# Patient Record
Sex: FEMALE | Race: WHITE | NOT HISPANIC OR LATINO | Employment: UNEMPLOYED | URBAN - METROPOLITAN AREA
[De-identification: names, ages, dates, MRNs, and addresses within clinical notes are randomized per-mention and may not be internally consistent; named-entity substitution may affect disease eponyms.]

---

## 2017-01-18 ENCOUNTER — TELEPHONE (OUTPATIENT)
Dept: PAIN MEDICINE | Facility: CLINIC | Age: 42
End: 2017-01-18

## 2017-01-18 NOTE — TELEPHONE ENCOUNTER
----- Message from Faith Anderson sent at 1/18/2017 10:56 AM CST -----  Contact: [phone]    Calling to  Speak to the  Nurse about   Booking    Shots  For  Neck  Pain/ please call

## 2017-01-19 ENCOUNTER — OFFICE VISIT (OUTPATIENT)
Dept: PAIN MEDICINE | Facility: CLINIC | Age: 42
End: 2017-01-19
Payer: COMMERCIAL

## 2017-01-19 ENCOUNTER — HOSPITAL ENCOUNTER (OUTPATIENT)
Dept: RADIOLOGY | Facility: HOSPITAL | Age: 42
Discharge: HOME | End: 2017-01-19
Attending: ANESTHESIOLOGY
Payer: COMMERCIAL

## 2017-01-19 VITALS
RESPIRATION RATE: 18 BRPM | WEIGHT: 175.25 LBS | DIASTOLIC BLOOD PRESSURE: 70 MMHG | TEMPERATURE: 99 F | SYSTOLIC BLOOD PRESSURE: 110 MMHG | BODY MASS INDEX: 27.05 KG/M2 | HEART RATE: 74 BPM

## 2017-01-19 DIAGNOSIS — M47.812 FACET ARTHROPATHY, CERVICAL: ICD-10-CM

## 2017-01-19 DIAGNOSIS — M50.30 DDD (DEGENERATIVE DISC DISEASE), CERVICAL: ICD-10-CM

## 2017-01-19 DIAGNOSIS — M54.12 CERVICAL RADICULOPATHY: Primary | ICD-10-CM

## 2017-01-19 DIAGNOSIS — M54.12 CERVICAL RADICULOPATHY: ICD-10-CM

## 2017-01-19 PROCEDURE — 72141 MRI NECK SPINE W/O DYE: CPT | Mod: TC,PO

## 2017-01-19 PROCEDURE — 72141 MRI NECK SPINE W/O DYE: CPT | Mod: 26,,, | Performed by: RADIOLOGY

## 2017-01-19 PROCEDURE — 99999 PR PBB SHADOW E&M-EST. PATIENT-LVL III: CPT | Mod: PBBFAC,,, | Performed by: ANESTHESIOLOGY

## 2017-01-19 PROCEDURE — 99213 OFFICE O/P EST LOW 20 MIN: CPT | Mod: S$GLB,,, | Performed by: ANESTHESIOLOGY

## 2017-01-19 PROCEDURE — 1159F MED LIST DOCD IN RCRD: CPT | Mod: S$GLB,,, | Performed by: ANESTHESIOLOGY

## 2017-01-19 NOTE — MR AVS SNAPSHOT
[facility]  [address] [facility] [address]  [place] [address]  Phone: [phone]                  Isela Smyth   2017 11:00 AM   Office Visit    Description:  Female : 1975   Provider:  Rich Negrete MD   Department:  [facility]           Reason for Visit     Follow-up     Neck Pain           Diagnoses this Visit        Comments    Cervical radiculopathy    -  Primary     DDD (degenerative disc disease), cervical         Facet arthropathy, cervical                To Do List           Future Appointments        Provider Department Dept Phone    2017 12:00 PM [facility] MRI1 [facility] [phone]    2017 9:40 AM Hardy Dan MD [facility] [phone]      Goals (5 Years of Data)     None      [facility]     [facility] -  Assistance  Registered nurses in the [facility] provide clinical advisement, health education, appointment booking, and other advisory services.  Call for this free service at [phone].             Medications           Message regarding Medications     Verify the changes and/or additions to your medication regime listed below are the same as discussed with your clinician today.  If any of these changes or additions are incorrect, please notify your healthcare provider.             Verify that the below list of medications is an accurate representation of the medications you are currently taking.  If none reported, the list may be blank. If incorrect, please contact your healthcare provider. Carry this list with you in case of emergency.           Current Medications     azelastine (ASTELIN) 137 mcg (0.1 %) nasal spray 1 spray (137 mcg total) by Nasal route 2 (two) times daily.    butalbital-acetaminophen-caffeine -40 mg (FIORICET, ESGIC) -40 mg per tablet Take 1 tablet by mouth every 6 (six) hours as needed.    cyclobenzaprine (FLEXERIL) 10 MG  tablet Take 1 tablet (10 mg total) by mouth 3 (three) times daily as needed.    doxycycline (MONODOX) 100 MG capsule Take 1 capsule (100 mg total) by mouth 2 (two) times daily.    fluticasone (FLONASE) 50 mcg/actuation nasal spray 1 spray by Each Nare route 2 (two) times daily as needed for Rhinitis or Allergies.    hydrocodone-acetaminophen 7.5-325mg (NORCO) 7.5-325 mg per tablet Take 1 tablet by mouth 2 (two) times daily as needed for Pain.    ketorolac (TORADOL) 10 mg tablet Take 1 tablet (10 mg total) by mouth 3 (three) times daily as needed.    levothyroxine (SYNTHROID) 75 MCG tablet TAKE ONE TABLET BY MOUTH EVERY MORNING    linaclotide (LINZESS) 145 mcg Cap capsule Take 1 capsule (145 mcg total) by mouth once daily.    methylPREDNISolone (MEDROL DOSEPACK) 4 mg tablet use as directed    pantoprazole (PROTONIX) 40 MG tablet Take 1 tablet (40 mg total) by mouth once daily.    ranitidine (ZANTAC) 300 MG tablet Take 1 tablet (300 mg total) by mouth every evening.    sumatriptan (IMITREX) 100 MG tablet TAKE 1 TABLET BY MOUTH EVERY DAY AS NEEDED. MAY REPEAT IN 2 HOURS IF NEEDED. DO NOT EXCEED 2 TABLETS    trazodone (DESYREL) 150 MG tablet TAKE ONE TABLET BY MOUTH NIGHTLY AT BEDTIME    venlafaxine (EFFEXOR-XR) 150 MG Cp24 Take 1 capsule (150 mg total) by mouth once daily.           Clinical Reference Information           Vital Signs - Last Recorded  Most recent update: 1/19/2017 11:11 AM by Elvia Cassidy MA    BP Pulse Temp Resp Wt BMI    110/70 74 98.5 °F (36.9 °C) (Oral) 18 79.5 kg (175 lb 4.3 oz) 27.05 kg/m2      Blood Pressure          Most Recent Value    BP  110/70      Allergies as of 1/19/2017     Morphine      Immunizations Administered on Date of Encounter - 1/19/2017     None      Orders Placed During Today's Visit     Future Labs/Procedures Expected by Expires    MRI Cervical Spine Without Contrast  1/19/2017 1/20/2018
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
## 2017-01-23 ENCOUNTER — TELEPHONE (OUTPATIENT)
Dept: PAIN MEDICINE | Facility: CLINIC | Age: 42
End: 2017-01-23

## 2017-01-23 NOTE — TELEPHONE ENCOUNTER
Please let the patient know that I reviewed her cervical spine MRI results which was most notable for a disc bulge to the right at C5/6.  She also has some mild foraminal narrowing on the left at C6-7.  Please schedule her for a cervical ALFREDA with four-week follow-up.

## 2017-01-23 NOTE — TELEPHONE ENCOUNTER
----- Message from Lissette Pitt sent at 1/20/2017  3:59 PM CST -----  Contact: Patient  Isela, patient [phone], Patient is calling for results on MRI, stated she is in severe pain. Please advise. Thanks.

## 2017-01-23 NOTE — TELEPHONE ENCOUNTER
We do versed for the injections, we do not do MAC anesthesia, guidelines for safe injections recommend against using MAC anesthesia. That said, this injection is done with one injection, should be very simple. Dr. Dan has been providing her pain medications so she would need to check with him for any change in medication.

## 2017-01-23 NOTE — TELEPHONE ENCOUNTER
Spoke with the patient and she would like to proceed with the AMISHA but she is requesting MAC sedation. She stated that the last injection she had she was in too much pain. She is also having migraine headaches and the medications are not helping. Please advise.

## 2017-01-24 ENCOUNTER — TELEPHONE (OUTPATIENT)
Dept: FAMILY MEDICINE | Facility: CLINIC | Age: 42
End: 2017-01-24

## 2017-01-24 DIAGNOSIS — M54.12 CERVICAL RADICULOPATHY: Primary | ICD-10-CM

## 2017-01-24 RX ORDER — MIDAZOLAM HYDROCHLORIDE 5 MG/ML
4 INJECTION INTRAMUSCULAR; INTRAVENOUS ONCE
Status: CANCELLED | OUTPATIENT
Start: 2017-01-31

## 2017-01-24 RX ORDER — SODIUM CHLORIDE, SODIUM LACTATE, POTASSIUM CHLORIDE, CALCIUM CHLORIDE 600; 310; 30; 20 MG/100ML; MG/100ML; MG/100ML; MG/100ML
INJECTION, SOLUTION INTRAVENOUS CONTINUOUS
Status: CANCELLED | OUTPATIENT
Start: 2017-01-31

## 2017-01-24 NOTE — TELEPHONE ENCOUNTER
Spoke with patient. Explained to patient we use Versed for cervical ALFREDA's and she would have to contact Dr. Dan's office regarding pain medication. Patient verbalized understanding. Procedure date set for 1/31 with 4 week follow up.

## 2017-01-24 NOTE — TELEPHONE ENCOUNTER
----- Message from Pallavi Bales sent at 1/24/2017  9:57 AM CST -----  Contact: self  Patient wants to speak with a nurse regarding a Rx for pain medication. Please call back at [phone] (home)

## 2017-01-24 NOTE — TELEPHONE ENCOUNTER
Patient thinks she is having muscle spasm from lifting a heavy box with tools (she though the box was her sheets), she has been taking hydrocodone , flexeril and IBU so Dr Negrete will not do her injections as of now because she took the IBU.  Please advise per TY

## 2017-01-25 NOTE — TELEPHONE ENCOUNTER
Called patient and asked her to call the Dr Negrete that Dr Dan is not here at this time and she will have to see if he can help but if not to call us back.

## 2017-01-25 NOTE — TELEPHONE ENCOUNTER
Told patient to go to the ER or I can address her issue with Dr Dan tomorrow , he did leave early so I recommended her to the ER due to her pain being so severe of more than a 10. She said the pain pill and muscle relaxer arent effective.

## 2017-01-25 NOTE — TELEPHONE ENCOUNTER
----- Message from Stefania Jorge sent at 1/25/2017  4:50 PM CST -----  Contact: [phone]  Patient is requesting a call back from the nurse.  Please call the patient upon request at phone number [phone].

## 2017-01-25 NOTE — TELEPHONE ENCOUNTER
----- Message from Pavithra Hinkle sent at 1/25/2017  1:09 PM CST -----  Contact: self  Patient states she needs a different type of pain medication. Please call patient at [phone]. Thanks!   [id number] [place] - [address]  [address]  [place] [address]  Phone: [phone] Fax: [phone]

## 2017-01-26 ENCOUNTER — TELEPHONE (OUTPATIENT)
Dept: PAIN MEDICINE | Facility: CLINIC | Age: 42
End: 2017-01-26

## 2017-01-26 RX ORDER — HYDROMORPHONE HYDROCHLORIDE 2 MG/1
2 TABLET ORAL 3 TIMES DAILY PRN
Qty: 15 TABLET | Refills: 0 | Status: SHIPPED | OUTPATIENT
Start: 2017-01-26 | End: 2017-02-17
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
## 2017-01-26 NOTE — TELEPHONE ENCOUNTER
inform pt via phone:    rx dilaudid e sent. Only 15 tabs given.  Please hold norco 7.5 and start the dilaudid.

## 2017-01-26 NOTE — TELEPHONE ENCOUNTER
----- Message from Leyla Craven sent at 1/25/2017  4:39 PM CST -----  Contact: Patient  Place call to pod.Patient call with questions regarding medication. Patient stated that she is in extreme pain and the flexeril is not helping. Please call back at [phone]. Thanks,

## 2017-01-26 NOTE — TELEPHONE ENCOUNTER
Spoke with patient. Patient states she is in severe pain. Patient stated her Flexeril that was prescribed by Dr. Dan is not working and patient stated she is waiting for his office to contact her back regarding medication adjustment. AMISHA has been scheduled for 1/31.

## 2017-01-29 RX ORDER — LEVOTHYROXINE SODIUM 75 UG/1
TABLET ORAL
Qty: 90 TABLET | Refills: 0 | Status: SHIPPED | OUTPATIENT
Start: 2017-01-29 | End: 2017-02-17 | Stop reason: SDUPTHER

## 2017-01-30 ENCOUNTER — TELEPHONE (OUTPATIENT)
Dept: PAIN MEDICINE | Facility: CLINIC | Age: 42
End: 2017-01-30

## 2017-01-30 NOTE — TELEPHONE ENCOUNTER
----- Message from Lissette Pitt sent at 1/30/2017  1:52 PM CST -----  Contact: Patient  Isela, patient [phone], Patient is having a lot of numbness in both arms. Patient is scheduled for injections tomorrow and is having pain neck. Please call and advise. Thanks.

## 2017-01-30 NOTE — TELEPHONE ENCOUNTER
Patient stated that she is nervous about the injection because of past experiences. Has the numbness in both arms and wanted Dr. Negrete to be aware. She will discuss more in pre-op before the procedure tomorrow.

## 2017-01-31 ENCOUNTER — HOSPITAL ENCOUNTER (OUTPATIENT)
Dept: RADIOLOGY | Facility: HOSPITAL | Age: 42
Discharge: HOME | End: 2017-01-31
Attending: ANESTHESIOLOGY | Admitting: ANESTHESIOLOGY
Payer: COMMERCIAL

## 2017-01-31 ENCOUNTER — HOSPITAL ENCOUNTER (OUTPATIENT)
Facility: HOSPITAL | Age: 42
Discharge: HOME | End: 2017-01-31
Attending: ANESTHESIOLOGY | Admitting: ANESTHESIOLOGY
Payer: COMMERCIAL

## 2017-01-31 ENCOUNTER — SURGERY (OUTPATIENT)
Age: 42
End: 2017-01-31

## 2017-01-31 VITALS
WEIGHT: 168 LBS | SYSTOLIC BLOOD PRESSURE: 140 MMHG | RESPIRATION RATE: 16 BRPM | OXYGEN SATURATION: 100 % | HEIGHT: 68 IN | BODY MASS INDEX: 25.46 KG/M2 | HEART RATE: 96 BPM | TEMPERATURE: 98 F | DIASTOLIC BLOOD PRESSURE: 90 MMHG

## 2017-01-31 DIAGNOSIS — M54.12 CERVICAL RADICULOPATHY: ICD-10-CM

## 2017-01-31 DIAGNOSIS — M50.30 DDD (DEGENERATIVE DISC DISEASE), CERVICAL: ICD-10-CM

## 2017-01-31 LAB
B-HCG UR QL: NEGATIVE
CTP QC/QA: YES

## 2017-01-31 PROCEDURE — 62321 NJX INTERLAMINAR CRV/THRC: CPT | Mod: ,,, | Performed by: ANESTHESIOLOGY

## 2017-01-31 PROCEDURE — 81025 URINE PREGNANCY TEST: CPT | Mod: PO | Performed by: ANESTHESIOLOGY

## 2017-01-31 PROCEDURE — 62321 NJX INTERLAMINAR CRV/THRC: CPT | Performed by: ANESTHESIOLOGY

## 2017-01-31 PROCEDURE — 99152 MOD SED SAME PHYS/QHP 5/>YRS: CPT | Mod: ,,, | Performed by: ANESTHESIOLOGY

## 2017-01-31 PROCEDURE — 25000003 PHARM REV CODE 250: Mod: PO | Performed by: ANESTHESIOLOGY

## 2017-01-31 PROCEDURE — 25500020 PHARM REV CODE 255: Mod: PO | Performed by: ANESTHESIOLOGY

## 2017-01-31 PROCEDURE — 62320 NJX INTERLAMINAR CRV/THRC: CPT | Mod: PO | Performed by: ANESTHESIOLOGY

## 2017-01-31 PROCEDURE — 63600175 PHARM REV CODE 636 W HCPCS: Mod: PO | Performed by: ANESTHESIOLOGY

## 2017-01-31 RX ORDER — METHYLPREDNISOLONE ACETATE 80 MG/ML
INJECTION, SUSPENSION INTRA-ARTICULAR; INTRALESIONAL; INTRAMUSCULAR; SOFT TISSUE
Status: DISCONTINUED | OUTPATIENT
Start: 2017-01-31 | End: 2017-01-31 | Stop reason: HOSPADM

## 2017-01-31 RX ORDER — SODIUM CHLORIDE 9 MG/ML
INJECTION, SOLUTION INTRAMUSCULAR; INTRAVENOUS; SUBCUTANEOUS
Status: DISCONTINUED | OUTPATIENT
Start: 2017-01-31 | End: 2017-01-31 | Stop reason: HOSPADM

## 2017-01-31 RX ORDER — SODIUM CHLORIDE, SODIUM LACTATE, POTASSIUM CHLORIDE, CALCIUM CHLORIDE 600; 310; 30; 20 MG/100ML; MG/100ML; MG/100ML; MG/100ML
INJECTION, SOLUTION INTRAVENOUS CONTINUOUS
Status: DISCONTINUED | OUTPATIENT
Start: 2017-01-31 | End: 2017-01-31 | Stop reason: HOSPADM

## 2017-01-31 RX ORDER — MIDAZOLAM HYDROCHLORIDE 1 MG/ML
4 INJECTION INTRAMUSCULAR; INTRAVENOUS ONCE
Status: COMPLETED | OUTPATIENT
Start: 2017-01-31 | End: 2017-01-31

## 2017-01-31 RX ORDER — LIDOCAINE HYDROCHLORIDE 10 MG/ML
INJECTION INFILTRATION; PERINEURAL
Status: DISCONTINUED | OUTPATIENT
Start: 2017-01-31 | End: 2017-01-31 | Stop reason: HOSPADM

## 2017-01-31 RX ADMIN — SODIUM CHLORIDE 4 ML: 9 INJECTION INTRAMUSCULAR; INTRAVENOUS; SUBCUTANEOUS at 12:01

## 2017-01-31 RX ADMIN — MIDAZOLAM HYDROCHLORIDE 4 MG: 1 INJECTION, SOLUTION INTRAMUSCULAR; INTRAVENOUS at 12:01

## 2017-01-31 RX ADMIN — METHYLPREDNISOLONE ACETATE 80 MG: 80 INJECTION, SUSPENSION INTRA-ARTICULAR; INTRALESIONAL; INTRAMUSCULAR; SOFT TISSUE at 12:01

## 2017-01-31 RX ADMIN — LIDOCAINE HYDROCHLORIDE 5 ML: 10 INJECTION, SOLUTION INFILTRATION; PERINEURAL at 12:01

## 2017-01-31 RX ADMIN — SODIUM CHLORIDE, SODIUM LACTATE, POTASSIUM CHLORIDE, AND CALCIUM CHLORIDE: .6; .31; .03; .02 INJECTION, SOLUTION INTRAVENOUS at 12:01

## 2017-01-31 RX ADMIN — IOHEXOL 3 ML: 300 INJECTION, SOLUTION INTRAVENOUS at 12:01
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
## 2017-01-31 NOTE — OP NOTE
PROCEDURE DATE: 1/31/2017    Procedure: C7-T1 cervical interlaminar epidural steroid injection under utilizing fluoroscopy.    Diagnosis: Cervical Radiculopathy    POSTOP DIAGNOSIS: SAME    Physician: Rich Negrete MD    Medications injected:  Methylprednisone 80mg followed by a slow injection of 4 mL sterile, preservative-free normal saline.    Local anesthetic used: Lidocaine 1%, 4 ml.    Sedation Medications: 4mg versed    Complications:  none    Estimated blood loss: none    Technique:  A time-out was taken to identify patient and procedure prior to starting the procedure.  With the patient laying in a prone position with the neck in a mid-flexed forward position, the area was prepped and draped in the usual sterile fashion using ChloraPrep and a fenestrated drape.  The area was determined under AP fluoroscopic guidance.  Local anesthetic was given using a 25-gauge 1.5 inch needle by raising a wheal and then infiltrating ventrally.  A 3.5 inch 20-gauge Touhy needle was introduced under fluoroscopic guidance to meet the lamina of C7.  The needle was then hinged under the lamina then advanced using loss of resistance technique.  Once the tip of the needle was in the desired position, the contrast dye Omnipaque was injected to determine placement and no uptake.  The steroid was then injected slowly followed by a slow injection of 4 mL of the sterile preservative-free normal saline.  The patient tolerated the procedure well.    The patient was monitored after the procedure and was given post-procedure and discharge instructions to follow at home. The patient was discharged in a stable condition.

## 2017-01-31 NOTE — IP AVS SNAPSHOT
[facility]  [address]  [place] [address]  Phone: [phone]           Patient Discharge Instructions     Our goal is to set you up for success. This packet includes information on your condition, medications, and your home care. It will help you to care for yourself so you don't get sicker and need to go back to the hospital.     Please ask your nurse if you have any questions.        There are many details to remember when preparing to leave the hospital. Here is what you will need to do:    1. Take your medicine. If you are prescribed medications, review your Medication List in the following pages. You may have new medications to  at the pharmacy and others that you'll need to stop taking. Review the instructions for how and when to take your medications. Talk with your doctor or nurses if you are unsure of what to do.     2. Go to your follow-up appointments. Specific follow-up information is listed in the following pages. Your may be contacted by a transition nurse or clinical provider about future appointments. Be sure we have all of the phone numbers to reach you, if needed. Please contact your provider's office if you are unable to make an appointment.     3. Watch for warning signs. Your doctor or nurse will give you detailed warning signs to watch for and when to call for assistance. These instructions may also include educational information about your condition. If you experience any of warning signs to your health, call your doctor.               [facility]  Unless otherwise directed by your provider, please contact [facility], our nurse care line that is available for 24/7 assistance.     [phone] (toll-free)    Registered nurses in the [facility] provide clinical advisement, health education, appointment booking, and other advisory services.                    ** Verify the list of medication(s) below is accurate and up  to date. Carry this with you in case of emergency. If your medications have changed, please notify your healthcare provider.             Medication List      CHANGE how you take these medications        Additional Info                      linaclotide 145 mcg Cap capsule   Commonly known as:  LINZESS   Quantity:  30 capsule   Refills:  5   Dose:  145 mcg   What changed:    - when to take this  - reasons to take this    Instructions:  Take 1 capsule (145 mcg total) by mouth once daily.     Begin Date    AM    Noon    PM    Bedtime         CONTINUE taking these medications        Additional Info                      azelastine 137 mcg (0.1 %) nasal spray   Commonly known as:  ASTELIN   Quantity:  30 mL   Refills:  11   Dose:  1 spray    Instructions:  1 spray (137 mcg total) by Nasal route 2 (two) times daily.     Begin Date    AM    Noon    PM    Bedtime       butalbital-acetaminophen-caffeine -40 mg -40 mg per tablet   Commonly known as:  FIORICET, ESGIC   Quantity:  60 tablet   Refills:  2   Dose:  1 tablet    Instructions:  Take 1 tablet by mouth every 6 (six) hours as needed.     Begin Date    AM    Noon    PM    Bedtime       cyclobenzaprine 10 MG tablet   Commonly known as:  FLEXERIL   Quantity:  30 tablet   Refills:  5   Dose:  10 mg    Instructions:  Take 1 tablet (10 mg total) by mouth 3 (three) times daily as needed.     Begin Date    AM    Noon    PM    Bedtime       fluticasone 50 mcg/actuation nasal spray   Commonly known as:  FLONASE   Quantity:  16 g   Refills:  11   Dose:  1 spray    Instructions:  1 spray by Each Nare route 2 (two) times daily as needed for Rhinitis or Allergies.     Begin Date    AM    Noon    PM    Bedtime       hydrocodone-acetaminophen 7.5-325mg 7.5-325 mg per tablet   Commonly known as:  NORCO   Quantity:  60 tablet   Refills:  0   Dose:  1 tablet    Instructions:  Take 1 tablet by mouth 2 (two) times daily as needed for Pain.     Begin Date    AM    Noon    PM     Bedtime       HYDROmorphone 2 MG tablet   Commonly known as:  DILAUDID   Quantity:  15 tablet   Refills:  0   Dose:  2 mg    Instructions:  Take 1 tablet (2 mg total) by mouth 3 (three) times daily as needed for Pain.     Begin Date    AM    Noon    PM    Bedtime       ketorolac 10 mg tablet   Commonly known as:  TORADOL   Quantity:  30 tablet   Refills:  2   Dose:  10 mg    Instructions:  Take 1 tablet (10 mg total) by mouth 3 (three) times daily as needed.     Begin Date    AM    Noon    PM    Bedtime       levothyroxine 75 MCG tablet   Commonly known as:  SYNTHROID   Quantity:  90 tablet   Refills:  0    Instructions:  TAKE ONE TABLET BY MOUTH EVERY MORNING     Begin Date    AM    Noon    PM    Bedtime       pantoprazole 40 MG tablet   Commonly known as:  PROTONIX   Quantity:  30 tablet   Refills:  5   Dose:  40 mg    Instructions:  Take 1 tablet (40 mg total) by mouth once daily.     Begin Date    AM    Noon    PM    Bedtime       ranitidine 300 MG tablet   Commonly known as:  ZANTAC   Quantity:  30 tablet   Refills:  2   Dose:  300 mg    Instructions:  Take 1 tablet (300 mg total) by mouth every evening.     Begin Date    AM    Noon    PM    Bedtime       sumatriptan 100 MG tablet   Commonly known as:  IMITREX   Quantity:  9 tablet   Refills:  11    Instructions:  TAKE 1 TABLET BY MOUTH EVERY DAY AS NEEDED. MAY REPEAT IN 2 HOURS IF NEEDED. DO NOT EXCEED 2 TABLETS     Begin Date    AM    Noon    PM    Bedtime       trazodone 150 MG tablet   Commonly known as:  DESYREL   Quantity:  30 tablet   Refills:  2    Instructions:  TAKE ONE TABLET BY MOUTH NIGHTLY AT BEDTIME     Begin Date    AM    Noon    PM    Bedtime       venlafaxine 150 MG Cp24   Commonly known as:  EFFEXOR-XR   Quantity:  90 capsule   Refills:  3   Dose:  150 mg    Instructions:  Take 1 capsule (150 mg total) by mouth once daily.     Begin Date    AM    Noon    PM    Bedtime                  Please bring to all follow up appointments:    1. A copy  of your discharge instructions.  2. All medicines you are currently taking in their original bottles.  3. Identification and insurance card.    Please arrive 15 minutes ahead of scheduled appointment time.    Please call 24 hours in advance if you must reschedule your appointment and/or time.        Your Scheduled Appointments     Jan 31, 2017  2:30 PM CST   Fl For Pain Management with NS [facility] ([place]    [address] [place] [address]   [phone]            Feb 17, 2017  9:40 AM CST   Established Patient Visit with Haryd Dan MD   [facility] ([place])    [address] [place] [address]   [phone]            Feb 22, 2017 10:00 AM CST   Established Patient Visit with LUKE Baez   [facility] ([facility]    [address] [place] [address]   [phone]                Discharge Instructions     Future Orders    Activity as tolerated     Call MD for:  redness, tenderness, or signs of infection (pain, swelling, redness, odor or green/yellow discharge around incision site)     Call MD for:  severe persistent headache     Call MD for:  severe uncontrolled pain     Call MD for:  temperature >100.4     Diet general     Questions:    Total calories:      Fat restriction, if any:      Protein restriction, if any:      Na restriction, if any:      Fluid restriction:      Additional restrictions:      No dressing needed         Discharge Instructions       Home care instructions  Apply ice pack to the injection site for 20 minutes periods for the first 24 hrs for soreness/discomfort at injection site DO NOT USE HEAT FOR 24 HOURS  Keep site clean and dry for 24 hours, remove bandaid when desired  Do not drive until tomorrow  Take care when walking after a lumbar injection  Avoid strenuous activities for 2 days  Make take 2 weeks to feel the full effects   Resume home medication as  "prescribed today  Resume Aspirin, Plavix, or Coumadin the day after the procedure unless otherwise instructed.    SEE IMMEDIATE MEDICAL HELP FOR:  Severe increase in your usual pain or appearance of new pain  Prolonged or increasing weakness or numbness in the legs or arms  Drainage, redness, active bleeding, or increased swelling at the injection site  Temperature over 100.0 degrees F.  Headache that increases when your head is upright and decreases when you lie flat    CALL 911 OR GO DIRECTLY TO EMERGENCY DEPARTMENT FOR:  Shortness of breath, chest pain, or problems breathing      Primary Diagnosis     Your primary diagnosis was:  Cervical Nerve Root Disorder      Admission Information     Date & Time Provider Department CSN    1/31/2017 11:42 AM Rich Negrete MD [facility] [id number]      Care Providers     Provider Role Specialty Primary office phone    Rich Negrete MD Attending Provider Pain Medicine [phone]    Rich Negrete MD Surgeon  Pain Medicine [phone]      Your Vitals Were     BP Pulse Temp Resp Height Weight    141/88 (BP Location: Right arm, Patient Position: Sitting, BP Method: Automatic) 88 98.2 °F (36.8 °C) (Skin) 16 5' 7.5" (1.715 m) 76.2 kg (168 lb)    Last Period SpO2 BMI          01/22/2017 100% 25.92 kg/m2        Recent Lab Values     No lab values to display.      Allergies as of 1/31/2017        Reactions    Morphine Itching      Advance Directives     An advance directive is a document which, in the event you are no longer able to make decisions for yourself, tells your healthcare team what kind of treatment you do or do not want to receive, or who you would like to make those decisions for you.  If you do not currently have an advance directive, [facility] encourages you to create one.  For more information call:  [phone]), [phone] ([phone]),  or log on to [email or web address].        Language Assistance Services  "    ATTENTION: Language assistance services are available, free of charge. Please call [phone].      ATENCIÓN: Si habla español, tiene a vallejo disposición servicios gratuitos de asistencia lingüística. Llame al [phone].     CHÚ Ý: N?u b?n nói Ti?ng Vi?t, có các d?ch v? h? tr? ngôn ng? mi?n phí dành cho b?n. G?i s? [phone].         [facility] complies with applicable Federal civil rights laws and does not discriminate on the basis of race, color, national origin, age, disability, or sex.

## 2017-01-31 NOTE — DISCHARGE INSTRUCTIONS
Home care instructions  Apply ice pack to the injection site for 20 minutes periods for the first 24 hrs for soreness/discomfort at injection site DO NOT USE HEAT FOR 24 HOURS  Keep site clean and dry for 24 hours, remove bandaid when desired  Do not drive until tomorrow  Take care when walking after a lumbar injection  Avoid strenuous activities for 2 days  Make take 2 weeks to feel the full effects   Resume home medication as prescribed today  Resume Aspirin, Plavix, or Coumadin the day after the procedure unless otherwise instructed.    SEE IMMEDIATE MEDICAL HELP FOR:  Severe increase in your usual pain or appearance of new pain  Prolonged or increasing weakness or numbness in the legs or arms  Drainage, redness, active bleeding, or increased swelling at the injection site  Temperature over 100.0 degrees F.  Headache that increases when your head is upright and decreases when you lie flat    CALL 911 OR GO DIRECTLY TO EMERGENCY DEPARTMENT FOR:  Shortness of breath, chest pain, or problems breathing

## 2017-01-31 NOTE — DISCHARGE SUMMARY
[facility]  Discharge Note  Short Stay    Admit Date: 1/31/2017    Discharge Date: 1/31/2017    Attending Physician: Rich Negrete MD     Discharge Provider: Rich Negrete    Diagnoses:  Active Hospital Problems    Diagnosis  POA    *Cervical radiculopathy [M54.12]  Yes      Resolved Hospital Problems    Diagnosis Date Resolved POA   No resolved problems to display.       Discharged Condition: good    Final Diagnoses: Cervical radiculopathy [M54.12]    Disposition: Home or Self Care    Hospital Course: no complications, uneventful    Outcome of Hospitalization, Treatment, Procedure, or Surgery:  Patient was admitted for outpatient procedure. The patient underwent procedure without complications and are discharged home    Follow up/Patient Instructions:  Follow up as scheduled/Patient has received instructions and follow up date    Medications:  Continue previous medications      Discharge Procedure Orders  Diet general     Activity as tolerated     Call MD for:  temperature >100.4     Call MD for:  severe uncontrolled pain     Call MD for:  redness, tenderness, or signs of infection (pain, swelling, redness, odor or green/yellow discharge around incision site)     Call MD for:  severe persistent headache     No dressing needed           Discharge Procedure Orders (must include Diet, Follow-up, Activity):    Discharge Procedure Orders (must include Diet, Follow-up, Activity)  Diet general     Activity as tolerated     Call MD for:  temperature >100.4     Call MD for:  severe uncontrolled pain     Call MD for:  redness, tenderness, or signs of infection (pain, swelling, redness, odor or green/yellow discharge around incision site)     Call MD for:  severe persistent headache     No dressing needed

## 2017-01-31 NOTE — PLAN OF CARE
Problem: Patient Care Overview  Goal: Individualization & Mutuality  Has met unit/department guidelines for discharge from each phase of the post procedure continuum

## 2017-02-03 ENCOUNTER — PATIENT OUTREACH (OUTPATIENT)
Dept: ADMINISTRATIVE | Facility: HOSPITAL | Age: 42
End: 2017-02-03

## 2017-02-03 NOTE — LETTER
February 3, 2017    Isela Libra  [address]  [place] [address]             [facility]  [address]  [facility] [address]  Phone: [phone] Dear Mrs. Smyth:    [facility] is committed to your overall health.  To help you get the most out of each of your visits, we will review your information to make sure you are up to date on all of your recommended tests and/or procedures.      Dr. Dan         has found that you may be due for:    Mammogram  Influenza vaccine    If you have had any of the above done at another facility, please bring the records or information with you so that your record at [facility] will be complete.     If you are currently taking medication , please bring it with you to your appointment for review.    If you have any questions or concerns, please don't hesitate to call.    Sincerely,    Serena Russo  Clinical Care Coordinator  [place] [facility] [address]  [place] [address]  Phone: [phone]   Fax: [phone]

## 2017-02-07 ENCOUNTER — TELEPHONE (OUTPATIENT)
Dept: PAIN MEDICINE | Facility: CLINIC | Age: 42
End: 2017-02-07

## 2017-02-07 NOTE — TELEPHONE ENCOUNTER
Patient evaluated at emergency department yesterday.  Please reassure the patient that we should give this some time and we can discuss this further in follow-up.  If she does not improve, have her call and we can always get her in sooner if needed.  It will be difficult to determine the full effect of the injection with having the fall and it may need to be repeated in the near future.  Also, we can repeat injections for her back in the future if necessary as well.

## 2017-02-07 NOTE — TELEPHONE ENCOUNTER
----- Message from Pavithra Hinkle sent at 2/6/2017  2:55 PM CST -----  Contact: self  Patient called asking for advice about bad headaches in the back of neck come toward front. She had and injection on 01/31/17.  Please call patient at [phone]. Thanks!

## 2017-02-07 NOTE — TELEPHONE ENCOUNTER
Spoke with patient regarding LUKE Go's note. Patient stated the headaches she is having is severe and everyday since her AMISHA. Patient stated these headaches started after injection. Please advise.

## 2017-02-07 NOTE — TELEPHONE ENCOUNTER
Yes, she was having headaches prior to the injection as well. Please have her come in for follow up in the next several days.

## 2017-02-07 NOTE — TELEPHONE ENCOUNTER
Patient stated she slipped in the tub and has some back pain from the fall. Patient states she has pain shooting up from her neck and is causing severe headaches. AMISHA was on 1/31. Please advise.

## 2017-02-09 ENCOUNTER — TELEPHONE (OUTPATIENT)
Dept: ADMINISTRATIVE | Facility: HOSPITAL | Age: 42
End: 2017-02-09

## 2017-02-09 NOTE — TELEPHONE ENCOUNTER
"Patient is enrolled in [facility] [employer] program for assistance with management of chronic disease. He/she will receive pre-visit and post-visit calls from a [employer] nurse to assist with closing any gaps in patient's care. Patient informed the nurse of the following:         "Mbr confirmed upcoming MD appt. States she does not take medication for BP. Reminded her that she is due for a cervical cancer screening (1/31)"  "

## 2017-02-17 ENCOUNTER — OFFICE VISIT (OUTPATIENT)
Dept: FAMILY MEDICINE | Facility: CLINIC | Age: 42
End: 2017-02-17
Payer: COMMERCIAL

## 2017-02-17 VITALS
HEART RATE: 92 BPM | BODY MASS INDEX: 27.1 KG/M2 | DIASTOLIC BLOOD PRESSURE: 82 MMHG | OXYGEN SATURATION: 99 % | HEIGHT: 68 IN | SYSTOLIC BLOOD PRESSURE: 118 MMHG | WEIGHT: 178.81 LBS

## 2017-02-17 DIAGNOSIS — G89.29 CHRONIC LOW BACK PAIN, UNSPECIFIED BACK PAIN LATERALITY, WITH SCIATICA PRESENCE UNSPECIFIED: ICD-10-CM

## 2017-02-17 DIAGNOSIS — G43.909 MIGRAINE WITHOUT STATUS MIGRAINOSUS, NOT INTRACTABLE, UNSPECIFIED MIGRAINE TYPE: Primary | ICD-10-CM

## 2017-02-17 DIAGNOSIS — G43.909 MIGRAINE WITHOUT STATUS MIGRAINOSUS, NOT INTRACTABLE, UNSPECIFIED MIGRAINE TYPE: ICD-10-CM

## 2017-02-17 DIAGNOSIS — M54.5 CHRONIC LOW BACK PAIN, UNSPECIFIED BACK PAIN LATERALITY, WITH SCIATICA PRESENCE UNSPECIFIED: ICD-10-CM

## 2017-02-17 DIAGNOSIS — M54.12 CERVICAL RADICULOPATHY: ICD-10-CM

## 2017-02-17 DIAGNOSIS — Z12.39 BREAST CANCER SCREENING: ICD-10-CM

## 2017-02-17 DIAGNOSIS — J31.0 CHRONIC RHINITIS: ICD-10-CM

## 2017-02-17 PROCEDURE — 99214 OFFICE O/P EST MOD 30 MIN: CPT | Mod: S$GLB,,, | Performed by: FAMILY MEDICINE

## 2017-02-17 PROCEDURE — 99999 PR PBB SHADOW E&M-EST. PATIENT-LVL III: CPT | Mod: PBBFAC,,, | Performed by: FAMILY MEDICINE

## 2017-02-17 RX ORDER — VENLAFAXINE HYDROCHLORIDE 150 MG/1
150 CAPSULE, EXTENDED RELEASE ORAL DAILY
Qty: 90 CAPSULE | Refills: 3 | Status: SHIPPED | OUTPATIENT
Start: 2017-02-17 | End: 2017-11-29 | Stop reason: SDUPTHER

## 2017-02-17 RX ORDER — BUTALBITAL, ACETAMINOPHEN AND CAFFEINE 50; 325; 40 MG/1; MG/1; MG/1
TABLET ORAL
Qty: 60 TABLET | Refills: 3 | Status: SHIPPED | OUTPATIENT
Start: 2017-02-17 | End: 2017-04-25 | Stop reason: SDUPTHER

## 2017-02-17 RX ORDER — SUMATRIPTAN SUCCINATE 100 MG/1
TABLET ORAL
Qty: 9 TABLET | Refills: 11 | Status: SHIPPED | OUTPATIENT
Start: 2017-02-17 | End: 2017-05-09 | Stop reason: SDUPTHER

## 2017-02-17 RX ORDER — HYDROCODONE BITARTRATE AND ACETAMINOPHEN 7.5; 325 MG/1; MG/1
1 TABLET ORAL 2 TIMES DAILY PRN
Qty: 60 TABLET | Refills: 0 | Status: SHIPPED | OUTPATIENT
Start: 2017-03-20 | End: 2017-04-10 | Stop reason: SDUPTHER

## 2017-02-17 RX ORDER — CYCLOBENZAPRINE HCL 10 MG
TABLET ORAL
Qty: 30 TABLET | Refills: 3 | Status: SHIPPED | OUTPATIENT
Start: 2017-02-17 | End: 2017-05-05 | Stop reason: SDUPTHER

## 2017-02-17 RX ORDER — HYDROCODONE BITARTRATE AND ACETAMINOPHEN 7.5; 325 MG/1; MG/1
1 TABLET ORAL 2 TIMES DAILY PRN
Qty: 60 TABLET | Refills: 0 | Status: SHIPPED | OUTPATIENT
Start: 2017-02-18 | End: 2017-03-20

## 2017-02-17 RX ORDER — HYDROCODONE BITARTRATE AND ACETAMINOPHEN 7.5; 325 MG/1; MG/1
1 TABLET ORAL 2 TIMES DAILY PRN
Qty: 60 TABLET | Refills: 0 | Status: SHIPPED | OUTPATIENT
Start: 2017-04-19 | End: 2017-05-18 | Stop reason: SDUPTHER

## 2017-02-17 RX ORDER — LEVOTHYROXINE SODIUM 75 UG/1
75 TABLET ORAL EVERY MORNING
Qty: 90 TABLET | Refills: 3 | Status: SHIPPED | OUTPATIENT
Start: 2017-02-17 | End: 2017-11-29 | Stop reason: SDUPTHER

## 2017-02-17 RX ORDER — FLUTICASONE PROPIONATE 50 MCG
2 SPRAY, SUSPENSION (ML) NASAL DAILY
Qty: 16 G | Refills: 11 | Status: SHIPPED | OUTPATIENT
Start: 2017-02-17 | End: 2019-05-30

## 2017-02-17 NOTE — PROGRESS NOTES
Subjective:       Patient ID: Isela Smyth is a 41 y.o. female.    Chief Complaint: Migraine    HPI     Hx of 6 low back spinal surgeries and 1 neck spinal surgery in past. Recent one in 2012.     status post 08/14/2012 microscopic recurrent left L4-L5 laminotomy, discectomy, left L5 complete laminectomy, left L5-S1 laminotomy,    recurrent discectomy.     Chronic lumbago controlled while on norco 10 and flexeril. Ps: 2/10. Occasional left sciatic pain. Had an placido last month which helped with the pain.     Had mri L spine 9/2016 which showed:     1.  Postoperative change of left hemilaminectomy at L4-L5 and L5-S1.  2.  Multilevel degenerative change of the lumbar spine, most pronounced at L4-L5 and L5-S1 as detailed above  3.  Minimal descending central disc extrusion at L5-S1 which does not appear to encroach upon either the descending left or right S1 nerve.  4.  Probable conjoined left S2 nerve coursing in the left posterolateral spinal canal.  Less likely, this represents an intraspinal synovial cyst abutting the descending left S1 nerve.  5.  Central annular tear of the intervertebral disc at L4-L5.    Also, has chronic neck pain > 5 years.  Hx of neck surgeries in past. Saw pain management recently and had an placido on 1/31/17.  Norco 7.5 helps with pain.      Had mri of cervical spine on 1/19/17 which showed:  -Disc protrusion at the C5-C6 level and to the right lateral recess with possible anterior cord contact  -Loss of normal cervical lordosis which may be positional or related to muscular strain.  -Milder degenerative changes are noted within the body of the report.      Depression stable while on effexor.      Gets 3-5 migraines per month.     Reports chronic nasal congestion, post nasal drip and dry cough x 1 week.  No fever or malaise.     Review of Systems      Review of Systems   Constitutional: Negative for fever and chills.   HENT: Negative for hearing loss and neck stiffness.    Eyes: Negative for  redness and itching.   Respiratory: Negative for cough and choking.    Cardiovascular: Negative for chest pain and leg swelling.  Abdomen: Negative for abdominal pain and blood in stool.   Genitourinary: Negative for dysuria and flank pain.   Neurological: Negative for light-headedness  Hematological: Negative for adenopathy.   Psychiatric/Behavioral: Negative for behavioral problems.     Objective:      Physical Exam   Constitutional: She appears well-developed.   HENT:   Head: Normocephalic and atraumatic.   Eyes: Conjunctivae are normal. Pupils are equal, round, and reactive to light.   Neck: Normal range of motion.   Cardiovascular: Normal rate and regular rhythm.    No murmur heard.  Pulmonary/Chest: Effort normal and breath sounds normal. She has no wheezes.   Musculoskeletal:   Cervical spine: tend of bilat paravert area.  Dec rom with flex/ext.     Lymphadenopathy:     She has no cervical adenopathy.       Assessment:       1. Migraine without status migrainosus, not intractable, unspecified migraine type    2. Breast cancer screening    3. Chronic rhinitis    4. Cervical radiculopathy    5. Chronic low back pain, unspecified back pain laterality, with sciatica presence unspecified        Plan:       Migraine without status migrainosus, not intractable, unspecified migraine type    Breast cancer screening  -     Mammo Digital Screening Bilateral With CAD; Future; Expected date: 2/17/17    Chronic rhinitis    Cervical radiculopathy    Chronic low back pain, unspecified back pain laterality, with sciatica presence unspecified    Other orders  -     hydrocodone-acetaminophen 7.5-325mg (NORCO) 7.5-325 mg per tablet; Take 1 tablet by mouth 2 (two) times daily as needed for Pain.  Dispense: 60 tablet; Refill: 0  -     hydrocodone-acetaminophen 7.5-325mg (NORCO) 7.5-325 mg per tablet; Take 1 tablet by mouth 2 (two) times daily as needed for Pain.  Dispense: 60 tablet; Refill: 0  -     hydrocodone-acetaminophen  7.5-325mg (NORCO) 7.5-325 mg per tablet; Take 1 tablet by mouth 2 (two) times daily as needed for Pain.  Dispense: 60 tablet; Refill: 0  -     fluticasone (FLONASE) 50 mcg/actuation nasal spray; 2 sprays by Each Nare route once daily.  Dispense: 16 g; Refill: 11      Plan:  Start flonase. Cont with astelin. otc zyrtec  rf norco 7.5  Cont all other meds  See mmg order

## 2017-02-17 NOTE — MR AVS SNAPSHOT
[facility]  [address]  [place] [address]  Phone: [phone]  Fax: [phone]                  Isela Smyth   2017 9:40 AM   Office Visit    Description:  Female : 1975   Provider:  Hardy Dan MD   Department:  [facility]           Reason for Visit     Migraine           Diagnoses this Visit        Comments    Breast cancer screening    -  Primary            To Do List           Future Appointments        Provider Department Dept Phone    2017 8:30 AM [facility] [id number] [facility] [phone]    2017 9:20 AM Hardy Dan MD [facility] [phone]      Goals (5 Years of Data)     None      Follow-Up and Disposition     Return in about 3 months (around 2017).       These Medications        Disp Refills Start End    hydrocodone-acetaminophen 7.5-325mg (NORCO) 7.5-325 mg per tablet 60 tablet 0 2017 3/20/2017    Take 1 tablet by mouth 2 (two) times daily as needed for Pain. - Oral    Ph[facility]: [id number] [place], [place] - [address] Ph #: [phone]       hydrocodone-acetaminophen 7.5-325mg (NORCO) 7.5-325 mg per tablet 60 tablet 0 3/20/2017 2017    Take 1 tablet by mouth 2 (two) times daily as needed for Pain. - Oral    Ph[facility]: [id number] [place], [place] - [address] Ph #: [phone]       hydrocodone-acetaminophen 7.5-325mg (NORCO) 7.5-325 mg per tablet 60 tablet 0 2017    Take 1 tablet by mouth 2 (two) times daily as needed for Pain. - Oral    Ph[facility]: [id number] [place] [place] - [address] Ph #: [phone]       fluticasone (FLONASE) 50 mcg/actuation nasal spray 16 g 11 2017     2 sprays by Each Nare route once daily. - Each Nare    Pharmacy: [id number] [place], [place] - [address] Ph #: [phone]         [facility]     [facility] -   Assistance  Registered nurses in the [facility] provide clinical advisement, health education, appointment booking, and other advisory services.  Call for this free service at [phone].             Medications           Message regarding Medications     Verify the changes and/or additions to your medication regime listed below are the same as discussed with your clinician today.  If any of these changes or additions are incorrect, please notify your healthcare provider.        START taking these NEW medications        Refills    hydrocodone-acetaminophen 7.5-325mg (NORCO) 7.5-325 mg per tablet 0    Starting on: 2017    Sig: Take 1 tablet by mouth 2 (two) times daily as needed for Pain.    Class: Print    Route: Oral    hydrocodone-acetaminophen 7.5-325mg (NORCO) 7.5-325 mg per tablet 0    Starting on: 3/20/2017    Sig: Take 1 tablet by mouth 2 (two) times daily as needed for Pain.    Class: Print    Route: Oral    hydrocodone-acetaminophen 7.5-325mg (NORCO) 7.5-325 mg per tablet 0    Starting on: 2017    Sig: Take 1 tablet by mouth 2 (two) times daily as needed for Pain.    Class: Print    Route: Oral    fluticasone (FLONASE) 50 mcg/actuation nasal spray 11    Si sprays by Each Nare route once daily.    Class: Normal    Route: Each Nare      STOP taking these medications     HYDROmorphone (DILAUDID) 2 MG tablet Take 1 tablet (2 mg total) by mouth 3 (three) times daily as needed for Pain.    ranitidine (ZANTAC) 300 MG tablet Take 1 tablet (300 mg total) by mouth every evening.           Verify that the below list of medications is an accurate representation of the medications you are currently taking.  If none reported, the list may be blank. If incorrect, please contact your healthcare provider. Carry this list with you in case of emergency.           Current Medications     azelastine (ASTELIN) 137 mcg (0.1 %) nasal spray 1 spray (137 mcg total) by Nasal route 2 (two) times daily.     "butalbital-acetaminophen-caffeine -40 mg (FIORICET, ESGIC) -40 mg per tablet Take 1 tablet by mouth every 6 (six) hours as needed.    cyclobenzaprine (FLEXERIL) 10 MG tablet Take 1 tablet (10 mg total) by mouth 3 (three) times daily as needed.    ketorolac (TORADOL) 10 mg tablet Take 1 tablet (10 mg total) by mouth 3 (three) times daily as needed.    levothyroxine (SYNTHROID) 75 MCG tablet TAKE ONE TABLET BY MOUTH EVERY MORNING    linaclotide (LINZESS) 145 mcg Cap capsule Take 1 capsule (145 mcg total) by mouth once daily.    pantoprazole (PROTONIX) 40 MG tablet Take 1 tablet (40 mg total) by mouth once daily.    sumatriptan (IMITREX) 100 MG tablet TAKE 1 TABLET BY MOUTH EVERY DAY AS NEEDED. MAY REPEAT IN 2 HOURS IF NEEDED. DO NOT EXCEED 2 TABLETS    trazodone (DESYREL) 150 MG tablet TAKE ONE TABLET BY MOUTH NIGHTLY AT BEDTIME    venlafaxine (EFFEXOR-XR) 150 MG Cp24 Take 1 capsule (150 mg total) by mouth once daily.    fluticasone (FLONASE) 50 mcg/actuation nasal spray 2 sprays by Each Nare route once daily.    hydrocodone-acetaminophen 7.5-325mg (NORCO) 7.5-325 mg per tablet Starting on Feb 18, 2017. Take 1 tablet by mouth 2 (two) times daily as needed for Pain.    hydrocodone-acetaminophen 7.5-325mg (NORCO) 7.5-325 mg per tablet Starting on Mar 20, 2017. Take 1 tablet by mouth 2 (two) times daily as needed for Pain.    hydrocodone-acetaminophen 7.5-325mg (NORCO) 7.5-325 mg per tablet Starting on Apr 19, 2017. Take 1 tablet by mouth 2 (two) times daily as needed for Pain.           Clinical Reference Information           Your Vitals Were     BP Pulse Height Weight Last Period SpO2    118/82 92 5' 7.5" (1.715 m) 81.1 kg (178 lb 12.7 oz) 01/27/2017 99%    BMI                27.59 kg/m2          Blood Pressure          Most Recent Value    BP  118/82      Allergies as of 2/17/2017     Morphine      Immunizations Administered on Date of Encounter - 2/17/2017     None      Orders Placed During Today's Visit "     Future Labs/Procedures Expected by Expires    Mammo Digital Screening Bilateral With CAD  2/17/2017 4/17/2018      Language Assistance Services     ATTENTION: Language assistance services are available, free of charge. Please call [phone].      ATENCIÓN: Si fco hooper, tiene a vallejo disposición servicios gratuitos de asistencia lingüística. Llame al [phone].     CHÚ Ý: N?u b?n nói Ti?ng Vi?t, có các d?ch v? h? tr? ngôn ng? mi?n phí dành cho b?n. G?i s? [phone].         [facility] complies with applicable Federal civil rights laws and does not discriminate on the basis of race, color, national origin, age, disability, or sex.

## 2017-02-20 ENCOUNTER — TELEPHONE (OUTPATIENT)
Dept: FAMILY MEDICINE | Facility: CLINIC | Age: 42
End: 2017-02-20

## 2017-02-20 NOTE — TELEPHONE ENCOUNTER
----- Message from Margarita Campos sent at 2/20/2017  3:27 PM CST -----  Contact: patient  Patient calling to speak with a Nurse about blood work. She wants the results faxed over to Dr Shelley, fax , attention to Ally. Please advise.  Call back .  Thanks!

## 2017-03-02 RX ORDER — KETOROLAC TROMETHAMINE 10 MG/1
TABLET, FILM COATED ORAL
Qty: 30 TABLET | Refills: 0 | Status: SHIPPED | OUTPATIENT
Start: 2017-03-02 | End: 2017-05-08 | Stop reason: SDUPTHER

## 2017-04-12 RX ORDER — HYDROCODONE BITARTRATE AND ACETAMINOPHEN 7.5; 325 MG/1; MG/1
1 TABLET ORAL 2 TIMES DAILY PRN
Qty: 60 TABLET | Refills: 0 | Status: SHIPPED | OUTPATIENT
Start: 2017-04-12 | End: 2017-05-12

## 2017-04-28 RX ORDER — BUTALBITAL, ACETAMINOPHEN AND CAFFEINE 50; 325; 40 MG/1; MG/1; MG/1
TABLET ORAL
Qty: 60 TABLET | Refills: 3 | Status: SHIPPED | OUTPATIENT
Start: 2017-04-28 | End: 2017-06-23 | Stop reason: SDUPTHER

## 2017-05-08 DIAGNOSIS — G43.909 MIGRAINE WITHOUT STATUS MIGRAINOSUS, NOT INTRACTABLE, UNSPECIFIED MIGRAINE TYPE: ICD-10-CM

## 2017-05-08 RX ORDER — CYCLOBENZAPRINE HCL 10 MG
TABLET ORAL
Qty: 30 TABLET | Refills: 3 | Status: SHIPPED | OUTPATIENT
Start: 2017-05-08 | End: 2017-07-11 | Stop reason: SDUPTHER

## 2017-05-09 RX ORDER — SUMATRIPTAN SUCCINATE 100 MG/1
TABLET ORAL
Qty: 9 TABLET | Refills: 11 | Status: SHIPPED | OUTPATIENT
Start: 2017-05-09 | End: 2017-11-21 | Stop reason: SDUPTHER

## 2017-05-09 RX ORDER — KETOROLAC TROMETHAMINE 10 MG/1
TABLET, FILM COATED ORAL
Qty: 30 TABLET | Refills: 0 | Status: SHIPPED | OUTPATIENT
Start: 2017-05-09 | End: 2017-07-11 | Stop reason: SDUPTHER

## 2017-05-09 NOTE — TELEPHONE ENCOUNTER
----- Message from Ria Sinclair sent at 5/9/2017  8:15 AM CDT -----  Contact: Patient  Patient states that she is having bad migraines and can you please call in the prescriptions the ketorolac (TORADOL) 10 mg tablets, the sumatriptan (IMITREX) 100 MG tablets and also the cyclobenzaprine (FLEXERIL) 10 MG tablets.  She asking for you to please call her because if these medications can not be filled, she may have to go to the ER.  Please call [phone].  Thank you      [id number] - [place] - [address]  [address]  [place] [address]  Phone: [phone] Fax: [phone]

## 2017-05-17 RX ORDER — PANTOPRAZOLE SODIUM 40 MG/1
40 TABLET, DELAYED RELEASE ORAL DAILY
Qty: 30 TABLET | Refills: 5 | Status: SHIPPED | OUTPATIENT
Start: 2017-05-17 | End: 2017-11-05

## 2017-05-18 ENCOUNTER — OFFICE VISIT (OUTPATIENT)
Dept: FAMILY MEDICINE | Facility: CLINIC | Age: 42
End: 2017-05-18
Payer: COMMERCIAL

## 2017-05-18 VITALS
OXYGEN SATURATION: 98 % | HEIGHT: 67 IN | DIASTOLIC BLOOD PRESSURE: 88 MMHG | HEART RATE: 107 BPM | SYSTOLIC BLOOD PRESSURE: 134 MMHG | BODY MASS INDEX: 28.48 KG/M2 | WEIGHT: 181.44 LBS

## 2017-05-18 DIAGNOSIS — F32.A DEPRESSION, UNSPECIFIED DEPRESSION TYPE: ICD-10-CM

## 2017-05-18 DIAGNOSIS — K21.9 GASTROESOPHAGEAL REFLUX DISEASE, ESOPHAGITIS PRESENCE NOT SPECIFIED: ICD-10-CM

## 2017-05-18 DIAGNOSIS — M54.12 CERVICAL RADICULOPATHY: ICD-10-CM

## 2017-05-18 DIAGNOSIS — G43.909 MIGRAINE WITHOUT STATUS MIGRAINOSUS, NOT INTRACTABLE, UNSPECIFIED MIGRAINE TYPE: Primary | ICD-10-CM

## 2017-05-18 DIAGNOSIS — M54.5 CHRONIC LOW BACK PAIN, UNSPECIFIED BACK PAIN LATERALITY, WITH SCIATICA PRESENCE UNSPECIFIED: ICD-10-CM

## 2017-05-18 DIAGNOSIS — G89.29 CHRONIC LOW BACK PAIN, UNSPECIFIED BACK PAIN LATERALITY, WITH SCIATICA PRESENCE UNSPECIFIED: ICD-10-CM

## 2017-05-18 DIAGNOSIS — M51.36 DDD (DEGENERATIVE DISC DISEASE), LUMBAR: ICD-10-CM

## 2017-05-18 PROCEDURE — 99999 PR PBB SHADOW E&M-EST. PATIENT-LVL III: CPT | Mod: PBBFAC,,, | Performed by: FAMILY MEDICINE

## 2017-05-18 PROCEDURE — 99214 OFFICE O/P EST MOD 30 MIN: CPT | Mod: S$GLB,,, | Performed by: FAMILY MEDICINE

## 2017-05-18 PROCEDURE — 1160F RVW MEDS BY RX/DR IN RCRD: CPT | Mod: S$GLB,,, | Performed by: FAMILY MEDICINE

## 2017-05-18 RX ORDER — HYDROCODONE BITARTRATE AND ACETAMINOPHEN 7.5; 325 MG/1; MG/1
1 TABLET ORAL 2 TIMES DAILY PRN
Qty: 60 TABLET | Refills: 0 | Status: SHIPPED | OUTPATIENT
Start: 2017-06-15 | End: 2017-07-14 | Stop reason: SDUPTHER

## 2017-05-18 RX ORDER — HYDROCODONE BITARTRATE AND ACETAMINOPHEN 7.5; 325 MG/1; MG/1
1 TABLET ORAL 2 TIMES DAILY PRN
Qty: 60 TABLET | Refills: 0 | Status: SHIPPED | OUTPATIENT
Start: 2017-07-15 | End: 2017-08-14

## 2017-06-21 ENCOUNTER — TELEPHONE (OUTPATIENT)
Dept: PAIN MEDICINE | Facility: CLINIC | Age: 42
End: 2017-06-21

## 2017-06-21 NOTE — TELEPHONE ENCOUNTER
----- Message from Sravani Payton sent at 6/19/2017  4:16 PM CDT -----  Contact: self  Needs to know when she needs the steroid injections in back.  Please call back at [phone] (home)

## 2017-06-24 RX ORDER — BUTALBITAL, ACETAMINOPHEN AND CAFFEINE 50; 325; 40 MG/1; MG/1; MG/1
TABLET ORAL
Qty: 60 TABLET | Refills: 0 | Status: SHIPPED | OUTPATIENT
Start: 2017-06-24 | End: 2017-07-11 | Stop reason: SDUPTHER

## 2017-07-12 RX ORDER — KETOROLAC TROMETHAMINE 10 MG/1
TABLET, FILM COATED ORAL
Qty: 30 TABLET | Refills: 2 | Status: SHIPPED | OUTPATIENT
Start: 2017-07-12 | End: 2018-09-04

## 2017-07-12 RX ORDER — CYCLOBENZAPRINE HCL 10 MG
TABLET ORAL
Qty: 30 TABLET | Refills: 2 | Status: SHIPPED | OUTPATIENT
Start: 2017-07-12 | End: 2017-08-28 | Stop reason: SDUPTHER

## 2017-07-12 RX ORDER — BUTALBITAL, ACETAMINOPHEN AND CAFFEINE 50; 325; 40 MG/1; MG/1; MG/1
TABLET ORAL
Qty: 60 TABLET | Refills: 2 | Status: SHIPPED | OUTPATIENT
Start: 2017-07-12 | End: 2017-09-01 | Stop reason: SDUPTHER

## 2017-07-14 ENCOUNTER — TELEPHONE (OUTPATIENT)
Dept: FAMILY MEDICINE | Facility: CLINIC | Age: 42
End: 2017-07-14

## 2017-07-14 RX ORDER — HYDROCODONE BITARTRATE AND ACETAMINOPHEN 7.5; 325 MG/1; MG/1
1 TABLET ORAL 2 TIMES DAILY PRN
Qty: 60 TABLET | Refills: 0 | Status: SHIPPED | OUTPATIENT
Start: 2017-07-14 | End: 2017-08-13

## 2017-08-16 ENCOUNTER — TELEPHONE (OUTPATIENT)
Dept: FAMILY MEDICINE | Facility: CLINIC | Age: 42
End: 2017-08-16

## 2017-08-16 NOTE — TELEPHONE ENCOUNTER
----- Message from Malika Orozco sent at 8/16/2017  1:32 PM CDT -----  Call [phone]  / pt request to reschedule appt / cancelled yesterday ... Next available is 09/13 th

## 2017-08-29 RX ORDER — CYCLOBENZAPRINE HCL 10 MG
TABLET ORAL
Qty: 30 TABLET | Refills: 2 | Status: SHIPPED | OUTPATIENT
Start: 2017-08-29 | End: 2017-10-20 | Stop reason: SDUPTHER

## 2017-08-31 ENCOUNTER — TELEPHONE (OUTPATIENT)
Dept: FAMILY MEDICINE | Facility: CLINIC | Age: 42
End: 2017-08-31

## 2017-08-31 NOTE — TELEPHONE ENCOUNTER
----- Message from Margarita Campos sent at 8/31/2017  9:51 AM CDT -----  Contact: patient  Patient calling in regards to scheduling an appt. She wants to speak with the Nurse about an early appt. Please advise.  Call back   Thanks!

## 2017-09-01 ENCOUNTER — OFFICE VISIT (OUTPATIENT)
Dept: FAMILY MEDICINE | Facility: CLINIC | Age: 42
End: 2017-09-01
Payer: COMMERCIAL

## 2017-09-01 VITALS
DIASTOLIC BLOOD PRESSURE: 68 MMHG | BODY MASS INDEX: 28.02 KG/M2 | HEART RATE: 72 BPM | HEIGHT: 67 IN | WEIGHT: 178.56 LBS | SYSTOLIC BLOOD PRESSURE: 110 MMHG | OXYGEN SATURATION: 98 %

## 2017-09-01 DIAGNOSIS — G43.909 MIGRAINE WITHOUT STATUS MIGRAINOSUS, NOT INTRACTABLE, UNSPECIFIED MIGRAINE TYPE: Primary | ICD-10-CM

## 2017-09-01 DIAGNOSIS — F32.A DEPRESSION, UNSPECIFIED DEPRESSION TYPE: ICD-10-CM

## 2017-09-01 DIAGNOSIS — M54.5 CHRONIC LOW BACK PAIN, UNSPECIFIED BACK PAIN LATERALITY, WITH SCIATICA PRESENCE UNSPECIFIED: ICD-10-CM

## 2017-09-01 DIAGNOSIS — G89.29 CHRONIC LOW BACK PAIN, UNSPECIFIED BACK PAIN LATERALITY, WITH SCIATICA PRESENCE UNSPECIFIED: ICD-10-CM

## 2017-09-01 DIAGNOSIS — M51.36 DDD (DEGENERATIVE DISC DISEASE), LUMBAR: ICD-10-CM

## 2017-09-01 DIAGNOSIS — K21.9 GASTROESOPHAGEAL REFLUX DISEASE, ESOPHAGITIS PRESENCE NOT SPECIFIED: ICD-10-CM

## 2017-09-01 DIAGNOSIS — M54.12 CERVICAL RADICULOPATHY: ICD-10-CM

## 2017-09-01 DIAGNOSIS — E66.3 OVERWEIGHT: ICD-10-CM

## 2017-09-01 PROCEDURE — 99999 PR PBB SHADOW E&M-EST. PATIENT-LVL III: CPT | Mod: PBBFAC,,, | Performed by: FAMILY MEDICINE

## 2017-09-01 PROCEDURE — 3008F BODY MASS INDEX DOCD: CPT | Mod: S$GLB,,, | Performed by: FAMILY MEDICINE

## 2017-09-01 PROCEDURE — 99214 OFFICE O/P EST MOD 30 MIN: CPT | Mod: S$GLB,,, | Performed by: FAMILY MEDICINE

## 2017-09-01 RX ORDER — HYDROCODONE BITARTRATE AND ACETAMINOPHEN 7.5; 325 MG/1; MG/1
1 TABLET ORAL 2 TIMES DAILY PRN
Qty: 60 TABLET | Refills: 0 | Status: SHIPPED | OUTPATIENT
Start: 2017-09-09 | End: 2017-10-06 | Stop reason: DRUGHIGH

## 2017-09-01 RX ORDER — HYDROCODONE BITARTRATE AND ACETAMINOPHEN 7.5; 325 MG/1; MG/1
1 TABLET ORAL 2 TIMES DAILY PRN
Qty: 60 TABLET | Refills: 0 | Status: SHIPPED | OUTPATIENT
Start: 2017-10-09 | End: 2017-10-06 | Stop reason: DRUGHIGH

## 2017-09-01 RX ORDER — HYDROCODONE BITARTRATE AND ACETAMINOPHEN 7.5; 325 MG/1; MG/1
TABLET ORAL
COMMUNITY
End: 2017-09-01 | Stop reason: SDUPTHER

## 2017-09-01 RX ORDER — PHENTERMINE HYDROCHLORIDE 37.5 MG/1
37.5 TABLET ORAL
Qty: 30 TABLET | Refills: 0 | Status: SHIPPED | OUTPATIENT
Start: 2017-09-01 | End: 2017-10-01

## 2017-09-01 RX ORDER — HYDROCODONE BITARTRATE AND ACETAMINOPHEN 7.5; 325 MG/1; MG/1
1 TABLET ORAL 2 TIMES DAILY PRN
Qty: 60 TABLET | Refills: 0 | Status: SHIPPED | OUTPATIENT
Start: 2017-11-08 | End: 2017-10-06 | Stop reason: DRUGHIGH

## 2017-09-01 RX ORDER — BUTALBITAL, ACETAMINOPHEN AND CAFFEINE 50; 325; 40 MG/1; MG/1; MG/1
TABLET ORAL
Qty: 60 TABLET | Refills: 2 | Status: SHIPPED | OUTPATIENT
Start: 2017-09-01 | End: 2017-10-20 | Stop reason: SDUPTHER

## 2017-09-01 NOTE — PROGRESS NOTES
Subjective:       Patient ID: Isela Smyth is a 42 y.o. female.    Chief Complaint: Migraine    HPI     Gets 3-5 migraines per month.  Takes fioricet and imitrex for pain control.     Hx of 6 low back spinal surgeries and 1 neck spinal surgery in past. Recent one in 2012.      status post 08/14/2012 microscopic recurrent left L4-L5 laminotomy, discectomy, left L5 complete laminectomy, left L5-S1 laminotomy,    recurrent discectomy.      Chronic lumbago controlled while on norco 10 and flexeril. Ps: 2/10. Occasional left sciatic pain. Had an placido last month which helped with the pain.      Had mri L spine 9/2016 which showed:      1.  Postoperative change of left hemilaminectomy at L4-L5 and L5-S1.  2.  Multilevel degenerative change of the lumbar spine, most pronounced at L4-L5 and L5-S1 as detailed above  3.  Minimal descending central disc extrusion at L5-S1 which does not appear to encroach upon either the descending left or right S1 nerve.  4.  Probable conjoined left S2 nerve coursing in the left posterolateral spinal canal.  Less likely, this represents an intraspinal synovial cyst abutting the descending left S1 nerve.  5.  Central annular tear of the intervertebral disc at L4-L5.     Also, has chronic neck pain > 5 years. Hx of neck surgeries in past. Saw pain management recently and had an placido on 1/31/17. Norco 7.5 helps with pain.      Had mri of cervical spine on 1/19/17 which showed:  -Disc protrusion at the C5-C6 level and to the right lateral recess with possible anterior cord contact  -Loss of normal cervical lordosis which may be positional or related to muscular strain.  -Milder degenerative changes are noted within the body of the report.      Depression stable while on effexor.       Requesting adipex to help her with her weight.  Has taken adipex in the past.        Review of Systems        Review of Systems   Constitutional: Negative for fever and chills.   HENT: Negative for hearing loss and neck  stiffness.    Eyes: Negative for redness and itching.   Respiratory: Negative for cough and choking.    Cardiovascular: Negative for chest pain and leg swelling.  Abdomen: Negative for abdominal pain and blood in stool.   Genitourinary: Negative for dysuria and flank pain.   Musculoskeletal: Negative for gait problem.   Neurological: Negative for light-headedness and headaches.   Hematological: Negative for adenopathy.       Objective:      Physical Exam   Constitutional: She appears well-developed.   HENT:   Head: Normocephalic and atraumatic.   Eyes: Conjunctivae are normal. Pupils are equal, round, and reactive to light.   Neck: Normal range of motion.   Cardiovascular: Normal rate and regular rhythm.    No murmur heard.  Pulmonary/Chest: Effort normal and breath sounds normal. She has no wheezes.   Musculoskeletal:   Cervical spine: tend of bilat paravert area.  Dec rom with flex/ext.    lumbar spine: pos tenderness of bilat lower paravert area.  Right slr to 70 degrees reproduces right lower pain.  Left slr to 70 degrees reproduces left lower pain.      Lymphadenopathy:     She has no cervical adenopathy.       Assessment:       1. Migraine without status migrainosus, not intractable, unspecified migraine type    2. Cervical radiculopathy    3. DDD (degenerative disc disease), lumbar    4. Chronic low back pain, unspecified back pain laterality, with sciatica presence unspecified    5. Gastroesophageal reflux disease, esophagitis presence not specified    6. Depression, unspecified depression type    7. Overweight        Plan:       Migraine without status migrainosus, not intractable, unspecified migraine type    Cervical radiculopathy    DDD (degenerative disc disease), lumbar    Chronic low back pain, unspecified back pain laterality, with sciatica presence unspecified    Gastroesophageal reflux disease, esophagitis presence not specified    Depression, unspecified depression type    Overweight    Other  orders  -     hydrocodone-acetaminophen 7.5-325mg (NORCO) 7.5-325 mg per tablet; Take 1 tablet by mouth 2 (two) times daily as needed for Pain.  Dispense: 60 tablet; Refill: 0  -     hydrocodone-acetaminophen 7.5-325mg (NORCO) 7.5-325 mg per tablet; Take 1 tablet by mouth 2 (two) times daily as needed for Pain.  Dispense: 60 tablet; Refill: 0  -     hydrocodone-acetaminophen 7.5-325mg (NORCO) 7.5-325 mg per tablet; Take 1 tablet by mouth 2 (two) times daily as needed for Pain.  Dispense: 60 tablet; Refill: 0  -     butalbital-acetaminophen-caffeine -40 mg (FIORICET, ESGIC) -40 mg per tablet; TAKE 1 TABLET BY MOUTH EVERY 6 (SIX) HOURS AS NEEDED.  Dispense: 60 tablet; Refill: 2  -     phentermine (ADIPEX-P) 37.5 mg tablet; Take 1 tablet (37.5 mg total) by mouth before breakfast.  Dispense: 30 tablet; Refill: 0          Plan:  rf norco 7.5  rf fioricet  Start adipex  Cont all other meds  F/u with me in 3 months

## 2017-09-06 NOTE — PROGRESS NOTES
Refill Authorization Note     is requesting a refill authorization.    Brief assessment and rational for refill: APPROVE: pt req refill  Name of medication: trazodone 150 mg  How patient will take medication: take 1 tablet po qhs  Amount/Quantity of medication ordered: 30           Refills Authorized: Yes  If authorized number of refills: 2        Medication Therapy Plan: Recent status of insomnia not documented

## 2017-09-08 RX ORDER — TRAZODONE HYDROCHLORIDE 150 MG/1
TABLET ORAL
Qty: 30 TABLET | Refills: 2 | Status: SHIPPED | OUTPATIENT
Start: 2017-09-08 | End: 2017-11-19 | Stop reason: SDUPTHER

## 2017-09-13 ENCOUNTER — TELEPHONE (OUTPATIENT)
Dept: FAMILY MEDICINE | Facility: CLINIC | Age: 42
End: 2017-09-13

## 2017-09-13 NOTE — TELEPHONE ENCOUNTER
Pt is having a 3 day migraine and terrible back pain unrelieved by her medications.  Pt asking if there's anything you suggest. She is having leg cramps and isn't sleeping and is at a loss.

## 2017-09-13 NOTE — TELEPHONE ENCOUNTER
----- Message from Bee Guan sent at 9/13/2017  3:14 PM CDT -----  Contact: self   Patient want to speak with a nurse regarding back and neck pain need some medical advice please call [phone]

## 2017-09-14 RX ORDER — PREDNISONE 10 MG/1
TABLET ORAL
Qty: 20 TABLET | Refills: 0 | Status: SHIPPED | OUTPATIENT
Start: 2017-09-14 | End: 2017-11-05

## 2017-10-02 RX ORDER — TRAZODONE HYDROCHLORIDE 150 MG/1
TABLET ORAL
Qty: 30 TABLET | Refills: 1 | OUTPATIENT
Start: 2017-10-02

## 2017-10-02 NOTE — PROGRESS NOTES
Refill Authorization Note     is requesting a refill authorization.    Brief assessment and rational for refill: DENY: pt should still have medication  Name of medication: TRAZODONE 150 MG TABLET  How patient will take medication: t1t po qhs   Amount/Quantity of medication ordered: 30d           Refills Authorized: No     If refills not authorized provide recommendations: Still has supply      Medication Therapy Plan: 90d supply was sent on on 9/5.  Will deny.  Comments:

## 2017-10-02 NOTE — TELEPHONE ENCOUNTER
Request has already been addressed by other means.  Please consult Patient Station for more details. Pt does not need refills at this time. Thanks

## 2017-10-03 NOTE — PROGRESS NOTES
Pt informed that his MRI Right Ankle was denied because he had not tried PT yet. Per [facility] PT order was placed . Subjective:       Patient ID: Isela Smyth is a 42 y.o. female.    Chief Complaint: Migraine (F/U )    HPI     Gets 3-5 migraines per month.  Takes fioricet and imitrex for pain control.    Hx of 6 low back spinal surgeries and 1 neck spinal surgery in past. Recent one in 2012.      status post 08/14/2012 microscopic recurrent left L4-L5 laminotomy, discectomy, left L5 complete laminectomy, left L5-S1 laminotomy,    recurrent discectomy.      Chronic lumbago controlled while on norco 10 and flexeril. Ps: 2/10. Occasional left sciatic pain. Had an placido last month which helped with the pain.      Had mri L spine 9/2016 which showed:      1.  Postoperative change of left hemilaminectomy at L4-L5 and L5-S1.  2.  Multilevel degenerative change of the lumbar spine, most pronounced at L4-L5 and L5-S1 as detailed above  3.  Minimal descending central disc extrusion at L5-S1 which does not appear to encroach upon either the descending left or right S1 nerve.  4.  Probable conjoined left S2 nerve coursing in the left posterolateral spinal canal.  Less likely, this represents an intraspinal synovial cyst abutting the descending left S1 nerve.  5.  Central annular tear of the intervertebral disc at L4-L5.     Also, has chronic neck pain > 5 years. Hx of neck surgeries in past. Saw pain management recently and had an placido on 1/31/17. Norco 7.5 helps with pain.      Had mri of cervical spine on 1/19/17 which showed:  -Disc protrusion at the C5-C6 level and to the right lateral recess with possible anterior cord contact  -Loss of normal cervical lordosis which may be positional or related to muscular strain.  -Milder degenerative changes are noted within the body of the report.      Depression stable while on effexor.           Review of Systems      Review of Systems   Constitutional: Negative for fever and chills.   HENT: Negative for hearing loss and neck stiffness.    Eyes: Negative for redness and itching.   Respiratory: Negative  for cough and choking.    Cardiovascular: Negative for chest pain and leg swelling.  Abdomen: Negative for abdominal pain and blood in stool.   Genitourinary: Negative for dysuria and flank pain.   Musculoskeletal: Negative for gait problem.   Neurological: Negative for light-headedness and headaches.   Hematological: Negative for adenopathy.       Objective:      Physical Exam   Constitutional: She appears well-developed.   HENT:   Head: Normocephalic and atraumatic.   Eyes: Conjunctivae are normal. Pupils are equal, round, and reactive to light.   Neck: Normal range of motion.   Cardiovascular: Normal rate and regular rhythm.    No murmur heard.  Pulmonary/Chest: Effort normal and breath sounds normal. She has no wheezes.   Lymphadenopathy:     She has no cervical adenopathy.       Assessment:       1. Migraine without status migrainosus, not intractable, unspecified migraine type    2. Cervical radiculopathy    3. DDD (degenerative disc disease), lumbar    4. Chronic low back pain, unspecified back pain laterality, with sciatica presence unspecified    5. Gastroesophageal reflux disease, esophagitis presence not specified    6. Depression, unspecified depression type        Plan:       Migraine without status migrainosus, not intractable, unspecified migraine type    Cervical radiculopathy    DDD (degenerative disc disease), lumbar    Chronic low back pain, unspecified back pain laterality, with sciatica presence unspecified    Gastroesophageal reflux disease, esophagitis presence not specified    Depression, unspecified depression type    Other orders  -     hydrocodone-acetaminophen 7.5-325mg (NORCO) 7.5-325 mg per tablet; Take 1 tablet by mouth 2 (two) times daily as needed for Pain.  Dispense: 60 tablet; Refill: 0  -     hydrocodone-acetaminophen 7.5-325mg (NORCO) 7.5-325 mg per tablet; Take 1 tablet by mouth 2 (two) times daily as needed for Pain.  Dispense: 60 tablet; Refill: 0          Plan:  rf  norco  Cont all other meds

## 2017-10-22 RX ORDER — CYCLOBENZAPRINE HCL 10 MG
TABLET ORAL
Qty: 30 TABLET | Refills: 1 | Status: SHIPPED | OUTPATIENT
Start: 2017-10-22 | End: 2017-11-29 | Stop reason: SDUPTHER

## 2017-10-22 RX ORDER — BUTALBITAL, ACETAMINOPHEN AND CAFFEINE 50; 325; 40 MG/1; MG/1; MG/1
TABLET ORAL
Qty: 60 TABLET | Refills: 1 | Status: SHIPPED | OUTPATIENT
Start: 2017-10-22 | End: 2017-11-21 | Stop reason: SDUPTHER

## 2017-11-07 ENCOUNTER — TELEPHONE (OUTPATIENT)
Dept: FAMILY MEDICINE | Facility: CLINIC | Age: 42
End: 2017-11-07

## 2017-11-07 NOTE — TELEPHONE ENCOUNTER
----- Message from Mana Mcdainels sent at 11/7/2017  4:15 PM CST -----  Contact: self  Patient called regarding blood work from the hospital, possible lime disease. Please contact [phone] (home)

## 2017-11-19 DIAGNOSIS — G43.909 MIGRAINE WITHOUT STATUS MIGRAINOSUS, NOT INTRACTABLE, UNSPECIFIED MIGRAINE TYPE: ICD-10-CM

## 2017-11-20 RX ORDER — TRAZODONE HYDROCHLORIDE 150 MG/1
TABLET ORAL
Qty: 30 TABLET | Refills: 11 | Status: SHIPPED | OUTPATIENT
Start: 2017-11-20 | End: 2018-03-19 | Stop reason: SDUPTHER

## 2017-11-21 NOTE — TELEPHONE ENCOUNTER
----- Message from Leyla Craven sent at 11/21/2017  3:50 PM CST -----  Contact: patient  Patient called regarding medications,requestingcall back at [phone]. Thanks,

## 2017-11-22 RX ORDER — SUMATRIPTAN SUCCINATE 100 MG/1
TABLET ORAL
Qty: 9 TABLET | Refills: 11 | Status: SHIPPED | OUTPATIENT
Start: 2017-11-22 | End: 2017-11-29 | Stop reason: SDUPTHER

## 2017-11-22 RX ORDER — BUTALBITAL, ACETAMINOPHEN AND CAFFEINE 50; 325; 40 MG/1; MG/1; MG/1
1 TABLET ORAL EVERY 6 HOURS PRN
Qty: 60 TABLET | Refills: 1 | Status: SHIPPED | OUTPATIENT
Start: 2017-11-22 | End: 2017-11-29 | Stop reason: SDUPTHER

## 2017-11-29 ENCOUNTER — OFFICE VISIT (OUTPATIENT)
Dept: FAMILY MEDICINE | Facility: CLINIC | Age: 42
End: 2017-11-29
Payer: COMMERCIAL

## 2017-11-29 VITALS
DIASTOLIC BLOOD PRESSURE: 88 MMHG | SYSTOLIC BLOOD PRESSURE: 138 MMHG | HEART RATE: 89 BPM | OXYGEN SATURATION: 98 % | WEIGHT: 175.5 LBS | HEIGHT: 67 IN | BODY MASS INDEX: 27.55 KG/M2

## 2017-11-29 DIAGNOSIS — M54.5 CHRONIC LOW BACK PAIN, UNSPECIFIED BACK PAIN LATERALITY, WITH SCIATICA PRESENCE UNSPECIFIED: ICD-10-CM

## 2017-11-29 DIAGNOSIS — G43.809 OTHER MIGRAINE WITHOUT STATUS MIGRAINOSUS, NOT INTRACTABLE: Primary | ICD-10-CM

## 2017-11-29 DIAGNOSIS — G89.29 CHRONIC LOW BACK PAIN, UNSPECIFIED BACK PAIN LATERALITY, WITH SCIATICA PRESENCE UNSPECIFIED: ICD-10-CM

## 2017-11-29 DIAGNOSIS — M47.816 LUMBAR FACET ARTHROPATHY: ICD-10-CM

## 2017-11-29 DIAGNOSIS — M54.2 CHRONIC CERVICAL PAIN: ICD-10-CM

## 2017-11-29 DIAGNOSIS — G89.29 CHRONIC CERVICAL PAIN: ICD-10-CM

## 2017-11-29 DIAGNOSIS — Z12.39 BREAST CANCER SCREENING: ICD-10-CM

## 2017-11-29 DIAGNOSIS — G43.909 MIGRAINE WITHOUT STATUS MIGRAINOSUS, NOT INTRACTABLE, UNSPECIFIED MIGRAINE TYPE: ICD-10-CM

## 2017-11-29 DIAGNOSIS — F32.A DEPRESSION, UNSPECIFIED DEPRESSION TYPE: ICD-10-CM

## 2017-11-29 PROCEDURE — 99214 OFFICE O/P EST MOD 30 MIN: CPT | Mod: S$GLB,,, | Performed by: FAMILY MEDICINE

## 2017-11-29 PROCEDURE — 99999 PR PBB SHADOW E&M-EST. PATIENT-LVL III: CPT | Mod: PBBFAC,,, | Performed by: FAMILY MEDICINE

## 2017-11-29 RX ORDER — HYDROCODONE BITARTRATE AND ACETAMINOPHEN 7.5; 325 MG/1; MG/1
1 TABLET ORAL
Qty: 60 TABLET | Refills: 0 | Status: SHIPPED | OUTPATIENT
Start: 2017-12-30 | End: 2018-01-29

## 2017-11-29 RX ORDER — LEVOTHYROXINE SODIUM 75 UG/1
75 TABLET ORAL EVERY MORNING
Qty: 90 TABLET | Refills: 3 | Status: SHIPPED | OUTPATIENT
Start: 2017-11-29 | End: 2018-03-19 | Stop reason: SDUPTHER

## 2017-11-29 RX ORDER — PROMETHAZINE HYDROCHLORIDE 25 MG/1
25 SUPPOSITORY RECTAL EVERY 6 HOURS PRN
Qty: 12 SUPPOSITORY | Refills: 5 | Status: SHIPPED | OUTPATIENT
Start: 2017-11-29 | End: 2019-02-11 | Stop reason: SDUPTHER

## 2017-11-29 RX ORDER — HYDROCODONE BITARTRATE AND ACETAMINOPHEN 7.5; 325 MG/1; MG/1
1 TABLET ORAL
Qty: 60 TABLET | Refills: 0 | Status: SHIPPED | OUTPATIENT
Start: 2018-01-29 | End: 2018-02-27 | Stop reason: SDUPTHER

## 2017-11-29 RX ORDER — BUTALBITAL, ACETAMINOPHEN AND CAFFEINE 50; 325; 40 MG/1; MG/1; MG/1
1 TABLET ORAL EVERY 6 HOURS PRN
Qty: 60 TABLET | Refills: 5 | Status: SHIPPED | OUTPATIENT
Start: 2017-11-29 | End: 2018-03-19 | Stop reason: SDUPTHER

## 2017-11-29 RX ORDER — CYCLOBENZAPRINE HCL 10 MG
10 TABLET ORAL 3 TIMES DAILY PRN
Qty: 30 TABLET | Refills: 5 | Status: SHIPPED | OUTPATIENT
Start: 2017-11-29 | End: 2018-03-19 | Stop reason: SDUPTHER

## 2017-11-29 RX ORDER — SUMATRIPTAN SUCCINATE 100 MG/1
TABLET ORAL
Qty: 9 TABLET | Refills: 11 | Status: SHIPPED | OUTPATIENT
Start: 2017-11-29 | End: 2018-03-19 | Stop reason: SDUPTHER

## 2017-11-29 RX ORDER — VENLAFAXINE HYDROCHLORIDE 150 MG/1
150 CAPSULE, EXTENDED RELEASE ORAL DAILY
Qty: 90 CAPSULE | Refills: 3 | Status: SHIPPED | OUTPATIENT
Start: 2017-11-29 | End: 2018-03-19 | Stop reason: SDUPTHER

## 2017-11-29 RX ORDER — HYDROCODONE BITARTRATE AND ACETAMINOPHEN 7.5; 325 MG/1; MG/1
1 TABLET ORAL
Qty: 60 TABLET | Refills: 0 | Status: SHIPPED | OUTPATIENT
Start: 2017-11-30 | End: 2017-12-30

## 2017-11-29 NOTE — TELEPHONE ENCOUNTER
Pt phoned stating [facility] is [facility] [place] waiting for her RX bandar tare pended. Please review and advise. Thank you.

## 2017-11-29 NOTE — TELEPHONE ENCOUNTER
Phoned pt to instruct that the medications had been  Sent to the requested pharmacy. LMOR for questions to call [phone]. CLC

## 2017-11-29 NOTE — PROGRESS NOTES
Subjective:       Patient ID: Isela Smyth is a 42 y.o. female.    Chief Complaint: Insect Bite (ED F/U [facility] )    HPI     Recall that she went to [facility]/17 for right post neck abscess. Resolved after completing course of doxy.    Gets 3-5 migraines per month.  Takes fioricet and imitrex for pain control.       Hx of 6 low back spinal surgeries and 1 neck spinal surgery in past. Recent one in 2012.      Status post 08/14/2012 microscopic recurrent left L4-L5 laminotomy, discectomy, left L5 complete laminectomy, left L5-S1 laminotomy,    recurrent discectomy.      Chronic lumbago controlled while on norco 7.5 and flexeril. Ps: 2/10. Occasional left sciatic pain. Had an placido last month which helped with the pain.      Had mri L spine 9/2016 which showed:      1.  Postoperative change of left hemilaminectomy at L4-L5 and L5-S1.  2.  Multilevel degenerative change of the lumbar spine, most pronounced at L4-L5 and L5-S1 as detailed above  3.  Minimal descending central disc extrusion at L5-S1 which does not appear to encroach upon either the descending left or right S1 nerve.  4.  Probable conjoined left S2 nerve coursing in the left posterolateral spinal canal.  Less likely, this represents an intraspinal synovial cyst abutting the descending left S1 nerve.  5.  Central annular tear of the intervertebral disc at L4-L5.     Also, has chronic neck pain > 5 years. Hx of neck surgeries in past. Saw pain management recently and had an placido on 1/31/17. Norco 7.5 helps with pain.      Had mri of cervical spine on 1/19/17 which showed:  -Disc protrusion at the C5-C6 level and to the right lateral recess with possible anterior cord contact  -Loss of normal cervical lordosis which may be positional or related to muscular strain.  -Milder degenerative changes are noted within the body of the report.      Depression stable while on effexor.                                                                                       Review of Systems      Review of Systems   Constitutional: Negative for fever and chills.   HENT: Negative for hearing loss and neck stiffness.    Eyes: Negative for redness and itching.   Respiratory: Negative for cough and choking.    Cardiovascular: Negative for chest pain and leg swelling.  Abdomen: Negative for abdominal pain and blood in stool.   Genitourinary: Negative for dysuria and flank pain.   Neurological: Negative for light-headedness and headaches.   Hematological: Negative for adenopathy.   Psychiatric/Behavioral: Negative for behavioral problems.     Objective:      Physical Exam   Constitutional: She appears well-developed.   HENT:   Head: Normocephalic and atraumatic.   Eyes: Conjunctivae are normal. Pupils are equal, round, and reactive to light.   Neck: Normal range of motion.   Cardiovascular: Normal rate and regular rhythm.    No murmur heard.  Pulmonary/Chest: Effort normal and breath sounds normal. She has no wheezes.   Musculoskeletal:   Cervical spine: tend of bilat paravert area.  Dec rom with flex/ext.    lumbar spine: pos tenderness of bilat lower paravert area.  Right slr to 70 degrees reproduces right lower pain.  Left slr to 70 degrees reproduces left lower pain.    Lymphadenopathy:     She has no cervical adenopathy.       Assessment:       1. Other migraine without status migrainosus, not intractable    2. Breast cancer screening    3. Lumbar facet arthropathy    4. Chronic low back pain, unspecified back pain laterality, with sciatica presence unspecified    5. Chronic cervical pain    6. Depression, unspecified depression type        Plan:       Other migraine without status migrainosus, not intractable  -     Ambulatory referral to Neurology    Breast cancer screening  -     Mammo Digital Screening Bilat with CAD; Future; Expected date: 11/29/2017    Lumbar facet arthropathy    Chronic low back pain, unspecified back pain laterality, with sciatica presence  unspecified    Chronic cervical pain    Depression, unspecified depression type    Other orders  -     promethazine (PHENERGAN) 25 MG suppository; Place 1 suppository (25 mg total) rectally every 6 (six) hours as needed for Nausea.  Dispense: 12 suppository; Refill: 5  -     hydrocodone-acetaminophen 7.5-325mg (NORCO) 7.5-325 mg per tablet; Take 1 tablet by mouth every 4 to 6 hours as needed for Pain.  Dispense: 60 tablet; Refill: 0  -     hydrocodone-acetaminophen 7.5-325mg (NORCO) 7.5-325 mg per tablet; Take 1 tablet by mouth every 4 to 6 hours as needed for Pain.  Dispense: 60 tablet; Refill: 0  -     hydrocodone-acetaminophen 7.5-325mg (NORCO) 7.5-325 mg per tablet; Take 1 tablet by mouth every 4 to 6 hours as needed for Pain.  Dispense: 60 tablet; Refill: 0            Plan:  See order and referral  Cont current meds      Medication List with Changes/Refills   New Medications    HYDROCODONE-ACETAMINOPHEN 7.5-325MG (NORCO) 7.5-325 MG PER TABLET    Take 1 tablet by mouth every 4 to 6 hours as needed for Pain.    HYDROCODONE-ACETAMINOPHEN 7.5-325MG (NORCO) 7.5-325 MG PER TABLET    Take 1 tablet by mouth every 4 to 6 hours as needed for Pain.   Current Medications    BUTALBITAL-ACETAMINOPHEN-CAFFEINE -40 MG (FIORICET, ESGIC) -40 MG PER TABLET    Take 1 tablet by mouth every 6 (six) hours as needed.    CYCLOBENZAPRINE (FLEXERIL) 10 MG TABLET    TAKE 1 TABLET BY MOUTH 3 TIMES DAILY AS NEEDED.    FLUTICASONE (FLONASE) 50 MCG/ACTUATION NASAL SPRAY    2 sprays by Each Nare route once daily.    KETOROLAC (TORADOL) 10 MG TABLET    TAKE 1 TABLET BY MOUTH 3 TIMES DAILY AS NEEDED.    LEVOTHYROXINE (SYNTHROID) 75 MCG TABLET    Take 1 tablet (75 mcg total) by mouth every morning.    LINACLOTIDE (LINZESS) 145 MCG CAP CAPSULE    Take 1 capsule (145 mcg total) by mouth once daily.    ONDANSETRON (ZOFRAN) 4 MG TABLET    Take 1 tablet (4 mg total) by mouth every 8 (eight) hours as needed.    SUMATRIPTAN (IMITREX) 100 MG  TABLET    TAKE 1 TABLET BY MOUTH EVERY DAY AS NEEDED. MAY REPEAT IN 2 HOURS IF NEEDED. DO NOT EXCEED 2 TABLETS    TRAZODONE (DESYREL) 150 MG TABLET    TAKE ONE TABLET BY MOUTH NIGHTLY AT BEDTIME    VENLAFAXINE (EFFEXOR-XR) 150 MG CP24    Take 1 capsule (150 mg total) by mouth once daily.   Changed and/or Refilled Medications    Modified Medication Previous Medication    HYDROCODONE-ACETAMINOPHEN 7.5-325MG (NORCO) 7.5-325 MG PER TABLET hydrocodone-acetaminophen 7.5-325mg (NORCO) 7.5-325 mg per tablet       Take 1 tablet by mouth every 4 to 6 hours as needed for Pain.    Take 1 tablet by mouth every 4 to 6 hours as needed for Pain.    PROMETHAZINE (PHENERGAN) 25 MG SUPPOSITORY promethazine (PHENERGAN) 25 MG suppository       Place 1 suppository (25 mg total) rectally every 6 (six) hours as needed for Nausea.    Place 1 suppository (25 mg total) rectally every 6 (six) hours as needed for Nausea.   Discontinued Medications    PRAMOXINE 1 % FOAM    Place rectally 3 (three) times daily as needed.

## 2018-01-24 ENCOUNTER — TELEPHONE (OUTPATIENT)
Dept: NEUROLOGY | Facility: CLINIC | Age: 43
End: 2018-01-24

## 2018-02-09 ENCOUNTER — NURSE TRIAGE (OUTPATIENT)
Dept: ADMINISTRATIVE | Facility: CLINIC | Age: 43
End: 2018-02-09

## 2018-02-10 NOTE — TELEPHONE ENCOUNTER
LM x2 at 717pm at [phone]  Man at [phone] states no pt called from that number.     Reason for Disposition   Message left on identified answering machine.    Protocols used: ST NO CONTACT OR DUPLICATE CONTACT CALL-A-AH

## 2018-02-27 ENCOUNTER — OFFICE VISIT (OUTPATIENT)
Dept: FAMILY MEDICINE | Facility: CLINIC | Age: 43
End: 2018-02-27
Payer: COMMERCIAL

## 2018-02-27 VITALS
BODY MASS INDEX: 27.64 KG/M2 | DIASTOLIC BLOOD PRESSURE: 72 MMHG | OXYGEN SATURATION: 99 % | HEART RATE: 69 BPM | HEIGHT: 67 IN | WEIGHT: 176.13 LBS | SYSTOLIC BLOOD PRESSURE: 102 MMHG

## 2018-02-27 DIAGNOSIS — M54.2 CHRONIC CERVICAL PAIN: ICD-10-CM

## 2018-02-27 DIAGNOSIS — G89.29 CHRONIC CERVICAL PAIN: ICD-10-CM

## 2018-02-27 DIAGNOSIS — F32.A DEPRESSION, UNSPECIFIED DEPRESSION TYPE: ICD-10-CM

## 2018-02-27 DIAGNOSIS — G89.29 CHRONIC LOW BACK PAIN, UNSPECIFIED BACK PAIN LATERALITY, WITH SCIATICA PRESENCE UNSPECIFIED: ICD-10-CM

## 2018-02-27 DIAGNOSIS — G43.809 OTHER MIGRAINE WITHOUT STATUS MIGRAINOSUS, NOT INTRACTABLE: Primary | ICD-10-CM

## 2018-02-27 DIAGNOSIS — M54.5 CHRONIC LOW BACK PAIN, UNSPECIFIED BACK PAIN LATERALITY, WITH SCIATICA PRESENCE UNSPECIFIED: ICD-10-CM

## 2018-02-27 DIAGNOSIS — M47.816 LUMBAR FACET ARTHROPATHY: ICD-10-CM

## 2018-02-27 DIAGNOSIS — F41.9 ANXIETY: ICD-10-CM

## 2018-02-27 PROCEDURE — 99999 PR PBB SHADOW E&M-EST. PATIENT-LVL III: CPT | Mod: PBBFAC,,, | Performed by: FAMILY MEDICINE

## 2018-02-27 PROCEDURE — 99214 OFFICE O/P EST MOD 30 MIN: CPT | Mod: S$GLB,,, | Performed by: FAMILY MEDICINE

## 2018-02-27 RX ORDER — HYDROCODONE BITARTRATE AND ACETAMINOPHEN 7.5; 325 MG/1; MG/1
1 TABLET ORAL
Qty: 60 TABLET | Refills: 0 | Status: SHIPPED | OUTPATIENT
Start: 2018-02-28 | End: 2018-03-30

## 2018-02-27 RX ORDER — HYDROCODONE BITARTRATE AND ACETAMINOPHEN 7.5; 325 MG/1; MG/1
1 TABLET ORAL
Qty: 60 TABLET | Refills: 0 | Status: SHIPPED | OUTPATIENT
Start: 2018-03-30 | End: 2018-04-29

## 2018-02-27 RX ORDER — HYDROCODONE BITARTRATE AND ACETAMINOPHEN 7.5; 325 MG/1; MG/1
1 TABLET ORAL
Qty: 60 TABLET | Refills: 0 | Status: SHIPPED | OUTPATIENT
Start: 2018-04-29 | End: 2018-05-22 | Stop reason: SDUPTHER

## 2018-02-27 NOTE — PROGRESS NOTES
Subjective:       Patient ID: Isela Smyth is a 42 y.o. female.    Chief Complaint: Migraine    HPI       Here for f/u.       Gets 3-5 migraines per month.  Takes fioricet and imitrex for pain control.       Hx of 6 low back spinal surgeries and 1 neck spinal surgery in past. Recent one in 2012.      Status post 08/14/2012 microscopic recurrent left L4-L5 laminotomy, discectomy, left L5 complete laminectomy, left L5-S1 laminotomy,    recurrent discectomy.      Chronic lumbago controlled while on norco 7.5 and flexeril. Ps: 2/10. Occasional left sciatic pain. Had an placido last month which helped with the pain.      Had mri L spine 9/2016 which showed:      1.  Postoperative change of left hemilaminectomy at L4-L5 and L5-S1.  2.  Multilevel degenerative change of the lumbar spine, most pronounced at L4-L5 and L5-S1 as detailed above  3.  Minimal descending central disc extrusion at L5-S1 which does not appear to encroach upon either the descending left or right S1 nerve.  4.  Probable conjoined left S2 nerve coursing in the left posterolateral spinal canal.  Less likely, this represents an intraspinal synovial cyst abutting the descending left S1 nerve.  5.  Central annular tear of the intervertebral disc at L4-L5.     Also, has chronic neck pain > 5 years. Hx of neck surgeries in past. Saw pain management recently and had an placido on 1/31/17. Norco 7.5 helps with pain.      Had mri of cervical spine on 1/19/17 which showed:  -Disc protrusion at the C5-C6 level and to the right lateral recess with possible anterior cord contact  -Loss of normal cervical lordosis which may be positional or related to muscular strain.  -Milder degenerative changes are noted within the body of the report.      Depression and anxiety stable while on effexor.                                                                                         Review of Systems      Review of Systems   Constitutional: Negative for fever and chills.   HENT:  Negative for hearing loss and neck stiffness.    Eyes: Negative for redness and itching.   Respiratory: Negative for cough and choking.    Cardiovascular: Negative for chest pain and leg swelling.  Abdomen: Negative for abdominal pain and blood in stool.   Genitourinary: Negative for dysuria and flank pain.   Musculoskeletal: Negative for gait problem.   Neurological: Negative for light-headedness and headaches.   Hematological: Negative for adenopathy.       Objective:      Physical Exam   Constitutional: She appears well-developed.   HENT:   Head: Normocephalic and atraumatic.   Eyes: Conjunctivae are normal. Pupils are equal, round, and reactive to light.   Neck: Normal range of motion.   Cardiovascular: Normal rate and regular rhythm.    No murmur heard.  Pulmonary/Chest: Effort normal and breath sounds normal. She has no wheezes.   Musculoskeletal:   Cervical spine: tend of bilat paravert area.  Dec rom with flex/ext.    lumbar spine: pos tenderness of bilat lower paravert area.  Right slr to 70 degrees reproduces right lower pain.  Left slr to 70 degrees reproduces left lower pain.     Lymphadenopathy:     She has no cervical adenopathy.       Assessment:       1. Other migraine without status migrainosus, not intractable    2. Lumbar facet arthropathy    3. Chronic low back pain, unspecified back pain laterality, with sciatica presence unspecified    4. Chronic cervical pain    5. Depression, unspecified depression type    6. Anxiety        Plan:       Other migraine without status migrainosus, not intractable    Lumbar facet arthropathy    Chronic low back pain, unspecified back pain laterality, with sciatica presence unspecified    Chronic cervical pain    Depression, unspecified depression type    Anxiety    Other orders  -     hydrocodone-acetaminophen 7.5-325mg (NORCO) 7.5-325 mg per tablet; Take 1 tablet by mouth every 4 to 6 hours as needed for Pain.  Dispense: 60 tablet; Refill: 0  -      hydrocodone-acetaminophen 7.5-325mg (NORCO) 7.5-325 mg per tablet; Take 1 tablet by mouth every 4 to 6 hours as needed for Pain.  Dispense: 60 tablet; Refill: 0  -     hydrocodone-acetaminophen 7.5-325mg (NORCO) 7.5-325 mg per tablet; Take 1 tablet by mouth every 4 to 6 hours as needed for Pain.  Dispense: 60 tablet; Refill: 0              Plan:  Cont current meds      Medication List with Changes/Refills   New Medications    HYDROCODONE-ACETAMINOPHEN 7.5-325MG (NORCO) 7.5-325 MG PER TABLET    Take 1 tablet by mouth every 4 to 6 hours as needed for Pain.    HYDROCODONE-ACETAMINOPHEN 7.5-325MG (NORCO) 7.5-325 MG PER TABLET    Take 1 tablet by mouth every 4 to 6 hours as needed for Pain.   Current Medications    BUTALBITAL-ACETAMINOPHEN-CAFFEINE -40 MG (FIORICET, ESGIC) -40 MG PER TABLET    Take 1 tablet by mouth every 6 (six) hours as needed.    CYCLOBENZAPRINE (FLEXERIL) 10 MG TABLET    Take 1 tablet (10 mg total) by mouth 3 (three) times daily as needed.    FLUTICASONE (FLONASE) 50 MCG/ACTUATION NASAL SPRAY    2 sprays by Each Nare route once daily.    KETOROLAC (TORADOL) 10 MG TABLET    TAKE 1 TABLET BY MOUTH 3 TIMES DAILY AS NEEDED.    LEVOTHYROXINE (SYNTHROID) 75 MCG TABLET    Take 1 tablet (75 mcg total) by mouth every morning.    LINACLOTIDE (LINZESS) 145 MCG CAP CAPSULE    Take 1 capsule (145 mcg total) by mouth daily as needed.    ONDANSETRON (ZOFRAN) 4 MG TABLET    Take 1 tablet (4 mg total) by mouth every 8 (eight) hours as needed.    PROMETHAZINE (PHENERGAN) 25 MG SUPPOSITORY    Place 1 suppository (25 mg total) rectally every 6 (six) hours as needed for Nausea.    SUMATRIPTAN (IMITREX) 100 MG TABLET    TAKE 1 TABLET BY MOUTH EVERY DAY AS NEEDED. MAY REPEAT IN 2 HOURS IF NEEDED. DO NOT EXCEED 2 TABLETS    TRAZODONE (DESYREL) 150 MG TABLET    TAKE ONE TABLET BY MOUTH NIGHTLY AT BEDTIME    VENLAFAXINE (EFFEXOR-XR) 150 MG CP24    Take 1 capsule (150 mg total) by mouth once daily.   Changed and/or  Refilled Medications    Modified Medication Previous Medication    HYDROCODONE-ACETAMINOPHEN 7.5-325MG (NORCO) 7.5-325 MG PER TABLET hydrocodone-acetaminophen 7.5-325mg (NORCO) 7.5-325 mg per tablet       Take 1 tablet by mouth every 4 to 6 hours as needed for Pain.    Take 1 tablet by mouth every 4 to 6 hours as needed for Pain.

## 2018-03-02 NOTE — TELEPHONE ENCOUNTER
----- Message from Pavithra Hinkle sent at 3/2/2018 12:52 PM CST -----  Contact: self    Type:  RX Refill Request    Who Called:  self  Refill or New Rx:  new  RX Name and Strength:  phentermine  How is the patient currently taking it? (ex. 1XDay):  1Xday  Is this a 30 day or 90 day RX:  30  [facility] phone number[facility]  Local or Mail Order:  local  Ordering Provider:  Dr Dan  [facility] Call Back Number:  [phone]  Additional Information:  Patient would like Qiana to call her.    [facility] [id number] [address] [facility] [place] - [address]  [address]  [place] [address]  Phone: [phone] Fax: [phone]

## 2018-03-03 RX ORDER — PHENTERMINE HYDROCHLORIDE 37.5 MG/1
37.5 TABLET ORAL
Qty: 30 TABLET | Refills: 0 | OUTPATIENT
Start: 2018-03-03 | End: 2018-04-02

## 2018-03-19 DIAGNOSIS — G43.909 MIGRAINE WITHOUT STATUS MIGRAINOSUS, NOT INTRACTABLE, UNSPECIFIED MIGRAINE TYPE: ICD-10-CM

## 2018-03-19 NOTE — TELEPHONE ENCOUNTER
----- Message from Nolan Rooney sent at 3/19/2018  1:24 PM CDT -----  Contact: same  Patient called in and stated when she saw Dr. Dan on 3/27/18 her refills were never sent in for the followin.  butalbital-acetaminophen-caffeine -40 mg (FIORICET, ESGIC) -40 mg per tablet  2.  sumatriptan (IMITREX) 100 MG tablet  3.  traZODone (DESYREL) 150 MG tablet  4.  cyclobenzaprine (FLEXERIL) 10 MG tablet  5.  levothyroxine (SYNTHROID) 75 MCG tablet  6.  venlafaxine (EFFEXOR-XR) 150 MG Cp24      [facility] [id number] [address] [facility] - [place] [address]   [address]  [place] [address]  Phone: [phone] Fax: [phone]    Patient call back number is [phone]

## 2018-03-20 ENCOUNTER — TELEPHONE (OUTPATIENT)
Dept: FAMILY MEDICINE | Facility: CLINIC | Age: 43
End: 2018-03-20

## 2018-03-20 NOTE — TELEPHONE ENCOUNTER
Advised patient that refill request was sent yesterday 3/19/18 and to please allow 72 business hours before she contact her pharmacy. Patient verbalized understanding.

## 2018-03-20 NOTE — TELEPHONE ENCOUNTER
----- Message from Fabi Latif sent at 3/20/2018  1:15 PM CDT -----  Patient is calling once again because office did not sent in her refills and is calling back to make sure that office refill her medications (that she said she sent in a list yesterday). Please have Kristine call her back at [phone].

## 2018-03-21 RX ORDER — LEVOTHYROXINE SODIUM 75 UG/1
75 TABLET ORAL EVERY MORNING
Qty: 90 TABLET | Refills: 3 | Status: SHIPPED | OUTPATIENT
Start: 2018-03-21 | End: 2018-05-22 | Stop reason: SDUPTHER

## 2018-03-21 RX ORDER — SUMATRIPTAN SUCCINATE 100 MG/1
TABLET ORAL
Qty: 9 TABLET | Refills: 11 | Status: SHIPPED | OUTPATIENT
Start: 2018-03-21 | End: 2018-11-07 | Stop reason: SDUPTHER

## 2018-03-21 RX ORDER — CYCLOBENZAPRINE HCL 10 MG
10 TABLET ORAL 3 TIMES DAILY PRN
Qty: 30 TABLET | Refills: 5 | Status: SHIPPED | OUTPATIENT
Start: 2018-03-21 | End: 2018-08-02 | Stop reason: ALTCHOICE

## 2018-03-21 RX ORDER — TRAZODONE HYDROCHLORIDE 150 MG/1
150 TABLET ORAL NIGHTLY
Qty: 30 TABLET | Refills: 11 | Status: SHIPPED | OUTPATIENT
Start: 2018-03-21 | End: 2019-04-15 | Stop reason: SDUPTHER

## 2018-03-21 RX ORDER — BUTALBITAL, ACETAMINOPHEN AND CAFFEINE 50; 325; 40 MG/1; MG/1; MG/1
1 TABLET ORAL EVERY 6 HOURS PRN
Qty: 60 TABLET | Refills: 5 | Status: SHIPPED | OUTPATIENT
Start: 2018-03-21 | End: 2018-06-19 | Stop reason: SDUPTHER

## 2018-03-21 RX ORDER — VENLAFAXINE HYDROCHLORIDE 150 MG/1
150 CAPSULE, EXTENDED RELEASE ORAL DAILY
Qty: 90 CAPSULE | Refills: 3 | Status: SHIPPED | OUTPATIENT
Start: 2018-03-21 | End: 2019-06-05 | Stop reason: SDUPTHER

## 2018-05-07 RX ORDER — VENLAFAXINE HYDROCHLORIDE 150 MG/1
CAPSULE, EXTENDED RELEASE ORAL
Qty: 90 CAPSULE | Refills: 3 | Status: SHIPPED | OUTPATIENT
Start: 2018-05-07 | End: 2018-05-22

## 2018-05-14 RX ORDER — LEVOTHYROXINE SODIUM 75 UG/1
75 TABLET ORAL EVERY MORNING
Qty: 90 TABLET | Refills: 3 | Status: SHIPPED | OUTPATIENT
Start: 2018-05-14 | End: 2019-06-22 | Stop reason: SDUPTHER

## 2018-05-22 ENCOUNTER — OFFICE VISIT (OUTPATIENT)
Dept: FAMILY MEDICINE | Facility: CLINIC | Age: 43
End: 2018-05-22
Payer: COMMERCIAL

## 2018-05-22 VITALS
WEIGHT: 178.81 LBS | OXYGEN SATURATION: 98 % | DIASTOLIC BLOOD PRESSURE: 82 MMHG | SYSTOLIC BLOOD PRESSURE: 128 MMHG | HEART RATE: 71 BPM | BODY MASS INDEX: 28.07 KG/M2 | HEIGHT: 67 IN

## 2018-05-22 DIAGNOSIS — G43.809 OTHER MIGRAINE WITHOUT STATUS MIGRAINOSUS, NOT INTRACTABLE: ICD-10-CM

## 2018-05-22 DIAGNOSIS — K21.9 GASTROESOPHAGEAL REFLUX DISEASE, ESOPHAGITIS PRESENCE NOT SPECIFIED: ICD-10-CM

## 2018-05-22 DIAGNOSIS — F41.9 ANXIETY: ICD-10-CM

## 2018-05-22 DIAGNOSIS — F32.A DEPRESSION, UNSPECIFIED DEPRESSION TYPE: ICD-10-CM

## 2018-05-22 DIAGNOSIS — M47.816 LUMBAR FACET ARTHROPATHY: Primary | ICD-10-CM

## 2018-05-22 DIAGNOSIS — M54.12 CERVICAL RADICULOPATHY: ICD-10-CM

## 2018-05-22 DIAGNOSIS — R10.9 ABDOMINAL PAIN, UNSPECIFIED ABDOMINAL LOCATION: ICD-10-CM

## 2018-05-22 PROCEDURE — 99999 PR PBB SHADOW E&M-EST. PATIENT-LVL III: CPT | Mod: PBBFAC,,, | Performed by: FAMILY MEDICINE

## 2018-05-22 PROCEDURE — 3008F BODY MASS INDEX DOCD: CPT | Mod: CPTII,S$GLB,, | Performed by: FAMILY MEDICINE

## 2018-05-22 PROCEDURE — 99214 OFFICE O/P EST MOD 30 MIN: CPT | Mod: S$GLB,,, | Performed by: FAMILY MEDICINE

## 2018-05-22 RX ORDER — HYDROCODONE BITARTRATE AND ACETAMINOPHEN 7.5; 325 MG/1; MG/1
1 TABLET ORAL
Qty: 60 TABLET | Refills: 0 | Status: SHIPPED | OUTPATIENT
Start: 2018-06-28 | End: 2018-07-28

## 2018-05-22 RX ORDER — HYDROCODONE BITARTRATE AND ACETAMINOPHEN 7.5; 325 MG/1; MG/1
1 TABLET ORAL
Qty: 60 TABLET | Refills: 0 | Status: SHIPPED | OUTPATIENT
Start: 2018-05-29 | End: 2018-05-23 | Stop reason: SDUPTHER

## 2018-05-22 RX ORDER — HYDROCODONE BITARTRATE AND ACETAMINOPHEN 7.5; 325 MG/1; MG/1
1 TABLET ORAL
Qty: 60 TABLET | Refills: 0 | Status: SHIPPED | OUTPATIENT
Start: 2018-07-28 | End: 2018-08-22 | Stop reason: SDUPTHER

## 2018-05-22 NOTE — PROGRESS NOTES
Subjective:       Patient ID: Isela Smyth is a 43 y.o. female.    Chief Complaint: Flank Pain ([facility] F/U )    HPI     Here for a f/u.    Was in [facility] this past weekend.  Went to [place] ER on 5/19/18 for left flank pain. Had ct abdomen/pelvis which showed no evidence of renal stone.     Went to [facility] ER last night for left lower abdominal pain.  Unremarkable w/u. Reports less pain currently.     Gets 2-5 migraines per month.  Takes fioricet and imitrex for pain control.       Hx of 6 low back spinal surgeries and 1 neck spinal surgery in past. Recent one in 2012.      Status post 08/14/2012 microscopic recurrent left L4-L5 laminotomy, discectomy, left L5 complete laminectomy, left L5-S1 laminotomy,    recurrent discectomy.      Chronic lumbago controlled while on norco 7.5 and flexeril. Ps: 2/10. Occasional left sciatic pain. Had an placido last month which helped with the pain.      Had mri L spine 9/2016 which showed:      1.  Postoperative change of left hemilaminectomy at L4-L5 and L5-S1.  2.  Multilevel degenerative change of the lumbar spine, most pronounced at L4-L5 and L5-S1 as detailed above  3.  Minimal descending central disc extrusion at L5-S1 which does not appear to encroach upon either the descending left or right S1 nerve.  4.  Probable conjoined left S2 nerve coursing in the left posterolateral spinal canal.  Less likely, this represents an intraspinal synovial cyst abutting the descending left S1 nerve.  5.  Central annular tear of the intervertebral disc at L4-L5.     Also, has chronic neck pain > 5 years. Hx of neck surgeries in past. Saw pain management recently and had an placido on 1/31/17. Norco 7.5 helps with pain.      Had mri of cervical spine on 1/19/17 which showed:  -Disc protrusion at the C5-C6 level and to the right lateral recess with possible anterior cord contact  -Loss of normal cervical lordosis which may be positional or related to muscular strain.  -Milder degenerative changes are  noted within the body of the report.      Depression and anxiety stable while on effexor.                                                                                            Review of Systems      Review of Systems   Constitutional: Negative for fever and chills.   HENT: Negative for hearing loss and neck stiffness.    Eyes: Negative for redness and itching.   Respiratory: Negative for cough and choking.    Cardiovascular: Negative for chest pain and leg swelling.  Abdomen: Negative for blood in stool.   Genitourinary: Negative for dysuria and flank pain.   Musculoskeletal: Negative for gait problem.   Neurological: Negative for light-headedness and headaches.   Hematological: Negative for adenopathy.   Psychiatric/Behavioral: Negative for behavioral problems.       Objective:      Physical Exam   Constitutional: She appears well-developed.   HENT:   Head: Normocephalic and atraumatic.   Eyes: Conjunctivae are normal. Pupils are equal, round, and reactive to light.   Neck: Normal range of motion.   Cardiovascular: Normal rate and regular rhythm.    No murmur heard.  Pulmonary/Chest: Effort normal and breath sounds normal. She has no wheezes.   Abdominal: Soft. Bowel sounds are normal. She exhibits no distension.   Mild left mid quad tenderness   Musculoskeletal:   Cervical spine: tend of bilat paravert area.  Dec rom with flex/ext.    lumbar spine: pos tenderness of bilat lower paravert area.  Right slr to 70 degrees reproduces right lower pain.  Left slr to 70 degrees reproduces left lower pain.     Lymphadenopathy:     She has no cervical adenopathy.       Assessment:       1. Lumbar facet arthropathy    2. Cervical radiculopathy    3. Gastroesophageal reflux disease, esophagitis presence not specified    4. Abdominal pain, unspecified abdominal location    5. Other migraine without status migrainosus, not intractable    6. Depression, unspecified depression type    7. Anxiety        Plan:       Lumbar  facet arthropathy    Cervical radiculopathy    Gastroesophageal reflux disease, esophagitis presence not specified    Abdominal pain, unspecified abdominal location    Other migraine without status migrainosus, not intractable    Depression, unspecified depression type    Anxiety    Other orders  -     Discontinue: HYDROcodone-acetaminophen (NORCO) 7.5-325 mg per tablet; Take 1 tablet by mouth every 4 to 6 hours as needed for Pain.  Dispense: 60 tablet; Refill: 0  -     HYDROcodone-acetaminophen (NORCO) 7.5-325 mg per tablet; Take 1 tablet by mouth every 4 to 6 hours as needed for Pain.  Dispense: 60 tablet; Refill: 0  -     HYDROcodone-acetaminophen (NORCO) 7.5-325 mg per tablet; Take 1 tablet by mouth every 4 to 6 hours as needed for Pain.  Dispense: 60 tablet; Refill: 0          Plan:  Abdominal pain resolving  rf norco   Cont all other meds      Medication List with Changes/Refills   New Medications    HYDROCODONE-ACETAMINOPHEN (NORCO) 7.5-325 MG PER TABLET    Take 1 tablet by mouth every 4 to 6 hours as needed for Pain.    HYDROCODONE-ACETAMINOPHEN (NORCO) 7.5-325 MG PER TABLET    Take 1 tablet by mouth every 4 to 6 hours as needed for Pain.   Current Medications    BUTALBITAL-ACETAMINOPHEN-CAFFEINE -40 MG (FIORICET, ESGIC) -40 MG PER TABLET    Take 1 tablet by mouth every 6 (six) hours as needed.    CYCLOBENZAPRINE (FLEXERIL) 10 MG TABLET    Take 1 tablet (10 mg total) by mouth 3 (three) times daily as needed.    FLUTICASONE (FLONASE) 50 MCG/ACTUATION NASAL SPRAY    2 sprays by Each Nare route once daily.    KETOROLAC (TORADOL) 10 MG TABLET    TAKE 1 TABLET BY MOUTH 3 TIMES DAILY AS NEEDED.    LEVOTHYROXINE (SYNTHROID) 75 MCG TABLET    TAKE 1 TABLET (75 MCG TOTAL) BY MOUTH EVERY MORNING.    LINACLOTIDE (LINZESS) 145 MCG CAP CAPSULE    Take 1 capsule (145 mcg total) by mouth daily as needed.    PROMETHAZINE (PHENERGAN) 25 MG SUPPOSITORY    Place 1 suppository (25 mg total) rectally every 6 (six)  hours as needed for Nausea.    SUMATRIPTAN (IMITREX) 100 MG TABLET    TAKE 1 TABLET BY MOUTH EVERY DAY AS NEEDED. MAY REPEAT IN 2 HOURS IF NEEDED. DO NOT EXCEED 2 TABLETS    TRAZODONE (DESYREL) 150 MG TABLET    Take 1 tablet (150 mg total) by mouth nightly.    VENLAFAXINE (EFFEXOR-XR) 150 MG CP24    Take 1 capsule (150 mg total) by mouth once daily.   Changed and/or Refilled Medications    Modified Medication Previous Medication    HYDROCODONE-ACETAMINOPHEN (NORCO) 7.5-325 MG PER TABLET hydrocodone-acetaminophen 7.5-325mg (NORCO) 7.5-325 mg per tablet       Take 1 tablet by mouth every 4 to 6 hours as needed for Pain.    Take 1 tablet by mouth every 4 to 6 hours as needed for Pain.   Discontinued Medications    LEVOTHYROXINE (SYNTHROID) 75 MCG TABLET    Take 1 tablet (75 mcg total) by mouth every morning.    ONDANSETRON (ZOFRAN) 4 MG TABLET    Take 1 tablet (4 mg total) by mouth every 8 (eight) hours as needed.    OXYCODONE-ACETAMINOPHEN (PERCOCET)  MG PER TABLET    Take 1 tablet by mouth every 6 (six) hours as needed for Pain.    VENLAFAXINE (EFFEXOR-XR) 150 MG CP24    TAKE 1 CAPSULE BY MOUTH ONCE DAILY.

## 2018-05-23 ENCOUNTER — TELEPHONE (OUTPATIENT)
Dept: FAMILY MEDICINE | Facility: CLINIC | Age: 43
End: 2018-05-23

## 2018-05-23 RX ORDER — HYDROCODONE BITARTRATE AND ACETAMINOPHEN 7.5; 325 MG/1; MG/1
1 TABLET ORAL
Qty: 60 TABLET | Refills: 0 | Status: SHIPPED | OUTPATIENT
Start: 2018-05-24 | End: 2018-06-23

## 2018-05-23 NOTE — TELEPHONE ENCOUNTER
----- Message from Faith Anderson sent at 5/23/2018  8:29 AM CDT -----  Type:  Pharmacy Calling to Clarify an RX    Name of Caller:  pt  Pharmacy Name:    [id number] [place] [facility] [address]  [address]  [place] [address]  Phone: [phone] Fax: [phone]      Prescription Name: narco  What do they need to clarify?: na  Best Call Back Number: [phone]  Additional Information:  Calling to  Get a  refill

## 2018-05-23 NOTE — TELEPHONE ENCOUNTER
----- Message from Levy Hernandez sent at 5/23/2018 12:21 PM CDT -----  Contact: self  [phone]  Type: Needs Medical Advice    Who Called:  Isela Briscoeal   Symptoms (please be specific):  NA   How long has patient had these symptoms:  [facility] name and phone #:  [facility]   Best Call Back Number: [phone]  Additional Information: Patient asking to get rx norco filled tomorrow. Patient states she is leaving to go out of town tomorrow.

## 2018-06-20 NOTE — TELEPHONE ENCOUNTER
[facility] nurse notified via email that patient had an ER visit at [facility] for Left lower quadrant pain on 05/21/2018

## 2018-06-21 RX ORDER — BUTALBITAL, ACETAMINOPHEN AND CAFFEINE 50; 325; 40 MG/1; MG/1; MG/1
TABLET ORAL
Qty: 60 TABLET | Refills: 0 | Status: SHIPPED | OUTPATIENT
Start: 2018-06-21 | End: 2018-07-03 | Stop reason: SDUPTHER

## 2018-06-21 NOTE — TELEPHONE ENCOUNTER
----- Message from Purnima Urena sent at 6/21/2018  2:29 PM CDT -----  Contact: PT  Type:  RX Refill Request    Who Called:  Isela   Refill or New Rx:  Refill   RX Name and Strength:  butalbital-acetaminophen-caffeine -40 mg (FIORICET, ESGIC) -40 mg per tablet  How is the patient currently taking it? (ex. 1XDay):  n/a  Is this a 30 day or 90 day RX:  n/a  Preferred Pharmacy with phone number:    [id number] [place] - [address]  [address]  [place] [address]  Phone: [phone] Fax: [phone]  Local or Mail Order:  local  Ordering Provider:  siri Reinoso Call Back Number:   [phone]  Additional Information:  Pt is completely out of this medication. Please call back and advise.

## 2018-06-22 DIAGNOSIS — Z12.39 BREAST CANCER SCREENING: ICD-10-CM

## 2018-07-05 RX ORDER — BUTALBITAL, ACETAMINOPHEN AND CAFFEINE 50; 325; 40 MG/1; MG/1; MG/1
TABLET ORAL
Qty: 60 TABLET | Refills: 0 | Status: SHIPPED | OUTPATIENT
Start: 2018-07-05 | End: 2018-08-06 | Stop reason: SDUPTHER

## 2018-08-09 ENCOUNTER — PATIENT OUTREACH (OUTPATIENT)
Dept: ADMINISTRATIVE | Facility: HOSPITAL | Age: 43
End: 2018-08-09

## 2018-08-09 RX ORDER — CYCLOBENZAPRINE HCL 10 MG
TABLET ORAL
Qty: 30 TABLET | Refills: 2 | Status: SHIPPED | OUTPATIENT
Start: 2018-08-09 | End: 2018-10-03 | Stop reason: SDUPTHER

## 2018-08-09 RX ORDER — BUTALBITAL, ACETAMINOPHEN AND CAFFEINE 50; 325; 40 MG/1; MG/1; MG/1
TABLET ORAL
Qty: 60 TABLET | Refills: 0 | Status: SHIPPED | OUTPATIENT
Start: 2018-08-09 | End: 2018-08-22 | Stop reason: SDUPTHER

## 2018-08-09 NOTE — PROGRESS NOTES
Health Maintenance Due   Topic Date Due    Sign Pain Contract  05/11/1993    Complete Opioid Risk Tool  05/11/1993    Pap Smear with HPV Cotest  05/11/1996    Mammogram  05/11/2015    Influenza Vaccine  08/01/2018     Pre-visit outreach via mail

## 2018-08-09 NOTE — LETTER
August 9, 2018    Isela Smyth  [address]  [place] [address]             [facility]  [address]  [facility] [address]  Phone: [phone] Dear Ms. Smyth:    [facility] is committed to your overall health.  To help you get the most out of each of your visits, we will review your information to make sure you are up to date on all of your recommended tests and/or procedures.      Dr. Dan   has found that your chart shows you may be due for the following:    Pap smear  Mammogram    If you have had any of the above done at another facility, please bring the records or information with you so that your record at [facility] will be complete.  If you would like to schedule any of these, please contact me.    If you are currently taking medication, please bring it with you to your appointment for review.    If you have any questions or concerns, please don't hesitate to call.    Sincerely,    Serena Russo  Clinical Care Coordinator  [facility] [address] [facility] [address]  [place] [address]  Phone: [phone]   Fax: [phone]

## 2018-08-22 ENCOUNTER — OFFICE VISIT (OUTPATIENT)
Dept: FAMILY MEDICINE | Facility: CLINIC | Age: 43
End: 2018-08-22
Payer: COMMERCIAL

## 2018-08-22 ENCOUNTER — OFFICE VISIT (OUTPATIENT)
Dept: GASTROENTEROLOGY | Facility: CLINIC | Age: 43
End: 2018-08-22
Payer: COMMERCIAL

## 2018-08-22 ENCOUNTER — OFFICE VISIT (OUTPATIENT)
Dept: UROLOGY | Facility: CLINIC | Age: 43
End: 2018-08-22
Payer: COMMERCIAL

## 2018-08-22 VITALS
HEART RATE: 65 BPM | SYSTOLIC BLOOD PRESSURE: 126 MMHG | BODY MASS INDEX: 28.55 KG/M2 | HEIGHT: 67 IN | SYSTOLIC BLOOD PRESSURE: 132 MMHG | WEIGHT: 181.88 LBS | HEART RATE: 62 BPM | HEIGHT: 67 IN | DIASTOLIC BLOOD PRESSURE: 80 MMHG | BODY MASS INDEX: 28.55 KG/M2 | WEIGHT: 181.88 LBS | DIASTOLIC BLOOD PRESSURE: 86 MMHG

## 2018-08-22 VITALS
BODY MASS INDEX: 28.48 KG/M2 | WEIGHT: 181.44 LBS | HEART RATE: 76 BPM | DIASTOLIC BLOOD PRESSURE: 78 MMHG | HEIGHT: 67 IN | OXYGEN SATURATION: 98 % | SYSTOLIC BLOOD PRESSURE: 122 MMHG

## 2018-08-22 DIAGNOSIS — N20.0 KIDNEY STONES: Primary | ICD-10-CM

## 2018-08-22 DIAGNOSIS — F32.A DEPRESSION, UNSPECIFIED DEPRESSION TYPE: ICD-10-CM

## 2018-08-22 DIAGNOSIS — Z87.19 HISTORY OF IBS: ICD-10-CM

## 2018-08-22 DIAGNOSIS — R19.8 IRREGULAR BOWEL HABITS: ICD-10-CM

## 2018-08-22 DIAGNOSIS — N29 NEPHROCALCINOSIS: ICD-10-CM

## 2018-08-22 DIAGNOSIS — M47.816 LUMBAR FACET ARTHROPATHY: ICD-10-CM

## 2018-08-22 DIAGNOSIS — Z12.39 BREAST CANCER SCREENING: ICD-10-CM

## 2018-08-22 DIAGNOSIS — K21.9 GASTROESOPHAGEAL REFLUX DISEASE, ESOPHAGITIS PRESENCE NOT SPECIFIED: ICD-10-CM

## 2018-08-22 DIAGNOSIS — Z87.19 HISTORY OF DIVERTICULOSIS: ICD-10-CM

## 2018-08-22 DIAGNOSIS — R10.13 EPIGASTRIC PAIN: ICD-10-CM

## 2018-08-22 DIAGNOSIS — G43.809 OTHER MIGRAINE WITHOUT STATUS MIGRAINOSUS, NOT INTRACTABLE: Primary | ICD-10-CM

## 2018-08-22 DIAGNOSIS — Z98.84 HISTORY OF GASTRIC RESTRICTIVE SURGERY: ICD-10-CM

## 2018-08-22 DIAGNOSIS — K92.1 BLACK STOOL: Primary | ICD-10-CM

## 2018-08-22 DIAGNOSIS — M54.12 CERVICAL RADICULOPATHY: ICD-10-CM

## 2018-08-22 DIAGNOSIS — Z86.59 HISTORY OF ANXIETY: ICD-10-CM

## 2018-08-22 DIAGNOSIS — F41.9 ANXIETY: ICD-10-CM

## 2018-08-22 DIAGNOSIS — Z12.4 CERVICAL CANCER SCREENING: ICD-10-CM

## 2018-08-22 DIAGNOSIS — E83.59 NEPHROCALCINOSIS: ICD-10-CM

## 2018-08-22 LAB
BILIRUB SERPL-MCNC: NORMAL MG/DL
BLOOD URINE, POC: NORMAL
COLOR, POC UA: YELLOW
GLUCOSE UR QL STRIP: NORMAL
KETONES UR QL STRIP: 15
LEUKOCYTE ESTERASE URINE, POC: NORMAL
NITRITE, POC UA: NORMAL
PH, POC UA: 5.5
PROTEIN, POC: NORMAL
SPECIFIC GRAVITY, POC UA: 1.02
UROBILINOGEN, POC UA: NORMAL

## 2018-08-22 PROCEDURE — 3008F BODY MASS INDEX DOCD: CPT | Mod: CPTII,S$GLB,, | Performed by: UROLOGY

## 2018-08-22 PROCEDURE — 3008F BODY MASS INDEX DOCD: CPT | Mod: CPTII,S$GLB,, | Performed by: FAMILY MEDICINE

## 2018-08-22 PROCEDURE — 99999 PR PBB SHADOW E&M-EST. PATIENT-LVL III: CPT | Mod: PBBFAC,,, | Performed by: UROLOGY

## 2018-08-22 PROCEDURE — 99214 OFFICE O/P EST MOD 30 MIN: CPT | Mod: S$GLB,,, | Performed by: NURSE PRACTITIONER

## 2018-08-22 PROCEDURE — 99204 OFFICE O/P NEW MOD 45 MIN: CPT | Mod: 25,S$GLB,, | Performed by: UROLOGY

## 2018-08-22 PROCEDURE — 99999 PR PBB SHADOW E&M-EST. PATIENT-LVL V: CPT | Mod: PBBFAC,,, | Performed by: NURSE PRACTITIONER

## 2018-08-22 PROCEDURE — 99999 PR PBB SHADOW E&M-EST. PATIENT-LVL IV: CPT | Mod: PBBFAC,,, | Performed by: FAMILY MEDICINE

## 2018-08-22 PROCEDURE — 3008F BODY MASS INDEX DOCD: CPT | Mod: CPTII,S$GLB,, | Performed by: NURSE PRACTITIONER

## 2018-08-22 PROCEDURE — 99214 OFFICE O/P EST MOD 30 MIN: CPT | Mod: S$GLB,,, | Performed by: FAMILY MEDICINE

## 2018-08-22 PROCEDURE — 81002 URINALYSIS NONAUTO W/O SCOPE: CPT | Mod: S$GLB,,, | Performed by: UROLOGY

## 2018-08-22 RX ORDER — HYDROCODONE BITARTRATE AND ACETAMINOPHEN 7.5; 325 MG/1; MG/1
1 TABLET ORAL
Qty: 60 TABLET | Refills: 0 | Status: SHIPPED | OUTPATIENT
Start: 2018-08-29 | End: 2018-09-28

## 2018-08-22 RX ORDER — PANTOPRAZOLE SODIUM 20 MG/1
20 TABLET, DELAYED RELEASE ORAL DAILY
Qty: 30 TABLET | Refills: 5 | Status: SHIPPED | OUTPATIENT
Start: 2018-08-22 | End: 2019-02-20 | Stop reason: SDUPTHER

## 2018-08-22 RX ORDER — HYDROCODONE BITARTRATE AND ACETAMINOPHEN 7.5; 325 MG/1; MG/1
1 TABLET ORAL
Qty: 60 TABLET | Refills: 0 | Status: SHIPPED | OUTPATIENT
Start: 2018-09-28 | End: 2018-09-04 | Stop reason: SDUPTHER

## 2018-08-22 RX ORDER — BUTALBITAL, ACETAMINOPHEN AND CAFFEINE 50; 325; 40 MG/1; MG/1; MG/1
1 TABLET ORAL EVERY 6 HOURS PRN
Qty: 60 TABLET | Refills: 2 | Status: SHIPPED | OUTPATIENT
Start: 2018-08-22 | End: 2018-10-18 | Stop reason: SDUPTHER

## 2018-08-22 RX ORDER — HYDROCODONE BITARTRATE AND ACETAMINOPHEN 7.5; 325 MG/1; MG/1
1 TABLET ORAL
Qty: 60 TABLET | Refills: 0 | Status: SHIPPED | OUTPATIENT
Start: 2018-10-28 | End: 2018-09-04 | Stop reason: SDUPTHER

## 2018-08-22 NOTE — PROGRESS NOTES
UROLOGY [place]  8 22 18    Cc kidney stones    Age 43, comes in because recently she was at the ER in [facility] with R flank pain with RLQ radiation, severe, nausea.     Pt has a hx of having passed about 10 kidney stones over the years, and one needed endoscopic extraction (doris, 2009).     On her latest visit at [facility] on 8 2 18 she had a CT scan, as shown below    IMPRESSION   1. Findings suggestive of bilateral nephrocalcinosis and punctate   nonobstructing nephrolithiasis. No evidence of distal obstructive uropathy or  other acute neurologic process is appreciated.  2. Interval postoperative changes of gastric sleeve procedure, with removal   of the banding hardware and improved hiatal hernia.  3. Copious stool suggests component of constipation.    It was interpreted that pt had probably just passed her stone prior to having the imaging done.      [facility]    Surgical:  has a past surgical history that includes Spine surgery; Kidney stone surgery (2009); Laparoscopic gastric banding (2007); Bilateral Tubal; Vaginal delivery; Tubal ligation; Cholecystectomy; Hiatal hernia repair; Sinus surgery; Upper gastrointestinal endoscopy (03/2013); Colonoscopy (prior to 2011); Lumbar epidural injection; INJECTION-STEROID-EPIDURAL-CERVICAL (N/A, 1/31/2017); INJECTION-FACET L4/5 and L5/S1 (Bilateral, 10/12/2016); ESOPHAGOGASTRODUODENOSCOPY (EGD) (N/A, 9/14/2016); SEPTOPLASTY, NASAL (N/A, 1/31/2014); RESECTION OF TURBINATES (N/A, 1/31/2014); EGD (ESOPHAGOGASTRODUODENOSCOPY) (N/A, 3/11/2013); and LAMINECTOMY, SPINE, LUMBAR, WITH DISCECTOMY (Left, 8/14/2012).    Medical:  has a past medical history of Anxiety, Arthritis, Chronic lumbar pain, Depression, Deviated nasal septum, Diverticulosis, GERD (gastroesophageal reflux disease), Hemorrhoids, Hypothyroid, Kidney stone, Migraine headache, PONV (postoperative nausea and vomiting), Shortness of breath, and Urticaria.    Familial: no fh of nephrolithiasis, diabetes or cancer    Social:  , lives in [place], [place]    Meds:   Current Outpatient Medications on File Prior to Visit   Medication Sig Dispense Refill    butalbital-acetaminophen-caffeine -40 mg (FIORICET, ESGIC) -40 mg Take 1 tablet by mouth every 6 (six) hours as needed. 60 tablet 2     TAKE 1 TABLET BY  30 tablet 2    fluticasone (FLONASE) 50 mcg/actuation nasal spray 2 sprays by Each Nare route on 16 g 11    [START ON 8/29/2018] HYDROcodone-acetaminophen (NORCO) 7.5-325 mg Take 1 tablet by mouth every 4 to 6 hours as needed 60 tablet 0    [START ON 9/28/2018] HYDROcodone-acetaminophen (NORCO) 7.5-325 mg pe Take 1 tablet by mouth every 4 to 6 hours as nee 60 tablet 0    [START ON 10/28/2018] HYDROcodone-acetaminophen (NORCO) 7.5-325 mg Take 1 tablet by mouth every 4 to 6 hours as needed  60 tablet 0    ketorolac (TORADOL) 10 m TAKE 1 TABLET  30 tablet 2    levothyroxine (SYNTHROID) 75 MCG tablet TAKE 1 TABLET (75 MCG T 90 tablet 3    linaclotide (LINZESS) 145 mcg Cap capsule Take 1 capsule (145 mcg total) by mouth 30 capsule 5    naproxen sodium (ANAPR  20 tablet 0    ondansetron (ZOFRAN)  Take 1 tablet (4 mg t 12 tablet 0    pantoprazole (PROTONIX) 20 MG tablet Take 1 tablet (20 mg total) by 30 tablet 5    promethazine (PHENERGAN) 25 MG suppository Place 1 suppository (25 mg to 12 suppository 5    sumatriptan (IMITREX) 100 MG tablet TAKE 1 TABLET BY 9 tablet 11    traZODone (DESYREL) 150 MG tablet Take 1 tablet (150 mg total) b 30 tablet 11    venlafaxine (EFFEXOR-XR) 150 MG Cp24 Take 1 capsule (150 mg total) by rupa 90 capsule 3     REVIEW OF SYSTEMS  GENERAL: No complaints of fatigue. No headaches at this time, or dizzy spells.   HEENT: vision preserved. Sinuses: No current complaints.   CARDIOPULMONARY: No swelling of the legs; no chest pain. No shortness of breath, no wheezing.   GASTROINTESTINAL: has heartburn. No diarrhea, constipation, blood or mucus   GENITOURINARY: recent stone episode. Denies  dysuria, bleeding or incontinence.   MUSCULOSKELETAL: No major arthritic complaints such as pain or stiffness.   PSYCHIATRIC: No history of depression or anxiety.   ENDOCRINOLOGIC: No reports of sweating, cold or heat intolerance. No polyuria or polydipsia.   NEUROLOGICAL: No dizziness, syncope, paralysis  LYMPHATICS: No enlarged nodes. No history of splenectomy.    Pt alert, oriented, no distress  HEENT: wnl.  Neck: supple, no JVD, no lymphadenopathy  Chest: CV NSR  Lungs: normal chest expansion, no labored breathing  Abdomen flat, nontender, no organomegaly, no masses.  Extremities: no edema, peripheral pulses nl  Neuro: preserved      IMP  Nephrolithiasis, probably passed recent stone  Her recent CT scan shows bilateral nephrocalcinosis, rather than actual stones. Although this condition suggests an elevated risk of stone production, I see no stones now that can be extracted from her kidneys.   Pt is now increasing the fluid intake, such as to try to combat her increased lithogenic tendency. I offered to do metabolic workup for stone disease; will wait for now. Can also be cone by nephrology and we discussed that.

## 2018-08-22 NOTE — PROGRESS NOTES
Subjective:       Patient ID: Isela Smyth is a 43 y.o. female Body mass index is 28.49 kg/m².    Chief Complaint: Melena (ER f/u)    Established patient of Dr. Zuluaga & myself.    Gastroesophageal Reflux   She complains of abdominal pain (intermittent epigastric pain, rated 7/10, described as sharp & burning, radiates up esophagus, worse with certain foods), early satiety, globus sensation, heartburn (rarely) and nausea (rare, occurs with kidney stone). She reports no belching, no chest pain, no choking, no coughing, no dysphagia, no hoarse voice, no sore throat or no water brash. This is a chronic problem. Episode onset: several years. The problem occurs rarely. The heartburn wakes her from sleep. The heartburn does not limit her activity. The heartburn changes with position. The symptoms are aggravated by lying down, certain foods and stress (spicy foods, red sauce). Associated symptoms include melena (occasional black stools since 8/2/18 (went to ER for kidney stones)). Pertinent negatives include no fatigue or weight loss. Reports indigestion. Risk factors include hiatal hernia, caffeine use and NSAIDs (NSAID rare use; occasional use of norco- maybe 1-2 times a month). She has tried a PPI, an antacid, head elevation and a diet change (protonix 20 mg once daily restarted 8/2/18; avoids dairy) for the symptoms. The treatment provided moderate relief. Past procedures include an EGD. history of gastric banding.     Review of Systems   Constitutional: Negative for appetite change, chills, fatigue, fever, unexpected weight change and weight loss.        Reports occasional use of norco and rare use of toradol   HENT: Negative for hoarse voice, mouth sores, sore throat and trouble swallowing.    Eyes: Negative for visual disturbance.   Respiratory: Negative for cough, choking, chest tightness and shortness of breath.    Cardiovascular: Negative for chest pain and palpitations.   Gastrointestinal: Positive for abdominal  pain (intermittent epigastric pain, rated 7/10, described as sharp & burning, radiates up esophagus, worse with certain foods), constipation (history of constipation), diarrhea, heartburn (rarely), melena (occasional black stools since 8/2/18 (went to ER for kidney stones)) and nausea (rare, occurs with kidney stone). Negative for anal bleeding, dysphagia, rectal pain and vomiting.        Bowel movements rotate between constipation (lasts 3-4 days; linzess PRN) and diarrhea (currently loose black tarry stool with 3-5 times a day)   Genitourinary: Negative for difficulty urinating, dysuria and frequency.   Neurological: Negative for weakness and headaches.       Past Medical History:   Diagnosis Date    Anxiety     Arthritis     Chronic lumbar pain     Depression     Deviated nasal septum     Diverticulosis     GERD (gastroesophageal reflux disease)     Hemorrhoids     Hypothyroid     Kidney stone     passed 10 stones by herself over the years, one needed procedure    Migraine headache     PONV (postoperative nausea and vomiting)     severe    Shortness of breath     Urticaria      Past Surgical History:   Procedure Laterality Date    Bilateral Tubal      CHOLECYSTECTOMY      COLONOSCOPY  prior to 2011    EGD (ESOPHAGOGASTRODUODENOSCOPY) N/A 9/5/2018    [facility] at [facility]    EGD (ESOPHAGOGASTRODUODENOSCOPY) N/A 3/11/2013    [facility]    ESOPHAGOGASTRODUODENOSCOPY N/A 9/5/2018    Procedure: EGD (ESOPHAGOGASTRODUODENOSCOPY);  Surgeon: Marquis Zuluaga MD;  Location: [facility];  Service: Endoscopy;  Laterality: N/A;    ESOPHAGOGASTRODUODENOSCOPY (EGD) N/A 9/14/2016    [facility] at [facility] ENDO    HIATAL HERNIA REPAIR      INJECTION-FACET L4/5 and L5/S1 Bilateral 10/12/2016    [facility] by [facility]rete [facility] at [facility] OR    INJECTION-STEROID-EPIDURAL-CERVICAL N/A 1/31/2017    Performed by Rich SONI  "MD Caden at [facility] OR    KIDNEY STONE SURGERY  2009    [facility], had stone removed by dr renay george, stayed in hospital 4 days    LAMINECTOMY, SPINE, LUMBAR, WITH DISCECTOMY Left 8/14/2012    [facility]y Clifford Jr[facility]    LAPAROSCOPIC GASTRIC BANDING  2007    later removed in 2016    LUMBAR EPIDURAL INJECTION      RESECTION OF TURBINATES N/A 1/31/2014    [facility]mon [facility]    SEPTOPLASTY, NASAL N/A 1/31/2014    [facility] at [facility] OR    SINUS SURGERY      SPINE SURGERY      6 back surgeries; 1 neck surgery    TUBAL LIGATION      UPPER GASTROINTESTINAL ENDOSCOPY  03/2013    Dr. Zuluaga    UPPER GASTROINTESTINAL ENDOSCOPY  09/14/2016    Dr. Zuluaga    VAGINAL DELIVERY      times 3     Family History   Problem Relation Age of Onset    Ulcers Mother     Cancer Neg Hx     Heart disease Neg Hx     Hypertension Neg Hx     Diabetes Neg Hx     Angioedema Neg Hx     Allergies Neg Hx     Allergic rhinitis Neg Hx     Asthma Neg Hx     Eczema Neg Hx     Immunodeficiency Neg Hx     Colon cancer Neg Hx     Colon polyps Neg Hx     Crohn's disease Neg Hx     Ulcerative colitis Neg Hx     Nephrolithiasis Neg Hx      Wt Readings from Last 10 Encounters:   08/22/18 82.5 kg (181 lb 14.1 oz)   08/22/18 82.5 kg (181 lb 14.1 oz)   08/22/18 82.3 kg (181 lb 7 oz)   08/02/18 79.6 kg (175 lb 7.8 oz)   05/22/18 81.1 kg (178 lb 12.7 oz)   05/21/18 81 kg (178 lb 9.2 oz)   02/27/18 79.9 kg (176 lb 2.4 oz)   02/06/18 79.5 kg (175 lb 4.3 oz)   11/29/17 79.6 kg (175 lb 7.8 oz)   11/05/17 78.2 kg (172 lb 6.4 oz)     Reviewed radiology reports of 8/2/18 CT renal stone ct abdomen pelvis and ER visit for acute cystitis; 5/22/18 renal ultrasound and abdominal x-ray; & 2/7/18 pelvic ultrasound.    9/14/2016 EGD was reviewed and procedure report states:   " Findings:       Chandler-Urena Grade II (multiple lesions and folds,        noncircumferential, with or " "without confluence) distal reflux        esophagitis with no bleeding was found. Biopsies were taken with a        cold forceps for histology.       Evidence of a lap band was found in proximal stomach. A gastric        pouch was found containing food debris above the band. The band was        not seen and appeared tight. This was traversed.       Mild inflammation was found in the gastric antrum. Biopsies were        taken with a cold forceps for Helicobacter pylori testing using        CLOtest. Biopsies were taken with a cold forceps for histology.       The examined duodenum was normal.  Impression:          - Patrickary-Urena Grade II reflux esophagitis.                        Biopsied.                       - S/P lap band - band appears tight with residual                        food debris above band.                       - Gastritis. Biopsied.                       - Normal examined duodenum.  Recommendation:      - Await pathology and Donya test results.                       - Continue present medications.                       - Refer to a surgeon at appointment to be scheduled                        re: lap band evaluation/adjustment.                       - Discharge patient to home (ambulatory).".  Biopsy results:   "FINAL PATHOLOGIC DIAGNOSIS  1. GASTRIC BIOPSIES:  NO SIGNIFICANT HISTOLOGIC ALTERATION  2. BIOPSY OF DISTAL ESOPHAGUS: SQUAMOUS MUCOSA WITH NO SIGNIFICANT HISTOLOGIC  ALTERATION"  Donya test negative  Objective:      Physical Exam   Constitutional: She is oriented to person, place, and time. She appears well-developed and well-nourished. No distress.   HENT:   Head: Atraumatic.   Mouth/Throat: Oropharynx is clear and moist and mucous membranes are normal. No oral lesions. No oropharyngeal exudate.   Eyes: Conjunctivae are normal. Pupils are equal, round, and reactive to light. No scleral icterus.   Neck: Normal range of motion. Neck supple. No tracheal deviation present.   Cardiovascular: Normal " rate.   Pulmonary/Chest: Effort normal and breath sounds normal. No respiratory distress. She has no wheezes.   Abdominal: Soft. Normal appearance and bowel sounds are normal. She exhibits no distension, no abdominal bruit, no ascites and no mass. There is no hepatosplenomegaly. There is tenderness (mild) in the epigastric area. There is no rigidity, no rebound, no guarding, no tenderness at McBurney's point and negative Nava's sign. No hernia.   Lymphadenopathy:     She has no cervical adenopathy.   Neurological: She is alert and oriented to person, place, and time.   Skin: Skin is warm and dry. No rash noted. She is not diaphoretic. No erythema. No pallor.   Non-jaundiced   Psychiatric: She has a normal mood and affect. Her behavior is normal.   Nursing note and vitals reviewed.      Assessment:       1. Black stool    2. History of gastric restrictive surgery    3. Irregular bowel habits    4. History of IBS    5. Epigastric pain    6. History of anxiety        Plan:       Black stool  - schedule EGD, discussed procedure with patient, patient verbalized understanding  - schedule Colonoscopy, discussed procedure with the patient, patient verbalized understanding    History of gastric restrictive surgery  - schedule EGD, discussed procedure with patient, patient verbalized understanding    Irregular bowel habits  - schedule EGD, discussed procedure with patient, patient verbalized understanding  - recommended increase fiber in diet, especially soluble fiber since this can help bulk up the stool consistency and may help to slow down how fast the stool goes through the colon and can prevent diarrhea  - schedule Colonoscopy, discussed procedure with the patient, patient verbalized understanding  Recommended daily exercise, adequate water intake (six 8-oz glasses of water daily), and high fiber diet. OTC fiber supplements are recommended if diet does not reach daily fiber goal (25 grams daily), such as Metamucil,  Citrucel, or FiberCon (take as directed, separate from other oral medications by >2 hours).  -Recommend taking an OTC stool softener such as Colace as directed to avoid hard stools and straining with bowel movements PRN  - discussed with patient that Linzess is not meant to be taken PRN but daily to prevent constipation, patient verbalized understanding  -If still no improvement with these measures, call/follow-up  - discussed with patient that a side effect of narcotic pain medications is constipation, advised patient to talk to provider who manages pain medication and to try to stop or decrease use of narcotics, patient verbalized understanding    History of IBS  - discussed diagnosis with patient in depth, reviewed and gave IBS educational information in AVS, patient verbalized understanding  - recommended OTC probiotic, such as Align or Culturelle, as directed  - avoid lactose  - drink adequate water intake  -smaller, more frequent meals  -avoid trigger foods    Epigastric pain  - schedule EGD, discussed procedure with patient, patient verbalized understanding  - continue Protonix 40 mg once daily as directed, take in the morning before breakfast, discussed about possible long term use of medication (prefer to use lowest effective dose or discontinuing if possible),patient verbalized understanding  -discussed the different types of medications used to treat reflux and how to use them, antacids can be used PRN for breakthrough heartburn symptoms by reducing stomach acid that is already produced, H2 blockers (zantac) work by limiting the amount acid production, & PPI's work to block acid production and are taken daily, patient verbalized understanding.  -Educated patient on lifestyle modifications to help control reflux including: avoid large meals, avoid eating within 2-3 hours of bedtime (avoid late night eating & lying down soon after eating), elevate head of bed if nocturnal symptoms are present, smoking  cessation (if current smoker), & weight loss (if overweight).  -Educated to avoid known foods which trigger reflux symptoms & to minimize/avoid high-fat foods, chocolate, caffeine, citrus, alcohol, & tomato products.  -Advised to avoid/limit use of NSAID's, since they can cause GI upset, bleeding, and/or ulcers. If needed, take with food.    History of anxiety  Recommend follow-up with Primary Care Provider/psych for continued evaluation and management.    History of diverticulosis  - discussed the diagnosis of diverticulosis and diverticulitis, to prevent diverticulitis, high fiber diet is recommended.  -Recommended high fiber diet. Recommended daily exercise, adequate water intake (six 8-oz glasses of water daily), and high fiber diet. OTC fiber supplements are recommended if diet does not reach daily fiber goal (25 grams daily), such as Metamucil, Citrucel, or FiberCon (take as directed, separate from other oral medications by >2 hours).  - Advised to avoid/minimize popcorn, seeds, and nuts.    Follow-up in about 1 month (around 9/22/2018), or if symptoms worsen or fail to improve.    If no improvement in symptoms or symptoms worsen, call/follow-up at clinic or go to ER.

## 2018-08-22 NOTE — PROGRESS NOTES
Subjective:       Patient ID: Isela Smyth is a 43 y.o. female.    Chief Complaint: Migraine    HPI       Here for a f/u.     Gets 2-5 migraines per month.  Takes fioricet and imitrex for pain control.       History of 6 low back spinal surgeries and 1 neck spinal surgery in past. Recent one in 2012.      Status post 08/14/2012 microscopic recurrent left L4-L5 laminotomy, discectomy, left L5 complete laminectomy, left L5-S1 laminotomy,    recurrent discectomy.      Chronic lumbago controlled while on norco 7.5 and flexeril. Ps: 2/10. Occasional left sciatic pain. Had an placido last month which helped with the pain.      Had mri L spine 9/2016 which showed:      1.  Postoperative change of left hemilaminectomy at L4-L5 and L5-S1.  2.  Multilevel degenerative change of the lumbar spine, most pronounced at L4-L5 and L5-S1 as detailed above  3.  Minimal descending central disc extrusion at L5-S1 which does not appear to encroach upon either the descending left or right S1 nerve.  4.  Probable conjoined left S2 nerve coursing in the left posterolateral spinal canal.  Less likely, this represents an intraspinal synovial cyst abutting the descending left S1 nerve.  5.  Central annular tear of the intervertebral disc at L4-L5.     Also, has chronic neck pain > 5 years. Hx of neck surgeries in past. Saw pain management recently and had an placido on 1/31/17. Norco 7.5 helps with pain.      Had mri of cervical spine on 1/19/17 which showed:  -Disc protrusion at the C5-C6 level and to the right lateral recess with possible anterior cord contact  -Loss of normal cervical lordosis which may be positional or related to muscular strain.  -Milder degenerative changes are noted within the body of the report.      Depression and anxiety stable while on effexor.                                                                                     gerd stable.        Review of Systems      Review of Systems   Constitutional: Negative for fever  and chills.   HENT: Negative for hearing loss and neck stiffness.    Eyes: Negative for redness and itching.   Respiratory: Negative for cough and choking.    Cardiovascular: Negative for chest pain and leg swelling.  Abdomen: Negative for abdominal pain and blood in stool.   Genitourinary: Negative for dysuria and flank pain.   Neurological: Negative for light-headedness and headaches.   Hematological: Negative for adenopathy.   Psychiatric/Behavioral: Negative for behavioral problems.     Objective:      Physical Exam   Constitutional: She appears well-developed.   HENT:   Head: Normocephalic and atraumatic.   Eyes: Conjunctivae are normal. Pupils are equal, round, and reactive to light.   Neck: Normal range of motion.   Cardiovascular: Normal rate and regular rhythm.   No murmur heard.  Pulmonary/Chest: Effort normal and breath sounds normal.   Musculoskeletal:   Cervical spine: tend of bilat paravert area.  Dec rom with flex/ext.    lumbar spine: pos tenderness of bilat lower paravert area.  Right slr to 70 degrees reproduces right lower pain.  Left slr to 70 degrees reproduces left lower pain.      Lymphadenopathy:     She has no cervical adenopathy.       Assessment:       1. Other migraine without status migrainosus, not intractable    2. Breast cancer screening    3. Cervical cancer screening    4. Lumbar facet arthropathy    5. Cervical radiculopathy    6. Depression, unspecified depression type    7. Anxiety    8. Gastroesophageal reflux disease, esophagitis presence not specified        Plan:       Other migraine without status migrainosus, not intractable    Breast cancer screening  -     Mammo Digital Screening Bilat with CAD; Future; Expected date: 08/22/2018    Cervical cancer screening  -     Ambulatory referral to Obstetrics / Gynecology    Lumbar facet arthropathy    Cervical radiculopathy    Depression, unspecified depression type    Anxiety    Gastroesophageal reflux disease, esophagitis presence  not specified    Other orders  -     HYDROcodone-acetaminophen (NORCO) 7.5-325 mg per tablet; Take 1 tablet by mouth every 4 to 6 hours as needed for Pain.  Dispense: 60 tablet; Refill: 0  -     HYDROcodone-acetaminophen (NORCO) 7.5-325 mg per tablet; Take 1 tablet by mouth every 4 to 6 hours as needed for Pain.  Dispense: 60 tablet; Refill: 0  -     HYDROcodone-acetaminophen (NORCO) 7.5-325 mg per tablet; Take 1 tablet by mouth every 4 to 6 hours as needed for Pain.  Dispense: 60 tablet; Refill: 0  -     butalbital-acetaminophen-caffeine -40 mg (FIORICET, ESGIC) -40 mg per tablet; Take 1 tablet by mouth every 6 (six) hours as needed.  Dispense: 60 tablet; Refill: 2  -     pantoprazole (PROTONIX) 20 MG tablet; Take 1 tablet (20 mg total) by mouth once daily.  Dispense: 30 tablet; Refill: 5          Plan:  Cont current meds      Medication List with Changes/Refills   New Medications    HYDROCODONE-ACETAMINOPHEN (NORCO) 7.5-325 MG PER TABLET    Take 1 tablet by mouth every 4 to 6 hours as needed for Pain.    HYDROCODONE-ACETAMINOPHEN (NORCO) 7.5-325 MG PER TABLET    Take 1 tablet by mouth every 4 to 6 hours as needed for Pain.   Current Medications    CYCLOBENZAPRINE (FLEXERIL) 10 MG TABLET    TAKE 1 TABLET BY MOUTH 3 TIMES DAILY AS NEEDED.    FLUTICASONE (FLONASE) 50 MCG/ACTUATION NASAL SPRAY    2 sprays by Each Nare route once daily.    KETOROLAC (TORADOL) 10 MG TABLET    TAKE 1 TABLET BY MOUTH 3 TIMES DAILY AS NEEDED.    LEVOTHYROXINE (SYNTHROID) 75 MCG TABLET    TAKE 1 TABLET (75 MCG TOTAL) BY MOUTH EVERY MORNING.    LINACLOTIDE (LINZESS) 145 MCG CAP CAPSULE    Take 1 capsule (145 mcg total) by mouth daily as needed.    NAPROXEN SODIUM (ANAPROX) 550 MG TABLET    Take 1 tablet (550 mg total) by mouth 2 (two) times daily with meals.    ONDANSETRON (ZOFRAN) 4 MG TABLET    Take 1 tablet (4 mg total) by mouth every 6 (six) hours as needed.    PROMETHAZINE (PHENERGAN) 25 MG SUPPOSITORY    Place 1 suppository  (25 mg total) rectally every 6 (six) hours as needed for Nausea.    SUMATRIPTAN (IMITREX) 100 MG TABLET    TAKE 1 TABLET BY MOUTH EVERY DAY AS NEEDED. MAY REPEAT IN 2 HOURS IF NEEDED. DO NOT EXCEED 2 TABLETS    TRAZODONE (DESYREL) 150 MG TABLET    Take 1 tablet (150 mg total) by mouth nightly.    VENLAFAXINE (EFFEXOR-XR) 150 MG CP24    Take 1 capsule (150 mg total) by mouth once daily.   Changed and/or Refilled Medications    Modified Medication Previous Medication    BUTALBITAL-ACETAMINOPHEN-CAFFEINE -40 MG (FIORICET, ESGIC) -40 MG PER TABLET butalbital-acetaminophen-caffeine -40 mg (FIORICET, ESGIC) -40 mg per tablet       Take 1 tablet by mouth every 6 (six) hours as needed.    TAKE 1 TABLET BY MOUTH EVERY 6 HOURS AS NEEDED.    HYDROCODONE-ACETAMINOPHEN (NORCO) 7.5-325 MG PER TABLET HYDROcodone-acetaminophen (NORCO) 7.5-325 mg per tablet       Take 1 tablet by mouth every 4 to 6 hours as needed for Pain.    Take 1 tablet by mouth every 4 to 6 hours as needed for Pain.    PANTOPRAZOLE (PROTONIX) 20 MG TABLET pantoprazole (PROTONIX) 20 MG tablet       Take 1 tablet (20 mg total) by mouth once daily.    Take 1 tablet (20 mg total) by mouth once daily.   Discontinued Medications    DOCUSATE SODIUM (COLACE) 100 MG CAPSULE    Take 1 capsule (100 mg total) by mouth 2 (two) times daily.

## 2018-08-22 NOTE — PATIENT INSTRUCTIONS
Epigastric Pain (Uncertain Cause)     Epigastric pain can be a sign of disease in the upper abdomen. Common causes include:  · Acid reflux (stomach acid flowing up into the esophagus)  · Gastritis (irritation of the stomach lining)  · Peptic Ulcer Disease  · Inflammation of the pancreas  · Gallstone  · Infection in the gallbladder  Pain may be dull or burning. It may spread upward to the chest or to the back. There may be other symptoms such as belching, bloating, cramps or hunger pains. There may be weight loss or poor appetite, nausea or vomiting.  Since the diagnosis of your pain is not certain yet, further tests may sometimes be needed. Sometimes the doctor will treat you for the most likely condition to see if there is improvement before doing further tests.  Home care  Medicines  · Antacids help neutralize the normal acids in your stomach. Examples are Maalox, Mylanta, Rolaids, and Tums. If you dont like the liquid, you can also try a chewable one. You may find one works better than another for you. Overuse can cause diarrhea or constipation.  · Acid blockers (H2 blockers) decrease acid production. Examples are cimetidine (Tagamet), famotidine (Pepcid) and ranitidine (Zantac).  · Acid inhibitors (PPIs) decrease acid production in a different way than the blockers. You may find they work better, but can take a little longer to take effect.  Examples are omeprazole (Prilosec), lansoprazole (Prevacid), pantoprazole (Protonix), rabeprazole (Aciphex), and esomeprazole (Nexium).  · Take an antacid 30-60 minutes after eating and at bedtime, but not at the same time as an acid blocker.  · Try not to take NSAIDs. Aspirin may also cause problems, but if taking it for your heart or other medical reasons, talk to your doctor before stopping it; you do not want to cause a worse problem, like a heart attack or stroke.  Diet  · If certain foods seem to cause your spasm, try to avoid them.   · Eat slowly and chew food well  before swallowing. Symptoms of gastritis can be worsened by certain foods. Limit or avoid fatty, fried, and spicy foods, as well as coffee, chocolate, mint, and foods with high acid content such as tomatoes and citrus fruit and juices (orange, grapefruit, lemon).  · Avoid alcohol, caffeine, and tobacco, which can delay healing and worsen your problem.  · Try eating smaller meals with snacks in between  Follow-up care  Follow up with your healthcare provider or as advised.  When to seek medical advice  Call your healthcare provider right away if any of the following occur:  · Stomach pain worsens or moves to the right lower part of the abdomen  · Chest pain appears, or if it worsens or spreads to the chest, back, neck, shoulder, or arm  · Frequent vomiting (cant keep down liquids)  · Blood in the stool or vomit (red or black color)  · Feeling weak or dizzy, fainting, or having trouble breathing  · Fever of 100.4ºF (38ºC) or higher, or as directed by your healthcare provider  · Abdominal swelling  Date Last Reviewed: 9/25/2015  © 6024-3987 [employer]. [address] [place] [address]. All rights reserved. This information is not intended as a substitute for professional medical care. Always follow your healthcare professional's instructions.        Irritable Bowel Syndrome    Irritable bowel syndrome (IBS) is a disorder of the intestines. It is not a disease, but a group of symptoms caused by changes in the way the intestines work. It is fairly common, but the cause is not well understood.  Symptoms of IBS include:  · Abdominal pain, discomfort, and cramping  · Diarrhea  · Constipation or dry, hard stools  · Mucous stool  · Bloating  · Feeling of incomplete bowel movements  It usually results in one of 3 patterns of symptoms:  · Chronic abdominal pain and constipation  · Recurring episodes of diarrhea, with or without pain  · Alternating diarrhea and constipation  Home care  The goal of  treatment is to control and relieve your symptoms, so you can lead a full and active life. There is no cure for IBS. But it can be managed.  Diet  Your diet did not cause your IBS, but it can affect it. No one diet works for everyone. Finding the best foods for you may take trial and error. Keep a food log to help find what foods made your symptoms worse. Below are some tips that may help you.  · Eat more slowly. Eat smaller amounts at a time, but more often. Remember, you can always eat more, but cannot eat less once youve eaten too much.  · High-fiber foods are complicated. While they may help relieve constipation, they can make your bloating, cramping, gas, and diarrhea worse.  · Eat less sugar.  · Try cutting out dairy products. Sometimes this helps.  · Try cutting out foods that are high in fat and fatty meats.  · You can control bloating or passing excess gas. Be careful with gassy vegetables and fruits like beans, cabbage, broccoli, and cauliflower.  · Be careful with carbonated beverages and fruit juices. They can make your bloating and diarrhea worse.  · Caffeine, alcohol, and stimulants can make symptoms worse. These include coffee, tea, sodas, energy drinks, and chocolate.  Lifestyle  · Look for factors that seem to worsen your symptoms. These include stress and emotions.   · Although stress does not cause IBS, it may trigger flare-ups. Counseling can help you learn to handle stress. So can self-help measures like exercise, yoga, and meditation.  · Depression can occur along with IBS. Your healthcare provider may prescribe antidepressant medicine. This may help with diarrhea, constipation, and cramping, as well as with symptoms of depression.  · Smoking doesn't cause IBS, but can make the symptoms worse.  Medicines  Your healthcare provider may prescribe medicines. Take them as directed. For acute flare-ups of your illness, your provider may give you prescription medicines.  · Check with  your healthcare provider before taking any medicines for diarrhea.  · Avoid anti-inflammatory medicines like ibuprofen or naproxen.  · Consider nutritional supplements. This is especially true if your diarrhea is prolonged, or you aren't eating or are losing weight  Follow-up care  Follow up with your healthcare provider, or as advised. If a stool sample was taken or cultures were done, you will be told if your treatment needs to change. You can call as directed for the results.  When to seek medical advice  Call your healthcare provider right away if any of these occur:  · Abdominal pain gets worse  · Constant abdominal pain moves to the right-lower abdomen  · You can't keep liquids down because of vomiting  · You have severe diarrhea  · You have blood (red or black color) or mucus in your stool  · You feel very weak or dizzy, faint, or have extreme thirst  · You have a fever of 100.4ºF (38.0ºC) or higher, or as directed by your healthcare provider  Date Last Reviewed: 8/31/2015  © 0052-5634 [employer]. [address] [place] [address], [place] [address]. All rights reserved. This information is not intended as a substitute for professional medical care. Always follow your healthcare professional's instructions.

## 2018-08-23 ENCOUNTER — TELEPHONE (OUTPATIENT)
Dept: GASTROENTEROLOGY | Facility: CLINIC | Age: 43
End: 2018-08-23

## 2018-08-23 NOTE — TELEPHONE ENCOUNTER
----- Message from Vicente Bales sent at 8/23/2018 10:35 AM CDT -----  Contact: Patient  Isela, [phone]. Calling to reschedule her colonoscopy on 9/21. She has conflicting schedules. Please advise. Thanks.

## 2018-08-24 ENCOUNTER — TELEPHONE (OUTPATIENT)
Dept: GASTROENTEROLOGY | Facility: CLINIC | Age: 43
End: 2018-08-24

## 2018-08-24 NOTE — TELEPHONE ENCOUNTER
----- Message from Farhana Guzman sent at 8/24/2018 10:08 AM CDT -----  Type:  Patient Returning Call    Who Called:  Patient   Who Left Message for Patient:  Meri Leonardo  Does the patient know what this is regarding?:  appointment  Best Call Back Number:  [phone]  Additional Information:

## 2018-08-28 ENCOUNTER — NURSE TRIAGE (OUTPATIENT)
Dept: ADMINISTRATIVE | Facility: CLINIC | Age: 43
End: 2018-08-28

## 2018-08-28 NOTE — TELEPHONE ENCOUNTER
"    Reason for Disposition   [1] SEVERE pain (e.g., excruciating, scale 8-10) AND [2] present > 1 hour    Answer Assessment - Initial Assessment Questions  1. LOCATION: "Where does it hurt?" (e.g., left, right)      left  2. ONSET: "When did the pain start?"      0200  3. SEVERITY: "How bad is the pain?" (e.g., Scale 1-10; mild, moderate, or severe)    - MILD (1-3): doesn't interfere with normal activities     - MODERATE (4-7): interferes with normal activities or awakens from sleep     - SEVERE (8-10): excruciating pain and patient unable to do normal activities (stays in bed)        severe  4. PATTERN: "Does the pain come and go, or is it constant?"       constant  5. CAUSE: "What do you think is causing the pain?"      stones  6. OTHER SYMPTOMS:  "Do you have any other symptoms?" (e.g., fever, abdominal pain, vomiting, leg weakness, burning with urination, blood in urine)      Frequent urination  7. PREGNANCY:  "Is there any chance you are pregnant?" "When was your last menstrual period?"      8/20    Protocols used: ST FLANK PAIN-A-AH      "

## 2018-09-04 NOTE — OR NURSING
Patient instructed regarding secondary to anesthesia has nausea and vomiting in postoperative time frame. Made note on chart for anesthesia

## 2018-09-05 ENCOUNTER — ANESTHESIA (OUTPATIENT)
Dept: ENDOSCOPY | Facility: HOSPITAL | Age: 43
End: 2018-09-05
Payer: COMMERCIAL

## 2018-09-05 ENCOUNTER — ANESTHESIA EVENT (OUTPATIENT)
Dept: ENDOSCOPY | Facility: HOSPITAL | Age: 43
End: 2018-09-05
Payer: COMMERCIAL

## 2018-09-05 ENCOUNTER — HOSPITAL ENCOUNTER (OUTPATIENT)
Facility: HOSPITAL | Age: 43
Discharge: HOME | End: 2018-09-05
Attending: INTERNAL MEDICINE | Admitting: INTERNAL MEDICINE
Payer: COMMERCIAL

## 2018-09-05 VITALS
SYSTOLIC BLOOD PRESSURE: 113 MMHG | HEIGHT: 67 IN | OXYGEN SATURATION: 98 % | TEMPERATURE: 97 F | BODY MASS INDEX: 27.62 KG/M2 | RESPIRATION RATE: 16 BRPM | HEART RATE: 84 BPM | WEIGHT: 176 LBS | DIASTOLIC BLOOD PRESSURE: 73 MMHG

## 2018-09-05 DIAGNOSIS — K92.1 MELENA: ICD-10-CM

## 2018-09-05 LAB
B-HCG UR QL: NEGATIVE
CTP QC/QA: YES
H PYLORI INDEX VALUE: NEGATIVE

## 2018-09-05 PROCEDURE — 37000009 HC ANESTHESIA EA ADD 15 MINS: Mod: PO | Performed by: INTERNAL MEDICINE

## 2018-09-05 PROCEDURE — 63600175 PHARM REV CODE 636 W HCPCS: Mod: PO | Performed by: NURSE ANESTHETIST, CERTIFIED REGISTERED

## 2018-09-05 PROCEDURE — D9220A PRA ANESTHESIA: Mod: ANES,,, | Performed by: ANESTHESIOLOGY

## 2018-09-05 PROCEDURE — 43239 EGD BIOPSY SINGLE/MULTIPLE: CPT | Mod: PO | Performed by: INTERNAL MEDICINE

## 2018-09-05 PROCEDURE — 25000003 PHARM REV CODE 250: Mod: PO | Performed by: INTERNAL MEDICINE

## 2018-09-05 PROCEDURE — 43239 EGD BIOPSY SINGLE/MULTIPLE: CPT | Mod: ,,, | Performed by: INTERNAL MEDICINE

## 2018-09-05 PROCEDURE — 37000008 HC ANESTHESIA 1ST 15 MINUTES: Mod: PO | Performed by: INTERNAL MEDICINE

## 2018-09-05 PROCEDURE — D9220A PRA ANESTHESIA: Mod: CRNA,,, | Performed by: NURSE ANESTHETIST, CERTIFIED REGISTERED

## 2018-09-05 PROCEDURE — 88305 TISSUE EXAM BY PATHOLOGIST: CPT | Performed by: PATHOLOGY

## 2018-09-05 PROCEDURE — 87449 NOS EACH ORGANISM AG IA: CPT | Mod: PO | Performed by: INTERNAL MEDICINE

## 2018-09-05 PROCEDURE — 27201012 HC FORCEPS, HOT/COLD, DISP: Mod: PO | Performed by: INTERNAL MEDICINE

## 2018-09-05 PROCEDURE — 88305 TISSUE EXAM BY PATHOLOGIST: CPT | Mod: 26,,, | Performed by: PATHOLOGY

## 2018-09-05 PROCEDURE — 81025 URINE PREGNANCY TEST: CPT | Mod: PO | Performed by: INTERNAL MEDICINE

## 2018-09-05 RX ORDER — LIDOCAINE HCL/PF 100 MG/5ML
SYRINGE (ML) INTRAVENOUS
Status: DISCONTINUED | OUTPATIENT
Start: 2018-09-05 | End: 2018-09-05

## 2018-09-05 RX ORDER — SODIUM CHLORIDE 0.9 % (FLUSH) 0.9 %
3 SYRINGE (ML) INJECTION
Status: DISCONTINUED | OUTPATIENT
Start: 2018-09-05 | End: 2018-09-05 | Stop reason: HOSPADM

## 2018-09-05 RX ORDER — SUCRALFATE 1 G/1
1 TABLET ORAL 3 TIMES DAILY
Qty: 100 TABLET | Refills: 2 | Status: SHIPPED | OUTPATIENT
Start: 2018-09-05 | End: 2019-01-09 | Stop reason: SDUPTHER

## 2018-09-05 RX ORDER — SODIUM CHLORIDE, SODIUM LACTATE, POTASSIUM CHLORIDE, CALCIUM CHLORIDE 600; 310; 30; 20 MG/100ML; MG/100ML; MG/100ML; MG/100ML
INJECTION, SOLUTION INTRAVENOUS CONTINUOUS
Status: DISCONTINUED | OUTPATIENT
Start: 2018-09-05 | End: 2018-09-05 | Stop reason: HOSPADM

## 2018-09-05 RX ORDER — PROPOFOL 10 MG/ML
VIAL (ML) INTRAVENOUS
Status: DISCONTINUED | OUTPATIENT
Start: 2018-09-05 | End: 2018-09-05

## 2018-09-05 RX ADMIN — PROPOFOL 30 MG: 10 INJECTION, EMULSION INTRAVENOUS at 11:09

## 2018-09-05 RX ADMIN — SODIUM CHLORIDE, SODIUM LACTATE, POTASSIUM CHLORIDE, AND CALCIUM CHLORIDE: .6; .31; .03; .02 INJECTION, SOLUTION INTRAVENOUS at 10:09

## 2018-09-05 RX ADMIN — LIDOCAINE HYDROCHLORIDE 100 MG: 20 INJECTION, SOLUTION INTRAVENOUS at 11:09

## 2018-09-05 NOTE — [employer] PATIENT INSTRUCTIONS
Discharge Summary/Instructions after an Endoscopic Procedure  Patient Name: Isela Smyth  Patient MRN: [id number]  Patient YOB: 1975 Wednesday, September 05, 2018  Marquis Zuluaga MD  RESTRICTIONS:  During your procedure today, you received medications for sedation.  These   medications may affect your judgment, balance and coordination.  Therefore,   for 24 hours, you have the following restrictions:   - DO NOT drive a car, operate machinery, make legal/financial decisions,   sign important papers or drink alcohol.    ACTIVITY:  Today: no heavy lifting, straining or running due to procedural   sedation/anesthesia.  The following day: return to full activity including work.  DIET:  Eat and drink normally unless instructed otherwise.     TREATMENT FOR COMMON SIDE EFFECTS:  - Mild abdominal pain, nausea, belching, bloating or excessive gas:  rest,   eat lightly and use a heating pad.  - Sore Throat: treat with throat lozenges and/or gargle with warm salt   water.  - Because air was used during the procedure, expelling large amounts of air   from your rectum or belching is normal.  - If a bowel prep was taken, you may not have a bowel movement for 1-3 days.    This is normal.  SYMPTOMS TO WATCH FOR AND REPORT TO YOUR PHYSICIAN:  1. Abdominal pain or bloating, other than gas cramps.  2. Chest pain.  3. Back pain.  4. Signs of infection such as: chills or fever occurring within 24 hours   after the procedure.  5. Rectal bleeding, which would show as bright red, maroon, or black stools.   (A tablespoon of blood from the rectum is not serious, especially if   hemorrhoids are present.)  6. Vomiting.  7. Weakness or dizziness.  GO DIRECTLY TO THE NEAREST EMERGENCY ROOM IF YOU HAVE ANY OF THE FOLLOWING:      Difficulty breathing              Chills and/or fever over 101 F   Persistent vomiting and/or vomiting blood   Severe abdominal pain   Severe chest pain   Black, tarry stools   Bleeding- more than one  tablespoon   Any other symptom or condition that you feel may need urgent attention  Your doctor recommends these additional instructions:  If any biopsies were taken, your doctors clinic will contact you in 1 to 2   weeks with any results.  We are waiting for your pathology results.   Continue your present medications.   You are being discharged to home.  For questions, problems or results please call your physician - Marquis Zuluaga MD at Work: [phone].  EMERGENCY PHONE NUMBER: [phone], LAB RESULTS: [phone]  IF A COMPLICATION OR EMERGENCY SITUATION ARISES AND YOU ARE UNABLE TO REACH   YOUR PHYSICIAN - GO DIRECTLY TO THE EMERGENCY ROOM.  ___________________________________________  Nurse Signature  ___________________________________________  Patient/Designated Responsible Party Signature  Marquis Zuluaga MD  9/5/2018 11:21:33 AM  This report has been verified and signed electronically.  [employer]

## 2018-09-05 NOTE — ANESTHESIA POSTPROCEDURE EVALUATION
"Anesthesia Post Evaluation    Patient: Isela Smyth    Procedure(s) Performed: Procedure(s) (LRB):  EGD (ESOPHAGOGASTRODUODENOSCOPY) (N/A)    Final Anesthesia Type: general  Patient location during evaluation: PACU  Patient participation: Yes- Able to Participate  Level of consciousness: awake and alert and oriented  Post-procedure vital signs: reviewed and stable  Pain management: adequate  Airway patency: patent  PONV status at discharge: No PONV  Anesthetic complications: no      Cardiovascular status: blood pressure returned to baseline and stable  Respiratory status: unassisted and spontaneous ventilation  Hydration status: euvolemic  Follow-up not needed.        Visit Vitals  /73 (BP Location: Left arm, Patient Position: Lying)   Pulse 84   Temp 36.3 °C (97.3 °F) (Skin)   Resp 16   Ht 5' 7" (1.702 m)   Wt 79.8 kg (176 lb)   LMP 08/16/2018   SpO2 98%   Breastfeeding? No   BMI 27.57 kg/m²       Pain/Saroj Score: Pain Assessment Performed: Yes (9/5/2018 11:47 AM)  Presence of Pain: denies (9/5/2018 11:47 AM)  Saroj Score: 10 (9/5/2018 11:47 AM)        "

## 2018-09-05 NOTE — H&P
History & Physical - Short Stay  Gastroenterology      SUBJECTIVE:     Procedure: EGD    Chief Complaint/Indication for Procedure: Epigastric Pain and Melena    PTA Medications   Medication Sig    butalbital-acetaminophen-caffeine -40 mg (FIORICET, ESGIC) -40 mg per tablet Take 1 tablet by mouth every 6 (six) hours as needed.    cyclobenzaprine (FLEXERIL) 10 MG tablet TAKE 1 TABLET BY MOUTH 3 TIMES DAILY AS NEEDED.    fluticasone (FLONASE) 50 mcg/actuation nasal spray 2 sprays by Each Nare route once daily. (Patient taking differently: 2 sprays by Each Nare route daily as needed. )    HYDROcodone-acetaminophen (NORCO) 7.5-325 mg per tablet Take 1 tablet by mouth every 4 to 6 hours as needed for Pain.    levothyroxine (SYNTHROID) 75 MCG tablet TAKE 1 TABLET (75 MCG TOTAL) BY MOUTH EVERY MORNING.    pantoprazole (PROTONIX) 20 MG tablet Take 1 tablet (20 mg total) by mouth once daily.    sumatriptan (IMITREX) 100 MG tablet TAKE 1 TABLET BY MOUTH EVERY DAY AS NEEDED. MAY REPEAT IN 2 HOURS IF NEEDED. DO NOT EXCEED 2 TABLETS    traZODone (DESYREL) 150 MG tablet Take 1 tablet (150 mg total) by mouth nightly.    venlafaxine (EFFEXOR-XR) 150 MG Cp24 Take 1 capsule (150 mg total) by mouth once daily.    linaclotide (LINZESS) 145 mcg Cap capsule Take 1 capsule (145 mcg total) by mouth daily as needed.    promethazine (PHENERGAN) 25 MG suppository Place 1 suppository (25 mg total) rectally every 6 (six) hours as needed for Nausea.       Review of patient's allergies indicates:   Allergen Reactions    Morphine Itching        Past Medical History:   Diagnosis Date    Anxiety     Arthritis     Chronic lumbar pain     Depression     Deviated nasal septum     Diverticulosis     GERD (gastroesophageal reflux disease)     Hemorrhoids     Hypothyroid     Kidney stone     passed 10 stones by herself over the years, one needed procedure    Migraine headache     PONV (postoperative nausea and vomiting)      severe    Shortness of breath     Urticaria      Past Surgical History:   Procedure Laterality Date    Bilateral Tubal      CHOLECYSTECTOMY      COLONOSCOPY  prior to 2011    HIATAL HERNIA REPAIR      KIDNEY STONE SURGERY  2009    [facility], had stone removed by dr renay george, stayed in hospital 4 days    LAPAROSCOPIC GASTRIC BANDING  2007    later removed in 2016    LUMBAR EPIDURAL INJECTION      SINUS SURGERY      SPINE SURGERY      6 back surgeries; 1 neck surgery    TUBAL LIGATION      UPPER GASTROINTESTINAL ENDOSCOPY  03/2013    Dr. Zuluaga    UPPER GASTROINTESTINAL ENDOSCOPY  09/14/2016    Dr. Zuluaga    VAGINAL DELIVERY      times 3     Family History   Problem Relation Age of Onset    Ulcers Mother     Cancer Neg Hx     Heart disease Neg Hx     Hypertension Neg Hx     Diabetes Neg Hx     Angioedema Neg Hx     Allergies Neg Hx     Allergic rhinitis Neg Hx     Asthma Neg Hx     Eczema Neg Hx     Immunodeficiency Neg Hx     Colon cancer Neg Hx     Colon polyps Neg Hx     Crohn's disease Neg Hx     Ulcerative colitis Neg Hx     Nephrolithiasis Neg Hx      Social History     Tobacco Use    Smoking status: Never Smoker    Smokeless tobacco: Never Used   Substance Use Topics    Alcohol use: No    Drug use: No         OBJECTIVE:     Vital Signs (Most Recent)  Temp: 98.6 °F (37 °C) (09/05/18 1037)  Pulse: 71 (09/05/18 1037)  Resp: 16 (09/05/18 1037)  BP: 111/71 (09/05/18 1037)  SpO2: 98 % (09/05/18 1037)    Physical Exam:                                                       GENERAL:  Comfortable, in no acute distress.                                 HEENT EXAM:  Nonicteric.  No adenopathy.  Oropharynx is clear.               NECK:  Supple.                                                               LUNGS:  Clear.                                                               CARDIAC:  Regular rate and rhythm.  S1, S2.  No murmur.                      ABDOMEN:  Soft, positive  bowel sounds, nontender.  No hepatosplenomegaly or masses.  No rebound or guarding.                                             EXTREMITIES:  No edema.     MENTAL STATUS:  Normal, alert and oriented.      ASSESSMENT/PLAN:     Assessment: Epigastric Pain and Melena    Plan: EGD    Anesthesia Plan: General    ASA Grade: ASA 2 - Patient with mild systemic disease with no functional limitations    MALLAMPATI SCORE:  I (soft palate, uvula, fauces, and tonsillar pillars visible)

## 2018-09-05 NOTE — DISCHARGE INSTRUCTIONS

## 2018-09-05 NOTE — ANESTHESIA PREPROCEDURE EVALUATION
09/05/2018  Isela Smyth is a 43 y.o., female.    Anesthesia Evaluation    I have reviewed the Patient Summary Reports.    I have reviewed the Nursing Notes.   I have reviewed the Medications.     Review of Systems  Anesthesia Hx:  Hx of Anesthetic complications (PONV)    Social:  Non-Smoker    Cardiovascular:  Cardiovascular Normal     Pulmonary:   Shortness of breath    Renal/:   Chronic Renal Disease renal calculi    Hepatic/GI:   GERD    Musculoskeletal:   Arthritis     Neurological:  Neurology Normal    Endocrine:   Hypothyroidism    Psych:   Psychiatric History anxiety depression          Physical Exam  General:  Well nourished    Airway/Jaw/Neck:  Airway Findings: Mouth Opening: Normal Tongue: Normal  General Airway Assessment: Adult  Oropharynx Findings:  Mallampati: II  Jaw/Neck Findings:  Neck ROM: Normal ROM     Eyes/Ears/Nose:  Eyes/Ears/Nose Findings:    Dental:  Dental Findings:   Chest/Lungs:  Chest/Lungs Findings: Normal Respiratory Rate     Heart/Vascular:  Heart Findings: Rate: Normal  Rhythm: Regular Rhythm        Mental Status:  Mental Status Findings:  Cooperative, Alert and Oriented         Anesthesia Plan  Type of Anesthesia, risks & benefits discussed:  Anesthesia Type:  general  Patient's Preference:   Intra-op Monitoring Plan: standard ASA monitors  Intra-op Monitoring Plan Comments:   Post Op Pain Control Plan: multimodal analgesia  Post Op Pain Control Plan Comments:   Induction:   IV  Beta Blocker:  Patient is not currently on a Beta-Blocker (No further documentation required).       Informed Consent: Patient understands risks and agrees with Anesthesia plan.  Questions answered. Anesthesia consent signed with patient.  ASA Score: 2     Day of Surgery Review of History & Physical:  There are no significant changes.   H&P completed by Anesthesiologist.       Ready For Surgery From  Anesthesia Perspective.

## 2018-09-05 NOTE — TRANSFER OF CARE
"Anesthesia Transfer of Care Note    Patient: Isela Smyth    Procedure(s) Performed: Procedure(s) (LRB):  EGD (ESOPHAGOGASTRODUODENOSCOPY) (N/A)    Patient location: PACU    Anesthesia Type: general    Transport from OR: Transported from OR on room air with adequate spontaneous ventilation    Post pain: adequate analgesia    Post assessment: no apparent anesthetic complications and tolerated procedure well    Post vital signs: stable    Level of consciousness: awake and alert    Nausea/Vomiting: no nausea/vomiting    Complications: none    Transfer of care protocol was followed      Last vitals:   Visit Vitals  /71   Pulse 71   Temp 37 °C (98.6 °F) (Skin)   Resp 16   Ht 5' 7" (1.702 m)   Wt 79.8 kg (176 lb)   LMP 08/16/2018   SpO2 98%   Breastfeeding? No   BMI 27.57 kg/m²     "

## 2018-09-05 NOTE — DISCHARGE SUMMARY
Discharge Note  Short Stay      SUMMARY     Admit Date: 9/5/2018    Attending Physician: Marquis Zuluaga MD     Discharge Physician: Marquis Zuluaga MD    Discharge Date: 9/5/2018 11:22 AM    Final Diagnosis: Black stool [K92.1]    Disposition: HOME OR SELF CARE    Patient Instructions:   Current Discharge Medication List      START taking these medications    Details   sucralfate (CARAFATE) 1 gram tablet Take 1 tablet (1 g total) by mouth 3 (three) times daily.  Qty: 100 tablet, Refills: 2         CONTINUE these medications which have NOT CHANGED    Details   butalbital-acetaminophen-caffeine -40 mg (FIORICET, ESGIC) -40 mg per tablet Take 1 tablet by mouth every 6 (six) hours as needed.  Qty: 60 tablet, Refills: 2      cyclobenzaprine (FLEXERIL) 10 MG tablet TAKE 1 TABLET BY MOUTH 3 TIMES DAILY AS NEEDED.  Qty: 30 tablet, Refills: 2      fluticasone (FLONASE) 50 mcg/actuation nasal spray 2 sprays by Each Nare route once daily.  Qty: 16 g, Refills: 11      HYDROcodone-acetaminophen (NORCO) 7.5-325 mg per tablet Take 1 tablet by mouth every 4 to 6 hours as needed for Pain.  Qty: 60 tablet, Refills: 0      levothyroxine (SYNTHROID) 75 MCG tablet TAKE 1 TABLET (75 MCG TOTAL) BY MOUTH EVERY MORNING.  Qty: 90 tablet, Refills: 3      pantoprazole (PROTONIX) 20 MG tablet Take 1 tablet (20 mg total) by mouth once daily.  Qty: 30 tablet, Refills: 5      sumatriptan (IMITREX) 100 MG tablet TAKE 1 TABLET BY MOUTH EVERY DAY AS NEEDED. MAY REPEAT IN 2 HOURS IF NEEDED. DO NOT EXCEED 2 TABLETS  Qty: 9 tablet, Refills: 11    Associated Diagnoses: Migraine without status migrainosus, not intractable, unspecified migraine type      traZODone (DESYREL) 150 MG tablet Take 1 tablet (150 mg total) by mouth nightly.  Qty: 30 tablet, Refills: 11      venlafaxine (EFFEXOR-XR) 150 MG Cp24 Take 1 capsule (150 mg total) by mouth once daily.  Qty: 90 capsule, Refills: 3      linaclotide (LINZESS) 145 mcg Cap capsule Take 1  capsule (145 mcg total) by mouth daily as needed.  Qty: 30 capsule, Refills: 5      promethazine (PHENERGAN) 25 MG suppository Place 1 suppository (25 mg total) rectally every 6 (six) hours as needed for Nausea.  Qty: 12 suppository, Refills: 5             Discharge Procedure Orders (must include Diet, Follow-up, Activity)    Follow Up:  Follow up with PCP as previously scheduled  Resume routine diet.  Activity as tolerated.    No driving day of procedure.

## 2018-09-10 ENCOUNTER — TELEPHONE (OUTPATIENT)
Dept: OBSTETRICS AND GYNECOLOGY | Facility: CLINIC | Age: 43
End: 2018-09-10

## 2018-09-10 NOTE — TELEPHONE ENCOUNTER
----- Message from Ledy Alfredo sent at 9/10/2018  9:26 AM CDT -----  Contact: Patient  Patient would like to speak with someone regarding her appointment this morning, because she started her period and was not sure if the doctor still wanted her to come in or wait.  Call Back#[phone]  Thanks

## 2018-09-11 ENCOUNTER — TELEPHONE (OUTPATIENT)
Dept: GASTROENTEROLOGY | Facility: CLINIC | Age: 43
End: 2018-09-11

## 2018-09-11 NOTE — TELEPHONE ENCOUNTER
----- Message from Nolan AUDREY Frisard sent at 9/11/2018  3:15 PM CDT -----  Contact: same  Patient called in and stated she needs to cancel her colonoscopy that is scheduled for tomorrow Wednesday 9/12/18.  Patient would like a call back to reschedule at [phone]

## 2018-09-11 NOTE — TELEPHONE ENCOUNTER
----- Message from Leyla Craven sent at 9/11/2018 11:56 AM CDT -----  Contact: patient  Type: Needs Medical Advice    Who Called:  patient  Symptoms (please be specific):    How long has patient had these symptoms:    Pharmacy name and phone #:    Best Call Back Number: [phone]  Additional Information: patient called to advise that she is on her cycle and need to know if she should reschedule procedure? Call back

## 2018-09-11 NOTE — TELEPHONE ENCOUNTER
----- Message from Mana Mcdaniels sent at 9/11/2018  1:17 PM CDT -----  Type:  Patient Returning Call    Who Called:  Patient  Who Left Message for Patient:  Meri  Does the patient know what this is regarding?:  yes  Best Call Back Number:  [phone] (home)     Additional Information:  Please email prep information to email, [email or web address], stating she cannot find her paperwork given before

## 2018-09-12 ENCOUNTER — TELEPHONE (OUTPATIENT)
Dept: GASTROENTEROLOGY | Facility: CLINIC | Age: 43
End: 2018-09-12

## 2018-09-12 NOTE — TELEPHONE ENCOUNTER
----- Message from Juliet Conde sent at 9/12/2018  8:00 AM CDT -----  Contact: Isela  Type: Needs Medical Advice    Who Called:  Patient   Best Call Back Number: [phone]  Additional Information: Calling to have procedure scheduled. Thanks!

## 2018-09-12 NOTE — TELEPHONE ENCOUNTER
Pt has been scheduled for colon on 9/26. Reviewed mg citrate prep. Pt is aware I will be mailing instructions and confirmation letter.

## 2018-09-26 ENCOUNTER — ANESTHESIA (OUTPATIENT)
Dept: ENDOSCOPY | Facility: HOSPITAL | Age: 43
End: 2018-09-26
Payer: COMMERCIAL

## 2018-09-26 ENCOUNTER — HOSPITAL ENCOUNTER (OUTPATIENT)
Facility: HOSPITAL | Age: 43
Discharge: HOME | End: 2018-09-26
Attending: INTERNAL MEDICINE | Admitting: INTERNAL MEDICINE
Payer: COMMERCIAL

## 2018-09-26 ENCOUNTER — ANESTHESIA EVENT (OUTPATIENT)
Dept: ENDOSCOPY | Facility: HOSPITAL | Age: 43
End: 2018-09-26
Payer: COMMERCIAL

## 2018-09-26 VITALS
TEMPERATURE: 98 F | WEIGHT: 170 LBS | HEART RATE: 72 BPM | OXYGEN SATURATION: 100 % | DIASTOLIC BLOOD PRESSURE: 70 MMHG | RESPIRATION RATE: 18 BRPM | BODY MASS INDEX: 26.68 KG/M2 | SYSTOLIC BLOOD PRESSURE: 120 MMHG | HEIGHT: 67 IN

## 2018-09-26 DIAGNOSIS — R19.4 CHANGE IN BOWEL HABITS: ICD-10-CM

## 2018-09-26 LAB
B-HCG UR QL: NEGATIVE
CTP QC/QA: YES

## 2018-09-26 PROCEDURE — D9220A PRA ANESTHESIA: Mod: CRNA,,, | Performed by: NURSE ANESTHETIST, CERTIFIED REGISTERED

## 2018-09-26 PROCEDURE — 81025 URINE PREGNANCY TEST: CPT | Mod: PO | Performed by: INTERNAL MEDICINE

## 2018-09-26 PROCEDURE — 45378 DIAGNOSTIC COLONOSCOPY: CPT | Mod: ,,, | Performed by: INTERNAL MEDICINE

## 2018-09-26 PROCEDURE — 45378 DIAGNOSTIC COLONOSCOPY: CPT | Mod: PO | Performed by: INTERNAL MEDICINE

## 2018-09-26 PROCEDURE — D9220A PRA ANESTHESIA: Mod: ANES,,, | Performed by: ANESTHESIOLOGY

## 2018-09-26 PROCEDURE — 37000009 HC ANESTHESIA EA ADD 15 MINS: Mod: PO | Performed by: INTERNAL MEDICINE

## 2018-09-26 PROCEDURE — 37000008 HC ANESTHESIA 1ST 15 MINUTES: Mod: PO | Performed by: INTERNAL MEDICINE

## 2018-09-26 PROCEDURE — 25000003 PHARM REV CODE 250: Mod: PO | Performed by: INTERNAL MEDICINE

## 2018-09-26 PROCEDURE — 63600175 PHARM REV CODE 636 W HCPCS: Mod: PO | Performed by: NURSE ANESTHETIST, CERTIFIED REGISTERED

## 2018-09-26 RX ORDER — SODIUM CHLORIDE, SODIUM LACTATE, POTASSIUM CHLORIDE, CALCIUM CHLORIDE 600; 310; 30; 20 MG/100ML; MG/100ML; MG/100ML; MG/100ML
INJECTION, SOLUTION INTRAVENOUS CONTINUOUS
Status: DISPENSED | OUTPATIENT
Start: 2018-09-26

## 2018-09-26 RX ORDER — SODIUM CHLORIDE 0.9 % (FLUSH) 0.9 %
3 SYRINGE (ML) INJECTION
Status: DISCONTINUED | OUTPATIENT
Start: 2018-09-26 | End: 2018-09-26

## 2018-09-26 RX ORDER — PROPOFOL 10 MG/ML
VIAL (ML) INTRAVENOUS
Status: DISCONTINUED | OUTPATIENT
Start: 2018-09-26 | End: 2018-09-26

## 2018-09-26 RX ORDER — LIDOCAINE HCL/PF 100 MG/5ML
SYRINGE (ML) INTRAVENOUS
Status: DISCONTINUED | OUTPATIENT
Start: 2018-09-26 | End: 2018-09-26

## 2018-09-26 RX ADMIN — PROPOFOL 30 MG: 10 INJECTION, EMULSION INTRAVENOUS at 09:09

## 2018-09-26 RX ADMIN — SODIUM CHLORIDE, SODIUM LACTATE, POTASSIUM CHLORIDE, AND CALCIUM CHLORIDE: .6; .31; .03; .02 INJECTION, SOLUTION INTRAVENOUS at 08:09

## 2018-09-26 RX ADMIN — LIDOCAINE HYDROCHLORIDE 100 MG: 20 INJECTION, SOLUTION INTRAVENOUS at 09:09

## 2018-09-26 NOTE — [employer] PATIENT INSTRUCTIONS
Discharge Summary/Instructions after an Endoscopic Procedure  Patient Name: Isela Smyth  Patient MRN: [id number]  Patient YOB: 1975 Wednesday, September 26, 2018  Marquis Zuluaga MD  RESTRICTIONS:  During your procedure today, you received medications for sedation.  These   medications may affect your judgment, balance and coordination.  Therefore,   for 24 hours, you have the following restrictions:   - DO NOT drive a car, operate machinery, make legal/financial decisions,   sign important papers or drink alcohol.    ACTIVITY:  Today: no heavy lifting, straining or running due to procedural   sedation/anesthesia.  The following day: return to full activity including work.  DIET:  Eat and drink normally unless instructed otherwise.     TREATMENT FOR COMMON SIDE EFFECTS:  - Mild abdominal pain, nausea, belching, bloating or excessive gas:  rest,   eat lightly and use a heating pad.  - Sore Throat: treat with throat lozenges and/or gargle with warm salt   water.  - Because air was used during the procedure, expelling large amounts of air   from your rectum or belching is normal.  - If a bowel prep was taken, you may not have a bowel movement for 1-3 days.    This is normal.  SYMPTOMS TO WATCH FOR AND REPORT TO YOUR PHYSICIAN:  1. Abdominal pain or bloating, other than gas cramps.  2. Chest pain.  3. Back pain.  4. Signs of infection such as: chills or fever occurring within 24 hours   after the procedure.  5. Rectal bleeding, which would show as bright red, maroon, or black stools.   (A tablespoon of blood from the rectum is not serious, especially if   hemorrhoids are present.)  6. Vomiting.  7. Weakness or dizziness.  GO DIRECTLY TO THE NEAREST EMERGENCY ROOM IF YOU HAVE ANY OF THE FOLLOWING:      Difficulty breathing              Chills and/or fever over 101 F   Persistent vomiting and/or vomiting blood   Severe abdominal pain   Severe chest pain   Black, tarry stools   Bleeding- more than one  tablespoon   Any other symptom or condition that you feel may need urgent attention  Your doctor recommends these additional instructions:  If any biopsies were taken, your doctors clinic will contact you in 1 to 2   weeks with any results.  Eat a high fiber diet.   Your physician has recommended a repeat colonoscopy in 10 years for   screening purposes.   You are being discharged to home.  For questions, problems or results please call your physician - Marquis Zuluaga MD at Work: [phone].  EMERGENCY PHONE NUMBER: [phone], LAB RESULTS: [phone]  IF A COMPLICATION OR EMERGENCY SITUATION ARISES AND YOU ARE UNABLE TO REACH   YOUR PHYSICIAN - GO DIRECTLY TO THE EMERGENCY ROOM.  ___________________________________________  Nurse Signature  ___________________________________________  Patient/Designated Responsible Party Signature  Marquis Zuluaga MD  9/26/2018 9:31:33 AM  This report has been verified and signed electronically.  [employer]

## 2018-09-26 NOTE — ANESTHESIA PREPROCEDURE EVALUATION
09/26/2018  Isela Smyth is a 43 y.o., female.    Anesthesia Evaluation      I have reviewed the Medications.     Review of Systems  Anesthesia Hx:  Hx of Anesthetic complications (PONV)   Social:  Non-Smoker    Cardiovascular:  Cardiovascular Normal     Pulmonary:   Shortness of breath    Renal/:   Chronic Renal Disease renal calculi    Hepatic/GI:   GERD    Musculoskeletal:   Arthritis     Neurological:  Neurology Normal    Endocrine:   Hypothyroidism    Psych:   Psychiatric History anxiety depression          Physical Exam  General:  Well nourished    Airway/Jaw/Neck:  Airway Findings: Mouth Opening: Normal Tongue: Normal  General Airway Assessment: Adult, Average  Mallampati: II  Jaw/Neck Findings:  Neck ROM: Normal ROM       Chest/Lungs:  Chest/Lungs Findings: Clear to auscultation, Normal Respiratory Rate     Heart/Vascular:  Heart Findings: Rate: Normal  Rhythm: Regular Rhythm  Sounds: Normal  Heart murmur: negative       Mental Status:  Mental Status Findings:  Cooperative, Alert and Oriented         Anesthesia Plan  Type of Anesthesia, risks & benefits discussed:  Anesthesia Type:  general  Patient's Preference:   Intra-op Monitoring Plan:   Intra-op Monitoring Plan Comments:   Post Op Pain Control Plan:   Post Op Pain Control Plan Comments:   Induction:   IV  Beta Blocker:  Patient is not currently on a Beta-Blocker (No further documentation required).       Informed Consent: Patient understands risks and agrees with Anesthesia plan.  Questions answered. Anesthesia consent signed with patient.  ASA Score: 2     Day of Surgery Review of History & Physical:            Ready For Surgery From Anesthesia Perspective.

## 2018-09-26 NOTE — H&P
History & Physical - Short Stay  Gastroenterology      SUBJECTIVE:     Procedure: Colonoscopy    Chief Complaint/Indication for Procedure: Change in Bowel Habits and Melena    PTA Medications   Medication Sig    butalbital-acetaminophen-caffeine -40 mg (FIORICET, ESGIC) -40 mg per tablet Take 1 tablet by mouth every 6 (six) hours as needed.    cyclobenzaprine (FLEXERIL) 10 MG tablet TAKE 1 TABLET BY MOUTH 3 TIMES DAILY AS NEEDED.    fluticasone (FLONASE) 50 mcg/actuation nasal spray 2 sprays by Each Nare route once daily. (Patient taking differently: 2 sprays by Each Nare route daily as needed. )    HYDROcodone-acetaminophen (NORCO) 7.5-325 mg per tablet Take 1 tablet by mouth every 4 to 6 hours as needed for Pain.    levothyroxine (SYNTHROID) 75 MCG tablet TAKE 1 TABLET (75 MCG TOTAL) BY MOUTH EVERY MORNING.    pantoprazole (PROTONIX) 20 MG tablet Take 1 tablet (20 mg total) by mouth once daily.    promethazine (PHENERGAN) 25 MG suppository Place 1 suppository (25 mg total) rectally every 6 (six) hours as needed for Nausea.    sucralfate (CARAFATE) 1 gram tablet Take 1 tablet (1 g total) by mouth 3 (three) times daily.    sumatriptan (IMITREX) 100 MG tablet TAKE 1 TABLET BY MOUTH EVERY DAY AS NEEDED. MAY REPEAT IN 2 HOURS IF NEEDED. DO NOT EXCEED 2 TABLETS    traZODone (DESYREL) 150 MG tablet Take 1 tablet (150 mg total) by mouth nightly.    venlafaxine (EFFEXOR-XR) 150 MG Cp24 Take 1 capsule (150 mg total) by mouth once daily.    linaclotide (LINZESS) 145 mcg Cap capsule Take 1 capsule (145 mcg total) by mouth daily as needed.       Review of patient's allergies indicates:   Allergen Reactions    Morphine Itching        Past Medical History:   Diagnosis Date    Anxiety     Arthritis     Chronic lumbar pain     Depression     Deviated nasal septum     Diverticulosis     GERD (gastroesophageal reflux disease)     Hemorrhoids     Hypothyroid     Kidney stone     passed 10 stones by  herself over the years, one needed procedure    Migraine headache     PONV (postoperative nausea and vomiting)     severe    Shortness of breath     Urticaria      Past Surgical History:   Procedure Laterality Date    CHOLECYSTECTOMY      COLONOSCOPY  prior to 2011    EGD (ESOPHAGOGASTRODUODENOSCOPY) N/A 9/5/2018    [facility] at [facility]    EGD (ESOPHAGOGASTRODUODENOSCOPY) N/A 3/11/2013    [facility] at [facility]    ESOPHAGOGASTRODUODENOSCOPY N/A 9/5/2018    Procedure: EGD (ESOPHAGOGASTRODUODENOSCOPY);  Surgeon: Marquis Zuluaga MD;  Location: [facility] ENDO;  Service: Endoscopy;  Laterality: N/A;    ESOPHAGOGASTRODUODENOSCOPY (EGD) N/A 9/14/2016    [facility] at [facility]    HIATAL HERNIA REPAIR      INJECTION-FACET L4/5 and L5/S1 Bilateral 10/12/2016    [facility]te [facility] at [facility] OR    INJECTION-STEROID-EPIDURAL-CERVICAL N/A 1/31/2017    [facility] at [facility] OR    KIDNEY STONE SURGERY  2009    [facility], had stone removed by dr renay george, stayed in hospital 4 days    LAMINECTOMY, SPINE, LUMBAR, WITH DISCECTOMY Left 8/14/2012    [facility]    LAPAROSCOPIC GASTRIC BANDING  2007    later removed in 2016    LUMBAR EPIDURAL INJECTION      RESECTION OF TURBINATES N/A 1/31/2014    Performed by Filemon Yuan [facility] at [facility] OR    SEPTOPLASTY, NOSE N/A 1/31/2014    [facility] Filemon Yuan [facility] at [facility] OR    SINUS SURGERY      SPINE SURGERY      6 back surgeries; 1 neck surgery    TUBAL LIGATION      UPPER GASTROINTESTINAL ENDOSCOPY  03/2013    Dr. Zuluaga    UPPER GASTROINTESTINAL ENDOSCOPY  09/14/2016    Dr. Zuluaga    VAGINAL DELIVERY      times 3     Family History   Problem Relation Age of Onset    Ulcers Mother     Cancer Neg Hx     Heart disease Neg Hx     Hypertension Neg Hx     Diabetes Neg Hx     Angioedema Neg Hx     Allergies Neg  Hx     Allergic rhinitis Neg Hx     Asthma Neg Hx     Eczema Neg Hx     Immunodeficiency Neg Hx     Colon cancer Neg Hx     Colon polyps Neg Hx     Crohn's disease Neg Hx     Ulcerative colitis Neg Hx     Nephrolithiasis Neg Hx      Social History     Tobacco Use    Smoking status: Never Smoker    Smokeless tobacco: Never Used   Substance Use Topics    Alcohol use: No    Drug use: No         OBJECTIVE:     Vital Signs (Most Recent)       Physical Exam:                                                       GENERAL:  Comfortable, in no acute distress.                                 HEENT EXAM:  Nonicteric.  No adenopathy.  Oropharynx is clear.               NECK:  Supple.                                                               LUNGS:  Clear.                                                               CARDIAC:  Regular rate and rhythm.  S1, S2.  No murmur.                      ABDOMEN:  Soft, positive bowel sounds, nontender.  No hepatosplenomegaly or masses.  No rebound or guarding.                                             EXTREMITIES:  No edema.     MENTAL STATUS:  Normal, alert and oriented.      ASSESSMENT/PLAN:     Assessment: Change in Bowel Habits and Melena    Plan: Colonoscopy    Anesthesia Plan: General    ASA Grade: ASA 2 - Patient with mild systemic disease with no functional limitations    MALLAMPATI SCORE:  I (soft palate, uvula, fauces, and tonsillar pillars visible)

## 2018-09-26 NOTE — DISCHARGE SUMMARY
Discharge Note  Short Stay      SUMMARY     Admit Date: 9/26/2018    Attending Physician: Marquis Zuluaga MD     Discharge Physician: Marquis Zuluaga MD    Discharge Date: 9/26/2018 9:32 AM    Final Diagnosis: Black stool [K92.1]  Irregular bowel habits [R19.8]    Disposition: HOME OR SELF CARE    Patient Instructions:   Current Discharge Medication List      CONTINUE these medications which have NOT CHANGED    Details   butalbital-acetaminophen-caffeine -40 mg (FIORICET, ESGIC) -40 mg per tablet Take 1 tablet by mouth every 6 (six) hours as needed.  Qty: 60 tablet, Refills: 2      cyclobenzaprine (FLEXERIL) 10 MG tablet TAKE 1 TABLET BY MOUTH 3 TIMES DAILY AS NEEDED.  Qty: 30 tablet, Refills: 2      fluticasone (FLONASE) 50 mcg/actuation nasal spray 2 sprays by Each Nare route once daily.  Qty: 16 g, Refills: 11      HYDROcodone-acetaminophen (NORCO) 7.5-325 mg per tablet Take 1 tablet by mouth every 4 to 6 hours as needed for Pain.  Qty: 60 tablet, Refills: 0      levothyroxine (SYNTHROID) 75 MCG tablet TAKE 1 TABLET (75 MCG TOTAL) BY MOUTH EVERY MORNING.  Qty: 90 tablet, Refills: 3      pantoprazole (PROTONIX) 20 MG tablet Take 1 tablet (20 mg total) by mouth once daily.  Qty: 30 tablet, Refills: 5      promethazine (PHENERGAN) 25 MG suppository Place 1 suppository (25 mg total) rectally every 6 (six) hours as needed for Nausea.  Qty: 12 suppository, Refills: 5      sucralfate (CARAFATE) 1 gram tablet Take 1 tablet (1 g total) by mouth 3 (three) times daily.  Qty: 100 tablet, Refills: 2      sumatriptan (IMITREX) 100 MG tablet TAKE 1 TABLET BY MOUTH EVERY DAY AS NEEDED. MAY REPEAT IN 2 HOURS IF NEEDED. DO NOT EXCEED 2 TABLETS  Qty: 9 tablet, Refills: 11    Associated Diagnoses: Migraine without status migrainosus, not intractable, unspecified migraine type      traZODone (DESYREL) 150 MG tablet Take 1 tablet (150 mg total) by mouth nightly.  Qty: 30 tablet, Refills: 11      venlafaxine  (EFFEXOR-XR) 150 MG Cp24 Take 1 capsule (150 mg total) by mouth once daily.  Qty: 90 capsule, Refills: 3      linaclotide (LINZESS) 145 mcg Cap capsule Take 1 capsule (145 mcg total) by mouth daily as needed.  Qty: 30 capsule, Refills: 5             Discharge Procedure Orders (must include Diet, Follow-up, Activity)    Follow Up:  Follow up with PCP as previously scheduled  Resume routine diet.  Activity as tolerated.    No driving day of procedure.

## 2018-09-26 NOTE — TRANSFER OF CARE
"Anesthesia Transfer of Care Note    Patient: Isela Smyth    Procedure(s) Performed: Procedure(s) (LRB):  COLONOSCOPY (N/A)    Patient location: PACU    Anesthesia Type: general    Transport from OR: Transported from OR on room air with adequate spontaneous ventilation    Post pain: adequate analgesia    Post assessment: no apparent anesthetic complications and tolerated procedure well    Post vital signs: stable    Level of consciousness: awake and alert    Nausea/Vomiting: no nausea/vomiting    Complications: none    Transfer of care protocol was followed      Last vitals:   Visit Vitals  /74 (BP Location: Right arm, Patient Position: Lying)   Pulse 70   Temp 36.6 °C (97.9 °F) (Skin)   Resp 15   Ht 5' 7" (1.702 m)   Wt 77.1 kg (170 lb)   LMP 09/10/2018   SpO2 98%   Breastfeeding? No   BMI 26.63 kg/m²     "

## 2018-09-26 NOTE — ANESTHESIA POSTPROCEDURE EVALUATION
"Anesthesia Post Evaluation    Patient: Isela Smyth    Procedure(s) Performed: Procedure(s) (LRB):  COLONOSCOPY (N/A)    Final Anesthesia Type: general  Patient location during evaluation: PACU  Patient participation: Yes- Able to Participate  Level of consciousness: awake and alert  Post-procedure vital signs: reviewed and stable  Pain management: adequate  Airway patency: patent  PONV status at discharge: No PONV  Anesthetic complications: no      Cardiovascular status: hemodynamically stable and blood pressure returned to baseline  Respiratory status: unassisted, spontaneous ventilation and room air  Hydration status: euvolemic  Follow-up not needed.        Visit Vitals  /70 (BP Location: Right arm, Patient Position: Sitting)   Pulse 72   Temp 36.6 °C (97.8 °F) (Skin)   Resp 18   Ht 5' 7" (1.702 m)   Wt 77.1 kg (170 lb)   LMP 09/10/2018   SpO2 100%   Breastfeeding? No   BMI 26.63 kg/m²       Pain/Saroj Score: Pain Assessment Performed: Yes (9/26/2018  9:30 AM)  Presence of Pain: denies (9/26/2018  9:30 AM)  Saroj Score: 10 (9/26/2018  9:30 AM)        "

## 2018-10-07 RX ORDER — CYCLOBENZAPRINE HCL 10 MG
TABLET ORAL
Qty: 30 TABLET | Refills: 2 | Status: SHIPPED | OUTPATIENT
Start: 2018-10-07 | End: 2018-11-27 | Stop reason: SDUPTHER

## 2018-10-21 RX ORDER — BUTALBITAL, ACETAMINOPHEN AND CAFFEINE 50; 325; 40 MG/1; MG/1; MG/1
TABLET ORAL
Qty: 60 TABLET | Refills: 2 | Status: SHIPPED | OUTPATIENT
Start: 2018-10-21 | End: 2018-12-18 | Stop reason: SDUPTHER

## 2018-10-22 NOTE — TELEPHONE ENCOUNTER
----- Message from Pavithra FRANCISCO Diasna sent at 10/19/2018  3:50 PM CDT -----  Contact: self  Type:  RX Refill Request    Who Called:  self  Refill or New Rx:  refill  RX Name and Strength:butalbital-acetaminophen-caffeine -40 mg (FIORICET, ESGIC) -40 mg per tablet  How is the patient currently taking it? (ex. 1XDay):  prn  Is this a 30 day or 90 day RX:  30  Preferred Pharmacy with phone number:  [facility]  Local or Mail Order:  local  Ordering Provider:  Dr Ni Reinoso Call Back Number:  [phone]  Additional Information: patient states pharmacy sent request. Patient wants to  medication today. Please call patient. Thanks!   [facility] [id number] [facility] [place] - [address] [place] [address]  [address] [address]  [place] [address]  Phone: [phone] Fax: [phone]

## 2018-11-07 DIAGNOSIS — G43.909 MIGRAINE WITHOUT STATUS MIGRAINOSUS, NOT INTRACTABLE, UNSPECIFIED MIGRAINE TYPE: ICD-10-CM

## 2018-11-08 NOTE — PROGRESS NOTES
Refill Authorization Note     is requesting a refill authorization.    Brief assessment and rationale for refill: APPROVE; prr  Amount/Quantity of medication ordered: 90d  Date of last appointment: 8/22/2018     Refills Authorized: Yes  If authorized number of refills: 2           Medication Therapy Plan: Migraines-lco(lov); Nov-011/26/18; med-minded indicates pt is using 18 tabs/ month; Approve 9 more months   Name and strength of medication: sumatriptan (IMITREX) 100 MG tablet  How patient will take medication: utd  Medication reconciliation completed: Yes        Comments:     BP Readings from Last 3 Encounters:   09/26/18 120/70   09/05/18 113/73   08/22/18 126/86

## 2018-11-09 RX ORDER — SUMATRIPTAN SUCCINATE 100 MG/1
TABLET ORAL
Qty: 54 TABLET | Refills: 2 | Status: SHIPPED | OUTPATIENT
Start: 2018-11-09 | End: 2019-04-12 | Stop reason: SDUPTHER

## 2018-11-26 ENCOUNTER — OFFICE VISIT (OUTPATIENT)
Dept: FAMILY MEDICINE | Facility: CLINIC | Age: 43
End: 2018-11-26
Payer: COMMERCIAL

## 2018-11-26 ENCOUNTER — LAB VISIT (OUTPATIENT)
Dept: LAB | Facility: HOSPITAL | Age: 43
End: 2018-11-26
Attending: FAMILY MEDICINE
Payer: COMMERCIAL

## 2018-11-26 VITALS
HEART RATE: 71 BPM | SYSTOLIC BLOOD PRESSURE: 120 MMHG | HEIGHT: 67 IN | BODY MASS INDEX: 28.93 KG/M2 | OXYGEN SATURATION: 100 % | WEIGHT: 184.31 LBS | DIASTOLIC BLOOD PRESSURE: 84 MMHG | TEMPERATURE: 99 F

## 2018-11-26 DIAGNOSIS — M47.816 LUMBAR FACET ARTHROPATHY: ICD-10-CM

## 2018-11-26 DIAGNOSIS — K21.9 GASTROESOPHAGEAL REFLUX DISEASE, ESOPHAGITIS PRESENCE NOT SPECIFIED: ICD-10-CM

## 2018-11-26 DIAGNOSIS — R53.83 FATIGUE, UNSPECIFIED TYPE: ICD-10-CM

## 2018-11-26 DIAGNOSIS — F41.9 ANXIETY: ICD-10-CM

## 2018-11-26 DIAGNOSIS — D64.9 ANEMIA, UNSPECIFIED TYPE: ICD-10-CM

## 2018-11-26 DIAGNOSIS — E03.9 HYPOTHYROIDISM, UNSPECIFIED TYPE: Primary | ICD-10-CM

## 2018-11-26 DIAGNOSIS — F32.A DEPRESSION, UNSPECIFIED DEPRESSION TYPE: ICD-10-CM

## 2018-11-26 LAB
ALBUMIN SERPL BCP-MCNC: 4 G/DL
ALP SERPL-CCNC: 53 U/L
ALT SERPL W/O P-5'-P-CCNC: 10 U/L
ANION GAP SERPL CALC-SCNC: 5 MMOL/L
AST SERPL-CCNC: 21 U/L
BASOPHILS # BLD AUTO: 0.08 K/UL
BASOPHILS NFR BLD: 1.5 %
BILIRUB SERPL-MCNC: 0.3 MG/DL
BUN SERPL-MCNC: 10 MG/DL
CALCIUM SERPL-MCNC: 8.8 MG/DL
CHLORIDE SERPL-SCNC: 106 MMOL/L
CHOLEST SERPL-MCNC: 203 MG/DL
CHOLEST/HDLC SERPL: 4.1 {RATIO}
CO2 SERPL-SCNC: 27 MMOL/L
CREAT SERPL-MCNC: 0.7 MG/DL
DIFFERENTIAL METHOD: ABNORMAL
EOSINOPHIL # BLD AUTO: 0.2 K/UL
EOSINOPHIL NFR BLD: 3.1 %
ERYTHROCYTE [DISTWIDTH] IN BLOOD BY AUTOMATED COUNT: 15.9 %
EST. GFR  (AFRICAN AMERICAN): >60 ML/MIN/1.73 M^2
EST. GFR  (NON AFRICAN AMERICAN): >60 ML/MIN/1.73 M^2
FERRITIN SERPL-MCNC: 3 NG/ML
GLUCOSE SERPL-MCNC: 92 MG/DL
HCT VFR BLD AUTO: 30.4 %
HDLC SERPL-MCNC: 49 MG/DL
HDLC SERPL: 24.1 %
HGB BLD-MCNC: 8.7 G/DL
IMM GRANULOCYTES # BLD AUTO: 0.01 K/UL
IMM GRANULOCYTES NFR BLD AUTO: 0.2 %
IRON SERPL-MCNC: 23 UG/DL
LDLC SERPL CALC-MCNC: 121.6 MG/DL
LYMPHOCYTES # BLD AUTO: 1.8 K/UL
LYMPHOCYTES NFR BLD: 32.1 %
MCH RBC QN AUTO: 23.6 PG
MCHC RBC AUTO-ENTMCNC: 28.6 G/DL
MCV RBC AUTO: 82 FL
MONOCYTES # BLD AUTO: 0.5 K/UL
MONOCYTES NFR BLD: 8.6 %
NEUTROPHILS # BLD AUTO: 3 K/UL
NEUTROPHILS NFR BLD: 54.5 %
NONHDLC SERPL-MCNC: 154 MG/DL
NRBC BLD-RTO: 0 /100 WBC
PLATELET # BLD AUTO: 178 K/UL
PMV BLD AUTO: 11.6 FL
POTASSIUM SERPL-SCNC: 3.9 MMOL/L
PROT SERPL-MCNC: 7.6 G/DL
RBC # BLD AUTO: 3.69 M/UL
SATURATED IRON: 4 %
SODIUM SERPL-SCNC: 138 MMOL/L
TOTAL IRON BINDING CAPACITY: 512 UG/DL
TRANSFERRIN SERPL-MCNC: 346 MG/DL
TRANSFERRIN SERPL-MCNC: 346 MG/DL
TRIGL SERPL-MCNC: 162 MG/DL
TSH SERPL DL<=0.005 MIU/L-ACNC: 1.23 UIU/ML
VIT B12 SERPL-MCNC: 237 PG/ML
WBC # BLD AUTO: 5.45 K/UL

## 2018-11-26 PROCEDURE — 83540 ASSAY OF IRON: CPT

## 2018-11-26 PROCEDURE — 85025 COMPLETE CBC W/AUTO DIFF WBC: CPT

## 2018-11-26 PROCEDURE — 80061 LIPID PANEL: CPT

## 2018-11-26 PROCEDURE — 82607 VITAMIN B-12: CPT

## 2018-11-26 PROCEDURE — 80053 COMPREHEN METABOLIC PANEL: CPT

## 2018-11-26 PROCEDURE — 84238 ASSAY NONENDOCRINE RECEPTOR: CPT

## 2018-11-26 PROCEDURE — 82728 ASSAY OF FERRITIN: CPT

## 2018-11-26 PROCEDURE — 99214 OFFICE O/P EST MOD 30 MIN: CPT | Mod: S$GLB,,, | Performed by: FAMILY MEDICINE

## 2018-11-26 PROCEDURE — 99999 PR PBB SHADOW E&M-EST. PATIENT-LVL III: CPT | Mod: PBBFAC,,, | Performed by: FAMILY MEDICINE

## 2018-11-26 PROCEDURE — 3008F BODY MASS INDEX DOCD: CPT | Mod: CPTII,S$GLB,, | Performed by: FAMILY MEDICINE

## 2018-11-26 PROCEDURE — 36415 COLL VENOUS BLD VENIPUNCTURE: CPT | Mod: PO

## 2018-11-26 PROCEDURE — 84443 ASSAY THYROID STIM HORMONE: CPT

## 2018-11-26 RX ORDER — SUCRALFATE 1 G/1
1 TABLET ORAL 3 TIMES DAILY
Refills: 2 | COMMUNITY
Start: 2018-10-29 | End: 2019-02-18 | Stop reason: SDUPTHER

## 2018-11-26 RX ORDER — HYDROCODONE BITARTRATE AND ACETAMINOPHEN 7.5; 325 MG/1; MG/1
1 TABLET ORAL
Qty: 60 TABLET | Refills: 0 | Status: SHIPPED | OUTPATIENT
Start: 2018-12-27 | End: 2019-01-26

## 2018-11-26 RX ORDER — HYDROCODONE BITARTRATE AND ACETAMINOPHEN 7.5; 325 MG/1; MG/1
1 TABLET ORAL
Qty: 60 TABLET | Refills: 0 | Status: SHIPPED | OUTPATIENT
Start: 2019-01-26 | End: 2019-02-25

## 2018-11-26 RX ORDER — HYDROCODONE BITARTRATE AND ACETAMINOPHEN 7.5; 325 MG/1; MG/1
1 TABLET ORAL
Qty: 60 TABLET | Refills: 0 | Status: SHIPPED | OUTPATIENT
Start: 2018-11-27 | End: 2018-12-27

## 2018-11-26 RX ORDER — HYDROCODONE BITARTRATE AND ACETAMINOPHEN 7.5; 325 MG/1; MG/1
1 TABLET ORAL
Refills: 0 | COMMUNITY
Start: 2018-10-29 | End: 2018-11-26 | Stop reason: SDUPTHER

## 2018-11-26 NOTE — PROGRESS NOTES
Subjective:       Patient ID: Isela Smyth is a 43 y.o. female.    Chief Complaint: Follow-up and Labs Only    HPI     Here for a f/u.     Gets 2-5 migraines per month.  Takes fioricet and imitrex for pain control.       History of 6 low back spinal surgeries and 1 neck spinal surgery in past. Recent one in 2012.      Status post 08/14/2012 microscopic recurrent left L4-L5 laminotomy, discectomy, left L5 complete laminectomy, left L5-S1 laminotomy,    recurrent discectomy.      Chronic lumbago controlled while on norco 7.5 and flexeril. Ps: 2/10. Occasional left sciatic pain. Had an placido last month which helped with the pain.      Had mri L spine 9/2016 which showed:      1.  Postoperative change of left hemilaminectomy at L4-L5 and L5-S1.  2.  Multilevel degenerative change of the lumbar spine, most pronounced at L4-L5 and L5-S1 as detailed above  3.  Minimal descending central disc extrusion at L5-S1 which does not appear to encroach upon either the descending left or right S1 nerve.  4.  Probable conjoined left S2 nerve coursing in the left posterolateral spinal canal.  Less likely, this represents an intraspinal synovial cyst abutting the descending left S1 nerve.  5.  Central annular tear of the intervertebral disc at L4-L5.     Also, has chronic neck pain > 5 years. Hx of neck surgeries in past. Saw pain management recently and had an placido on 1/31/17. Norco 7.5 helps with pain.      Had mri of cervical spine on 1/19/17 which showed:  -Disc protrusion at the C5-C6 level and to the right lateral recess with possible anterior cord contact  -Loss of normal cervical lordosis which may be positional or related to muscular strain.  -Milder degenerative changes are noted within the body of the report.      Depression and anxiety stable while on effexor.                                                                                     gerd stable.     On levothyroxine for management of hypothyroidism.    Reports  generalized fatigue over the past 3-6 months.     Mild anemia with hb of 10.1 in 8/2018.  Had a colonoscopy and egd in 9/2018. Colonoscopy showed diverticulosis. egd showed gastritis. Pt taking protonix and carafate.     Reports mild menses every 30 days I.e 2-3 days.        Review of Systems      Review of Systems   Constitutional: Negative for fever and chills.   HENT: Negative for hearing loss and neck stiffness.    Eyes: Negative for redness and itching.   Respiratory: Negative for cough and choking.    Cardiovascular: Negative for chest pain and leg swelling.  Abdomen: Negative for abdominal pain and blood in stool.   Genitourinary: Negative for dysuria and flank pain.   Musculoskeletal: Negative for gait problem.   Neurological: Negative for light-headedness and headaches.   Hematological: Negative for adenopathy.   Psychiatric/Behavioral: Negative for behavioral problems.     Objective:      Physical Exam   Constitutional: She appears well-developed.   HENT:   Head: Normocephalic and atraumatic.   Eyes: Conjunctivae are normal. Pupils are equal, round, and reactive to light.   Neck: Normal range of motion.   Cardiovascular: Normal rate and regular rhythm.   No murmur heard.  Pulmonary/Chest: Effort normal and breath sounds normal.   Lymphadenopathy:     She has no cervical adenopathy.       Assessment:       1. Hypothyroidism, unspecified type    2. Anemia, unspecified type    3. Fatigue, unspecified type    4. Lumbar facet arthropathy    5. Depression, unspecified depression type    6. Anxiety    7. Gastroesophageal reflux disease, esophagitis presence not specified        Plan:       Hypothyroidism, unspecified type    Anemia, unspecified type  -     Comprehensive metabolic panel; Future; Expected date: 11/26/2018  -     Lipid panel; Future; Expected date: 11/26/2018  -     TSH; Future; Expected date: 11/26/2018  -     CBC auto differential; Future; Expected date: 11/26/2018  -     Ferritin; Future;  Expected date: 11/26/2018  -     Iron and TIBC; Future; Expected date: 11/26/2018  -     Transferrin; Future; Expected date: 11/26/2018  -     Soluble Transferrin Receptor; Future; Expected date: 11/26/2018  -     Vitamin B12; Future; Expected date: 11/26/2018    Fatigue, unspecified type  -     Comprehensive metabolic panel; Future; Expected date: 11/26/2018  -     Lipid panel; Future; Expected date: 11/26/2018  -     TSH; Future; Expected date: 11/26/2018  -     CBC auto differential; Future; Expected date: 11/26/2018  -     Ferritin; Future; Expected date: 11/26/2018  -     Iron and TIBC; Future; Expected date: 11/26/2018  -     Transferrin; Future; Expected date: 11/26/2018  -     Soluble Transferrin Receptor; Future; Expected date: 11/26/2018  -     Vitamin B12; Future; Expected date: 11/26/2018    Lumbar facet arthropathy    Depression, unspecified depression type    Anxiety    Gastroesophageal reflux disease, esophagitis presence not specified    Other orders  -     HYDROcodone-acetaminophen (NORCO) 7.5-325 mg per tablet; Take 1 tablet by mouth every 4 to 6 hours as needed.  Dispense: 60 tablet; Refill: 0  -     HYDROcodone-acetaminophen (NORCO) 7.5-325 mg per tablet; Take 1 tablet by mouth every 4 to 6 hours as needed.  Dispense: 60 tablet; Refill: 0  -     HYDROcodone-acetaminophen (NORCO) 7.5-325 mg per tablet; Take 1 tablet by mouth every 4 to 6 hours as needed.  Dispense: 60 tablet; Refill: 0          Plan:  See orders  rf norco 7.5  Cont current meds       Medication List           Accurate as of 11/26/18  1:26 PM. If you have any questions, ask your nurse or doctor.               CHANGE how you take these medications    fluticasone 50 mcg/actuation nasal spray  Commonly known as:  FLONASE  2 sprays by Each Nare route once daily.  What changed:    · when to take this  · reasons to take this     * HYDROcodone-acetaminophen 7.5-325 mg per tablet  Commonly known as:  NORCO  Take 1 tablet by mouth every 4  to 6 hours as needed.  Start taking on:  11/27/2018  What changed:  These instructions start on 11/27/2018. If you are unsure what to do until then, ask your doctor or other care provider.  Changed by:  Hardy Dan MD     * HYDROcodone-acetaminophen 7.5-325 mg per tablet  Commonly known as:  NORCO  Take 1 tablet by mouth every 4 to 6 hours as needed.  Start taking on:  12/27/2018  What changed:  You were already taking a medication with the same name, and this prescription was added. Make sure you understand how and when to take each.  Changed by:  Hardy Dan MD     * HYDROcodone-acetaminophen 7.5-325 mg per tablet  Commonly known as:  NORCO  Take 1 tablet by mouth every 4 to 6 hours as needed.  Start taking on:  1/26/2019  What changed:  You were already taking a medication with the same name, and this prescription was added. Make sure you understand how and when to take each.  Changed by:  Hardy Dan MD         * This list has 3 medication(s) that are the same as other medications prescribed for you. Read the directions carefully, and ask your doctor or other care provider to review them with you.            CONTINUE taking these medications    butalbital-acetaminophen-caffeine -40 mg -40 mg per tablet  Commonly known as:  FIORICET, ESGIC  TAKE 1 TABLET BY MOUTH EVERY 6 HOURS AS NEEDED     cyclobenzaprine 10 MG tablet  Commonly known as:  FLEXERIL  TAKE 1 TABLET BY MOUTH 3 TIMES DAILY AS NEEDED.     levothyroxine 75 MCG tablet  Commonly known as:  SYNTHROID  TAKE 1 TABLET (75 MCG TOTAL) BY MOUTH EVERY MORNING.     linaclotide 145 mcg Cap capsule  Commonly known as:  LINZESS  Take 1 capsule (145 mcg total) by mouth daily as needed.     pantoprazole 20 MG tablet  Commonly known as:  PROTONIX  Take 1 tablet (20 mg total) by mouth once daily.     promethazine 25 MG suppository  Commonly known as:  PHENERGAN  Place 1 suppository (25 mg total) rectally every 6 (six) hours as needed for  Nausea.     sucralfate 1 gram tablet  Commonly known as:  CARAFATE     sumatriptan 100 MG tablet  Commonly known as:  IMITREX  TAKE 1 TABLET BY MOUTH EVERY DAY AS NEEDED. MAY REPEAT IN 2 HOURS IF NEEDED. DO NOT EXCEED 2 TABLETS PER DAY     traZODone 150 MG tablet  Commonly known as:  DESYREL  Take 1 tablet (150 mg total) by mouth nightly.     venlafaxine 150 MG Cp24  Commonly known as:  EFFEXOR-XR  Take 1 capsule (150 mg total) by mouth once daily.     ZOFRAN ODT 8 MG Tbdl  Generic drug:  ondansetron           Where to Get Your Medications      These medications were sent to [facility] [id number] [facility] - [place] - [address] [address]  [address], [place] [address]    Phone:  [phone]   · HYDROcodone-acetaminophen 7.5-325 mg per tablet  · HYDROcodone-acetaminophen 7.5-325 mg per tablet  · HYDROcodone-acetaminophen 7.5-325 mg per tablet

## 2018-11-28 LAB — STFR SERPL-MCNC: 7.1 MG/L

## 2018-11-29 DIAGNOSIS — D50.9 MICROCYTIC ANEMIA: Primary | ICD-10-CM

## 2018-11-29 RX ORDER — CYCLOBENZAPRINE HCL 10 MG
TABLET ORAL
Qty: 30 TABLET | Refills: 2 | Status: SHIPPED | OUTPATIENT
Start: 2018-11-29 | End: 2019-02-11 | Stop reason: SDUPTHER

## 2018-11-29 NOTE — PROGRESS NOTES
inform pt via phone that I reviewed the test results and note the following:    Pertinent results:    She has iron deficiency anemia.  Needs to see hematology.  Needs to start taking otc ferrous sulfate 325 mg twice daily.   Please schedule referral to hematology.

## 2018-12-03 ENCOUNTER — TELEPHONE (OUTPATIENT)
Dept: FAMILY MEDICINE | Facility: CLINIC | Age: 43
End: 2018-12-03

## 2018-12-03 NOTE — TELEPHONE ENCOUNTER
Pt states the iron tablets are hard on her stomach.  She is having black diarrhea from the iron tablets.  Not tarry or pasty.  She is extremely tired.  She would like an appt ASAP.

## 2018-12-03 NOTE — TELEPHONE ENCOUNTER
----- Message from Juliet Javier sent at 12/3/2018  2:37 PM CST -----  Contact: self  Patient [phone] is calling to say that the hemaglobin specialist still has not called her yet to schedule any appt//patient is not feeling well at all and is asking if Dr Dan could help her get an appt soon/please advise

## 2018-12-03 NOTE — TELEPHONE ENCOUNTER
----- Message from Darby Martinez sent at 12/3/2018  5:19 PM CST -----  Specialist  Has not called patient back, she would like to know whats going on .   Patient said her iron pills are making her stomach hurt really bad, extreme diarrhea ( pure black) looks like she drank black paint.     Patient  Would like call back from a nurse at [phone]    Thanks

## 2018-12-04 DIAGNOSIS — D50.0 ANEMIA DUE TO CHRONIC BLOOD LOSS: Primary | ICD-10-CM

## 2018-12-04 NOTE — TELEPHONE ENCOUNTER
Spoke with pt.  Appt made.  CBC ordered to check H/H, w lab appt scheduled.  Pt verbalized understanding.

## 2018-12-06 ENCOUNTER — OFFICE VISIT (OUTPATIENT)
Dept: HEMATOLOGY/ONCOLOGY | Facility: CLINIC | Age: 43
End: 2018-12-06
Payer: COMMERCIAL

## 2018-12-06 ENCOUNTER — LAB VISIT (OUTPATIENT)
Dept: LAB | Facility: HOSPITAL | Age: 43
End: 2018-12-06
Attending: INTERNAL MEDICINE
Payer: COMMERCIAL

## 2018-12-06 VITALS
RESPIRATION RATE: 18 BRPM | BODY MASS INDEX: 28.34 KG/M2 | SYSTOLIC BLOOD PRESSURE: 159 MMHG | TEMPERATURE: 99 F | HEART RATE: 71 BPM | DIASTOLIC BLOOD PRESSURE: 89 MMHG | WEIGHT: 180.56 LBS | HEIGHT: 67 IN

## 2018-12-06 DIAGNOSIS — D64.9 FATIGUE ASSOCIATED WITH ANEMIA: ICD-10-CM

## 2018-12-06 DIAGNOSIS — E03.9 HYPOTHYROIDISM, UNSPECIFIED TYPE: ICD-10-CM

## 2018-12-06 DIAGNOSIS — D50.9 IRON DEFICIENCY ANEMIA, UNSPECIFIED IRON DEFICIENCY ANEMIA TYPE: Primary | ICD-10-CM

## 2018-12-06 DIAGNOSIS — D50.0 ANEMIA DUE TO CHRONIC BLOOD LOSS: ICD-10-CM

## 2018-12-06 LAB
BASOPHILS # BLD AUTO: 0.06 K/UL
BASOPHILS NFR BLD: 1 %
DIFFERENTIAL METHOD: ABNORMAL
EOSINOPHIL # BLD AUTO: 0.1 K/UL
EOSINOPHIL NFR BLD: 2.3 %
ERYTHROCYTE [DISTWIDTH] IN BLOOD BY AUTOMATED COUNT: 18.7 %
HCT VFR BLD AUTO: 33.4 %
HGB BLD-MCNC: 9.9 G/DL
LYMPHOCYTES # BLD AUTO: 1.1 K/UL
LYMPHOCYTES NFR BLD: 18.7 %
MCH RBC QN AUTO: 24.2 PG
MCHC RBC AUTO-ENTMCNC: 29.6 G/DL
MCV RBC AUTO: 82 FL
MONOCYTES # BLD AUTO: 0.6 K/UL
MONOCYTES NFR BLD: 9.5 %
NEUTROPHILS # BLD AUTO: 4.2 K/UL
NEUTROPHILS NFR BLD: 68.5 %
NRBC BLD-RTO: 0 /100 WBC
PLATELET # BLD AUTO: 378 K/UL
PMV BLD AUTO: 10.3 FL
RBC # BLD AUTO: 4.09 M/UL
WBC # BLD AUTO: 6.1 K/UL

## 2018-12-06 PROCEDURE — 85025 COMPLETE CBC W/AUTO DIFF WBC: CPT

## 2018-12-06 PROCEDURE — 99204 OFFICE O/P NEW MOD 45 MIN: CPT | Mod: S$GLB,,, | Performed by: INTERNAL MEDICINE

## 2018-12-06 PROCEDURE — 99999 PR PBB SHADOW E&M-EST. PATIENT-LVL III: CPT | Mod: PBBFAC,,, | Performed by: INTERNAL MEDICINE

## 2018-12-06 PROCEDURE — 85025 COMPLETE CBC W/AUTO DIFF WBC: CPT | Mod: PN

## 2018-12-06 PROCEDURE — 36415 COLL VENOUS BLD VENIPUNCTURE: CPT | Mod: PN

## 2018-12-06 PROCEDURE — 3008F BODY MASS INDEX DOCD: CPT | Mod: CPTII,S$GLB,, | Performed by: INTERNAL MEDICINE

## 2018-12-06 NOTE — PROGRESS NOTES
HPI    This is initial consultation for patient named Isela Smyth    43 years old  female presented [facility] for initial consultation of iron deficiency anemia.  Patient has history of migraine and acid reflux disease who was taking ferrous set pemetrexed and PPI and Carafate.  Patient also has known history of chronic lower back pain, status post laminectomy 2012.  Review of the medical record shows patient has normal CBC up to 2017, in May of 2018 patient had a steady decline of hemoglobin.  August 2018 patient had upper EGD and low HGB shows diverticulosis and gastritis.  In November 2018 patient's hemoglobin dropped to 8.7 grams/deciliter.  Repeat hemoglobin now is 9.9 grams/deciliter.  Patient has normal MCV.  However iron panel shows iron of 23, TIBC 512, saturation iron 4%, his soluble transferrin receptor 7.1, ferritin 3, vitamin B12 237.      Patient denies any heavy menstrual cycle no evidence of GI bleeding per patient.    Patient complaining of generalized fatigue malaise.  Denies any recent acute infection, fever or chills.  Patient denies any nausea vomiting or diarrhea prior to initiation of iron pill.  Currently patient is on iron pill daily x1 week.  She states that she takes 325 mg iron pill twice a day ( she states that she takes 4 pills at a time).  Iron pills that she obtain is over-the-counter..    Review of the system:  Fourteen points of assistant negative except for above    Past Medical History:   Diagnosis Date    Anxiety     Arthritis     Chronic lumbar pain     Depression     Deviated nasal septum     Diverticulosis     GERD (gastroesophageal reflux disease)     Hemorrhoids     Hypothyroid     Kidney stone     passed 10 stones by herself over the years, one needed procedure    Migraine headache     PONV (postoperative nausea and vomiting)     severe    Shortness of breath     Urticaria      Past Surgical History:   Procedure Laterality Date     CHOLECYSTECTOMY      COLONOSCOPY  prior to 2011    COLONOSCOPY N/A 9/26/2018    Procedure: COLONOSCOPY;  Surgeon: Marquis Zuluaga MD;  Location: [facility] ENDO;  Service: Endoscopy;  Laterality: N/A;    COLONOSCOPY N/A 9/26/2018    Performed by [facility]aga MD at [facility] END[facility]    EGD (ESOPHAGOGASTRODUODENOSCOPY) N/A 9/5/2018    [facility]formed by [facility]aga [facility] at [facility] END[facility]    EGD (ESOPHAGOGASTRODUODENOSCOPY) N/A 3/11/2013    [facility] by [facility] at [facility]    ESOPHAGOGASTRODUODENOSCOPY N/A 9/5/2018    Procedure: EGD (ESOPHAGOGASTRODUODENOSCOPY);  Surgeon: Marquis Zuluaga MD;  Location: [facility] ENDO;  Service: Endoscopy;  Laterality: N/A;    ESOPHAGOGASTRODUODENOSCOPY (EGD) N/A 9/14/2016    [facility] by [facility]aga MD at [facility] END[facility]    HERNIA REPAIR      HIATAL HERNIA REPAIR      INJECTION-FACET L4/5 and L5/S1 Bilateral 10/12/2016    Performed by Rich Negrete MD at [facility] OR    INJECTION-STEROID-EPIDURAL-CERVICAL N/A 1/31/2017    [facility]te [facility] at [facility] OR    KIDNEY STONE SURGERY  2009    [facility], had stone removed by dr renay george, stayed in hospital 4 days    LAMINECTOMY, SPINE, LUMBAR, WITH DISCECTOMY Left 8/14/2012    [facility]y Clifford Jr[facility]    LAPAROSCOPIC GASTRIC BANDING  2007    later removed in 2016    LUMBAR EPIDURAL INJECTION      RESECTION OF TURBINATES N/A 1/31/2014    Performed by Filemon Yuan MD at [facility] OR    SEPTOPLASTY, NOSE N/A 1/31/2014    [facility] by [facility]mon [facility] at [facility] OR    SINUS SURGERY      SPINE SURGERY      6 back surgeries; 1 neck surgery    TUBAL LIGATION      UPPER GASTROINTESTINAL ENDOSCOPY  03/2013    Dr. Zuluaga    UPPER GASTROINTESTINAL ENDOSCOPY  09/14/2016    Dr. Zuluaga    VAGINAL DELIVERY      times 3     Review of patient's allergies indicates:   Allergen Reactions    Morphine Itching     Social History     Socioeconomic History    Marital status:      Spouse  name: None    Number of children: None    Years of education: None    Highest education level: None   Social Needs    Financial resource strain: None    Food insecurity - worry: None    Food insecurity - inability: None    Transportation needs - medical: None    Transportation needs - non-medical: None   Occupational History    None   Tobacco Use    Smoking status: Never Smoker    Smokeless tobacco: Never Used   Substance and Sexual Activity    Alcohol use: No    Drug use: No    Sexual activity: Yes     Partners: Male     Birth control/protection: Surgical   Other Topics Concern    None   Social History Narrative    None     Physical exam:  General:  Alert oriented x3 no acute distress  HEENT:  Normocephalic atraumatic pupils equal round reactive to light extraocular muscle intact.  Trachea midline.  No JVD.  Neck:  No JVD  Chest:  Clear to auscultate bilaterally  CV:  Regular rate and rhythm  Abdomen:  Soft nontender nondistended bowel sounds x4  Extremity:  Muscle strength 5/5 no edema  Neuro:  No focal deficit cranial nerves 2-12 grossly intact  Psych:  Normal affect    Lab Results   Component Value Date    WBC 6.10 12/06/2018    HGB 9.9 (L) 12/06/2018    HCT 33.4 (L) 12/06/2018    MCV 82 12/06/2018     (H) 12/06/2018     CMP  Sodium   Date Value Ref Range Status   11/26/2018 138 136 - 145 mmol/L Final     Potassium   Date Value Ref Range Status   11/26/2018 3.9 3.5 - 5.1 mmol/L Final     Chloride   Date Value Ref Range Status   11/26/2018 106 95 - 110 mmol/L Final     CO2   Date Value Ref Range Status   11/26/2018 27 23 - 29 mmol/L Final     Glucose   Date Value Ref Range Status   11/26/2018 92 70 - 110 mg/dL Final     BUN, Bld   Date Value Ref Range Status   11/26/2018 10 6 - 20 mg/dL Final     Creatinine   Date Value Ref Range Status   11/26/2018 0.7 0.5 - 1.4 mg/dL Final   08/10/2012 0.6 0.2 - 1.4 mg/dl Final     Calcium   Date Value Ref Range Status   11/26/2018 8.8 8.7 - 10.5 mg/dL  Final   08/10/2012 10.3 (H) 8.6 - 10.2 mg/dl Final     Total Protein   Date Value Ref Range Status   11/26/2018 7.6 6.0 - 8.4 g/dL Final     Albumin   Date Value Ref Range Status   11/26/2018 4.0 3.5 - 5.2 g/dL Final     Total Bilirubin   Date Value Ref Range Status   11/26/2018 0.3 0.1 - 1.0 mg/dL Final     Comment:     For infants and newborns, interpretation of results should be based  on gestational age, weight and in agreement with clinical  observations.  Premature Infant recommended reference ranges:  Up to 24 hours.............<8.0 mg/dL  Up to 48 hours............<12.0 mg/dL  3-5 days..................<15.0 mg/dL  6-29 days.................<15.0 mg/dL       Alkaline Phosphatase   Date Value Ref Range Status   11/26/2018 53 (L) 55 - 135 U/L Final     AST ([place])   Date Value Ref Range Status   01/25/2016 27 14 - 36 U/L Final     AST   Date Value Ref Range Status   11/26/2018 21 10 - 40 U/L Final     ALT   Date Value Ref Range Status   11/26/2018 10 10 - 44 U/L Final     Anion Gap   Date Value Ref Range Status   11/26/2018 5 (L) 8 - 16 mmol/L Final   08/10/2012 8 5 - 15 meq/L Final     eGFR if    Date Value Ref Range Status   11/26/2018 >60.0 >60 mL/min/1.73 m^2 Final     eGFR if non    Date Value Ref Range Status   11/26/2018 >60.0 >60 mL/min/1.73 m^2 Final     Comment:     Calculation used to obtain the estimated glomerular filtration  rate (eGFR) is the CKD-EPI equation.        Assessment plan    ['] iron deficiency anemia currently hemoglobin 9.9 grams/deciliter WBC 6.1 platelets 378.    Iron panel from November 26, 2018 shows serum iron level of 23, TIBC 512, iron saturation 4%, ferritin 3, soluble transferrin receptor 7.1, vitamin B12 237.    Patient has initiated over-the-counter iron supplement therapy.  Patient states that the iron pills 325 mg that she takes 4 pills twice daily.  Since initiating iron therapy patient review reports black stool and  constipation    I have instructed patient to take iron pill on empty stomach orange juice 1 pill twice daily.  I will have patient continue taking iron pill for the next 4 weeks and have patient return to clinic for repeat iron studies.  Should patient not responding to iron therapy will proceed with IV iron replacement.    I have instructed patient may return to clinic for any concerns.  Or to the emergency room for any emergency events    [] History of migraine   -continue management by primary care    [] Status post EGD and colonoscopy.  Gastritis and diverticulosis  -continue follow-up with GI

## 2018-12-06 NOTE — LETTER
December 6, 2018      Hardy Dan MD  [address] [facility] [address]  [facility] [address]           [facility] [place] [address]  [facility] [address]  Phone: [phone]  Fax: [phone]          Patient: Isela Smyth   MR Number: [id number]   YOB: 1975   Date of Visit: 12/6/2018       Dear Dr. Hardy Dan:    Thank you for referring Isela Smyth to me for evaluation. Attached you will find relevant portions of my assessment and plan of care.    If you have questions, please do not hesitate to call me. I look forward to following Isela Smyth along with you.    Sincerely,    Anthony Rudd MD    Enclosure  CC:  No Recipients    If you would like to receive this communication electronically, please contact [email or web address] or [phone] to request more information on [email or web address] Link access.    For providers and/or their staff who would like to refer a patient to [facility], please contact us through our one-stop-shop provider referral line, [facility], at [phone].    If you feel you have received this communication in error or would no longer like to receive these types of communications, please e-mail [email or web address]

## 2018-12-18 NOTE — TELEPHONE ENCOUNTER
----- Message from Stefania Jorge sent at 12/18/2018 11:36 AM CST -----  Contact: [phone]  Patient requesting a refill on FIORICET.  Requesting to  prescription now.  Please call patient at [phone].   Thanks!

## 2018-12-19 RX ORDER — BUTALBITAL, ACETAMINOPHEN AND CAFFEINE 50; 325; 40 MG/1; MG/1; MG/1
TABLET ORAL
Qty: 60 TABLET | Refills: 2 | Status: SHIPPED | OUTPATIENT
Start: 2018-12-19 | End: 2019-02-16 | Stop reason: SDUPTHER

## 2019-01-08 ENCOUNTER — LAB VISIT (OUTPATIENT)
Dept: LAB | Facility: HOSPITAL | Age: 44
End: 2019-01-08
Attending: INTERNAL MEDICINE
Payer: COMMERCIAL

## 2019-01-08 DIAGNOSIS — D50.9 IRON DEFICIENCY ANEMIA, UNSPECIFIED IRON DEFICIENCY ANEMIA TYPE: ICD-10-CM

## 2019-01-08 DIAGNOSIS — E03.9 HYPOTHYROIDISM, UNSPECIFIED TYPE: ICD-10-CM

## 2019-01-08 DIAGNOSIS — D64.9 FATIGUE ASSOCIATED WITH ANEMIA: ICD-10-CM

## 2019-01-08 LAB
ALBUMIN SERPL BCP-MCNC: 4.3 G/DL
ALP SERPL-CCNC: 47 U/L
ALT SERPL W/O P-5'-P-CCNC: 34 U/L
ANION GAP SERPL CALC-SCNC: 12 MMOL/L
AST SERPL-CCNC: 31 U/L
BASOPHILS # BLD AUTO: 0.05 K/UL
BASOPHILS NFR BLD: 0.8 %
BILIRUB SERPL-MCNC: 0.3 MG/DL
BUN SERPL-MCNC: 10 MG/DL
CALCIUM SERPL-MCNC: 9 MG/DL
CHLORIDE SERPL-SCNC: 104 MMOL/L
CO2 SERPL-SCNC: 24 MMOL/L
CREAT SERPL-MCNC: 0.55 MG/DL
DIFFERENTIAL METHOD: ABNORMAL
EOSINOPHIL # BLD AUTO: 0.1 K/UL
EOSINOPHIL NFR BLD: 2.2 %
ERYTHROCYTE [DISTWIDTH] IN BLOOD BY AUTOMATED COUNT: 19.9 %
EST. GFR  (AFRICAN AMERICAN): >60 ML/MIN/1.73 M^2
EST. GFR  (NON AFRICAN AMERICAN): >60 ML/MIN/1.73 M^2
FERRITIN SERPL-MCNC: 49 NG/ML
GLUCOSE SERPL-MCNC: 96 MG/DL
HCT VFR BLD AUTO: 39.6 %
HGB BLD-MCNC: 12.9 G/DL
IRON SATN MFR SERPL: 36 %
IRON SERPL-MCNC: 108 UG/DL
LYMPHOCYTES # BLD AUTO: 1.3 K/UL
LYMPHOCYTES NFR BLD: 21.4 %
MCH RBC QN AUTO: 28.2 PG
MCHC RBC AUTO-ENTMCNC: 32.6 G/DL
MCV RBC AUTO: 87 FL
MONOCYTES # BLD AUTO: 0.4 K/UL
MONOCYTES NFR BLD: 7.3 %
NEUTROPHILS # BLD AUTO: 4.1 K/UL
NEUTROPHILS NFR BLD: 68.3 %
NRBC BLD-RTO: 0 /100 WBC
PLATELET # BLD AUTO: 341 K/UL
PMV BLD AUTO: 9.9 FL
POTASSIUM SERPL-SCNC: 3.9 MMOL/L
PROT SERPL-MCNC: 7.5 G/DL
RBC # BLD AUTO: 4.58 M/UL
SODIUM SERPL-SCNC: 140 MMOL/L
TOTAL IRON BINDING CAPACITY: 303 UG/DL
WBC # BLD AUTO: 6.03 K/UL

## 2019-01-08 PROCEDURE — 85025 COMPLETE CBC W/AUTO DIFF WBC: CPT

## 2019-01-08 PROCEDURE — 36415 COLL VENOUS BLD VENIPUNCTURE: CPT | Mod: PN

## 2019-01-08 PROCEDURE — 82728 ASSAY OF FERRITIN: CPT

## 2019-01-08 PROCEDURE — 83540 ASSAY OF IRON: CPT

## 2019-01-08 PROCEDURE — 80053 COMPREHEN METABOLIC PANEL: CPT | Mod: PN

## 2019-01-08 PROCEDURE — 83540 ASSAY OF IRON: CPT | Mod: PN

## 2019-01-08 PROCEDURE — 80053 COMPREHEN METABOLIC PANEL: CPT

## 2019-01-08 PROCEDURE — 82728 ASSAY OF FERRITIN: CPT | Mod: PN

## 2019-01-08 PROCEDURE — 85025 COMPLETE CBC W/AUTO DIFF WBC: CPT | Mod: PN

## 2019-01-09 RX ORDER — SUCRALFATE 1 G/1
TABLET ORAL
Qty: 100 TABLET | Refills: 2 | Status: SHIPPED | OUTPATIENT
Start: 2019-01-09 | End: 2020-02-10

## 2019-01-11 ENCOUNTER — TELEPHONE (OUTPATIENT)
Dept: HEMATOLOGY/ONCOLOGY | Facility: CLINIC | Age: 44
End: 2019-01-11

## 2019-01-11 NOTE — TELEPHONE ENCOUNTER
As Dr. Rudd will be in a bone marrow procedure on 1/15/19 early morning, called patient to inform of moving her 840 am follow up to 9 am. No answer, left detailed message of the time change on her voice mail and asked that she call back to confirm receipt of message

## 2019-01-15 ENCOUNTER — OFFICE VISIT (OUTPATIENT)
Dept: HEMATOLOGY/ONCOLOGY | Facility: CLINIC | Age: 44
End: 2019-01-15
Payer: COMMERCIAL

## 2019-01-15 VITALS
WEIGHT: 184.06 LBS | SYSTOLIC BLOOD PRESSURE: 124 MMHG | RESPIRATION RATE: 18 BRPM | TEMPERATURE: 99 F | BODY MASS INDEX: 28.89 KG/M2 | HEIGHT: 67 IN | HEART RATE: 91 BPM | DIASTOLIC BLOOD PRESSURE: 92 MMHG

## 2019-01-15 DIAGNOSIS — D64.9 FATIGUE ASSOCIATED WITH ANEMIA: ICD-10-CM

## 2019-01-15 DIAGNOSIS — D50.8 IRON DEFICIENCY ANEMIA DUE TO DIETARY CAUSES: Primary | ICD-10-CM

## 2019-01-15 PROCEDURE — 99213 OFFICE O/P EST LOW 20 MIN: CPT | Mod: S$GLB,,, | Performed by: INTERNAL MEDICINE

## 2019-01-15 PROCEDURE — 99999 PR PBB SHADOW E&M-EST. PATIENT-LVL III: CPT | Mod: PBBFAC,,, | Performed by: INTERNAL MEDICINE

## 2019-01-15 PROCEDURE — 3008F PR BODY MASS INDEX (BMI) DOCUMENTED: ICD-10-PCS | Mod: CPTII,S$GLB,, | Performed by: INTERNAL MEDICINE

## 2019-01-15 PROCEDURE — 3008F BODY MASS INDEX DOCD: CPT | Mod: CPTII,S$GLB,, | Performed by: INTERNAL MEDICINE

## 2019-01-15 PROCEDURE — 99999 PR PBB SHADOW E&M-EST. PATIENT-LVL III: ICD-10-PCS | Mod: PBBFAC,,, | Performed by: INTERNAL MEDICINE

## 2019-01-15 PROCEDURE — 99213 PR OFFICE/OUTPT VISIT, EST, LEVL III, 20-29 MIN: ICD-10-PCS | Mod: S$GLB,,, | Performed by: INTERNAL MEDICINE

## 2019-01-15 NOTE — PROGRESS NOTES
HPI    43 years old  female presented to [facility] for initial consultation of iron deficiency anemia.  Patient has history of migraine and acid reflux disease who was taking iron and PPI and Carafate.  Patient also has known history of chronic lower back pain, status post laminectomy 2012.  Review of the medical record shows patient has normal CBC in 2017, and in May of 2018 patient had a steady decline of hemoglobin.  August 2018 patient had upper EGD and low HGB shows diverticulosis and gastritis.  In November 2018 patient's hemoglobin dropped to 8.7 grams/deciliter.  Repeat hemoglobin now is 9.9 grams/deciliter.  Patient has normal MCV,  iron panel shows iron of 23, TIBC 512, saturation iron 4%, his soluble transferrin receptor 7.1, ferritin 3, vitamin B12 237.      Patient denies any heavy menstrual cycle no evidence of GI bleeding per patient.    Patient complaining of generalized fatigue malaise.  Denies any recent acute infection, fever or chills.  Patient denies any nausea vomiting or diarrhea prior to initiation of iron pill.  Currently patient is on iron pill daily x1 week.  She states that she takes 325 mg iron pill twice a day ( she states that she takes 4 pills at a time).  Iron pills that she obtain is over-the-counter.    Patient is here for follow-up with iron study    Review of the system:  Fourteen points of assistant negative except for above    Past Medical History:   Diagnosis Date    Anxiety     Arthritis     Chronic lumbar pain     Depression     Deviated nasal septum     Diverticulosis     GERD (gastroesophageal reflux disease)     Hemorrhoids     Hypothyroid     Kidney stone     passed 10 stones by herself over the years, one needed procedure    Migraine headache     PONV (postoperative nausea and vomiting)     severe    Shortness of breath     Urticaria      Past Surgical History:   Procedure Laterality Date    CHOLECYSTECTOMY      COLONOSCOPY  prior to  2011    COLONOSCOPY N/A 9/26/2018    [facility] END[facility]    EGD (ESOPHAGOGASTRODUODENOSCOPY) N/A 9/5/2018    [facility] by [facility] at [facility] END[facility]    EGD (ESOPHAGOGASTRODUODENOSCOPY) N/A 3/11/2013    [facility] at [facility]    ESOPHAGOGASTRODUODENOSCOPY (EGD) N/A 9/14/2016    [facility] at [facility] END[facility]    HERNIA REPAIR      HIATAL HERNIA REPAIR      INJECTION-FACET L4/5 and L5/S1 Bilateral 10/12/2016    [facility] by Rich Neg[facility]te [facility] at [facility] OR    INJECTION-STEROID-EPIDURAL-CERVICAL N/A 1/31/2017    [facility] at [facility] OR    KIDNEY STONE SURGERY  2009    [facility], had stone removed by dr renay george, stayed in hospital 4 days    LAMINECTOMY, SPINE, LUMBAR, WITH DISCECTOMY Left 8/14/2012    [facility]    LAPAROSCOPIC GASTRIC BANDING  2007    later removed in 2016    LUMBAR EPIDURAL INJECTION      RESECTION OF TURBINATES N/A 1/31/2014    [facility] by [facility]mon [facility] at [facility] OR    SEPTOPLASTY, NOSE N/A 1/31/2014    [facility] at [facility] OR    SINUS SURGERY      SPINE SURGERY      6 back surgeries; 1 neck surgery    TUBAL LIGATION      UPPER GASTROINTESTINAL ENDOSCOPY  03/2013    Dr. Zuluaga    UPPER GASTROINTESTINAL ENDOSCOPY  09/14/2016    Dr. Zuluaga    VAGINAL DELIVERY      times 3     Review of patient's allergies indicates:   Allergen Reactions    Morphine Itching     Social History     Socioeconomic History    Marital status:      Spouse name: Not on file    Number of children: Not on file    Years of education: Not on file    Highest education level: Not on file   Social Needs    Financial resource strain: Not on file    Food insecurity - worry: Not on file    Food insecurity - inability: Not on file    Transportation needs - medical: Not on file    Transportation needs - non-medical: Not on file    Occupational History    Not on file   Tobacco Use    Smoking status: Never Smoker    Smokeless tobacco: Never Used   Substance and Sexual Activity    Alcohol use: No    Drug use: No    Sexual activity: Yes     Partners: Male     Birth control/protection: Surgical   Other Topics Concern    Not on file   Social History Narrative    Not on file     Physical exam:  General:  Alert oriented x3 no acute distress  HEENT:  Normocephalic atraumatic pupils equal round reactive to light extraocular muscle intact.  Trachea midline.  No JVD.  Neck:  No JVD  Chest:  Clear to auscultate bilaterally  CV:  Regular rate and rhythm  Abdomen:  Soft nontender nondistended bowel sounds x4  Extremity:  Muscle strength 5/5 no edema  Neuro:  No focal deficit cranial nerves 2-12 grossly intact  Psych:  Normal affect    Lab Results   Component Value Date    WBC 6.03 01/08/2019    HGB 12.9 01/08/2019    HCT 39.6 01/08/2019    MCV 87 01/08/2019     01/08/2019     CMP  Sodium   Date Value Ref Range Status   01/08/2019 140 136 - 145 mmol/L Final     Potassium   Date Value Ref Range Status   01/08/2019 3.9 3.5 - 5.1 mmol/L Final     Chloride   Date Value Ref Range Status   01/08/2019 104 95 - 110 mmol/L Final     CO2   Date Value Ref Range Status   01/08/2019 24 22 - 31 mmol/L Final     Glucose   Date Value Ref Range Status   01/08/2019 96 70 - 110 mg/dL Final     Comment:     The ADA recommends the following guidelines for fasting glucose:  Normal:       less than 100 mg/dL  Prediabetes:  100 mg/dL to 125 mg/dL  Diabetes:     126 mg/dL or higher       BUN, Bld   Date Value Ref Range Status   01/08/2019 10 7 - 18 mg/dL Final     Creatinine   Date Value Ref Range Status   01/08/2019 0.55 0.50 - 1.40 mg/dL Final   08/10/2012 0.6 0.2 - 1.4 mg/dl Final     Calcium   Date Value Ref Range Status   01/08/2019 9.0 8.4 - 10.2 mg/dL Final   08/10/2012 10.3 (H) 8.6 - 10.2 mg/dl Final     Total Protein   Date Value Ref Range Status    01/08/2019 7.5 6.0 - 8.4 g/dL Final     Albumin   Date Value Ref Range Status   01/08/2019 4.3 3.5 - 5.2 g/dL Final     Total Bilirubin   Date Value Ref Range Status   01/08/2019 0.3 0.2 - 1.3 mg/dL Final     Alkaline Phosphatase   Date Value Ref Range Status   01/08/2019 47 38 - 145 U/L Final     AST ([place])   Date Value Ref Range Status   01/25/2016 27 14 - 36 U/L Final     AST   Date Value Ref Range Status   01/08/2019 31 14 - 36 U/L Final     ALT   Date Value Ref Range Status   01/08/2019 34 10 - 44 U/L Final     Anion Gap   Date Value Ref Range Status   01/08/2019 12 8 - 16 mmol/L Final   08/10/2012 8 5 - 15 meq/L Final     eGFR if    Date Value Ref Range Status   01/08/2019 >60 >60 mL/min/1.73 m^2 Final     eGFR if non    Date Value Ref Range Status   01/08/2019 >60 >60 mL/min/1.73 m^2 Final     Comment:     Calculation used to obtain the estimated glomerular filtration  rate (eGFR) is the CKD-EPI equation.        Assessment plan    [] Improve iron deficiency anemia January 8, 2019 CBC:  WBC 6.03, hemoglobin 12.9 grams/deciliter, platelets 341 K.  iron study:  Serum iron 108, TIBC 303, iron saturation 36%, ferritin 49    - normalization of iron with iron storage  - hold oral iron tablets regimen  -RTC 3 months with repeat CBC iron panel  -if iron panel normal after 3 months interval follow-up may discharge from clinic with follow-up primary care    [] History of migraine   -continue management by primary care    [] Status post EGD and colonoscopy.  Gastritis and diverticulosis  -continue follow-up with GI    Progress note by M*Modal

## 2019-02-12 ENCOUNTER — PATIENT OUTREACH (OUTPATIENT)
Dept: ADMINISTRATIVE | Facility: HOSPITAL | Age: 44
End: 2019-02-12

## 2019-02-12 NOTE — PROGRESS NOTES
Health Maintenance Due   Topic Date Due    Sign Pain Contract  05/11/1993    Complete Opioid Risk Tool  05/11/1993    Pap Smear with HPV Cotest  05/11/1996    Mammogram  05/11/2015    Influenza Vaccine  08/01/2018

## 2019-02-13 RX ORDER — CYCLOBENZAPRINE HCL 10 MG
TABLET ORAL
Qty: 30 TABLET | Refills: 2 | Status: SHIPPED | OUTPATIENT
Start: 2019-02-13 | End: 2019-04-13 | Stop reason: SDUPTHER

## 2019-02-13 RX ORDER — PROMETHAZINE HYDROCHLORIDE 25 MG/1
25 SUPPOSITORY RECTAL EVERY 6 HOURS PRN
Qty: 12 SUPPOSITORY | Refills: 1 | Status: SHIPPED | OUTPATIENT
Start: 2019-02-13 | End: 2019-10-04

## 2019-02-15 ENCOUNTER — TELEPHONE (OUTPATIENT)
Dept: UROLOGY | Facility: CLINIC | Age: 44
End: 2019-02-15

## 2019-02-16 ENCOUNTER — TELEPHONE (OUTPATIENT)
Dept: FAMILY MEDICINE | Facility: CLINIC | Age: 44
End: 2019-02-16

## 2019-02-16 NOTE — TELEPHONE ENCOUNTER
----- Message from Annmarie Roper RT sent at 2/15/2019  3:49 PM CST -----  Contact: pt    pt , requesting medication to be called in for the kidney stone pain issues, please call to confirm, her urologist is not available, thanks.

## 2019-02-18 NOTE — PROGRESS NOTES
Refill Authorization Note     is requesting a refill authorization.    Brief assessment and rationale for refill: ROUTE: op  Name and strength of medication: butalbital-acetaminophen-caffeine -40 mg (FIORICET, ESGIC) -40 mg per tablet  Medication-related problems identified: Therapeutic duplication    Medication Therapy Plan: DCed dups    Medication reconciliation completed: No                         Comments: Appointments (past 12 m or future 3m authorizing provider)  LAST VISIT DATE  Hardy Dan MD 11/26/2018         NEXT VISIT DATE  Hardy Dan MD 2/26/2019

## 2019-02-20 ENCOUNTER — PATIENT OUTREACH (OUTPATIENT)
Dept: ADMINISTRATIVE | Facility: HOSPITAL | Age: 44
End: 2019-02-20

## 2019-02-20 DIAGNOSIS — K21.9 GASTROESOPHAGEAL REFLUX DISEASE, ESOPHAGITIS PRESENCE NOT SPECIFIED: Primary | ICD-10-CM

## 2019-02-20 RX ORDER — BUTALBITAL, ACETAMINOPHEN AND CAFFEINE 50; 325; 40 MG/1; MG/1; MG/1
TABLET ORAL
Qty: 60 TABLET | Refills: 0 | Status: SHIPPED | OUTPATIENT
Start: 2019-02-20 | End: 2019-03-20 | Stop reason: SDUPTHER

## 2019-02-20 NOTE — PROGRESS NOTES
Portal outreach un-read by patient.  Outreach mailed today  Health Maintenance Due   Topic Date Due    Sign Pain Contract  05/11/1993    Complete Opioid Risk Tool  05/11/1993    Pap Smear with HPV Cotest  05/11/1996    Mammogram  05/11/2015    Influenza Vaccine  08/01/2018

## 2019-02-20 NOTE — LETTER
February 20, 2019    Isela Smyth  [address]  [place] [address]             [facility]  [address]  [facility] [address]  Phone: [phone] Dear Ms. Smyth:    We have tried to reach you by [facility] email unsuccessfully.     [facility] is committed to your overall health.  To help you get the most out of each of your visits, we will review your information to make sure you are up to date on all of your recommended tests and/or procedures.       Hardy Dan MD   has found that your chart shows you may be due for the following:     Pap smear   Mammogram, order placed   Influenza Vaccine     If you have had any of the above done at another facility, please bring the records with you or fax them to [phone] so that your record at [facility] will be complete. If you have not had any of these tests or procedures done recently and would like to complete this testing ,  please call [phone] or send a message through your [facility] portal to your provider's office.     If you have an upcoming scheduled appointment for the above test and/or procedures, please disregard this letter.     If you are currently taking medication, please bring it with you to your appointment for review.     Sincerely,    MD Serena Grajeda  Clinical Care Coordinator  [facility]

## 2019-02-21 RX ORDER — PANTOPRAZOLE SODIUM 20 MG/1
TABLET, DELAYED RELEASE ORAL
Qty: 90 TABLET | Refills: 1 | Status: SHIPPED | OUTPATIENT
Start: 2019-02-21 | End: 2019-08-22 | Stop reason: SDUPTHER

## 2019-02-21 NOTE — TELEPHONE ENCOUNTER
I have reviewed and agree with the assessment below with changes.GERD lco - stable; APPROVE 6 more.

## 2019-02-21 NOTE — PROGRESS NOTES
Refill Authorization Note     is requesting a refill authorization.    Brief assessment and rationale for refill: ROUTE: op  Name and strength of medication: pantoprazole (PROTONIX) 20 MG tablet       Medication Therapy Plan: GERD-stable, lco(lov); 6 more months     Medication reconciliation completed: No              How patient will take medication: t1t po qd          Comments:   APPOINTMENTS (past 12 m or future 3m authorizing provider)  LAST VISIT DATE  Hardy Dan MD 11/26/2018         NEXT VISIT DATE  Hardy Dan MD 2/26/2019

## 2019-02-27 RX ORDER — HYDROCODONE BITARTRATE AND ACETAMINOPHEN 7.5; 325 MG/1; MG/1
1 TABLET ORAL 2 TIMES DAILY PRN
Qty: 60 TABLET | Refills: 0 | Status: SHIPPED | OUTPATIENT
Start: 2019-02-27 | End: 2019-03-07 | Stop reason: SDUPTHER

## 2019-02-27 RX ORDER — HYDROCODONE BITARTRATE AND ACETAMINOPHEN 7.5; 325 MG/1; MG/1
1 TABLET ORAL 4 TIMES DAILY PRN
COMMUNITY
End: 2019-02-27 | Stop reason: SDUPTHER

## 2019-02-27 NOTE — TELEPHONE ENCOUNTER
----- Message from Mana Mcdaniels sent at 2/26/2019  4:27 PM CST -----  Type: Needs Medical Advice    Who Called:  Patient    Best Call Back Number:[phone] (home)     Additional Information:Patient stating she would like to discuss her medication refills, being discharged from the hospital today

## 2019-02-27 NOTE — TELEPHONE ENCOUNTER
Callback to patient--patient states she missed appt due to being in hospital; next avail appt 3/7/19; please advise on refill

## 2019-02-27 NOTE — TELEPHONE ENCOUNTER
----- Message from Jose Keith sent at 2/27/2019  9:44 AM CST -----  Contact: pt  Type:  Patient Returning Call    Who Called:  pt  Who Left Message for Patient:  Sonya  Does the patient know what this is regarding?:    Best Call Back Number:  [phone]  Additional Information:

## 2019-03-07 ENCOUNTER — OFFICE VISIT (OUTPATIENT)
Dept: FAMILY MEDICINE | Facility: CLINIC | Age: 44
End: 2019-03-07
Payer: COMMERCIAL

## 2019-03-07 VITALS
HEIGHT: 67 IN | BODY MASS INDEX: 28.79 KG/M2 | DIASTOLIC BLOOD PRESSURE: 76 MMHG | WEIGHT: 183.44 LBS | OXYGEN SATURATION: 97 % | HEART RATE: 85 BPM | SYSTOLIC BLOOD PRESSURE: 114 MMHG

## 2019-03-07 DIAGNOSIS — F41.9 ANXIETY: ICD-10-CM

## 2019-03-07 DIAGNOSIS — G89.29 CHRONIC CERVICAL PAIN: ICD-10-CM

## 2019-03-07 DIAGNOSIS — M54.2 CHRONIC CERVICAL PAIN: ICD-10-CM

## 2019-03-07 DIAGNOSIS — F32.A DEPRESSION, UNSPECIFIED DEPRESSION TYPE: ICD-10-CM

## 2019-03-07 DIAGNOSIS — M54.5 CHRONIC LOW BACK PAIN, UNSPECIFIED BACK PAIN LATERALITY, WITH SCIATICA PRESENCE UNSPECIFIED: ICD-10-CM

## 2019-03-07 DIAGNOSIS — G43.809 OTHER MIGRAINE WITHOUT STATUS MIGRAINOSUS, NOT INTRACTABLE: ICD-10-CM

## 2019-03-07 DIAGNOSIS — G89.29 CHRONIC LOW BACK PAIN, UNSPECIFIED BACK PAIN LATERALITY, WITH SCIATICA PRESENCE UNSPECIFIED: ICD-10-CM

## 2019-03-07 DIAGNOSIS — Z12.4 CERVICAL CANCER SCREENING: ICD-10-CM

## 2019-03-07 DIAGNOSIS — E03.9 HYPOTHYROIDISM, UNSPECIFIED TYPE: Primary | ICD-10-CM

## 2019-03-07 PROCEDURE — 99214 PR OFFICE/OUTPT VISIT, EST, LEVL IV, 30-39 MIN: ICD-10-PCS | Mod: S$GLB,,, | Performed by: FAMILY MEDICINE

## 2019-03-07 PROCEDURE — 99999 PR PBB SHADOW E&M-EST. PATIENT-LVL III: ICD-10-PCS | Mod: PBBFAC,,, | Performed by: FAMILY MEDICINE

## 2019-03-07 PROCEDURE — 3008F BODY MASS INDEX DOCD: CPT | Mod: CPTII,S$GLB,, | Performed by: FAMILY MEDICINE

## 2019-03-07 PROCEDURE — 99999 PR PBB SHADOW E&M-EST. PATIENT-LVL III: CPT | Mod: PBBFAC,,, | Performed by: FAMILY MEDICINE

## 2019-03-07 PROCEDURE — 99214 OFFICE O/P EST MOD 30 MIN: CPT | Mod: S$GLB,,, | Performed by: FAMILY MEDICINE

## 2019-03-07 PROCEDURE — 3008F PR BODY MASS INDEX (BMI) DOCUMENTED: ICD-10-PCS | Mod: CPTII,S$GLB,, | Performed by: FAMILY MEDICINE

## 2019-03-07 RX ORDER — HYDROCODONE BITARTRATE AND ACETAMINOPHEN 7.5; 325 MG/1; MG/1
1 TABLET ORAL 2 TIMES DAILY PRN
Qty: 60 TABLET | Refills: 0 | Status: SHIPPED | OUTPATIENT
Start: 2019-05-28 | End: 2019-06-04 | Stop reason: SDUPTHER

## 2019-03-07 RX ORDER — HYDROCODONE BITARTRATE AND ACETAMINOPHEN 7.5; 325 MG/1; MG/1
1 TABLET ORAL 2 TIMES DAILY PRN
Qty: 60 TABLET | Refills: 0 | Status: SHIPPED | OUTPATIENT
Start: 2019-04-28 | End: 2019-05-28

## 2019-03-07 RX ORDER — HYDROCODONE BITARTRATE AND ACETAMINOPHEN 7.5; 325 MG/1; MG/1
1 TABLET ORAL 2 TIMES DAILY PRN
Qty: 60 TABLET | Refills: 0 | Status: SHIPPED | OUTPATIENT
Start: 2019-03-29 | End: 2019-04-28

## 2019-03-15 RX ORDER — BUTALBITAL, ACETAMINOPHEN AND CAFFEINE 50; 325; 40 MG/1; MG/1; MG/1
TABLET ORAL
Qty: 60 TABLET | Refills: 0 | OUTPATIENT
Start: 2019-03-15

## 2019-03-15 NOTE — TELEPHONE ENCOUNTER
----- Message from Ledy Alfredo sent at 3/15/2019 11:09 AM CDT -----  Contact: Hamida with [facility]  Type:  RX Refill Request    Who Called:  Hamida  Refill or New Rx:  Refill  RX Name and Strength:    linaclotide (LINZESS) 145 mcg Cap capsule  How is the patient currently taking it? (ex. 1XDay):    Is this a 30 day or 90 day RX:  30  Preferred Pharmacy with phone number:    [id number] [place] - [address]  [address]  [place] [address]  Phone: [phone] Fax: [phone]  Local or Mail Order:  Local   Ordering Provider:  Ni Reinoso Call Back Number:  [phone]  Additional Information:

## 2019-03-21 NOTE — PROGRESS NOTES
Refill Authorization Note     is requesting a refill authorization.    Brief assessment and rationale for refill: ROUTE: op  Name and strength of medication: butalbital-acetaminophen-caffeine -40 mg (FIORICET, ESGIC) -40 mg per tablet            Medication reconciliation completed: No                         Comments:   APPOINTMENTS (past 12 m or future 3m authorizing provider)  LAST VISIT DATE  Hardy Dan MD 3/7/2019         NEXT VISIT DATE  Hardy Dan MD 6/4/2019

## 2019-03-21 NOTE — TELEPHONE ENCOUNTER
----- Message from Malika Ernesto sent at 3/21/2019  8:14 AM CDT -----  Type:  RX Refill Request    Who Called:  self  Refill or New Rx:  refill  RX Name and Strength: butalbital-acetaminophen-caffeine -40 mg (FIORICET, ESGIC) -40 mg per tablet  How is the patient currently taking it? (ex. 1XDay):     Is this a 30 day or 90 day RX:     Preferred Pharmacy with phone number:    [id number] [place] [facility] [address]  [address]  [place] [address]  Phone: [phone] Fax: [phone]            Local or Mail Order:  local  Ordering Provider:  Dr Ni Reinoso Call Back Number:  [phone] (home)     Additional Information:  Pharmacy sent the refill request to dr MOHAMUD Schaffer / filled last month ... States she is leaving town about 12 and needs to pick this up / having headaches from pollen

## 2019-03-21 NOTE — TELEPHONE ENCOUNTER
----- Message from Malika Orozco sent at 3/21/2019  1:21 PM CDT -----  Type: Needs Medical Advice    Who Called:  Isela  Symptoms (please be specific):  Refill   How long has patient had these symptoms: butalbital-acetaminophen-caffeine -40 mg (FIORICET, ESGIC) -40 mg per tablet  Pharmacy name and phone #:     Best Call Back Number: [phone]  Additional Information: 2 nd call today

## 2019-03-22 RX ORDER — BUTALBITAL, ACETAMINOPHEN AND CAFFEINE 50; 325; 40 MG/1; MG/1; MG/1
TABLET ORAL
Qty: 60 TABLET | Refills: 0 | Status: SHIPPED | OUTPATIENT
Start: 2019-03-22 | End: 2019-04-13 | Stop reason: SDUPTHER

## 2019-04-12 DIAGNOSIS — G43.909 MIGRAINE WITHOUT STATUS MIGRAINOSUS, NOT INTRACTABLE, UNSPECIFIED MIGRAINE TYPE: ICD-10-CM

## 2019-04-12 NOTE — TELEPHONE ENCOUNTER
----- Message from Farhana Hale sent at 4/12/2019 12:38 PM CDT -----  Type:  RX Refill Request    Who Called:  Patient  RX Name and Strength:  butalbital-acetaminophen-caffeine -40 mg (FIORICET, ESGIC) -40 mg per tablet;cyclobenzaprine (FLEXERIL) 10 MG tablet and sumatriptan (IMITREX) 100 MG tablet  Preferred Pharmacy with phone number:  [facility] in [facility] Call Back Number:  [phone]  Additional Information:

## 2019-04-15 ENCOUNTER — TELEPHONE (OUTPATIENT)
Dept: FAMILY MEDICINE | Facility: CLINIC | Age: 44
End: 2019-04-15

## 2019-04-15 RX ORDER — BUTALBITAL, ACETAMINOPHEN AND CAFFEINE 50; 325; 40 MG/1; MG/1; MG/1
TABLET ORAL
Qty: 60 TABLET | Refills: 0 | Status: SHIPPED | OUTPATIENT
Start: 2019-04-15 | End: 2019-06-04

## 2019-04-15 RX ORDER — BUTALBITAL, ACETAMINOPHEN AND CAFFEINE 50; 325; 40 MG/1; MG/1; MG/1
1 TABLET ORAL EVERY 6 HOURS PRN
Qty: 60 TABLET | Refills: 0 | OUTPATIENT
Start: 2019-04-15

## 2019-04-15 RX ORDER — TRAZODONE HYDROCHLORIDE 150 MG/1
150 TABLET ORAL NIGHTLY
Qty: 30 TABLET | Refills: 5 | Status: SHIPPED | OUTPATIENT
Start: 2019-04-15 | End: 2020-01-20 | Stop reason: SDUPTHER

## 2019-04-15 RX ORDER — CYCLOBENZAPRINE HCL 10 MG
10 TABLET ORAL 3 TIMES DAILY PRN
Qty: 30 TABLET | Refills: 2 | OUTPATIENT
Start: 2019-04-15

## 2019-04-15 RX ORDER — CYCLOBENZAPRINE HCL 10 MG
TABLET ORAL
Qty: 30 TABLET | Refills: 2 | Status: SHIPPED | OUTPATIENT
Start: 2019-04-15 | End: 2019-06-10 | Stop reason: SDUPTHER

## 2019-04-15 RX ORDER — SUMATRIPTAN SUCCINATE 100 MG/1
TABLET ORAL
Qty: 54 TABLET | Refills: 2 | Status: SHIPPED | OUTPATIENT
Start: 2019-04-15 | End: 2020-04-20 | Stop reason: SDUPTHER

## 2019-04-16 NOTE — TELEPHONE ENCOUNTER
Pt is asking to have her Norco filled early.  She has finished the bottle for 3/28 - 4/28 early due to plantar fascitis pain, and taking extra tablet

## 2019-04-16 NOTE — TELEPHONE ENCOUNTER
----- Message from Bashir Lord sent at 4/16/2019  4:56 PM CDT -----  Type:  Patient Call Back    Who Called: pt   Does the patient know what this is regarding?: can pt have norco refilled due to plantar fascitis?  [id number] [place] - [address]  [address]  [place] [address]  Phone: [phone] Fax: [phone]  Best Call Back Number:  [phone]  Additional Information:  Please call pt and leave a detailed message if there is no answer.

## 2019-04-17 NOTE — TELEPHONE ENCOUNTER
Due to tighter regulations on pain medications, I will not be able to permit you to fill the norco earlier than intended.

## 2019-05-03 NOTE — TELEPHONE ENCOUNTER
It appears she is using more medication.    Please check with her.    Start: 4/15/2019  Ord/Sold: 4/15/2019 (O)  Report  Adh:   Long-term:   Pharmacy: [facility] [id number] [facility] [place] - [facility] [address]  Med Dose History         Summary: TAKE 1 TABLET BY MOUTH EVERY 6 HOURS AS NEEDED, Normal       Ordered on: 4/15/2019       Authorized by: NINO MILLS       Dispense: 60 tablet        Please let me know what is happening.    Miguel Kraus MD

## 2019-05-20 RX ORDER — BUTALBITAL, ACETAMINOPHEN AND CAFFEINE 50; 325; 40 MG/1; MG/1; MG/1
TABLET ORAL
Qty: 60 TABLET | Refills: 0 | OUTPATIENT
Start: 2019-05-20

## 2019-05-21 ENCOUNTER — PATIENT OUTREACH (OUTPATIENT)
Dept: ADMINISTRATIVE | Facility: HOSPITAL | Age: 44
End: 2019-05-21

## 2019-05-21 NOTE — LETTER
May 29, 2019    Isela Smyth  [address]  [place] [address]             [facility]  [address]  [facility] [address]  Phone: [phone] Dear Mrs. Smyth:    We have tried to reach you by mychart unsuccessfully.      [facility] is committed to your overall health.  To help you get the most out of each of your visits, we will review your information to make sure you are up to date on all of your recommended tests and/or procedures.       Hardy Dan MD   has found that your chart shows you may be due for the following:     Pap Smear   Mammogram     If you have had any of the above done at another facility, please bring the records with you or fax them to [phone] so that your record at [facility] will be complete. If you have not had any of these tests or procedures done recently and would like to complete this testing ,  please call [phone] or send a message through your [facility] portal to your provider's office.     If you have an upcoming scheduled appointment for the above test and/or procedures, please disregard this letter.     If you are currently taking medication, please bring it with you to your appointment for review.     Sincerely,     Your [facility] Serena Reveles   Clinical Care Coordinator   [facility]         If you have any questions or concerns, please don't hesitate to call.

## 2019-05-21 NOTE — PROGRESS NOTES
Health Maintenance Due   Topic Date Due    Sign Pain Contract  05/11/1993    Complete Opioid Risk Tool  05/11/1993    Pap Smear with HPV Cotest  05/11/1996    Mammogram  05/11/2015     Chart review complete/scrubbed 05/21/2019  Future Appointments   Date Time Provider Department Center   6/4/2019 11:40 AM Hardy Dan MD [facility]

## 2019-05-29 ENCOUNTER — TELEPHONE (OUTPATIENT)
Dept: HEMATOLOGY/ONCOLOGY | Facility: CLINIC | Age: 44
End: 2019-05-29

## 2019-05-29 ENCOUNTER — TELEPHONE (OUTPATIENT)
Dept: OBSTETRICS AND GYNECOLOGY | Facility: CLINIC | Age: 44
End: 2019-05-29

## 2019-05-29 NOTE — TELEPHONE ENCOUNTER
----- Message from Macieclaire Weiner sent at 5/29/2019 10:44 AM CDT -----  Contact: pt  ..Type:  Sooner Apoointment Request    Caller is requesting a sooner appointment.  Caller declined first available appointment listed below.  Caller will not accept being placed on the waitlist and is requesting a message be sent to doctor.    Name of Caller:  pt  When is the first available appointment?  6-14-19  Symptoms: WWE/Sharp pain in right ovary   Best Call Back Number:    Additional Information:  Pt would like to speak with a nurse to get a sooner appt  Please advise  Thanks

## 2019-05-29 NOTE — TELEPHONE ENCOUNTER
Returned call to patient, rescheduled missed lab and follow up. Rescheduled labs to 6/4/19 at [facility] lab and follow up to 6/5/19 at 140 pm. Patient voiced understanding and appreciation

## 2019-05-29 NOTE — TELEPHONE ENCOUNTER
----- Message from Macie Weiner sent at 5/29/2019 10:47 AM CDT -----  Contact: pt  ..Type:  Sooner Apoointment Request    Caller is requesting an appointment.    Name of Caller:  pt  When is the first available appointment? n/a  Symptoms: f/u labs  Best Call Back Number:    Additional Information:  Pt would like to speak with a nurse to schedule an appt   Please advise  Thanks

## 2019-05-30 ENCOUNTER — OFFICE VISIT (OUTPATIENT)
Dept: OBSTETRICS AND GYNECOLOGY | Facility: CLINIC | Age: 44
End: 2019-05-30
Payer: COMMERCIAL

## 2019-05-30 ENCOUNTER — HOSPITAL ENCOUNTER (OUTPATIENT)
Dept: RADIOLOGY | Facility: HOSPITAL | Age: 44
Discharge: HOME | End: 2019-05-30
Attending: OBSTETRICS & GYNECOLOGY
Payer: COMMERCIAL

## 2019-05-30 VITALS — WEIGHT: 174 LBS | HEIGHT: 67 IN | BODY MASS INDEX: 27.31 KG/M2

## 2019-05-30 VITALS
DIASTOLIC BLOOD PRESSURE: 68 MMHG | WEIGHT: 174.63 LBS | HEIGHT: 67 IN | BODY MASS INDEX: 27.41 KG/M2 | SYSTOLIC BLOOD PRESSURE: 116 MMHG

## 2019-05-30 DIAGNOSIS — Z12.4 ENCOUNTER FOR PAPANICOLAOU SMEAR FOR CERVICAL CANCER SCREENING: Primary | ICD-10-CM

## 2019-05-30 DIAGNOSIS — Z12.31 VISIT FOR SCREENING MAMMOGRAM: ICD-10-CM

## 2019-05-30 DIAGNOSIS — N39.46 MIXED STRESS AND URGE URINARY INCONTINENCE: ICD-10-CM

## 2019-05-30 DIAGNOSIS — R10.2 PELVIC PAIN IN FEMALE: ICD-10-CM

## 2019-05-30 DIAGNOSIS — Z01.419 ENCOUNTER FOR WELL WOMAN EXAM WITH ROUTINE GYNECOLOGICAL EXAM: ICD-10-CM

## 2019-05-30 PROBLEM — R32 URINARY INCONTINENCE: Status: ACTIVE | Noted: 2019-05-30

## 2019-05-30 PROCEDURE — 88175 CYTOPATH C/V AUTO FLUID REDO: CPT

## 2019-05-30 PROCEDURE — 77063 MAMMO DIGITAL SCREENING BILAT WITH TOMOSYNTHESIS_CAD: ICD-10-PCS | Mod: 26,,, | Performed by: RADIOLOGY

## 2019-05-30 PROCEDURE — 77067 MAMMO DIGITAL SCREENING BILAT WITH TOMOSYNTHESIS_CAD: ICD-10-PCS | Mod: 26,,, | Performed by: RADIOLOGY

## 2019-05-30 PROCEDURE — 99999 PR PBB SHADOW E&M-EST. PATIENT-LVL III: CPT | Mod: PBBFAC,,, | Performed by: OBSTETRICS & GYNECOLOGY

## 2019-05-30 PROCEDURE — 99386 PR PREVENTIVE VISIT,NEW,40-64: ICD-10-PCS | Mod: S$GLB,,, | Performed by: OBSTETRICS & GYNECOLOGY

## 2019-05-30 PROCEDURE — 99999 PR PBB SHADOW E&M-EST. PATIENT-LVL III: ICD-10-PCS | Mod: PBBFAC,,, | Performed by: OBSTETRICS & GYNECOLOGY

## 2019-05-30 PROCEDURE — 87624 HPV HI-RISK TYP POOLED RSLT: CPT

## 2019-05-30 PROCEDURE — 99386 PREV VISIT NEW AGE 40-64: CPT | Mod: S$GLB,,, | Performed by: OBSTETRICS & GYNECOLOGY

## 2019-05-30 PROCEDURE — 77063 BREAST TOMOSYNTHESIS BI: CPT | Mod: 26,,, | Performed by: RADIOLOGY

## 2019-05-30 PROCEDURE — 77067 SCR MAMMO BI INCL CAD: CPT | Mod: 26,,, | Performed by: RADIOLOGY

## 2019-05-30 PROCEDURE — 77067 SCR MAMMO BI INCL CAD: CPT | Mod: TC,PN

## 2019-05-30 NOTE — PROGRESS NOTES
Subjective:       Patient ID: Isela Smyth is a 44 y.o. female.    Chief Complaint:  Well Woman and sharp pains in ovary      History of Present Illness  HPI  44 y.o.  for annual exam and pap smear.Patient reports occasional sharp pains in RLQ of abdomen for past 5 days.Patient reports regular monthly menses.Patient has had laparoscopic BTL and ovarian cystectomy many years ago.Patient also reports losing urine involuntarily with or without straining for many years. Patient also with nocturia.    GYN & OB History  Patient's last menstrual period was 2019.   Date of Last Pap: No result found    OB History    Para Term  AB Living   3 3       3   SAB TAB Ectopic Multiple Live Births                  # Outcome Date GA Lbr Emiliano/2nd Weight Sex Delivery Anes PTL Lv   3 Para            2 Para            1 Para                Review of Systems  Review of Systems   Constitutional: Negative for activity change, appetite change, chills, diaphoresis, fatigue, fever and unexpected weight change.   HENT: Negative for nasal congestion, mouth sores and tinnitus.    Eyes: Negative for discharge and visual disturbance.   Respiratory: Negative for cough, shortness of breath and wheezing.    Cardiovascular: Negative for chest pain, palpitations and leg swelling.   Gastrointestinal: Positive for abdominal pain. Negative for bloating, blood in stool, constipation, diarrhea, nausea, vomiting, reflux and fecal incontinence.   Endocrine: Positive for hypothyroidism. Negative for diabetes, hair loss, hot flashes and hyperthyroidism.   Genitourinary: Positive for bladder incontinence and pelvic pain. Negative for decreased libido, dysmenorrhea, dyspareunia, dysuria, flank pain, frequency, genital sores, hematuria, hot flashes, menorrhagia, menstrual problem, urgency, vaginal bleeding, vaginal discharge, vaginal pain, urinary incontinence, postcoital bleeding, postmenopausal bleeding, vaginal dryness and vaginal  odor.   Musculoskeletal: Positive for back pain. Negative for arthralgias, joint swelling, leg pain and myalgias.   Integumentary:  Negative for rash, acne, hair changes, mole/lesion, breast mass, nipple discharge, breast skin changes and breast tenderness.   Neurological: Positive for headaches. Negative for vertigo, seizures, syncope and numbness.   Hematological: Negative for adenopathy. Does not bruise/bleed easily.   Psychiatric/Behavioral: Positive for depression. Negative for sleep disturbance. The patient is nervous/anxious.    Breast: Negative for asymmetry, breast self exam, lump, mass, mastodynia, nipple discharge, skin changes and tenderness          Objective:    Physical Exam:   Constitutional: She is oriented to person, place, and time. She appears well-developed and well-nourished. No distress.    HENT:   Head: Normocephalic.   Nose: No epistaxis.    Eyes: Pupils are equal, round, and reactive to light.    Neck: Normal range of motion.    Cardiovascular: Normal rate.     Pulmonary/Chest: Effort normal.   Breasts: implants palpated bilaterally        Abdominal: Soft. She exhibits no distension. There is no tenderness.     Genitourinary: Vagina normal and uterus normal.   Genitourinary Comments: Pap smear done of cervix.No palpable right adnexal masses           Musculoskeletal: Normal range of motion and moves all extremeties.       Neurological: She is alert and oriented to person, place, and time.    Skin: Skin is warm and dry. She is not diaphoretic.    Psychiatric: She has a normal mood and affect. Her behavior is normal. Judgment and thought content normal.          Assessment:        1. Encounter for Papanicolaou smear for cervical cancer screening    2. Visit for screening mammogram    3. Mixed stress and urge urinary incontinence    4. Encounter for well woman exam with routine gynecological exam    5. Pelvic pain in female                Plan:      Referral to Urogynecology  Mammogram  today  Pelvic ultrasound for after onset of next menses

## 2019-05-31 DIAGNOSIS — Z12.39 BREAST CANCER SCREENING: ICD-10-CM

## 2019-06-03 ENCOUNTER — TELEPHONE (OUTPATIENT)
Dept: RADIOLOGY | Facility: HOSPITAL | Age: 44
End: 2019-06-03

## 2019-06-04 ENCOUNTER — LAB VISIT (OUTPATIENT)
Dept: LAB | Facility: HOSPITAL | Age: 44
End: 2019-06-04
Attending: INTERNAL MEDICINE
Payer: COMMERCIAL

## 2019-06-04 ENCOUNTER — OFFICE VISIT (OUTPATIENT)
Dept: FAMILY MEDICINE | Facility: CLINIC | Age: 44
End: 2019-06-04
Payer: COMMERCIAL

## 2019-06-04 VITALS
DIASTOLIC BLOOD PRESSURE: 78 MMHG | OXYGEN SATURATION: 98 % | BODY MASS INDEX: 27.89 KG/M2 | SYSTOLIC BLOOD PRESSURE: 122 MMHG | WEIGHT: 177.69 LBS | HEIGHT: 67 IN | HEART RATE: 80 BPM

## 2019-06-04 DIAGNOSIS — D50.8 IRON DEFICIENCY ANEMIA DUE TO DIETARY CAUSES: ICD-10-CM

## 2019-06-04 DIAGNOSIS — F41.9 ANXIETY: ICD-10-CM

## 2019-06-04 DIAGNOSIS — G43.809 OTHER MIGRAINE WITHOUT STATUS MIGRAINOSUS, NOT INTRACTABLE: ICD-10-CM

## 2019-06-04 DIAGNOSIS — G89.29 CHRONIC LOW BACK PAIN, UNSPECIFIED BACK PAIN LATERALITY, WITH SCIATICA PRESENCE UNSPECIFIED: ICD-10-CM

## 2019-06-04 DIAGNOSIS — M54.2 CHRONIC CERVICAL PAIN: ICD-10-CM

## 2019-06-04 DIAGNOSIS — F32.A DEPRESSION, UNSPECIFIED DEPRESSION TYPE: ICD-10-CM

## 2019-06-04 DIAGNOSIS — R10.32 LLQ ABDOMINAL PAIN: Primary | ICD-10-CM

## 2019-06-04 DIAGNOSIS — D64.9 FATIGUE ASSOCIATED WITH ANEMIA: ICD-10-CM

## 2019-06-04 DIAGNOSIS — G89.29 CHRONIC CERVICAL PAIN: ICD-10-CM

## 2019-06-04 DIAGNOSIS — M54.5 CHRONIC LOW BACK PAIN, UNSPECIFIED BACK PAIN LATERALITY, WITH SCIATICA PRESENCE UNSPECIFIED: ICD-10-CM

## 2019-06-04 LAB
BASOPHILS # BLD AUTO: 0.03 K/UL (ref 0–0.2)
BASOPHILS NFR BLD: 0.4 % (ref 0–1.9)
DIFFERENTIAL METHOD: ABNORMAL
EOSINOPHIL # BLD AUTO: 0.2 K/UL (ref 0–0.5)
EOSINOPHIL NFR BLD: 2.4 % (ref 0–8)
ERYTHROCYTE [DISTWIDTH] IN BLOOD BY AUTOMATED COUNT: 12.1 % (ref 11.5–14.5)
FERRITIN SERPL-MCNC: 9 NG/ML (ref 20–300)
HCT VFR BLD AUTO: 36.5 % (ref 37–48.5)
HGB BLD-MCNC: 12.5 G/DL (ref 12–16)
IRON SERPL-MCNC: 30 UG/DL (ref 30–160)
LYMPHOCYTES # BLD AUTO: 1.5 K/UL (ref 1–4.8)
LYMPHOCYTES NFR BLD: 21.2 % (ref 18–48)
MCH RBC QN AUTO: 31.3 PG (ref 27–31)
MCHC RBC AUTO-ENTMCNC: 34.2 G/DL (ref 32–36)
MCV RBC AUTO: 91 FL (ref 82–98)
MONOCYTES # BLD AUTO: 0.5 K/UL (ref 0.3–1)
MONOCYTES NFR BLD: 6.6 % (ref 4–15)
NEUTROPHILS # BLD AUTO: 4.9 K/UL (ref 1.8–7.7)
NEUTROPHILS NFR BLD: 69.4 % (ref 38–73)
PLATELET # BLD AUTO: 244 K/UL (ref 150–350)
PMV BLD AUTO: 10.1 FL (ref 9.2–12.9)
RBC # BLD AUTO: 4 M/UL (ref 4–5.4)
SATURATED IRON: 8 % (ref 20–50)
TOTAL IRON BINDING CAPACITY: 388 UG/DL (ref 250–450)
TRANSFERRIN SERPL-MCNC: 262 MG/DL (ref 200–375)
WBC # BLD AUTO: 7.11 K/UL (ref 3.9–12.7)

## 2019-06-04 PROCEDURE — 99214 PR OFFICE/OUTPT VISIT, EST, LEVL IV, 30-39 MIN: ICD-10-PCS | Mod: S$GLB,,, | Performed by: FAMILY MEDICINE

## 2019-06-04 PROCEDURE — 82728 ASSAY OF FERRITIN: CPT

## 2019-06-04 PROCEDURE — 99214 OFFICE O/P EST MOD 30 MIN: CPT | Mod: S$GLB,,, | Performed by: FAMILY MEDICINE

## 2019-06-04 PROCEDURE — 83540 ASSAY OF IRON: CPT

## 2019-06-04 PROCEDURE — 36415 COLL VENOUS BLD VENIPUNCTURE: CPT | Mod: PO

## 2019-06-04 PROCEDURE — 85025 COMPLETE CBC W/AUTO DIFF WBC: CPT | Mod: PO

## 2019-06-04 PROCEDURE — 99999 PR PBB SHADOW E&M-EST. PATIENT-LVL III: CPT | Mod: PBBFAC,,, | Performed by: FAMILY MEDICINE

## 2019-06-04 PROCEDURE — 3008F BODY MASS INDEX DOCD: CPT | Mod: CPTII,S$GLB,, | Performed by: FAMILY MEDICINE

## 2019-06-04 PROCEDURE — 99999 PR PBB SHADOW E&M-EST. PATIENT-LVL III: ICD-10-PCS | Mod: PBBFAC,,, | Performed by: FAMILY MEDICINE

## 2019-06-04 PROCEDURE — 3008F PR BODY MASS INDEX (BMI) DOCUMENTED: ICD-10-PCS | Mod: CPTII,S$GLB,, | Performed by: FAMILY MEDICINE

## 2019-06-04 RX ORDER — HYDROCODONE BITARTRATE AND ACETAMINOPHEN 7.5; 325 MG/1; MG/1
1 TABLET ORAL 2 TIMES DAILY PRN
Qty: 60 TABLET | Refills: 0 | Status: SHIPPED | OUTPATIENT
Start: 2019-07-27 | End: 2019-08-26

## 2019-06-04 RX ORDER — DICYCLOMINE HYDROCHLORIDE 20 MG/1
TABLET ORAL
Refills: 0 | COMMUNITY
Start: 2019-05-23 | End: 2019-09-24

## 2019-06-04 RX ORDER — HYDROCODONE BITARTRATE AND ACETAMINOPHEN 7.5; 325 MG/1; MG/1
1 TABLET ORAL 2 TIMES DAILY PRN
Qty: 60 TABLET | Refills: 0 | Status: SHIPPED | OUTPATIENT
Start: 2019-08-26 | End: 2019-09-24 | Stop reason: SDUPTHER

## 2019-06-04 RX ORDER — HYDROCODONE BITARTRATE AND ACETAMINOPHEN 7.5; 325 MG/1; MG/1
1 TABLET ORAL 2 TIMES DAILY PRN
Qty: 60 TABLET | Refills: 0 | Status: SHIPPED | OUTPATIENT
Start: 2019-06-27 | End: 2019-07-27

## 2019-06-04 RX ORDER — AMOXICILLIN AND CLAVULANATE POTASSIUM 875; 125 MG/1; MG/1
1 TABLET, FILM COATED ORAL 2 TIMES DAILY
Qty: 20 TABLET | Refills: 0 | Status: SHIPPED | OUTPATIENT
Start: 2019-06-04 | End: 2019-06-14

## 2019-06-04 NOTE — PROGRESS NOTES
Subjective:       Patient ID: Isela Smtyh is a 44 y.o. female.    Chief Complaint: Hypothyroidism and Abdominal Pain    HPI       Here for a hospital f/u.      Last week, went to [facility] er for chest pain. Negative cardiac w/u with negative stress test.  Since discharge, no more c/o chest pain.  Will plan to f/u with Dr. Askew, cardiologist in [place], in near future.      Gets 2-5 migraines per month.  Takes fioricet and imitrex for pain control.       History of 6 low back spinal surgeries and 1 neck spinal surgery in past. Recent one in 2012.      Status post 08/14/2012 microscopic recurrent left L4-L5 laminotomy, discectomy, left L5 complete laminectomy, left L5-S1 laminotomy,    recurrent discectomy.      Chronic lumbago controlled while on norco 7.5 and flexeril. Ps: 2/10. Occasional left sciatic pain. Had an placido last month which helped with the pain.      Had mri L spine 9/2016 which showed:      1.  Postoperative change of left hemilaminectomy at L4-L5 and L5-S1.  2.  Multilevel degenerative change of the lumbar spine, most pronounced at L4-L5 and L5-S1 as detailed above  3.  Minimal descending central disc extrusion at L5-S1 which does not appear to encroach upon either the descending left or right S1 nerve.  4.  Probable conjoined left S2 nerve coursing in the left posterolateral spinal canal.  Less likely, this represents an intraspinal synovial cyst abutting the descending left S1 nerve.  5.  Central annular tear of the intervertebral disc at L4-L5.     Also, has chronic neck pain > 5 years. Hx of neck surgeries in past. Saw pain management recently and had an placido on 1/31/17. Norco 7.5 helps with pain.      Had mri of cervical spine on 1/19/17 which showed:  -Disc protrusion at the C5-C6 level and to the right lateral recess with possible anterior cord contact  -Loss of normal cervical lordosis which may be positional or related to muscular strain.  -Milder degenerative changes are noted  within the body of the report.      Depression and anxiety stable while on effexor.     C/o  lower achy abdominal pain x 2-3 days. Associated nausea.  Reports subjective fever. C/o loose bowel movements x 2-3 days. Has colonic sigmoid diverticulosis based on ct abdomen done in 2016.     Review of Systems      Review of Systems   Constitutional: Negative for fever and chills.   HENT: Negative for hearing loss and neck stiffness.    Eyes: Negative for redness and itching.   Respiratory: Negative for cough and choking.    Cardiovascular: Negative for chest pain and leg swelling.  Abdomen: Negative for abdominal pain and blood in stool.   Genitourinary: Negative for dysuria and flank pain.   Neurological: Negative for light-headedness   Hematological: Negative for adenopathy.   Psychiatric/Behavioral: Negative for behavioral problems.       Objective:      Physical Exam   Constitutional: She appears well-developed.   HENT:   Head: Normocephalic and atraumatic.   Eyes: Pupils are equal, round, and reactive to light. Conjunctivae are normal.   Neck: Normal range of motion.   Cardiovascular: Normal rate and regular rhythm.   No murmur heard.  Pulmonary/Chest: Effort normal and breath sounds normal.   Abdominal: Soft. Bowel sounds are normal. She exhibits no distension.   llq tenderness noted   Lymphadenopathy:     She has no cervical adenopathy.       Assessment:       1. LLQ abdominal pain    2. Other migraine without status migrainosus, not intractable    3. Depression, unspecified depression type    4. Chronic low back pain, unspecified back pain laterality, with sciatica presence unspecified    5. Chronic cervical pain    6. Anxiety        Plan:       LLQ abdominal pain    Other migraine without status migrainosus, not intractable  -     Ambulatory referral to Neurology    Depression, unspecified depression type    Chronic low back pain, unspecified back pain laterality, with sciatica presence unspecified    Chronic  cervical pain    Anxiety    Other orders  -     amoxicillin-clavulanate 875-125mg (AUGMENTIN) 875-125 mg per tablet; Take 1 tablet by mouth 2 (two) times daily. for 10 days  Dispense: 20 tablet; Refill: 0  -     HYDROcodone-acetaminophen (NORCO) 7.5-325 mg per tablet; Take 1 tablet by mouth 2 (two) times daily as needed.  Dispense: 60 tablet; Refill: 0  -     HYDROcodone-acetaminophen (NORCO) 7.5-325 mg per tablet; Take 1 tablet by mouth 2 (two) times daily as needed.  Dispense: 60 tablet; Refill: 0  -     HYDROcodone-acetaminophen (NORCO) 7.5-325 mg per tablet; Take 1 tablet by mouth 2 (two) times daily as needed.  Dispense: 60 tablet; Refill: 0                  Plan:  D/c fioricet. Cont with imitrex and take initially. If no relief after 2nd dose of imitrex, then take otc excedrim migraine.   Suspect diverticulitis. Start augmentin  rf norco 7.5  Refer to neurology for migraine management  Cont all other meds      Medication List with Changes/Refills   New Medications    AMOXICILLIN-CLAVULANATE 875-125MG (AUGMENTIN) 875-125 MG PER TABLET    Take 1 tablet by mouth 2 (two) times daily. for 10 days    HYDROCODONE-ACETAMINOPHEN (NORCO) 7.5-325 MG PER TABLET    Take 1 tablet by mouth 2 (two) times daily as needed.    HYDROCODONE-ACETAMINOPHEN (NORCO) 7.5-325 MG PER TABLET    Take 1 tablet by mouth 2 (two) times daily as needed.   Current Medications    CYCLOBENZAPRINE (FLEXERIL) 10 MG TABLET    TAKE 1 TABLET BY MOUTH 3 TIMES DAILY AS NEEDED.    DICYCLOMINE (BENTYL) 20 MG TABLET    TK 1 T PO Q 8 H PRF CRAMPING    LEVOTHYROXINE (SYNTHROID) 75 MCG TABLET    TAKE 1 TABLET (75 MCG TOTAL) BY MOUTH EVERY MORNING.    LINACLOTIDE (LINZESS) 145 MCG CAP CAPSULE    Take 1 capsule (145 mcg total) by mouth daily as needed.    PANTOPRAZOLE (PROTONIX) 20 MG TABLET    TAKE 1 TABLET BY MOUTH EVERY DAY    PROMETHAZINE (PHENERGAN) 25 MG SUPPOSITORY    PLACE 1 SUPPOSITORY (25 MG TOTAL) RECTALLY EVERY 6 (SIX) HOURS AS NEEDED FOR NAUSEA.     SUCRALFATE (CARAFATE) 1 GRAM TABLET    TAKE 1 TABLET BY MOUTH THREE TIMES A DAY    SUMATRIPTAN (IMITREX) 100 MG TABLET    TAKE 1 TABLET BY MOUTH EVERY DAY AS NEEDED. MAY REPEAT IN 2 HOURS IF NEEDED. DO NOT EXCEED 2 TABLETS PER DAY    TRAZODONE (DESYREL) 150 MG TABLET    TAKE 1 TABLET (150 MG TOTAL) BY MOUTH NIGHTLY.    VENLAFAXINE (EFFEXOR-XR) 150 MG CP24    Take 1 capsule (150 mg total) by mouth once daily.   Changed and/or Refilled Medications    Modified Medication Previous Medication    HYDROCODONE-ACETAMINOPHEN (NORCO) 7.5-325 MG PER TABLET HYDROcodone-acetaminophen (NORCO) 7.5-325 mg per tablet       Take 1 tablet by mouth 2 (two) times daily as needed.    Take 1 tablet by mouth 2 (two) times daily as needed.   Discontinued Medications    BUTALBITAL-ACETAMINOPHEN-CAFFEINE -40 MG (FIORICET, ESGIC) -40 MG PER TABLET    TAKE 1 TABLET BY MOUTH EVERY 6 HOURS AS NEEDED

## 2019-06-05 ENCOUNTER — TELEPHONE (OUTPATIENT)
Dept: HEMATOLOGY/ONCOLOGY | Facility: CLINIC | Age: 44
End: 2019-06-05

## 2019-06-05 ENCOUNTER — TELEPHONE (OUTPATIENT)
Dept: FAMILY MEDICINE | Facility: CLINIC | Age: 44
End: 2019-06-05

## 2019-06-05 RX ORDER — BUTALBITAL, ACETAMINOPHEN AND CAFFEINE 50; 325; 40 MG/1; MG/1; MG/1
TABLET ORAL
Qty: 60 TABLET | Refills: 0 | OUTPATIENT
Start: 2019-06-05

## 2019-06-05 RX ORDER — VENLAFAXINE HYDROCHLORIDE 150 MG/1
CAPSULE, EXTENDED RELEASE ORAL
Qty: 90 CAPSULE | Refills: 3 | Status: SHIPPED | OUTPATIENT
Start: 2019-06-05 | End: 2020-07-09 | Stop reason: SDUPTHER

## 2019-06-05 NOTE — TELEPHONE ENCOUNTER
----- Message from Mana Mcdaniels MA sent at 6/4/2019 12:25 PM CDT -----  Type: Needs Medical Advice    Who Called:  Patient    Best Call Back Number: [phone] (home)     Additional Information: Patient would like to reschedule her appt for tomorrow with Dr. Rudd. Please contact thank you

## 2019-06-05 NOTE — TELEPHONE ENCOUNTER
----- Message from Izzy Schaffer sent at 6/5/2019 12:18 PM CDT -----  Contact: Self   Type: Patient Call Back    Who called: Self     What is the request in detail:patient called to check the status of her appointment being scheduled with a Neurologist     Can the clinic reply by [facility]? No     Would the patient rather a call back or a response via [facility]? Call     Best call back number:[phone]

## 2019-06-05 NOTE — TELEPHONE ENCOUNTER
Called patient left message she has an apt on 08/27/19 at 11:00 with neurology. If she had any questions for her to call us back.

## 2019-06-06 LAB
HPV HR 12 DNA CVX QL NAA+PROBE: NEGATIVE
HPV16 AG SPEC QL: POSITIVE
HPV18 DNA SPEC QL NAA+PROBE: NEGATIVE

## 2019-06-10 RX ORDER — CYCLOBENZAPRINE HCL 10 MG
TABLET ORAL
Qty: 30 TABLET | Refills: 2 | Status: SHIPPED | OUTPATIENT
Start: 2019-06-10 | End: 2019-07-27 | Stop reason: SDUPTHER

## 2019-06-18 ENCOUNTER — PATIENT MESSAGE (OUTPATIENT)
Dept: RADIOLOGY | Facility: HOSPITAL | Age: 44
End: 2019-06-18

## 2019-06-22 RX ORDER — LEVOTHYROXINE SODIUM 75 UG/1
75 TABLET ORAL EVERY MORNING
Qty: 90 TABLET | Refills: 3 | Status: SHIPPED | OUTPATIENT
Start: 2019-06-22 | End: 2020-07-09 | Stop reason: SDUPTHER

## 2019-07-11 ENCOUNTER — HOSPITAL ENCOUNTER (OUTPATIENT)
Dept: RADIOLOGY | Facility: HOSPITAL | Age: 44
Discharge: HOME | End: 2019-07-11
Attending: OBSTETRICS & GYNECOLOGY
Payer: COMMERCIAL

## 2019-07-11 DIAGNOSIS — R92.8 ABNORMAL MAMMOGRAM OF LEFT BREAST: ICD-10-CM

## 2019-07-11 PROCEDURE — 77065 DX MAMMO INCL CAD UNI: CPT | Mod: TC,PO,LT

## 2019-07-11 PROCEDURE — 77065 MAMMO DIGITAL DIAGNOSTIC LEFT WITH TOMOSYNTHESIS_CAD: ICD-10-PCS | Mod: 26,LT,, | Performed by: RADIOLOGY

## 2019-07-11 PROCEDURE — 77065 DX MAMMO INCL CAD UNI: CPT | Mod: 26,LT,, | Performed by: RADIOLOGY

## 2019-07-11 PROCEDURE — 77061 BREAST TOMOSYNTHESIS UNI: CPT | Mod: TC,PO,LT

## 2019-07-11 PROCEDURE — 77061 MAMMO DIGITAL DIAGNOSTIC LEFT WITH TOMOSYNTHESIS_CAD: ICD-10-PCS | Mod: 26,LT,, | Performed by: RADIOLOGY

## 2019-07-11 PROCEDURE — 77061 BREAST TOMOSYNTHESIS UNI: CPT | Mod: 26,LT,, | Performed by: RADIOLOGY

## 2019-07-29 NOTE — PROGRESS NOTES
Refill Authorization Note     is requesting a refill authorization.    Brief assessment and rationale for refill: ROUTE: op   Name and strength of medication: cyclobenzaprine (FLEXERIL) 10 MG tablet       Medication Therapy Plan: outside of protocol, route to you     Medication reconciliation completed: No              How patient will take medication: t1t po tid          Comments:   APPOINTMENTS (past 12m or future 3m authorizing provider)  LAST VISIT DATE  Hardy Dan MD 6/4/2019         NEXT VISIT DATE  Hardy Dan MD 9/4/2019

## 2019-07-31 RX ORDER — CYCLOBENZAPRINE HCL 10 MG
TABLET ORAL
Qty: 30 TABLET | Refills: 2 | Status: SHIPPED | OUTPATIENT
Start: 2019-07-31 | End: 2019-09-24 | Stop reason: SDUPTHER

## 2019-08-12 ENCOUNTER — TELEPHONE (OUTPATIENT)
Dept: OBSTETRICS AND GYNECOLOGY | Facility: CLINIC | Age: 44
End: 2019-08-12

## 2019-08-12 NOTE — TELEPHONE ENCOUNTER
Lucia pt regarding abnormal pap/ POS high risk hpv and the need for a colpo for further evaluation. No answer. Left message on Pts cell with call back #

## 2019-08-12 NOTE — TELEPHONE ENCOUNTER
----- Message from Raymundo Vick MD sent at 8/9/2019  2:08 PM CDT -----  Please try again to contact patient regarding high risk HPV on pap smear.

## 2019-08-20 ENCOUNTER — TELEPHONE (OUTPATIENT)
Dept: OBSTETRICS AND GYNECOLOGY | Facility: CLINIC | Age: 44
End: 2019-08-20

## 2019-08-20 NOTE — TELEPHONE ENCOUNTER
----- Message from Juliet Javier sent at 8/20/2019  2:16 PM CDT -----  Type: Needs Medical Advice    Who Called:  patient  Symptoms (please be specific):  na  How long has patient had these symptoms:  holli  Pharmacy name and phone #:  [facility]  Best Call Back Number: [phone]  Additional Information: patient is needing to reschedule her procedure for tomorrow as she is still on her period/please call patient today to reschedule

## 2019-08-21 ENCOUNTER — PATIENT OUTREACH (OUTPATIENT)
Dept: ADMINISTRATIVE | Facility: HOSPITAL | Age: 44
End: 2019-08-21

## 2019-08-22 DIAGNOSIS — K21.9 GASTROESOPHAGEAL REFLUX DISEASE, ESOPHAGITIS PRESENCE NOT SPECIFIED: ICD-10-CM

## 2019-08-22 RX ORDER — PANTOPRAZOLE SODIUM 20 MG/1
TABLET, DELAYED RELEASE ORAL
Qty: 90 TABLET | Refills: 1 | Status: SHIPPED | OUTPATIENT
Start: 2019-08-22 | End: 2019-10-04

## 2019-08-22 NOTE — PROGRESS NOTES
Refill Authorization Note     is requesting a refill authorization.    Brief assessment and rationale for refill: ROUTE: op  Name and strength of medication: pantoprazole (PROTONIX) 20 MG tablet       Medication Therapy Plan: LCO(3/19) GERD Stable; Med outside of protocol; Route to you                   How patient will take medication: t1t po qd          APPOINTMENTS(past 12m or future 3m authorizing provider)   Date Provider   LAST VISIT  6/4/2019 Hardy Dan MD   NEXT VISIT  9/5/2019 Hardy Dan MD

## 2019-09-18 ENCOUNTER — TELEPHONE (OUTPATIENT)
Dept: FAMILY MEDICINE | Facility: CLINIC | Age: 44
End: 2019-09-18

## 2019-09-18 NOTE — TELEPHONE ENCOUNTER
Spoke to patient. Patient says she has been having a migraine for about 2 weeks. She says she is also having vomiting and diarrhea. She says she is using phenergan suppositories to help but she is feeling really bad. Everytime she eats or drinks she says it comes back or or goes right through her. She is worried she has a parasite. She is also running fever on and off. She says she is not able to drive at all. She is seeing stars and has pressure behind her eyes.   Recommended ER for evaluation.   Patient says her  has been trying to take her to the ER but she has refused. She will discuss with him.   Advised Dr. Dan is out of the clinic this afternoon. She verbalized understanding. Advised she go to the ER to treat acute symptoms and we can make her a follow-up appointment after.

## 2019-09-18 NOTE — TELEPHONE ENCOUNTER
----- Message from Malika Orozco sent at 9/18/2019  3:04 PM CDT -----  Type:  Sooner Apoointment Request    Caller is requesting a sooner appointment.  Caller declined first available appointment listed below.  Caller will not accept being placed on the waitlist and is requesting a message be sent to doctor.    Name of Caller:  Self / asking to speak to nurse   When is the first available appointment?  09/20 or 10 /21 st   Symptoms:  Migraines / stomach issues / fever   Best Call Back Number:  [phone] (home)     Additional Information:  Request to be seen 09/25 th (has a ride )

## 2019-09-23 ENCOUNTER — TELEPHONE (OUTPATIENT)
Dept: FAMILY MEDICINE | Facility: CLINIC | Age: 44
End: 2019-09-23

## 2019-09-23 NOTE — TELEPHONE ENCOUNTER
----- Message from Simba Diamond sent at 9/23/2019  9:11 AM CDT -----  Contact: pt  Type:  Same Day Appointment Request    Caller is requesting a same day appointment.  Caller declined first available appointment listed below.      Name of Caller:  pt  When is the first available appointment?  10/14/19  Symptoms:  migraine, vomiting, and nausea   Best Call Back Number:  [phone]  Additional Information:   Pt stated she could not make the 10:20am appointment that is available today. Pt only wants to see Dr. Dan.  Please call to advise.

## 2019-09-24 ENCOUNTER — OFFICE VISIT (OUTPATIENT)
Dept: FAMILY MEDICINE | Facility: CLINIC | Age: 44
End: 2019-09-24
Payer: COMMERCIAL

## 2019-09-24 ENCOUNTER — HOSPITAL ENCOUNTER (OUTPATIENT)
Dept: RADIOLOGY | Facility: HOSPITAL | Age: 44
Discharge: HOME | End: 2019-09-24
Attending: FAMILY MEDICINE
Payer: COMMERCIAL

## 2019-09-24 VITALS
WEIGHT: 177.69 LBS | HEIGHT: 67 IN | DIASTOLIC BLOOD PRESSURE: 82 MMHG | BODY MASS INDEX: 27.89 KG/M2 | OXYGEN SATURATION: 96 % | SYSTOLIC BLOOD PRESSURE: 110 MMHG | TEMPERATURE: 99 F | HEART RATE: 88 BPM

## 2019-09-24 DIAGNOSIS — R10.31 RLQ ABDOMINAL PAIN: ICD-10-CM

## 2019-09-24 DIAGNOSIS — M54.2 CHRONIC CERVICAL PAIN: ICD-10-CM

## 2019-09-24 DIAGNOSIS — G89.29 CHRONIC CERVICAL PAIN: ICD-10-CM

## 2019-09-24 DIAGNOSIS — E03.9 HYPOTHYROIDISM, UNSPECIFIED TYPE: ICD-10-CM

## 2019-09-24 DIAGNOSIS — G89.29 CHRONIC LOW BACK PAIN, UNSPECIFIED BACK PAIN LATERALITY, WITH SCIATICA PRESENCE UNSPECIFIED: ICD-10-CM

## 2019-09-24 DIAGNOSIS — G43.809 OTHER MIGRAINE WITHOUT STATUS MIGRAINOSUS, NOT INTRACTABLE: ICD-10-CM

## 2019-09-24 DIAGNOSIS — M54.5 CHRONIC LOW BACK PAIN, UNSPECIFIED BACK PAIN LATERALITY, WITH SCIATICA PRESENCE UNSPECIFIED: ICD-10-CM

## 2019-09-24 DIAGNOSIS — R19.7 DIARRHEA, UNSPECIFIED TYPE: Primary | ICD-10-CM

## 2019-09-24 PROCEDURE — 74177 CT ABDOMEN PELVIS WITH CONTRAST: ICD-10-PCS | Mod: 26,,, | Performed by: RADIOLOGY

## 2019-09-24 PROCEDURE — 3008F PR BODY MASS INDEX (BMI) DOCUMENTED: ICD-10-PCS | Mod: CPTII,S$GLB,, | Performed by: FAMILY MEDICINE

## 2019-09-24 PROCEDURE — 25500020 PHARM REV CODE 255: Mod: PO | Performed by: FAMILY MEDICINE

## 2019-09-24 PROCEDURE — 74177 CT ABD & PELVIS W/CONTRAST: CPT | Mod: TC,PO

## 2019-09-24 PROCEDURE — 99214 OFFICE O/P EST MOD 30 MIN: CPT | Mod: S$GLB,,, | Performed by: FAMILY MEDICINE

## 2019-09-24 PROCEDURE — 99999 PR PBB SHADOW E&M-EST. PATIENT-LVL III: CPT | Mod: PBBFAC,,, | Performed by: FAMILY MEDICINE

## 2019-09-24 PROCEDURE — 99999 PR PBB SHADOW E&M-EST. PATIENT-LVL III: ICD-10-PCS | Mod: PBBFAC,,, | Performed by: FAMILY MEDICINE

## 2019-09-24 PROCEDURE — 99214 PR OFFICE/OUTPT VISIT, EST, LEVL IV, 30-39 MIN: ICD-10-PCS | Mod: S$GLB,,, | Performed by: FAMILY MEDICINE

## 2019-09-24 PROCEDURE — 3008F BODY MASS INDEX DOCD: CPT | Mod: CPTII,S$GLB,, | Performed by: FAMILY MEDICINE

## 2019-09-24 PROCEDURE — 74177 CT ABD & PELVIS W/CONTRAST: CPT | Mod: 26,,, | Performed by: RADIOLOGY

## 2019-09-24 RX ORDER — CYCLOBENZAPRINE HCL 10 MG
10 TABLET ORAL 3 TIMES DAILY PRN
Qty: 30 TABLET | Refills: 5 | Status: SHIPPED | OUTPATIENT
Start: 2019-09-24 | End: 2020-01-20 | Stop reason: SDUPTHER

## 2019-09-24 RX ORDER — DICYCLOMINE HYDROCHLORIDE 10 MG/1
10 CAPSULE ORAL
Qty: 90 CAPSULE | Refills: 0 | Status: SHIPPED | OUTPATIENT
Start: 2019-09-24 | End: 2019-10-18 | Stop reason: SDUPTHER

## 2019-09-24 RX ORDER — HYDROCODONE BITARTRATE AND ACETAMINOPHEN 7.5; 325 MG/1; MG/1
1 TABLET ORAL 2 TIMES DAILY PRN
Qty: 60 TABLET | Refills: 0 | Status: SHIPPED | OUTPATIENT
Start: 2019-10-26 | End: 2019-11-25

## 2019-09-24 RX ORDER — HYDROCODONE BITARTRATE AND ACETAMINOPHEN 7.5; 325 MG/1; MG/1
1 TABLET ORAL 2 TIMES DAILY PRN
Qty: 60 TABLET | Refills: 0 | Status: SHIPPED | OUTPATIENT
Start: 2019-09-26 | End: 2019-10-26

## 2019-09-24 RX ORDER — HYDROCODONE BITARTRATE AND ACETAMINOPHEN 7.5; 325 MG/1; MG/1
1 TABLET ORAL 2 TIMES DAILY PRN
Qty: 60 TABLET | Refills: 0 | Status: SHIPPED | OUTPATIENT
Start: 2019-11-25 | End: 2019-12-23 | Stop reason: SDUPTHER

## 2019-09-24 RX ADMIN — IOHEXOL 30 ML: 350 INJECTION, SOLUTION INTRAVENOUS at 01:09

## 2019-09-24 RX ADMIN — IOHEXOL 75 ML: 350 INJECTION, SOLUTION INTRAVENOUS at 01:09

## 2019-09-24 NOTE — PROGRESS NOTES
"Subjective:       Patient ID: Isela Smyth is a 44 y.o. female.    Chief Complaint: Diarrhea; Fatigue; Fever; Headache; Nausea; Neck Pain; Emesis; Dizziness ("seeing stars" ); and Edema (finger swelling, face retaining fluids)    HPI     Here for [facility] ER f/u:    Went to ER for nausea, vomiting, diarrhea and worsening migraine.  Lab were wnl.    Re diarrhea: clear mucous diarrhea after eating x 10-14 days. No sick contacts. No blood.  Took augmentin with otc probiotics 3 months ago.      Re vomiting: nausea with migraine. Intermittent and comes about after eating x 2 weeks.      Reports fever x 1 week. t max 104 about 5-6 days ago. Does not feel feverish today.     C/o right lower quad abdominal pain x 6-7 days. Waxes and wanes.      Re migraines: no headache today.      Chronic lumbago controlled while on norco 7.5 and flexeril. Ps: 2/10. Occasional left sciatic pain.     Also, has chronic neck pain > 5 years. Hx of neck surgeries in past. Norco 7.5 and flexeril helps with pain.     On levothyroxine for hypothyroidism. tsh wnl 11/2018.    Review of Systems      Review of Systems   HENT: Negative for hearing loss and neck stiffness.    Eyes: Negative for redness and itching.   Respiratory: Negative for cough and choking.    Cardiovascular: Negative for chest pain and leg swelling.  Abdomen: Negative for  blood in stool.   Genitourinary: Negative for dysuria and flank pain.   Musculoskeletal: Negative for gait problem.   Neurological: Negative for light-headedness and headaches.   Hematological: Negative for adenopathy.   Psychiatric/Behavioral: Negative for behavioral problems.     Objective:      Physical Exam   Constitutional: She appears well-developed.   HENT:   Head: Normocephalic and atraumatic.   Eyes: Pupils are equal, round, and reactive to light. Conjunctivae are normal.   Neck: Normal range of motion.   Cardiovascular: Normal rate and regular rhythm.   No murmur heard.  Pulmonary/Chest: Effort normal " and breath sounds normal.   Abdominal: Soft. Bowel sounds are normal. She exhibits no distension. There is tenderness. There is no guarding.   rlq tenderness   Lymphadenopathy:     She has no cervical adenopathy.       Assessment:       1. Diarrhea, unspecified type    2. RLQ abdominal pain    3. Hypothyroidism, unspecified type    4. Other migraine without status migrainosus, not intractable    5. Chronic cervical pain    6. Chronic low back pain, unspecified back pain laterality, with sciatica presence unspecified        Plan:       Diarrhea, unspecified type  -     Stool Exam-Ova,Cysts,Parasites; Future; Expected date: 09/24/2019  -     WBC, Stool; Future; Expected date: 09/24/2019  -     Stool culture; Future; Expected date: 09/24/2019  -     Giardia / Cryptosporidum, EIA; Future; Expected date: 09/24/2019  -     Clostridium difficile EIA; Future; Expected date: 09/24/2019    RLQ abdominal pain  -     CT Abdomen Pelvis W Wo Contrast; Future    Hypothyroidism, unspecified type  -     TSH; Future; Expected date: 09/24/2019    Other migraine without status migrainosus, not intractable    Chronic cervical pain    Chronic low back pain, unspecified back pain laterality, with sciatica presence unspecified    Other orders  -     dicyclomine (BENTYL) 10 MG capsule; Take 1 capsule (10 mg total) by mouth 3 (three) times daily with meals.  Dispense: 90 capsule; Refill: 0  -     HYDROcodone-acetaminophen (NORCO) 7.5-325 mg per tablet; Take 1 tablet by mouth 2 (two) times daily as needed.  Dispense: 60 tablet; Refill: 0  -     HYDROcodone-acetaminophen (NORCO) 7.5-325 mg per tablet; Take 1 tablet by mouth 2 (two) times daily as needed.  Dispense: 60 tablet; Refill: 0  -     HYDROcodone-acetaminophen (NORCO) 7.5-325 mg per tablet; Take 1 tablet by mouth 2 (two) times daily as needed.  Dispense: 60 tablet; Refill: 0  -     cyclobenzaprine (FLEXERIL) 10 MG tablet; Take 1 tablet (10 mg total) by mouth 3 (three) times daily as  needed.  Dispense: 30 tablet; Refill: 5        Plan:  See orders  Start bentyl for diarrhea and abd pain  rf norco 7.5 and flexeril  Cont all other meds        Medication List with Changes/Refills   New Medications    DICYCLOMINE (BENTYL) 10 MG CAPSULE    Take 1 capsule (10 mg total) by mouth 3 (three) times daily with meals.    HYDROCODONE-ACETAMINOPHEN (NORCO) 7.5-325 MG PER TABLET    Take 1 tablet by mouth 2 (two) times daily as needed.    HYDROCODONE-ACETAMINOPHEN (NORCO) 7.5-325 MG PER TABLET    Take 1 tablet by mouth 2 (two) times daily as needed.   Current Medications    LEVOTHYROXINE (SYNTHROID) 75 MCG TABLET    TAKE 1 TABLET (75 MCG TOTAL) BY MOUTH EVERY MORNING.    LINACLOTIDE (LINZESS) 145 MCG CAP CAPSULE    Take 1 capsule (145 mcg total) by mouth daily as needed.    ONDANSETRON (ZOFRAN ODT) 4 MG TBDL    Take 1 tablet (4 mg total) by mouth every 6 (six) hours as needed.    PANTOPRAZOLE (PROTONIX) 20 MG TABLET    TAKE 1 TABLET BY MOUTH EVERY DAY    PROMETHAZINE (PHENERGAN) 25 MG SUPPOSITORY    PLACE 1 SUPPOSITORY (25 MG TOTAL) RECTALLY EVERY 6 (SIX) HOURS AS NEEDED FOR NAUSEA.    SUCRALFATE (CARAFATE) 1 GRAM TABLET    TAKE 1 TABLET BY MOUTH THREE TIMES A DAY    SUMATRIPTAN (IMITREX) 100 MG TABLET    TAKE 1 TABLET BY MOUTH EVERY DAY AS NEEDED. MAY REPEAT IN 2 HOURS IF NEEDED. DO NOT EXCEED 2 TABLETS PER DAY    TRAZODONE (DESYREL) 150 MG TABLET    TAKE 1 TABLET (150 MG TOTAL) BY MOUTH NIGHTLY.    VENLAFAXINE (EFFEXOR-XR) 150 MG CP24    TAKE 1 CAPSULE BY MOUTH EVERY DAY   Changed and/or Refilled Medications    Modified Medication Previous Medication    CYCLOBENZAPRINE (FLEXERIL) 10 MG TABLET cyclobenzaprine (FLEXERIL) 10 MG tablet       Take 1 tablet (10 mg total) by mouth 3 (three) times daily as needed.    TAKE 1 TABLET BY MOUTH 3 TIMES DAILY AS NEEDED.    HYDROCODONE-ACETAMINOPHEN (NORCO) 7.5-325 MG PER TABLET HYDROcodone-acetaminophen (NORCO) 7.5-325 mg per tablet       Take 1 tablet by mouth 2 (two)  times daily as needed.    Take 1 tablet by mouth 2 (two) times daily as needed.   Discontinued Medications    DICYCLOMINE (BENTYL) 20 MG TABLET    TK 1 T PO Q 8 H PRF CRAMPING            Declined

## 2019-09-25 ENCOUNTER — TELEPHONE (OUTPATIENT)
Dept: FAMILY MEDICINE | Facility: CLINIC | Age: 44
End: 2019-09-25

## 2019-09-25 ENCOUNTER — OFFICE VISIT (OUTPATIENT)
Dept: OBSTETRICS AND GYNECOLOGY | Facility: CLINIC | Age: 44
End: 2019-09-25
Payer: COMMERCIAL

## 2019-09-25 VITALS
HEIGHT: 68 IN | DIASTOLIC BLOOD PRESSURE: 60 MMHG | WEIGHT: 176.81 LBS | BODY MASS INDEX: 26.8 KG/M2 | SYSTOLIC BLOOD PRESSURE: 100 MMHG

## 2019-09-25 DIAGNOSIS — R10.9 RIGHT SIDED ABDOMINAL PAIN: Primary | ICD-10-CM

## 2019-09-25 DIAGNOSIS — A63.0 HPV (HUMAN PAPILLOMA VIRUS) ANOGENITAL INFECTION: ICD-10-CM

## 2019-09-25 PROCEDURE — 99999 PR PBB SHADOW E&M-EST. PATIENT-LVL III: ICD-10-PCS | Mod: PBBFAC,,, | Performed by: OBSTETRICS & GYNECOLOGY

## 2019-09-25 PROCEDURE — 3008F PR BODY MASS INDEX (BMI) DOCUMENTED: ICD-10-PCS | Mod: CPTII,S$GLB,, | Performed by: OBSTETRICS & GYNECOLOGY

## 2019-09-25 PROCEDURE — 99213 OFFICE O/P EST LOW 20 MIN: CPT | Mod: S$GLB,,, | Performed by: OBSTETRICS & GYNECOLOGY

## 2019-09-25 PROCEDURE — 99213 PR OFFICE/OUTPT VISIT, EST, LEVL III, 20-29 MIN: ICD-10-PCS | Mod: S$GLB,,, | Performed by: OBSTETRICS & GYNECOLOGY

## 2019-09-25 PROCEDURE — 3008F BODY MASS INDEX DOCD: CPT | Mod: CPTII,S$GLB,, | Performed by: OBSTETRICS & GYNECOLOGY

## 2019-09-25 PROCEDURE — 99999 PR PBB SHADOW E&M-EST. PATIENT-LVL III: CPT | Mod: PBBFAC,,, | Performed by: OBSTETRICS & GYNECOLOGY

## 2019-09-25 NOTE — TELEPHONE ENCOUNTER
----- Message from Lorena Spear sent at 9/25/2019  1:35 PM CDT -----  Contact: patient  Type: Needs Medical Advice    Who Called:  patient  Symptoms (please be specific):  na  How long has patient had these symptoms:  holli  Pharmacy name and phone #:  [facility]  Best Call Back Number: [phone]  Additional Information: Patient states she is in pain and has multiple questions for the nurse. Please call to advise and schedule.Thanks!

## 2019-09-25 NOTE — PROGRESS NOTES
Chief Complaint   Patient presents with    pap normal with + hpv in may    kuebel recommended colpo    right sided pain       History of Present Illness: Isela Smyth is a 44 y.o. female that presents today 9/25/2019 for   Chief Complaint   Patient presents with    pap normal with + hpv in may    kuebel recommended colpo    right sided pain         Past Medical History:   Diagnosis Date    Anxiety     Arthritis     Chronic lumbar pain     Depression     Deviated nasal septum     Diverticulosis     GERD (gastroesophageal reflux disease)     Hemorrhoids     Hypothyroid     Kidney stone     passed 10 stones by herself over the years, one needed procedure    Migraine headache     PONV (postoperative nausea and vomiting)     severe    Shortness of breath     Urticaria        Past Surgical History:   Procedure Laterality Date    AUGMENTATION OF BREAST      CHOLECYSTECTOMY      COLONOSCOPY  prior to 2011    COLONOSCOPY N/A 9/26/2018    Procedure: COLONOSCOPY;  Surgeon: Marquis Zuluaga MD;  Location: [facility];  Service: Endoscopy;  Laterality: N/A;    ESOPHAGOGASTRODUODENOSCOPY N/A 9/5/2018    Procedure: EGD (ESOPHAGOGASTRODUODENOSCOPY);  Surgeon: Marquis Zuluaga MD;  Location: [facility];  Service: Endoscopy;  Laterality: N/A;    HERNIA REPAIR      HIATAL HERNIA REPAIR      KIDNEY STONE SURGERY  2009    [facility], had stone removed by dr renay george, stayed in hospital 4 days    LAPAROSCOPIC GASTRIC BANDING  2007    later removed in 2016    LUMBAR EPIDURAL INJECTION      SINUS SURGERY      SPINE SURGERY      6 back surgeries; 1 neck surgery    TUBAL LIGATION      UPPER GASTROINTESTINAL ENDOSCOPY  03/2013    Dr. Zuluaga    UPPER GASTROINTESTINAL ENDOSCOPY  09/14/2016    Dr. Zuluaga    VAGINAL DELIVERY      times 3       Current Outpatient Medications   Medication Sig Dispense Refill    levothyroxine (SYNTHROID) 75 MCG tablet TAKE 1 TABLET (75 MCG TOTAL) BY MOUTH EVERY MORNING.  90 tablet 3    venlafaxine (EFFEXOR-XR) 150 MG Cp24 TAKE 1 CAPSULE BY MOUTH EVERY DAY 90 capsule 3    cyclobenzaprine (FLEXERIL) 10 MG tablet Take 1 tablet (10 mg total) by mouth 3 (three) times daily as needed. (Patient not taking: Reported on 9/25/2019) 30 tablet 5    dicyclomine (BENTYL) 10 MG capsule Take 1 capsule (10 mg total) by mouth 3 (three) times daily with meals. (Patient not taking: Reported on 9/25/2019) 90 capsule 0    [START ON 9/26/2019] HYDROcodone-acetaminophen (NORCO) 7.5-325 mg per tablet Take 1 tablet by mouth 2 (two) times daily as needed. (Patient not taking: Reported on 9/25/2019) 60 tablet 0    [START ON 10/26/2019] HYDROcodone-acetaminophen (NORCO) 7.5-325 mg per tablet Take 1 tablet by mouth 2 (two) times daily as needed. 60 tablet 0    [START ON 11/25/2019] HYDROcodone-acetaminophen (NORCO) 7.5-325 mg per tablet Take 1 tablet by mouth 2 (two) times daily as needed. 60 tablet 0    linaclotide (LINZESS) 145 mcg Cap capsule Take 1 capsule (145 mcg total) by mouth daily as needed. (Patient not taking: Reported on 9/25/2019) 30 capsule 5    ondansetron (ZOFRAN ODT) 4 MG TbDL Take 1 tablet (4 mg total) by mouth every 6 (six) hours as needed. (Patient not taking: Reported on 9/25/2019) 10 tablet 0    pantoprazole (PROTONIX) 20 MG tablet TAKE 1 TABLET BY MOUTH EVERY DAY (Patient not taking: Reported on 9/24/2019) 90 tablet 1    promethazine (PHENERGAN) 25 MG suppository PLACE 1 SUPPOSITORY (25 MG TOTAL) RECTALLY EVERY 6 (SIX) HOURS AS NEEDED FOR NAUSEA. (Patient not taking: Reported on 9/25/2019) 12 suppository 1    sucralfate (CARAFATE) 1 gram tablet TAKE 1 TABLET BY MOUTH THREE TIMES A DAY (Patient not taking: Reported on 9/24/2019) 100 tablet 2    sumatriptan (IMITREX) 100 MG tablet TAKE 1 TABLET BY MOUTH EVERY DAY AS NEEDED. MAY REPEAT IN 2 HOURS IF NEEDED. DO NOT EXCEED 2 TABLETS PER DAY (Patient not taking: Reported on 9/25/2019) 54 tablet 2    traZODone (DESYREL) 150 MG  tablet TAKE 1 TABLET (150 MG TOTAL) BY MOUTH NIGHTLY. (Patient not taking: Reported on 2019) 30 tablet 5     No current facility-administered medications for this visit.      Facility-Administered Medications Ordered in Other Visits   Medication Dose Route Frequency Provider Last Rate Last Dose    lactated ringers infusion   Intravenous Continuous Marquis Zuluaga MD 75 mL/hr at 18 0845         Review of patient's allergies indicates:   Allergen Reactions    Gabapentin Other (See Comments)     Coming out of skin    Morphine Itching       Family History   Problem Relation Age of Onset    Ulcers Mother     Cancer Neg Hx     Heart disease Neg Hx     Hypertension Neg Hx     Diabetes Neg Hx     Angioedema Neg Hx     Allergies Neg Hx     Allergic rhinitis Neg Hx     Asthma Neg Hx     Eczema Neg Hx     Immunodeficiency Neg Hx     Colon cancer Neg Hx     Colon polyps Neg Hx     Crohn's disease Neg Hx     Ulcerative colitis Neg Hx     Nephrolithiasis Neg Hx        Social History     Tobacco Use    Smoking status: Never Smoker    Smokeless tobacco: Never Used   Substance Use Topics    Alcohol use: No    Drug use: No     Types: Oxycodone       OB History    Para Term  AB Living   3 3 3     3   SAB TAB Ectopic Multiple Live Births                  # Outcome Date GA Lbr Emiliano/2nd Weight Sex Delivery Anes PTL Lv   3 Term            2 Term            1 Term                Review of Symptoms:  GENERAL: Denies weight gain or weight loss. Feeling well overall.   SKIN: Denies rash or lesions.   HEAD: Denies head injury or headache.   NODES: Denies enlarged lymph nodes.   CHEST: Denies chest pain or shortness of breath.   CARDIOVASCULAR: Denies palpitations or left sided chest pain.   ABDOMEN: No abdominal pain, constipation, diarrhea, nausea, vomiting or rectal bleeding.   URINARY: No frequency, dysuria, hematuria, or burning on urination.  HEMATOLOGIC: No easy bruisability or excessive  "bleeding.   MUSCULOSKELETAL: Denies joint pain or swelling.     /60   Ht 5' 7.5" (1.715 m)   Wt 80.2 kg (176 lb 12.9 oz)   LMP 09/16/2019     ASSESSMENT/PLAN:  Right sided abdominal pain  -     US Pelvis Comp with Transvag NON-OB (xpd; Future; Expected date: 09/25/2019    HPV (human papilloma virus) anogenital infection          Pap in 1 year    15 minutes spent today with this patient. Greater than half spent in counseling today.     "

## 2019-09-25 NOTE — TELEPHONE ENCOUNTER
----- Message from Stephany Perry sent at 9/25/2019  9:41 AM CDT -----  Contact: patient  Type:  Patient Returning Call    Who Called:  Patient  Who Left Message for Patient:  Voicemail full  Does the patient know what this is regarding?:  Test results  Best Call Back Number:  [phone]  Additional Information:  Returning call from yesterday. Going to dr grajeda at 10, so call after 11:30

## 2019-10-03 ENCOUNTER — TELEPHONE (OUTPATIENT)
Dept: FAMILY MEDICINE | Facility: CLINIC | Age: 44
End: 2019-10-03

## 2019-10-03 DIAGNOSIS — R10.9 ABDOMINAL PAIN, UNSPECIFIED ABDOMINAL LOCATION: ICD-10-CM

## 2019-10-03 DIAGNOSIS — R19.7 DIARRHEA, UNSPECIFIED TYPE: Primary | ICD-10-CM

## 2019-10-03 NOTE — TELEPHONE ENCOUNTER
Spoke to patient. Scheduled patient appt in the morning with GI.   Patient says she is feeling really bad. She wants to know if there is anything she can do until tomorrow. She says she is running fever and having diarrhea and vomiting. She says she has lost 12 pounds because she cannot hold anything down. She does not want to go back to the ER because they did not do anything for her except give her zofran that didn't help. Please advise.

## 2019-10-03 NOTE — TELEPHONE ENCOUNTER
inform pt via phone:    The stool specimens were processed but were under the ER orders    Needs to provide another stool specimen to our lab, more so for c diff testing and giardia and the other tests I ordered.    Also, please schedule referral to GI.

## 2019-10-03 NOTE — TELEPHONE ENCOUNTER
----- Message from Stefania Jorge sent at 10/3/2019 12:14 PM CDT -----  Contact: [phone]  Patient is requesting a call back from the nurse stated she not feeling any better, still have lots of cramping, diarrhea, and unable to keep any food down.    Please call the patient upon request at phone number [phone].

## 2019-10-03 NOTE — TELEPHONE ENCOUNTER
Called and spoke with patient, patient was very very upset. Gave instructions per Dr. Dan.  Called and spoke with Vania in GI. She will reach out to the patient to schedule an appointment.

## 2019-10-03 NOTE — TELEPHONE ENCOUNTER
----- Message from Fabi Latif sent at 10/3/2019  3:17 PM CDT -----  Type: Needs Medical Advice    Who Called:  Patient  Symptoms (please be specific):  Massive diarrhea, vomiting every time she eats  How long has patient had these symptoms:  3 weeks  Best Call Back Number: [phone] (home)     Additional Information: States she needs something done now. She still has fever and lost 12 pounds in 3 weeks. She is crying stating how bad she is feeling. Please call to advise.

## 2019-10-04 ENCOUNTER — OFFICE VISIT (OUTPATIENT)
Dept: GASTROENTEROLOGY | Facility: CLINIC | Age: 44
End: 2019-10-04
Payer: COMMERCIAL

## 2019-10-04 ENCOUNTER — TELEPHONE (OUTPATIENT)
Dept: FAMILY MEDICINE | Facility: CLINIC | Age: 44
End: 2019-10-04

## 2019-10-04 VITALS
HEIGHT: 67 IN | DIASTOLIC BLOOD PRESSURE: 97 MMHG | RESPIRATION RATE: 18 BRPM | WEIGHT: 173.94 LBS | SYSTOLIC BLOOD PRESSURE: 124 MMHG | HEART RATE: 103 BPM | BODY MASS INDEX: 27.3 KG/M2

## 2019-10-04 DIAGNOSIS — R19.4 CHANGE IN BOWEL HABITS: ICD-10-CM

## 2019-10-04 DIAGNOSIS — R10.13 EPIGASTRIC PAIN: ICD-10-CM

## 2019-10-04 DIAGNOSIS — R10.31 RLQ ABDOMINAL PAIN: ICD-10-CM

## 2019-10-04 DIAGNOSIS — R19.7 DIARRHEA, UNSPECIFIED TYPE: Primary | ICD-10-CM

## 2019-10-04 DIAGNOSIS — K92.1 HEMATOCHEZIA: ICD-10-CM

## 2019-10-04 DIAGNOSIS — R11.2 NAUSEA AND VOMITING, INTRACTABILITY OF VOMITING NOT SPECIFIED, UNSPECIFIED VOMITING TYPE: ICD-10-CM

## 2019-10-04 PROCEDURE — 99999 PR PBB SHADOW E&M-EST. PATIENT-LVL IV: CPT | Mod: PBBFAC,,, | Performed by: NURSE PRACTITIONER

## 2019-10-04 PROCEDURE — 3008F BODY MASS INDEX DOCD: CPT | Mod: CPTII,S$GLB,, | Performed by: NURSE PRACTITIONER

## 2019-10-04 PROCEDURE — 3008F PR BODY MASS INDEX (BMI) DOCUMENTED: ICD-10-PCS | Mod: CPTII,S$GLB,, | Performed by: NURSE PRACTITIONER

## 2019-10-04 PROCEDURE — 99999 PR PBB SHADOW E&M-EST. PATIENT-LVL IV: ICD-10-PCS | Mod: PBBFAC,,, | Performed by: NURSE PRACTITIONER

## 2019-10-04 PROCEDURE — 99214 OFFICE O/P EST MOD 30 MIN: CPT | Mod: S$GLB,,, | Performed by: NURSE PRACTITIONER

## 2019-10-04 PROCEDURE — 99214 PR OFFICE/OUTPT VISIT, EST, LEVL IV, 30-39 MIN: ICD-10-PCS | Mod: S$GLB,,, | Performed by: NURSE PRACTITIONER

## 2019-10-04 RX ORDER — PANTOPRAZOLE SODIUM 40 MG/1
40 TABLET, DELAYED RELEASE ORAL DAILY
Qty: 30 TABLET | Refills: 11 | Status: SHIPPED | OUTPATIENT
Start: 2019-10-04 | End: 2020-01-20

## 2019-10-04 RX ORDER — PROMETHAZINE HYDROCHLORIDE 12.5 MG/1
12.5 TABLET ORAL EVERY 6 HOURS PRN
Qty: 90 TABLET | Refills: 1 | Status: SHIPPED | OUTPATIENT
Start: 2019-10-04 | End: 2021-09-16 | Stop reason: SDUPTHER

## 2019-10-04 NOTE — TELEPHONE ENCOUNTER
Called and spoke with patient. Wanted to confirm what pharmacy her prescriptions were called in. I let the patient know that it was sent over to [facility]. Verbalized understanding. Call ended.

## 2019-10-04 NOTE — PROGRESS NOTES
Subjective:       Patient ID: Isela Smyth is a 44 y.o. female, Body mass index is 27.24 kg/m².    Chief Complaint: Emesis; Diarrhea; and Nausea      Patient is new to me. Established patient of Dr. Zuluaga.  Referred by Dr. Dan for nausea, vomiting, and diarrhea.    GI Problem   The primary symptoms include fever (last night; did not check temperature), fatigue, abdominal pain, nausea, vomiting, diarrhea and hematochezia. Primary symptoms do not include weight loss, melena, hematemesis, dysuria or rash. The illness began more than 7 days ago (Started 3 weeks ago). The onset was sudden. The problem has not changed since onset.  The fever began yesterday. The fever has been gradually improving since its onset. The maximum temperature recorded prior to her arrival was unknown (did not check temperature).   The fatigue began more than 1 week ago (Started three weeks ago). The fatigue has been unchanged since its onset. The fatigue is worsened by exertion.   The abdominal pain began more than 2 days ago (Started three weeks ago). The abdominal pain has been unchanged since its onset. The abdominal pain is located in the RLQ and epigastric region. The abdominal pain does not radiate. The severity of the abdominal pain is 6/10. The abdominal pain is relieved by bowel movements (Bentyl relieves discomfort).   Nausea began more than 1 week ago (Started three weeks ago; occurs daily). The nausea is associated with eating. The nausea is exacerbated by food (Zofran does not relieve nausea).   The vomiting began more than 2 days ago (Started three weeks ago). Frequency: occurs intermittently; 1-3 times when she does; results in dry heaving. The emesis contains stomach contents and bilious material (denies blood or coffee ground material). Risk factors for illness leading to emesis include travel to endemic areas (traveled to [facility] in 6/2019).   The diarrhea began more than 1 week ago (Started three weeks ago). The  diarrhea is watery, semi-solid and bloody (since starting bentyl more formed; sees bright red blood on tissue paper and in stool after bowel movment; bleeding occurs intermittently). The diarrhea occurs 5 to 10 times per day. Risk factors for illness producing diarrhea include travel to endemic areas (traveled to [facility] in June 2019; denies recent antibiotics or hospitalization).   The illness is also significant for chills. The illness does not include dysphagia, bloating or constipation.     Review of Systems   Constitutional: Positive for chills, fatigue and fever (last night; did not check temperature). Negative for appetite change, unexpected weight change and weight loss.   HENT: Negative for trouble swallowing.    Respiratory: Negative for cough and shortness of breath.    Cardiovascular: Negative for chest pain.   Gastrointestinal: Positive for abdominal pain, blood in stool, diarrhea, hematochezia, nausea and vomiting. Negative for bloating, constipation, dysphagia, hematemesis, melena and rectal pain.   Genitourinary: Negative for difficulty urinating and dysuria.   Musculoskeletal: Negative for gait problem.   Skin: Negative for rash.   Neurological: Negative for speech difficulty.   Psychiatric/Behavioral: Negative for confusion.       Past Medical History:   Diagnosis Date    Anxiety     Arthritis     Chronic lumbar pain     Depression     Deviated nasal septum     Diverticulosis     GERD (gastroesophageal reflux disease)     Hemorrhoids     Hypothyroid     Kidney stone     passed 10 stones by herself over the years, one needed procedure    Migraine headache     PONV (postoperative nausea and vomiting)     severe    Shortness of breath     Urticaria       Past Surgical History:   Procedure Laterality Date    AUGMENTATION OF BREAST      CHOLECYSTECTOMY      COLONOSCOPY  prior to 2011    COLONOSCOPY N/A 9/26/2018    Dr. Zuluaga; diverticulosis; repeat in 10 years     ESOPHAGOGASTRODUODENOSCOPY N/A 9/5/2018    Procedure: EGD (ESOPHAGOGASTRODUODENOSCOPY);  Surgeon: Marquis Zuluaga MD;  Location: [facility];  Service: Endoscopy;  Laterality: N/A;    HERNIA REPAIR      HIATAL HERNIA REPAIR      KIDNEY STONE SURGERY  2009    [facility], had stone removed by dr renay george, stayed in hospital 4 days    LAPAROSCOPIC GASTRIC BANDING  2007    later removed in 2016    LUMBAR EPIDURAL INJECTION      SINUS SURGERY      SPINE SURGERY      6 back surgeries; 1 neck surgery    TUBAL LIGATION      UPPER GASTROINTESTINAL ENDOSCOPY  03/2013    Dr. Zuluaga    UPPER GASTROINTESTINAL ENDOSCOPY  09/14/2016    Dr. Zuluaga    UPPER GASTROINTESTINAL ENDOSCOPY  09/05/2018    Dr. Zuluaga; gastritis; bx negative    VAGINAL DELIVERY      times 3      Family History   Problem Relation Age of Onset    Ulcers Mother     Cancer Neg Hx     Heart disease Neg Hx     Hypertension Neg Hx     Diabetes Neg Hx     Angioedema Neg Hx     Allergies Neg Hx     Allergic rhinitis Neg Hx     Asthma Neg Hx     Eczema Neg Hx     Immunodeficiency Neg Hx     Colon cancer Neg Hx     Colon polyps Neg Hx     Crohn's disease Neg Hx     Ulcerative colitis Neg Hx     Nephrolithiasis Neg Hx     Stomach cancer Neg Hx     Esophageal cancer Neg Hx       Wt Readings from Last 10 Encounters:   10/04/19 78.9 kg (173 lb 15.1 oz)   09/25/19 80.2 kg (176 lb 12.9 oz)   09/24/19 80.6 kg (177 lb 11.1 oz)   09/23/19 79.2 kg (174 lb 9.7 oz)   06/04/19 80.6 kg (177 lb 11.1 oz)   05/30/19 78.9 kg (174 lb)   05/30/19 79.2 kg (174 lb 9.7 oz)   03/07/19 83.2 kg (183 lb 6.8 oz)   01/15/19 83.5 kg (184 lb 1.4 oz)   12/06/18 81.9 kg (180 lb 8.9 oz)     Lab Results   Component Value Date    WBC 6.86 09/23/2019    HGB 12.0 09/23/2019    HCT 37.3 09/23/2019    MCV 91 09/23/2019     09/23/2019     CMP  Sodium   Date Value Ref Range Status   09/23/2019 138 136 - 145 mmol/L Final     Potassium   Date Value Ref Range Status    09/23/2019 4.0 3.5 - 5.1 mmol/L Final     Chloride   Date Value Ref Range Status   09/23/2019 104 95 - 110 mmol/L Final     CO2   Date Value Ref Range Status   09/23/2019 24 22 - 31 mmol/L Final     Glucose   Date Value Ref Range Status   09/23/2019 88 70 - 110 mg/dL Final     Comment:     The ADA recommends the following guidelines for fasting glucose:  Normal:       less than 100 mg/dL  Prediabetes:  100 mg/dL to 125 mg/dL  Diabetes:     126 mg/dL or higher       BUN, Bld   Date Value Ref Range Status   09/23/2019 12 7 - 18 mg/dL Final     Creatinine   Date Value Ref Range Status   09/23/2019 0.50 0.50 - 1.40 mg/dL Final   08/10/2012 0.6 0.2 - 1.4 mg/dl Final     Calcium   Date Value Ref Range Status   09/23/2019 9.3 8.4 - 10.2 mg/dL Final   08/10/2012 10.3 (H) 8.6 - 10.2 mg/dl Final     Total Protein   Date Value Ref Range Status   09/23/2019 8.2 6.0 - 8.4 g/dL Final     Albumin   Date Value Ref Range Status   09/23/2019 4.8 3.5 - 5.2 g/dL Final     Total Bilirubin   Date Value Ref Range Status   09/23/2019 0.4 0.2 - 1.3 mg/dL Final     Alkaline Phosphatase   Date Value Ref Range Status   09/23/2019 58 38 - 145 U/L Final     AST ([place])   Date Value Ref Range Status   01/25/2016 27 14 - 36 U/L Final     AST   Date Value Ref Range Status   09/23/2019 30 14 - 36 U/L Final     ALT   Date Value Ref Range Status   09/23/2019 23 10 - 44 U/L Final     Anion Gap   Date Value Ref Range Status   09/23/2019 10 8 - 16 mmol/L Final   08/10/2012 8 5 - 15 meq/L Final     eGFR if    Date Value Ref Range Status   09/23/2019 >60 >60 mL/min/1.73 m^2 Final     eGFR if non    Date Value Ref Range Status   09/23/2019 >60 >60 mL/min/1.73 m^2 Final     Comment:     Calculation used to obtain the estimated glomerular filtration  rate (eGFR) is the CKD-EPI equation.        Lab Results   Component Value Date    LIPASE 109 08/24/2016     Lab Results   Component Value Date    LIPASERES 204  09/23/2019             Reviewed prior medical records including radiology report of CT abdomen 9/24/19 & endoscopy history (see surgical history).     Objective:      Physical Exam   Constitutional: She is oriented to person, place, and time. She appears well-developed and well-nourished.   HENT:   Head: Normocephalic.   Eyes: Pupils are equal, round, and reactive to light.   Neck: Normal range of motion.   Cardiovascular: Normal rate, regular rhythm and normal heart sounds.   No murmur heard.  Pulmonary/Chest: Breath sounds normal. No respiratory distress. She has no wheezes.   Abdominal: Soft. Bowel sounds are normal. She exhibits no distension and no mass. There is tenderness in the right lower quadrant and epigastric area. There is no guarding.   Musculoskeletal: Normal range of motion.   Neurological: She is alert and oriented to person, place, and time.   Skin: Skin is warm and dry. No rash noted.   Non jaundiced   Psychiatric: She has a normal mood and affect. Her speech is normal.         Assessment:       1. Diarrhea, unspecified type    2. Change in bowel habits    3. Hematochezia    4. Nausea and vomiting, intractability of vomiting not specified, unspecified vomiting type    5. Epigastric pain    6. RLQ abdominal pain           Plan:   All diagnoses and orders for this visit:    Diarrhea, unspecified type, Change in bowel habits, & RLQ abdominal pain  -     Clostridium difficile EIA; Future; Expected date: 10/04/2019  -     Stool Exam-Ova,Cysts,Parasites; Future; Expected date: 10/04/2019  -     Stool culture; Future; Expected date: 10/04/2019  -     Giardia / Cryptosporidum, EIA; Future; Expected date: 10/04/2019  -     WBC, Stool; Future; Expected date: 10/04/2019  -     Occult blood x 1, stool; Future; Expected date: 10/04/2019  -     Calprotectin; Future; Expected date: 10/04/2019  -     Rotavirus antigen, stool; Future; Expected date: 10/04/2019  - Recommended increase fiber in diet, especially  soluble fiber since this can help bulk up the stool consistency and may help to slow down how fast the stool goes through the colon and can prevent diarrhea  - Continue Bentyl 10 mg TID PRN as directed  - Imodium otc PRN after stool studies complete  - Schedule Colonoscopy, discussed procedure with the patient, including risks and benefits, patient verbalized understanding    Hematochezia  - Discussed about different etiologies that can cause rectal bleeding, such as but not limited to diverticulosis, polyps, colon mass, colon inflammation or infection, anal fissure or hemorrhoids.   - Schedule Colonoscopy, discussed procedure with the patient, verbalized understanding   - You may resume normal activity as long as you feel well.  - Avoid/minimize aspirin and anti-inflammatory drugs such as ibuprofen (Advil, Motrin) and naproxen (Aleve and Naprosyn).    Nausea and vomiting, intractability of vomiting not specified, unspecified vomiting type  - Start: pantoprazole (PROTONIX) 40 MG tablet; Take 1 tablet (40 mg total) by mouth once daily.  Dispense: 30 tablet; Refill: 11  - Start: promethazine (PHENERGAN) 12.5 MG Tab; Take 1 tablet (12.5 mg total) by mouth every 6 (six) hours as needed.  Dispense: 90 tablet; Refill: 1  - Schedule EGD, discussed procedure with patient, including risks and benefits, patient verbalized understanding    Epigastric pain  - Schedule EGD, discussed procedure with patient, including risks and benefits, patient verbalized understanding  - Start: pantoprazole (PROTONIX) 40 MG tablet; Take 1 tablet (40 mg total) by mouth once daily.  Dispense: 30 tablet; Refill: 11  - Take PPI in the morning 30-60 minutes before breakfast  - Educated patient on lifestyle modifications to help control/reduce reflux/abdominal pain including: avoid large meals, avoid eating within 2-3 hours of bedtime (avoid late night eating & lying down soon after eating), elevate head of bed if nocturnal symptoms are present,  smoking cessation (if current smoker), & weight loss (if overweight).   - Educated to avoid known foods which trigger reflux symptoms & to minimize/avoid high-fat foods, chocolate, caffeine, citrus, alcohol, & tomato products.  - Advised to avoid/limit use of NSAID's, since they can cause GI upset, bleeding, and/or ulcers. If needed, take with food.     If no improvement in symptoms or symptoms worsen, call/follow-up at clinic or go to ER

## 2019-10-04 NOTE — TELEPHONE ENCOUNTER
Attempted to call patient to follow up with her on how her appointment went with GI. Left a message and a call back number

## 2019-10-04 NOTE — TELEPHONE ENCOUNTER
----- Message from Pavithra Hinkle sent at 10/4/2019  9:50 AM CDT -----  Contact: self  Type:  Patient Returning Call    Who Called:  self  Who Left Message for Patient:  Not sure  Does the patient know what this is regarding?:  yes  Best Call Back Number:  [phone] (home)   Additional Information:  Patient states it is regarding the issues she is having. Thanks!

## 2019-10-04 NOTE — PATIENT INSTRUCTIONS
Abdominal Pain    Abdominal pain is pain in the stomach or belly area. Everyone has this pain from time to time. In many cases it goes away on its own. But abdominal pain can sometimes be due to a serious problem, such as appendicitis. So its important to know when to seek help.  Causes of abdominal pain  There are many possible causes of abdominal pain. Common causes in adults include:  · Constipation, diarrhea, or gas  · Stomach acid flowing back up into the esophagus (acid reflux or heartburn)  · Severe acid reflux, called GERD (gastroesophageal reflux disease)  · A sore in the lining of the stomach or small intestine (peptic ulcer)  · Inflammation of the gallbladder, liver, or pancreas  · Gallstones or kidney stones  · Appendicitis   · Intestinal blockage   · An internal organ pushing through a muscle or other tissue (hernia)  · Urinary tract infections  · In women, menstrual cramps, fibroids, or endometriosis  · Inflammation or infection of the intestines  Diagnosing the cause of abdominal pain  Your healthcare provider will do a physical exam help find the cause of your pain. If needed, tests will be ordered. Belly pain has many possible causes. So it can be hard to find the reason for your pain. Giving details about your pain can help. Tell your provider where and when you feel the pain, and what makes it better or worse. Also let your provider know if you have other symptoms such as:  · Fever  · Tiredness  · Upset stomach (nausea)  · Vomiting  · Changes in bathroom habits  Treating abdominal pain  Some causes of pain need emergency medical treatment right away. These include appendicitis or a bowel blockage. Other problems can be treated with rest, fluids, or medicines. Your healthcare provider can give you specific instructions for treatment or self-care based on what is causing your pain.  If you have vomiting or diarrhea, sip water or other clear fluids. When you are ready to eat solid foods again,  start with small amounts of easy-to-digest, low-fat foods. These include apple sauce, toast, or crackers.   When to seek medical care  Call 911 or go to the hospital right away if you:  · Cant pass stool and are vomiting  · Are vomiting blood or have bloody diarrhea or black, tarry diarrhea  · Have chest, neck, or shoulder pain  · Feel like you might pass out  · Have pain in your shoulder blades with nausea  · Have sudden, severe belly pain  · Have new, severe pain unlike any you have felt before  · Have a belly that is rigid, hard, and tender to touch  Call your healthcare provider if you have:  · Pain for more than 5 days  · Bloating for more than 2 days  · Diarrhea for more than 5 days  · A fever of 100.4°F (38.0°C) or higher, or as directed by your provider  · Pain that gets worse  · Weight loss for no reason  · Continued lack of appetite  · Blood in your stool  How to prevent abdominal pain  Here are some tips to help prevent abdominal pain:  · Eat smaller amounts of food at one time.  · Avoid greasy, fried, or other high-fat foods.  · Avoid foods that give you gas.  · Exercise regularly.  · Drink plenty of fluids.  To help prevent GERD symptoms:  · Quit smoking.  · Reduce alcohol and certain foods that increase stomach acid.  · Avoid aspirin and over-the-counter pain and fever medicines (NSAIDS or nonsteroidal anti-inflammatory drugs), if possible  · Lose extra weight.  · Finish eating at least 2 hours before you go to bed or lie down.  · Raise the head of your bed.  Date Last Reviewed: 7/1/2016  © 1446-7946 [employer]. [address] [place] [address], [place] [address]. All rights reserved. This information is not intended as a substitute for professional medical care. Always follow your healthcare professional's instructions.

## 2019-10-04 NOTE — LETTER
October 4, 2019      Hardy Dan MD  [address] [facility] [address] [place] [address]           [facility]  [address] [place] [address]  Phone: [phone]          Patient: Isela Smyth   MR Number: [id number]   YOB: 1975   Date of Visit: 10/4/2019       Dear Dr. Hardy Dan:    Thank you for referring Isela Smyth to me for evaluation. Attached you will find relevant portions of my assessment and plan of care.    If you have questions, please do not hesitate to call me. I look forward to following Isela Smyth along with you.    Sincerely,    Isabelle Serra, NP    Enclosure  CC:  No Recipients    If you would like to receive this communication electronically, please contact [email or web address] or [phone] to request more information on [employer] access.    For providers and/or their staff who would like to refer a patient to [facility], please contact us through our one-stop-shop provider referral line, [facility], at [phone].    If you feel you have received this communication in error or would no longer like to receive these types of communications, please e-mail [email or web address]

## 2019-10-04 NOTE — TELEPHONE ENCOUNTER
----- Message from Connie Lugo sent at 10/3/2019  5:14 PM CDT -----  Contact: pt  Type:  Patient Returning Call    Who Called:  pt  Who Left Message for Patient:  nurse  Does the patient know what this is regarding?:  wesley  Best Call Back Number:  [phone]  Additional Information:  Please Advise ---Thank you

## 2019-10-07 ENCOUNTER — TELEPHONE (OUTPATIENT)
Dept: FAMILY MEDICINE | Facility: CLINIC | Age: 44
End: 2019-10-07

## 2019-10-07 ENCOUNTER — TELEPHONE (OUTPATIENT)
Dept: PHARMACY | Facility: CLINIC | Age: 44
End: 2019-10-07

## 2019-10-07 ENCOUNTER — TELEPHONE (OUTPATIENT)
Dept: GASTROENTEROLOGY | Facility: CLINIC | Age: 44
End: 2019-10-07

## 2019-10-07 ENCOUNTER — OFFICE VISIT (OUTPATIENT)
Dept: NEUROLOGY | Facility: CLINIC | Age: 44
End: 2019-10-07
Payer: COMMERCIAL

## 2019-10-07 VITALS
HEIGHT: 67 IN | DIASTOLIC BLOOD PRESSURE: 88 MMHG | RESPIRATION RATE: 17 BRPM | WEIGHT: 174.5 LBS | HEART RATE: 80 BPM | BODY MASS INDEX: 27.39 KG/M2 | SYSTOLIC BLOOD PRESSURE: 128 MMHG

## 2019-10-07 DIAGNOSIS — M79.18 CERVICAL MYOFASCIAL PAIN SYNDROME: ICD-10-CM

## 2019-10-07 DIAGNOSIS — R19.7 DIARRHEA, UNSPECIFIED TYPE: Primary | ICD-10-CM

## 2019-10-07 DIAGNOSIS — G43.711 INTRACTABLE CHRONIC MIGRAINE WITHOUT AURA AND WITH STATUS MIGRAINOSUS: Primary | ICD-10-CM

## 2019-10-07 DIAGNOSIS — F32.A DEPRESSION, UNSPECIFIED DEPRESSION TYPE: ICD-10-CM

## 2019-10-07 DIAGNOSIS — R19.7 DIARRHEA, UNSPECIFIED TYPE: ICD-10-CM

## 2019-10-07 PROCEDURE — 3008F PR BODY MASS INDEX (BMI) DOCUMENTED: ICD-10-PCS | Mod: CPTII,S$GLB,, | Performed by: PSYCHIATRY & NEUROLOGY

## 2019-10-07 PROCEDURE — 99204 PR OFFICE/OUTPT VISIT, NEW, LEVL IV, 45-59 MIN: ICD-10-PCS | Mod: S$GLB,,, | Performed by: PSYCHIATRY & NEUROLOGY

## 2019-10-07 PROCEDURE — 3008F BODY MASS INDEX DOCD: CPT | Mod: CPTII,S$GLB,, | Performed by: PSYCHIATRY & NEUROLOGY

## 2019-10-07 PROCEDURE — 99999 PR PBB SHADOW E&M-EST. PATIENT-LVL III: ICD-10-PCS | Mod: PBBFAC,,, | Performed by: PSYCHIATRY & NEUROLOGY

## 2019-10-07 PROCEDURE — 99204 OFFICE O/P NEW MOD 45 MIN: CPT | Mod: S$GLB,,, | Performed by: PSYCHIATRY & NEUROLOGY

## 2019-10-07 PROCEDURE — 99999 PR PBB SHADOW E&M-EST. PATIENT-LVL III: CPT | Mod: PBBFAC,,, | Performed by: PSYCHIATRY & NEUROLOGY

## 2019-10-07 RX ORDER — BUTALBITAL, ACETAMINOPHEN AND CAFFEINE 50; 325; 40 MG/1; MG/1; MG/1
TABLET ORAL
Qty: 10 TABLET | Refills: 3 | Status: SHIPPED | OUTPATIENT
Start: 2019-10-07 | End: 2020-02-24

## 2019-10-07 RX ORDER — KETOROLAC TROMETHAMINE 30 MG/ML
60 INJECTION, SOLUTION INTRAMUSCULAR; INTRAVENOUS 2 TIMES DAILY PRN
Qty: 20 ML | Refills: 3 | Status: SHIPPED | OUTPATIENT
Start: 2019-10-07 | End: 2020-02-21

## 2019-10-07 NOTE — TELEPHONE ENCOUNTER
Spoke w/ lab. Stated that study was canceled because sample was collected in non-sterile container. LMOM for pt to return call in regards to this matter.

## 2019-10-07 NOTE — LETTER
October 7, 2019      Hardy Dan MD  [address] [facility] [address] [place] [address]           [facility] [place]  [address] [place] [address]  Phone: [phone]  Fax: [phone]          Patient: Isela Smyth   MR Number: [id number]   YOB: 1975   Date of Visit: 10/7/2019       Dear Dr. Hardy Dan:    Thank you for referring Isela Smyth to me for evaluation. Attached you will find relevant portions of my assessment and plan of care.    If you have questions, please do not hesitate to call me. I look forward to following Isela Smyth along with you.    Sincerely,    Ivy Donaldson MD    Enclosure  CC:  No Recipients    If you would like to receive this communication electronically, please contact [email or web address] or [phone] to request more information on [employer] Link access.    For providers and/or their staff who would like to refer a patient to [facility], please contact us through our one-stop-shop provider referral line, [facility], at [phone].    If you feel you have received this communication in error or would no longer like to receive these types of communications, please e-mail [email or web address]

## 2019-10-07 NOTE — TELEPHONE ENCOUNTER
----- Message from Ian Jay sent at 10/7/2019  2:05 PM CDT -----  Contact: Patient  Type:  Patient Returning Call    Who Called:  Patient  Who Left Message for Patient:  Basilia Wolff  Does the patient know what this is regarding?:  See previous message  Best Call Back Number:  [phone]  Additional Information:  NA

## 2019-10-07 NOTE — PATIENT INSTRUCTIONS
TESTING:  -- none     REFERRALS:  -- none     PREVENTION (use daily regardless of headache):  -- start Aimovig 140mg every 28 days ([facility] will call you) - usually takes up 3 treatments to see effect, return to clinic after you have tried three     AS-NEEDED TREATMENT (use total no more than 10 days per month unless otherwise stated):  -- Start Imitrex (sumatriptan) at onset of migraine. May repeat once in 2 hours. No more than 2 doses per pay, 10 days per month  -- Take Toradol 60mg injection at home for severe escalations (if imitrex failed to relieve)  -- rescue with 1-2 tablets of Fioricet (no more than 10 pills per month) if the above was ineffective   -- continue phenergan suppository for nausea   -- let us know if you want to schedule as needed infusions of toradol 30mg + phenergan 25mg in the [facility] infusion center (plus other medications)

## 2019-10-07 NOTE — TELEPHONE ENCOUNTER
LVM for callback to inform patient that [facility] received prescription for AIMOVIG and prior authorization is required.  [facility] will be back in touch once insurance determination is received.

## 2019-10-07 NOTE — TELEPHONE ENCOUNTER
----- Message from Faith Anderson sent at 10/7/2019 11:46 AM CDT -----    Type:  Test Results    Who Called:  pt  Name of Test (Lab/Mammo/Etc):   Stool   Sample   Best Call Back Number:  [phone]  Additional Information:    Discuss   Test  results

## 2019-10-07 NOTE — TELEPHONE ENCOUNTER
----- Message from Isabelle Serra NP sent at 10/7/2019  9:28 AM CDT -----  Regarding: FW: Lab Client Service  Contact: [phone]  Can you please call? Thanks.  ----- Message -----  From: Tor[facility]primitivoshahnaz JOHN[place]ON [facility]  Sent: 10/7/2019   9:20 AM CDT  To: Isabelle Serra NP, [facility] Staff  Subject: Lab Client Service                               Good Morning,    My name is Checo De La Vega I work in the Lab Client Services. We had a problem with some lab work on this patient. If someone from your office could call us at [phone] or ext. [phone] that would be great. Anyone in my department can help.      Thank you

## 2019-10-07 NOTE — TELEPHONE ENCOUNTER
----- Message from Faith Anderson sent at 10/7/2019 11:46 AM CDT -----    Type:  Test Results    Who Called:  pt  Name of Test (Lab/Mammo/Etc):   Stool   Sample   Best Call Back Number:  [phone]  Additional Information:    Discuss   Test  results

## 2019-10-07 NOTE — PROGRESS NOTES
Date of service: 10/7/2019  Referring provider: Dr. Hardy Dan    Subjective:      Chief complaint: Headache       Patient ID: Isela Smyth is a 44 y.o. lady with migraines, depression, diverticulosis, history of kidney stone presenting for new patient evaluation of headache    History of Present Illness    ORIGINAL HEADACHE HISTORY - 10/07/2019  Age at onset and course over time:  The headaches began in her early 20s; they are accompanied by significant nausea. Dairy reduction has helped. She feels like they start in her sinus. Stays with a constant sinus congestion. Neck and shoulders stay tight. If she can catch it early with imitrex or fioricet they are ok. Can last 3-4 days. Associated with significant reduction to functioning  Location:  Behind her eyes and her forehead  Quality:  [x] pressure [] tight [x] throbbing [x] sharp [] stabbing   Severity:  Current 2  Duration:  Hr to days  Frequency:  Near daily facial / sinus pressure; about 10 escalations per month   Headaches awaken at night?:  Yes    Worst time of day:   Associated with: [x] photophobia [x]  phonophobia [x] osmophobia [x] blurred vision  [x] double vision [x] loss of appetite [x] nausea [x] vomiting [] dizziness [] vertigo  [] tinnitus [] irritability [x] sinus pressure [x] problems with concentration   [x] neck tightness   Alleviated by:  [x] sleep [x] darkness [] massage [x] heat [x] ice [x] medication  Exacerbated by:  [] fatigue [x] light [x] noise [x] smells [] coughing [] sneezing  [] bending over [] ovulation [] menses [] alcohol [x] change in weather []  stress  Ipsilateral autonomic: [] nasal congestion [] lacrimation [] ptosis [] injection [] edema [] foreign body sensation [] ear fullness   ICP:   Sleep habits:   Caffeine intake: little, varies   Gyn status (if female):  Status post tubal ligation    Current acute treatment:  Imitrex 100 mg or fioricet (#60 given 4/15 still has 6 months later)  Flexeril most nights   Norco  7.5mg - for neck and lumbar pain (has had multiple surgeries); sometimes may go one week without taking   Phenergan supp     Current prevention:   Venlafaxine  mg mood     Previously tried/failed acute treatment:  Tylenol  Ibuprofen  Ketorolac  Naproxen  BU Pap  Excedrin  Zofran  Phenergan + toradol in the ED helps the most     Previously tried/failed preventative treatment:  Lyrica  Topiramate  Depakote  Wellbutrin  Lexapro  Gabapentin      Review of patient's allergies indicates:   Allergen Reactions    Gabapentin Other (See Comments)     Coming out of skin    Morphine Itching     Current Outpatient Medications   Medication Sig Dispense Refill    cyclobenzaprine (FLEXERIL) 10 MG tablet Take 1 tablet (10 mg total) by mouth 3 (three) times daily as needed. 30 tablet 5    dicyclomine (BENTYL) 10 MG capsule Take 1 capsule (10 mg total) by mouth 3 (three) times daily with meals. 90 capsule 0    HYDROcodone-acetaminophen (NORCO) 7.5-325 mg per tablet Take 1 tablet by mouth 2 (two) times daily as needed. 60 tablet 0    [START ON 10/26/2019] HYDROcodone-acetaminophen (NORCO) 7.5-325 mg per tablet Take 1 tablet by mouth 2 (two) times daily as needed. 60 tablet 0    [START ON 11/25/2019] HYDROcodone-acetaminophen (NORCO) 7.5-325 mg per tablet Take 1 tablet by mouth 2 (two) times daily as needed. 60 tablet 0    levothyroxine (SYNTHROID) 75 MCG tablet TAKE 1 TABLET (75 MCG TOTAL) BY MOUTH EVERY MORNING. 90 tablet 3    linaclotide (LINZESS) 145 mcg Cap capsule Take 1 capsule (145 mcg total) by mouth daily as needed. 30 capsule 5    ondansetron (ZOFRAN ODT) 4 MG TbDL Take 1 tablet (4 mg total) by mouth every 6 (six) hours as needed. 10 tablet 0    pantoprazole (PROTONIX) 40 MG tablet Take 1 tablet (40 mg total) by mouth once daily. 30 tablet 11    promethazine (PHENERGAN) 12.5 MG Tab Take 1 tablet (12.5 mg total) by mouth every 6 (six) hours as needed. 90 tablet 1    sucralfate (CARAFATE) 1 gram tablet TAKE 1  TABLET BY MOUTH THREE TIMES A  tablet 2    sumatriptan (IMITREX) 100 MG tablet TAKE 1 TABLET BY MOUTH EVERY DAY AS NEEDED. MAY REPEAT IN 2 HOURS IF NEEDED. DO NOT EXCEED 2 TABLETS PER DAY 54 tablet 2    traZODone (DESYREL) 150 MG tablet TAKE 1 TABLET (150 MG TOTAL) BY MOUTH NIGHTLY. 30 tablet 5    venlafaxine (EFFEXOR-XR) 150 MG Cp24 TAKE 1 CAPSULE BY MOUTH EVERY DAY 90 capsule 3    butalbital-acetaminophen-caffeine -40 mg (FIORICET, ESGIC) -40 mg per tablet 1 tab PO q6 hr PRN severe migraine. No more than 10 tab per 30 days. 10 tablet 3    erenumab-aooe 140 mg/mL AtIn Inject 140 mg into the skin every 28 days. 1 Syringe 11    ketorolac (TORADOL) 60 mg/2 mL Soln Inject 2 mLs (60 mg total) into the muscle 2 (two) times daily as needed (severe migraine.). No more than 2 days/week. 20 mL 3     No current facility-administered medications for this visit.      Facility-Administered Medications Ordered in Other Visits   Medication Dose Route Frequency Provider Last Rate Last Dose    lactated ringers infusion   Intravenous Continuous Marquis Zuluaga MD 75 mL/hr at 09/26/18 0845         Past Medical History  Past Medical History:   Diagnosis Date    Anxiety     Arthritis     Chronic lumbar pain     Depression     Deviated nasal septum     Diverticulosis     GERD (gastroesophageal reflux disease)     Hemorrhoids     Hypothyroid     Kidney stone     passed 10 stones by herself over the years, one needed procedure    Migraine headache     PONV (postoperative nausea and vomiting)     severe    Shortness of breath     Urticaria        Past Surgical History  Past Surgical History:   Procedure Laterality Date    AUGMENTATION OF BREAST      CHOLECYSTECTOMY      COLONOSCOPY  prior to 2011    COLONOSCOPY N/A 9/26/2018    Dr. Zuluaga; diverticulosis; repeat in 10 years    ESOPHAGOGASTRODUODENOSCOPY N/A 9/5/2018    Procedure: EGD (ESOPHAGOGASTRODUODENOSCOPY);  Surgeon: Marquis BUCKNER  MD Zan;  Location: [facility];  Service: Endoscopy;  Laterality: N/A;    HERNIA REPAIR      HIATAL HERNIA REPAIR      KIDNEY STONE SURGERY  2009    [facility], had stone removed by dr renay george, stayed in hospital 4 days    LAPAROSCOPIC GASTRIC BANDING  2007    later removed in 2016    LUMBAR EPIDURAL INJECTION      SINUS SURGERY      SPINE SURGERY      6 back surgeries; 1 neck surgery    TUBAL LIGATION      UPPER GASTROINTESTINAL ENDOSCOPY  03/2013    Dr. Zuluaga    UPPER GASTROINTESTINAL ENDOSCOPY  09/14/2016    Dr. Zuluaga    UPPER GASTROINTESTINAL ENDOSCOPY  09/05/2018    Dr. Zuluaga; gastritis; bx negative    VAGINAL DELIVERY      times 3       Family History  Family History   Problem Relation Age of Onset    Ulcers Mother     Cancer Neg Hx     Heart disease Neg Hx     Hypertension Neg Hx     Diabetes Neg Hx     Angioedema Neg Hx     Allergies Neg Hx     Allergic rhinitis Neg Hx     Asthma Neg Hx     Eczema Neg Hx     Immunodeficiency Neg Hx     Colon cancer Neg Hx     Colon polyps Neg Hx     Crohn's disease Neg Hx     Ulcerative colitis Neg Hx     Nephrolithiasis Neg Hx     Stomach cancer Neg Hx     Esophageal cancer Neg Hx        Social History  Social History     Socioeconomic History    Marital status:      Spouse name: Not on file    Number of children: Not on file    Years of education: Not on file    Highest education level: Not on file   Occupational History    Not on file   Social Needs    Financial resource strain: Not on file    Food insecurity:     Worry: Not on file     Inability: Not on file    Transportation needs:     Medical: Not on file     Non-medical: Not on file   Tobacco Use    Smoking status: Never Smoker    Smokeless tobacco: Never Used   Substance and Sexual Activity    Alcohol use: No    Drug use: No     Types: Oxycodone    Sexual activity: Yes     Partners: Male     Birth control/protection: Surgical   Lifestyle    Physical  activity:     Days per week: Not on file     Minutes per session: Not on file    Stress: Not on file   Relationships    Social connections:     Talks on phone: Not on file     Gets together: Not on file     Attends Mandaen service: Not on file     Active member of club or organization: Not on file     Attends meetings of clubs or organizations: Not on file     Relationship status: Not on file   Other Topics Concern    Not on file   Social History Narrative    Not on file        Review of Systems  14-point review of systems as follows:   No check rubi indicates NEGATIVE response   Constitutional: [x] weight loss, [x] change to appetite   Eyes: [] change in vision, [] double vision   Ears, nose, mouth, throat: [] frequent nose bleeds, [] ringing in the ears   Respiratory: [] cough, [] wheezing   Cardiovascular: [] chest pain, [] palpitations   Gastrointestinal: [] jaundice, [] nausea/vomiting   Genitourinary: [] incontinence, [] burning with urination   Hematologic/lymphatic: [] easy bruising/bleeding, [] night sweats   Neurological: [] numbness, [] weakness   Endocrine: [] fatigue, [] heat/cold intolerance   Allergy/Immunologic: [x] fevers, [x] chills   Musculoskeletal: [] muscle pain, [] joint pain   Psychiatric: [] thoughts of harming self/others, [] depression   Integumentary: [] rashes, [] sores that do not heal     Objective:        Vitals:    10/07/19 1004   BP: 128/88   Pulse: 80   Resp: 17     Body mass index is 27.33 kg/m².    Constitutional: appears in no acute distress, well-developed, well-nourished     Eyes: normal conjunctiva, PERRLA, optic discs are flat and sharp bilaterally     Ears, nose, mouth, throat: external appearance of ears and nose normal, hearing intact     Cardiovascular: regular rate and rhythm, no murmurs appreciated    Respiratory: unlabored respirations, breath sounds normal bilaterally    Gastrointestinal: no visible abdominal masses, no guarding, no visible  hernia    Musculoskeletal: normal tone in all four extremities. No atrophy. No abnormal movements. No pronator drift. No orbit. Symmetric finger tapping. Normal gait and station. Digits and nails normal.      Spine:   CERVICAL SPINE:  ROM: normal   MUSCLE SPASM:  Mild  FACET LOADING:  Left-sided  SPURLING: no  VERONICA / TAZ tender:  ARCADIO when only    Psychiatric: normal judgment and insight. Oriented to person, place, and time.     Neurologic:   Cortical functions: recent and remote memory intact, normal attention span and concentration, speech fluent, adequate fund of knowledge   Cranial nerves: visual fields full, PERRLA, EOMI, symmetric facial strength, hearing intact, palate elevates symmetrically, shoulder shrug 5/5, tongue protrudes midline   Reflexes: 2+ in the upper and lower extremities, no Harrison  Sensation: intact to temperature throughout   Coordination: normal finger to nose    Data Review:     I have personally reviewed the referring provider's notes, labs, & imaging made available to me today.      RADIOLOGY STUDIES:  I have personally reviewed the pertinent images performed.       Results for orders placed or performed during the hospital encounter of 01/25/16   CT Head Without Contrast    Narrative    CT head     INDICATION:    Headache     No comparison available     TECHNIQUE:    5 mm axial tomographic images of the brain were obtained. No contrast was   utilized. Images are reviewed on PACS in brain, blood and bone windows. Total       FINDINGS:    No hemorrhage, mass effect or CT evidence of acute cortical infarction. Brain  parenchyma and ventricles are normal. No cerebellar tonsillar ectopia.     No abnormal extra-axial fluid collections.     No hyperdense artery or vein.     No skull fracture or aggressive calvarial lesion. Visualized paranasal   sinuses and mastoid air cells are clear.     IMPRESSION:    1. Normal noncontrast CT head       Electronically signed by: Halrey Vasquez (Jan 25,  "2016 21:04:35)       Lab Results   Component Value Date     09/23/2019    K 4.0 09/23/2019     09/23/2019    CO2 24 09/23/2019    BUN 12 09/23/2019    CREATININE 0.50 09/23/2019    CREATININE 0.6 08/10/2012    GLU 88 09/23/2019    AST 30 09/23/2019    AST 27 01/25/2016    ALT 23 09/23/2019    ALBUMIN 4.8 09/23/2019    PROT 8.2 09/23/2019    BILITOT 0.4 09/23/2019    CHOL 203 (H) 11/26/2018    HDL 49 11/26/2018    LDLCALC 121.6 11/26/2018    TRIG 162 (H) 11/26/2018       Lab Results   Component Value Date    WBC 6.86 09/23/2019    HGB 12.0 09/23/2019    HCT 37.3 09/23/2019    MCV 91 09/23/2019     09/23/2019       Lab Results   Component Value Date    TSH 2.026 09/24/2019           Assessment & Plan:       Problem List Items Addressed This Visit        Neuro    Intractable chronic migraine without aura and with status migrainosus - Primary    Overview     Longstanding migraine history with near daily "sinus" headache and superimposed severe escalations of status migrainosus which are very disabling; his tried and failed numerous agents from different classes; has not tried Botox or CGRP; we discussed the risks and benefits of both and she elected to pursue CGRP, I will start Aimovig due to the benefit of constipation as she has some diarrhea this time    For as needed, she needs a strong regimen given status migrainosus with need to rescue in urgent care/ED has done the best with Toradol so will provide injections at home.         Relevant Medications    butalbital-acetaminophen-caffeine -40 mg (FIORICET, ESGIC) -40 mg per tablet    ketorolac (TORADOL) 60 mg/2 mL Soln    erenumab-aooe 140 mg/mL AtIn       Psychiatric    Depression    Overview     On venlafaxine 150 mg daily which also has a migraine benefit            GI    Diarrhea    Current Assessment & Plan     Currently undergoing workup  Aimovig for migraine may help alleviate this some            Orthopedic    Cervical myofascial " pain syndrome    Overview     Likely reactive to chronic migraine, if still present despite better headache control then changed over to tizanidine and referred to myofascial release                   TESTING:  -- none     REFERRALS:  -- none     PREVENTION (use daily regardless of headache):  -- start Aimovig 140mg every 28 days ([facility] will call you) - usually takes up 3 treatments to see effect, return to clinic after you have tried three     AS-NEEDED TREATMENT (use total no more than 10 days per month unless otherwise stated):  -- Start Imitrex (sumatriptan) at onset of migraine. May repeat once in 2 hours. No more than 2 doses per pay, 10 days per month  -- Take Toradol 60mg injection at home for severe escalations (if imitrex failed to relieve)  -- rescue with 1-2 tablets of Fioricet (no more than 10 pills per month) if the above was ineffective   -- continue phenergan suppository for nausea   -- let us know if you want to schedule as needed infusions of toradol 30mg + phenergan 25mg in the [facility] infusion center (plus other medications)    Follow up in about 3 months (around 1/7/2020) for office visit after 3 Aimovig .    Ivy Donaldson MD

## 2019-10-08 ENCOUNTER — HOSPITAL ENCOUNTER (OUTPATIENT)
Dept: RADIOLOGY | Facility: HOSPITAL | Age: 44
Discharge: HOME | End: 2019-10-08
Attending: OBSTETRICS & GYNECOLOGY
Payer: COMMERCIAL

## 2019-10-08 DIAGNOSIS — R10.9 RIGHT SIDED ABDOMINAL PAIN: ICD-10-CM

## 2019-10-08 PROCEDURE — 76856 US PELVIS COMP WITH TRANSVAG NON-OB (XPD): ICD-10-PCS | Mod: 26,,, | Performed by: RADIOLOGY

## 2019-10-08 PROCEDURE — 76830 TRANSVAGINAL US NON-OB: CPT | Mod: 26,,, | Performed by: RADIOLOGY

## 2019-10-08 PROCEDURE — 76856 US EXAM PELVIC COMPLETE: CPT | Mod: 26,,, | Performed by: RADIOLOGY

## 2019-10-08 PROCEDURE — 76830 US PELVIS COMP WITH TRANSVAG NON-OB (XPD): ICD-10-PCS | Mod: 26,,, | Performed by: RADIOLOGY

## 2019-10-08 PROCEDURE — 76830 TRANSVAGINAL US NON-OB: CPT | Mod: TC,PN

## 2019-10-09 ENCOUNTER — LAB VISIT (OUTPATIENT)
Dept: LAB | Facility: HOSPITAL | Age: 44
End: 2019-10-09
Attending: FAMILY MEDICINE
Payer: COMMERCIAL

## 2019-10-09 DIAGNOSIS — R19.7 DIARRHEA, UNSPECIFIED TYPE: ICD-10-CM

## 2019-10-09 LAB
C DIFF GDH STL QL: POSITIVE
C DIFF TOX A+B STL QL IA: NEGATIVE
C DIFF TOX GENS STL QL NAA+PROBE: POSITIVE

## 2019-10-09 PROCEDURE — 87493 C DIFF AMPLIFIED PROBE: CPT

## 2019-10-09 PROCEDURE — 83993 ASSAY FOR CALPROTECTIN FECAL: CPT

## 2019-10-09 PROCEDURE — 89055 LEUKOCYTE ASSESSMENT FECAL: CPT

## 2019-10-09 PROCEDURE — 87324 CLOSTRIDIUM AG IA: CPT

## 2019-10-09 PROCEDURE — 87329 GIARDIA AG IA: CPT

## 2019-10-09 PROCEDURE — 82272 OCCULT BLD FECES 1-3 TESTS: CPT

## 2019-10-10 ENCOUNTER — TELEPHONE (OUTPATIENT)
Dept: GASTROENTEROLOGY | Facility: CLINIC | Age: 44
End: 2019-10-10

## 2019-10-10 DIAGNOSIS — A49.8 CLOSTRIDIOIDES DIFFICILE INFECTION: Primary | ICD-10-CM

## 2019-10-10 LAB
CRYPTOSP AG STL QL IA: NEGATIVE
G LAMBLIA AG STL QL IA: NEGATIVE
OB PNL STL: NEGATIVE
WBC #/AREA STL HPF: NORMAL /[HPF]

## 2019-10-10 RX ORDER — VANCOMYCIN HYDROCHLORIDE 125 MG/1
125 CAPSULE ORAL EVERY 6 HOURS
Qty: 40 CAPSULE | Refills: 0 | Status: SHIPPED | OUTPATIENT
Start: 2019-10-10 | End: 2019-10-20

## 2019-10-10 NOTE — TELEPHONE ENCOUNTER
Please let patient know one of the stool studies has come back positive for C. Diff. This is an organism that usually presents after taking an antibiotic when the good bacteria are not present and the bad bacteria take over. I recommend to avoid anti-motility medications (such as lomotil, Pepto or imodium), take florastor probiotic daily and a course of antibiotics- Vancomycin 125 mg QID x 10 days. RX sent to her pharmacy. Instruct on good hand washing to prevent spreading the disease. Thanks.

## 2019-10-12 NOTE — TELEPHONE ENCOUNTER
DOCUMENTATION ONLY:   Prior authorization for Aimovig approved from 10/11/2019 to 04/10/2020.      Case ID# [id number]    Co-pay: $55    Patient Assistance IS required and  being researched.      Forward to patient assistance for review. FLC

## 2019-10-14 ENCOUNTER — TELEPHONE (OUTPATIENT)
Dept: GASTROENTEROLOGY | Facility: CLINIC | Age: 44
End: 2019-10-14

## 2019-10-14 NOTE — TELEPHONE ENCOUNTER
----- Message from Malika Orozco sent at 10/14/2019  1:16 PM CDT -----  Type: Needs Medical Advice    Who Called: self                                                                                                                            Symptoms (please be specific):  2 nd call                                                                               How long has patient had these symptoms: problem with antibiotics Pharmacy name and phone #:     Best Call Back Number: [phone] (home)   Additional Information:

## 2019-10-14 NOTE — TELEPHONE ENCOUNTER
----- Message from Lorena Liang sent at 10/14/2019 10:55 AM CDT -----  Contact: self  Patient need to speak to nurse regarding she was prescribed antibiotics for bacteria infection on  10/10 and stop taking over the weekend due to she was still sick and spoke with an an doctor over the weekend who informed patient to stop taking medication ,and to contact Dr. Serra today       Please call to advice [phone] (home)

## 2019-10-14 NOTE — TELEPHONE ENCOUNTER
Spoke with patient. Informed her to continue Vancomycin prescription until completion and continue probiotic daily. May take Zofron and Phenergan PRN for nausea. Patient verbalized understanding and will notify me of any additional problems.

## 2019-10-17 LAB — CALPROTECTIN STL-MCNT: 172.9 MCG/G

## 2019-10-18 ENCOUNTER — TELEPHONE (OUTPATIENT)
Dept: GASTROENTEROLOGY | Facility: CLINIC | Age: 44
End: 2019-10-18

## 2019-10-18 DIAGNOSIS — R19.7 DIARRHEA, UNSPECIFIED TYPE: Primary | ICD-10-CM

## 2019-10-18 DIAGNOSIS — R10.9 ABDOMINAL PAIN, UNSPECIFIED ABDOMINAL LOCATION: ICD-10-CM

## 2019-10-18 NOTE — TELEPHONE ENCOUNTER
----- Message from Farhana Hale sent at 10/18/2019  8:49 AM CDT -----  Type: Needs Medical Advice    Who Called:  Patient  Best Call Back Number: [phone]  Additional Information: Patient stated she spoke with Isabelle this morning but forgot to mention that she is still in alot of pain/ needs advice/stated she forgot to mention while on phone with her.

## 2019-10-18 NOTE — PROGRESS NOTES
Refill Authorization Note     is requesting a refill authorization.    Brief assessment and rationale for refill: ROUTE: OP  Name and strength of medication: dicyclomine (BENTYL) 10 MG capsule       Medication Therapy Plan: new start/OP, route to you    Medication reconciliation completed: No              How patient will take medication: t1t tid          Comments:   Appointments past 12m or future 3m    Date Provider   Last Visit   9/24/2019 Hardy Dan MD   Next Visit   Visit date not found Hardy Dan MD

## 2019-10-18 NOTE — TELEPHONE ENCOUNTER
Spoke w/ pt in regards to results and recommendations from provider. Pt verbalized understanding. Pt states her symptoms have not improved. Message sent to NP.

## 2019-10-18 NOTE — TELEPHONE ENCOUNTER
----- Message from Isabelle Serra NP sent at 10/18/2019  8:09 AM CDT -----  Please let patient know the remaining stool study is elevated suggesting inflammation in the colon. Continue with previous recommendations. Ask her if the nausea and diarrhea have improved please.

## 2019-10-20 RX ORDER — DICYCLOMINE HYDROCHLORIDE 10 MG/1
CAPSULE ORAL
Qty: 90 CAPSULE | Refills: 2 | Status: SHIPPED | OUTPATIENT
Start: 2019-10-20 | End: 2019-11-19 | Stop reason: SDUPTHER

## 2019-10-21 ENCOUNTER — TELEPHONE (OUTPATIENT)
Dept: GASTROENTEROLOGY | Facility: CLINIC | Age: 44
End: 2019-10-21

## 2019-10-21 NOTE — TELEPHONE ENCOUNTER
----- Message from Chanda Urena sent at 10/18/2019 12:03 PM CDT -----  Contact: patient  Type:  Patient Returning Call    Who Called:  patient  Who Left Message for Patient:  Phyla  Does the patient know what this is regarding?:    Best Call Back Number:  [phone]  Additional Information:  Call to pod. No answer

## 2019-10-21 NOTE — TELEPHONE ENCOUNTER
----- Message from Jose Keith sent at 10/21/2019 11:30 AM CDT -----  Contact: pt  Type:  Patient Returning Call    Who Called:  pt  Who Left Message for Patient: Phyla  Does the patient know what this is regarding?:    Best Call Back Number:  [phone]  Additional Information:

## 2019-10-29 ENCOUNTER — TELEPHONE (OUTPATIENT)
Dept: PHARMACY | Facility: CLINIC | Age: 44
End: 2019-10-29

## 2019-10-29 NOTE — TELEPHONE ENCOUNTER
Initial Aimovig consult completed on 10/25. Aimovig 140mg will be shipped on 10/29 to arrive at patient's home on 10/30 via [employer]. $ 5 copay and permission to charge CC on file [id number]. Patient intends to start Aimovig on 10/30. Address confirmed. Confirmed 2 patient identifiers - name and . Therapy Appropriate.    Indication: Migraine prophylaxis   Goals of treatment: Reduction in migraine frequency, duration, and/or intensity (may take 3 months to reach full efficacy)    Informed patient on online injection video on  website. [[email or web address]    Storage: keep refrigerated, do not freeze. Take out of the refrigerator 30-60 minutes prior to injection.    Counseled patient on administration directions:  - Inject 140 mg into the skin every 4 weeks.   - Monthly RX will come with gauze, bandaids, and alcohol swabs.  - Patient may inject in either the tops of the thighs, abdomen- but at least 2 inches away from belly button, or the outer part of upper arm (with assistance).   - Patient is to wipe down the injection site with the alcohol pad, wait to dry.    - Remove white cap from pen  - Gently squeeze OR stretch the area of the cleaned skin and hold it firmly.  Place the pen flat against the skin then push down on the purple button and release - there will be an initial click; in 10-15 seconds you will hear a second click and the window will go from from clear to yellow, indicating injection is complete.  - It is recommended to rotate injection sites; never inject into irritated skin.   - Patient will use sharps container; once full, per [place] law, she/ he may lock the sharps container and place in trash. Pharmacy will replace the sharps at no additional charge.    Patient was counseled on possible side effects:  - Injection site reaction: redness, soreness, itching, bruising  - constipation  - Muscle cramps (?2%)     Med rec completed. No known drug interactions with Aimovig.    Advised to keep  a calendar to stay compliant. Consultation included the importance of compliance . Patient advised of [facility] hours, refill procedures, clinical followups, and availability of on-call pharmacist Patient understands to report any medication changes to [facility] and provider. All questions answered and addressed to patients satisfaction. RPh to touch base with patient in 7 days and [facility] to contact patient in 3 weeks for refills.

## 2019-10-31 ENCOUNTER — TELEPHONE (OUTPATIENT)
Dept: GASTROENTEROLOGY | Facility: CLINIC | Age: 44
End: 2019-10-31

## 2019-10-31 NOTE — TELEPHONE ENCOUNTER
----- Message from Stefania Jorge sent at 10/30/2019  3:14 PM CDT -----  Contact: [phone]  Patient is requesting a call back from the nurse concerning upcoming surgery.    Please call the patient upon request at phone number [phone].

## 2019-10-31 NOTE — TELEPHONE ENCOUNTER
----- Message from Pavithra Hinkle sent at 10/31/2019  1:03 PM CDT -----  Contact: self  Type: Needs Medical Advice    Who Called:  self  Symptoms (please be specific):    How long has patient had these symptoms:    Pharmacy name and phone #:    Best Call Back Number: [phone] (home)   Additional Information: Patient needs a call back regarding medication she needs before the procedure tomorrow to prevent her from getting sick from the anesthesia. Please call patient. Thanks!

## 2019-11-01 ENCOUNTER — HOSPITAL ENCOUNTER (OUTPATIENT)
Facility: HOSPITAL | Age: 44
Discharge: HOME | End: 2019-11-01
Attending: INTERNAL MEDICINE | Admitting: INTERNAL MEDICINE
Payer: COMMERCIAL

## 2019-11-01 ENCOUNTER — ANESTHESIA (OUTPATIENT)
Dept: ENDOSCOPY | Facility: HOSPITAL | Age: 44
End: 2019-11-01
Payer: COMMERCIAL

## 2019-11-01 ENCOUNTER — ANESTHESIA EVENT (OUTPATIENT)
Dept: ENDOSCOPY | Facility: HOSPITAL | Age: 44
End: 2019-11-01
Payer: COMMERCIAL

## 2019-11-01 VITALS
HEIGHT: 68 IN | RESPIRATION RATE: 18 BRPM | OXYGEN SATURATION: 95 % | DIASTOLIC BLOOD PRESSURE: 89 MMHG | BODY MASS INDEX: 26.22 KG/M2 | TEMPERATURE: 98 F | WEIGHT: 173 LBS | SYSTOLIC BLOOD PRESSURE: 146 MMHG | HEART RATE: 87 BPM

## 2019-11-01 DIAGNOSIS — R19.4 CHANGE IN BOWEL HABITS: ICD-10-CM

## 2019-11-01 LAB
B-HCG UR QL: NEGATIVE
CTP QC/QA: YES

## 2019-11-01 PROCEDURE — 27201012 HC FORCEPS, HOT/COLD, DISP: Mod: PO | Performed by: INTERNAL MEDICINE

## 2019-11-01 PROCEDURE — 45380 COLONOSCOPY AND BIOPSY: CPT | Mod: PO | Performed by: INTERNAL MEDICINE

## 2019-11-01 PROCEDURE — 63600175 PHARM REV CODE 636 W HCPCS: Mod: PO | Performed by: INTERNAL MEDICINE

## 2019-11-01 PROCEDURE — 37000009 HC ANESTHESIA EA ADD 15 MINS: Mod: PO | Performed by: INTERNAL MEDICINE

## 2019-11-01 PROCEDURE — 45380 PR COLONOSCOPY,BIOPSY: ICD-10-PCS | Mod: ,,, | Performed by: INTERNAL MEDICINE

## 2019-11-01 PROCEDURE — D9220A PRA ANESTHESIA: ICD-10-PCS | Mod: ANES,,, | Performed by: ANESTHESIOLOGY

## 2019-11-01 PROCEDURE — D9220A PRA ANESTHESIA: Mod: ANES,,, | Performed by: ANESTHESIOLOGY

## 2019-11-01 PROCEDURE — 45380 COLONOSCOPY AND BIOPSY: CPT | Mod: ,,, | Performed by: INTERNAL MEDICINE

## 2019-11-01 PROCEDURE — 63600175 PHARM REV CODE 636 W HCPCS: Mod: PO | Performed by: NURSE ANESTHETIST, CERTIFIED REGISTERED

## 2019-11-01 PROCEDURE — D9220A PRA ANESTHESIA: ICD-10-PCS | Mod: CRNA,,, | Performed by: NURSE ANESTHETIST, CERTIFIED REGISTERED

## 2019-11-01 PROCEDURE — D9220A PRA ANESTHESIA: Mod: CRNA,,, | Performed by: NURSE ANESTHETIST, CERTIFIED REGISTERED

## 2019-11-01 PROCEDURE — 25000003 PHARM REV CODE 250: Mod: PO | Performed by: NURSE ANESTHETIST, CERTIFIED REGISTERED

## 2019-11-01 PROCEDURE — 37000008 HC ANESTHESIA 1ST 15 MINUTES: Mod: PO | Performed by: INTERNAL MEDICINE

## 2019-11-01 PROCEDURE — 81025 URINE PREGNANCY TEST: CPT | Mod: PO | Performed by: INTERNAL MEDICINE

## 2019-11-01 PROCEDURE — 88305 TISSUE SPECIMEN TO PATHOLOGY - SURGERY: ICD-10-PCS | Mod: 26,,, | Performed by: PATHOLOGY

## 2019-11-01 PROCEDURE — 63600175 PHARM REV CODE 636 W HCPCS: Mod: PO | Performed by: ANESTHESIOLOGY

## 2019-11-01 PROCEDURE — 88305 TISSUE EXAM BY PATHOLOGIST: CPT | Performed by: PATHOLOGY

## 2019-11-01 RX ORDER — SODIUM CHLORIDE 0.9 % (FLUSH) 0.9 %
10 SYRINGE (ML) INJECTION
Status: DISCONTINUED | OUTPATIENT
Start: 2019-11-01 | End: 2019-11-01 | Stop reason: HOSPADM

## 2019-11-01 RX ORDER — ONDANSETRON 2 MG/ML
4 INJECTION INTRAMUSCULAR; INTRAVENOUS ONCE
Status: COMPLETED | OUTPATIENT
Start: 2019-11-01 | End: 2019-11-01

## 2019-11-01 RX ORDER — SODIUM CHLORIDE, SODIUM LACTATE, POTASSIUM CHLORIDE, CALCIUM CHLORIDE 600; 310; 30; 20 MG/100ML; MG/100ML; MG/100ML; MG/100ML
INJECTION, SOLUTION INTRAVENOUS CONTINUOUS
Status: DISCONTINUED | OUTPATIENT
Start: 2019-11-01 | End: 2019-11-01 | Stop reason: HOSPADM

## 2019-11-01 RX ORDER — LORAZEPAM 2 MG/ML
0.25 INJECTION INTRAMUSCULAR EVERY 10 MIN PRN
Status: COMPLETED | OUTPATIENT
Start: 2019-11-01 | End: 2019-11-01

## 2019-11-01 RX ORDER — LIDOCAINE HYDROCHLORIDE 10 MG/ML
INJECTION, SOLUTION INTRAVENOUS
Status: DISCONTINUED | OUTPATIENT
Start: 2019-11-01 | End: 2019-11-01

## 2019-11-01 RX ORDER — PROPOFOL 10 MG/ML
VIAL (ML) INTRAVENOUS
Status: DISCONTINUED | OUTPATIENT
Start: 2019-11-01 | End: 2019-11-01

## 2019-11-01 RX ADMIN — PROPOFOL 100 MG: 10 INJECTION, EMULSION INTRAVENOUS at 12:11

## 2019-11-01 RX ADMIN — ONDANSETRON HYDROCHLORIDE 4 MG: 2 SOLUTION INTRAMUSCULAR; INTRAVENOUS at 01:11

## 2019-11-01 RX ADMIN — PROPOFOL 50 MG: 10 INJECTION, EMULSION INTRAVENOUS at 12:11

## 2019-11-01 RX ADMIN — LIDOCAINE HYDROCHLORIDE 50 MG: 10 INJECTION, SOLUTION INTRAVENOUS at 12:11

## 2019-11-01 RX ADMIN — PROPOFOL 150 MG: 10 INJECTION, EMULSION INTRAVENOUS at 12:11

## 2019-11-01 RX ADMIN — SODIUM CHLORIDE, SODIUM LACTATE, POTASSIUM CHLORIDE, AND CALCIUM CHLORIDE: .6; .31; .03; .02 INJECTION, SOLUTION INTRAVENOUS at 12:11

## 2019-11-01 RX ADMIN — LORAZEPAM 0.25 MG: 2 INJECTION INTRAMUSCULAR; INTRAVENOUS at 01:11

## 2019-11-01 NOTE — ANESTHESIA POSTPROCEDURE EVALUATION
Anesthesia Post Evaluation    Patient: Isela Smyth    Procedure(s) Performed: Procedure(s) (LRB):  COLONOSCOPY (N/A)    Final Anesthesia Type: general  Patient location during evaluation: PACU  Patient participation: Yes- Able to Participate  Level of consciousness: awake and alert  Post-procedure vital signs: reviewed and stable  Pain management: adequate  Airway patency: patent  PONV status at discharge: No PONV  Anesthetic complications: no      Cardiovascular status: blood pressure returned to baseline and hemodynamically stable  Respiratory status: unassisted  Hydration status: euvolemic  Follow-up not needed.          Vitals Value Taken Time   /78 11/1/2019 12:50 PM   Temp 36.5 °C (97.7 °F) 11/1/2019 12:50 PM   Pulse 109 11/1/2019 12:50 PM   Resp 20 11/1/2019 12:50 PM   SpO2 99 % 11/1/2019 12:50 PM         No case tracking events are documented in the log.      Pain/Saroj Score: Saroj Score: 6 (11/1/2019 12:50 PM)

## 2019-11-01 NOTE — DISCHARGE SUMMARY
Discharge Note  Short Stay      SUMMARY     Admit Date: 11/1/2019    Attending Physician: Marquis Zuluaga MD     Discharge Physician: Marquis Zuluaga MD    Discharge Date: 11/1/2019 12:49 PM    Final Diagnosis: Epigastric pain [R10.13]  RLQ abdominal pain [R10.31]  Nausea [R11.0]  Vomiting, intractability of vomiting not specified, presence of nausea not specified, unspecified vomiting type [R11.10]  Diarrhea, unspecified type [R19.7]  Change in bowel habit [R19.4]  Hematochezia [K92.1]    Disposition: HOME OR SELF CARE    Patient Instructions:   Current Discharge Medication List      CONTINUE these medications which have NOT CHANGED    Details   butalbital-acetaminophen-caffeine -40 mg (FIORICET, ESGIC) -40 mg per tablet 1 tab PO q6 hr PRN severe migraine. No more than 10 tab per 30 days.  Qty: 10 tablet, Refills: 3    Associated Diagnoses: Intractable chronic migraine without aura and with status migrainosus      cyclobenzaprine (FLEXERIL) 10 MG tablet Take 1 tablet (10 mg total) by mouth 3 (three) times daily as needed.  Qty: 30 tablet, Refills: 5      dicyclomine (BENTYL) 10 MG capsule TAKE 1 CAPSULE BY MOUTH 3 TIMES DAILY WITH MEALS.  Qty: 90 capsule, Refills: 2    Associated Diagnoses: Diarrhea, unspecified type; Abdominal pain, unspecified abdominal location      erenumab-aooe 140 mg/mL AtIn Inject 140 mg into the skin every 28 days.  Qty: 1 mL, Refills: 11    Associated Diagnoses: Intractable chronic migraine without aura and with status migrainosus      !! HYDROcodone-acetaminophen (NORCO) 7.5-325 mg per tablet Take 1 tablet by mouth 2 (two) times daily as needed.  Qty: 60 tablet, Refills: 0    Comments: Quantity prescribed more than 7 day supply? Yes, quantity medically necessary      !! HYDROcodone-acetaminophen (NORCO) 7.5-325 mg per tablet Take 1 tablet by mouth 2 (two) times daily as needed.  Qty: 60 tablet, Refills: 0    Comments: Quantity prescribed more than 7 day supply? Yes,  quantity medically necessary      ketorolac (TORADOL) 60 mg/2 mL Soln Inject 2 mLs (60 mg total) into the muscle 2 (two) times daily as needed (severe migraine.). No more than 2 days/week.  Qty: 20 mL, Refills: 3    Comments: Provide injection syringes and needles  Associated Diagnoses: Intractable chronic migraine without aura and with status migrainosus      levothyroxine (SYNTHROID) 75 MCG tablet TAKE 1 TABLET (75 MCG TOTAL) BY MOUTH EVERY MORNING.  Qty: 90 tablet, Refills: 3      linaclotide (LINZESS) 145 mcg Cap capsule Take 1 capsule (145 mcg total) by mouth daily as needed.  Qty: 30 capsule, Refills: 5      pantoprazole (PROTONIX) 40 MG tablet Take 1 tablet (40 mg total) by mouth once daily.  Qty: 30 tablet, Refills: 11    Associated Diagnoses: Nausea and vomiting, intractability of vomiting not specified, unspecified vomiting type; Epigastric pain      promethazine (PHENERGAN) 12.5 MG Tab Take 1 tablet (12.5 mg total) by mouth every 6 (six) hours as needed.  Qty: 90 tablet, Refills: 1      sumatriptan (IMITREX) 100 MG tablet TAKE 1 TABLET BY MOUTH EVERY DAY AS NEEDED. MAY REPEAT IN 2 HOURS IF NEEDED. DO NOT EXCEED 2 TABLETS PER DAY  Qty: 54 tablet, Refills: 2    Associated Diagnoses: Migraine without status migrainosus, not intractable, unspecified migraine type      traZODone (DESYREL) 150 MG tablet TAKE 1 TABLET (150 MG TOTAL) BY MOUTH NIGHTLY.  Qty: 30 tablet, Refills: 5      venlafaxine (EFFEXOR-XR) 150 MG Cp24 TAKE 1 CAPSULE BY MOUTH EVERY DAY  Qty: 90 capsule, Refills: 3      ondansetron (ZOFRAN ODT) 4 MG TbDL Take 1 tablet (4 mg total) by mouth every 6 (six) hours as needed.  Qty: 10 tablet, Refills: 0      sucralfate (CARAFATE) 1 gram tablet TAKE 1 TABLET BY MOUTH THREE TIMES A DAY  Qty: 100 tablet, Refills: 2       !! - Potential duplicate medications found. Please discuss with provider.          Discharge Procedure Orders (must include Diet, Follow-up, Activity)    Follow Up:  Follow up with PCP as  previously scheduled  Resume routine diet.  Activity as tolerated.    No driving day of procedure.

## 2019-11-01 NOTE — H&P
History & Physical - Short Stay  Gastroenterology      SUBJECTIVE:     Procedure: Colonoscopy    Chief Complaint/Indication for Procedure: Change in Bowel Habits and Rectal Bleeding    PTA Medications   Medication Sig    butalbital-acetaminophen-caffeine -40 mg (FIORICET, ESGIC) -40 mg per tablet 1 tab PO q6 hr PRN severe migraine. No more than 10 tab per 30 days.    cyclobenzaprine (FLEXERIL) 10 MG tablet Take 1 tablet (10 mg total) by mouth 3 (three) times daily as needed.    dicyclomine (BENTYL) 10 MG capsule TAKE 1 CAPSULE BY MOUTH 3 TIMES DAILY WITH MEALS.    erenumab-aooe 140 mg/mL AtIn Inject 140 mg into the skin every 28 days.    HYDROcodone-acetaminophen (NORCO) 7.5-325 mg per tablet Take 1 tablet by mouth 2 (two) times daily as needed.    [START ON 11/25/2019] HYDROcodone-acetaminophen (NORCO) 7.5-325 mg per tablet Take 1 tablet by mouth 2 (two) times daily as needed.    ketorolac (TORADOL) 60 mg/2 mL Soln Inject 2 mLs (60 mg total) into the muscle 2 (two) times daily as needed (severe migraine.). No more than 2 days/week.    levothyroxine (SYNTHROID) 75 MCG tablet TAKE 1 TABLET (75 MCG TOTAL) BY MOUTH EVERY MORNING.    linaclotide (LINZESS) 145 mcg Cap capsule Take 1 capsule (145 mcg total) by mouth daily as needed.    pantoprazole (PROTONIX) 40 MG tablet Take 1 tablet (40 mg total) by mouth once daily.    promethazine (PHENERGAN) 12.5 MG Tab Take 1 tablet (12.5 mg total) by mouth every 6 (six) hours as needed.    sumatriptan (IMITREX) 100 MG tablet TAKE 1 TABLET BY MOUTH EVERY DAY AS NEEDED. MAY REPEAT IN 2 HOURS IF NEEDED. DO NOT EXCEED 2 TABLETS PER DAY    traZODone (DESYREL) 150 MG tablet TAKE 1 TABLET (150 MG TOTAL) BY MOUTH NIGHTLY.    venlafaxine (EFFEXOR-XR) 150 MG Cp24 TAKE 1 CAPSULE BY MOUTH EVERY DAY    ondansetron (ZOFRAN ODT) 4 MG TbDL Take 1 tablet (4 mg total) by mouth every 6 (six) hours as needed. (Patient not taking: Reported on 10/31/2019)    sucralfate  (CARAFATE) 1 gram tablet TAKE 1 TABLET BY MOUTH THREE TIMES A DAY (Patient not taking: Reported on 10/31/2019)       Review of patient's allergies indicates:   Allergen Reactions    Gabapentin Other (See Comments)     Coming out of skin    Morphine Itching        Past Medical History:   Diagnosis Date    Anxiety     Arthritis     Chronic lumbar pain     Depression     Deviated nasal septum     Diverticulosis     GERD (gastroesophageal reflux disease)     Hemorrhoids     Hypothyroid     Kidney stone     passed 10 stones by herself over the years, one needed procedure    Migraine headache     PONV (postoperative nausea and vomiting)     severe    Shortness of breath     Urticaria      Past Surgical History:   Procedure Laterality Date    AUGMENTATION OF BREAST      CHOLECYSTECTOMY      COLONOSCOPY  prior to 2011    COLONOSCOPY N/A 9/26/2018    Dr. Zuluaga; diverticulosis; repeat in 10 years    ESOPHAGOGASTRODUODENOSCOPY N/A 9/5/2018    Procedure: EGD (ESOPHAGOGASTRODUODENOSCOPY);  Surgeon: Marquis Zuluaga MD;  Location: [facility];  Service: Endoscopy;  Laterality: N/A;    HERNIA REPAIR      HIATAL HERNIA REPAIR      KIDNEY STONE SURGERY  2009    [facility], had stone removed by dr renay george, stayed in hospital 4 days    LAPAROSCOPIC GASTRIC BANDING  2007    later removed in 2016    LUMBAR EPIDURAL INJECTION      SINUS SURGERY      SPINE SURGERY      6 back surgeries; 1 neck surgery    TUBAL LIGATION      UPPER GASTROINTESTINAL ENDOSCOPY  03/2013    Dr. Zuluaga    UPPER GASTROINTESTINAL ENDOSCOPY  09/14/2016    Dr. Zuluaga    UPPER GASTROINTESTINAL ENDOSCOPY  09/05/2018    Dr. Zuluaga; gastritis; bx negative    VAGINAL DELIVERY      times 3     Family History   Problem Relation Age of Onset    Ulcers Mother     Cancer Neg Hx     Heart disease Neg Hx     Hypertension Neg Hx     Diabetes Neg Hx     Angioedema Neg Hx     Allergies Neg Hx     Allergic rhinitis Neg Hx      Asthma Neg Hx     Eczema Neg Hx     Immunodeficiency Neg Hx     Colon cancer Neg Hx     Colon polyps Neg Hx     Crohn's disease Neg Hx     Ulcerative colitis Neg Hx     Nephrolithiasis Neg Hx     Stomach cancer Neg Hx     Esophageal cancer Neg Hx      Social History     Tobacco Use    Smoking status: Never Smoker    Smokeless tobacco: Never Used   Substance Use Topics    Alcohol use: No    Drug use: No     Types: Oxycodone         OBJECTIVE:     Vital Signs (Most Recent)  Temp: 98 °F (36.7 °C) (11/01/19 1202)  Pulse: 85 (11/01/19 1202)  Resp: 18 (11/01/19 1202)  BP: (!) 151/92 (11/01/19 1202)  SpO2: 100 % (11/01/19 1202)    Physical Exam:                                                       GENERAL:  Comfortable, in no acute distress.                                 HEENT EXAM:  Nonicteric.  No adenopathy.  Oropharynx is clear.               NECK:  Supple.                                                               LUNGS:  Clear.                                                               CARDIAC:  Regular rate and rhythm.  S1, S2.  No murmur.                      ABDOMEN:  Soft, positive bowel sounds, nontender.  No hepatosplenomegaly or masses.  No rebound or guarding.                                             EXTREMITIES:  No edema.     MENTAL STATUS:  Normal, alert and oriented.      ASSESSMENT/PLAN:     Assessment: Change in Bowel Habits and Rectal Bleeding    Plan: Colonoscopy    Anesthesia Plan: General    ASA Grade: ASA 2 - Patient with mild systemic disease with no functional limitations    MALLAMPATI SCORE:  I (soft palate, uvula, fauces, and tonsillar pillars visible)

## 2019-11-01 NOTE — DISCHARGE INSTRUCTIONS
Recovery After Procedural Sedation (Adult)  You have been given medicine by vein to make you sleep during your surgery. This may have included both a pain medicine and sleeping medicine. Most of the effects have worn off. But you may still have some drowsiness for the next 6 to 8 hours.  Home care  Follow these guidelines when you get home:  · For the next 8 hours, you should be watched by a responsible adult. This person should make sure your condition is not getting worse.  · Don't drink any alcohol for the next 24 hours.  · Don't drive, operate dangerous machinery, or make important business or personal decisions during the next 24 hours.  Note: Your healthcare provider may tell you not to take any medicine by mouth for pain or sleep in the next 4 hours. These medicines may react with the medicines you were given in the hospital. This could cause a much stronger response than usual.  Follow-up care  Follow up with your healthcare provider if you are not alert and back to your usual level of activity within 12 hours.  When to seek medical advice  Call your healthcare provider right away if any of these occur:  · Drowsiness gets worse  · Weakness or dizziness gets worse  · Repeated vomiting  · You can't be awakened   Date Last Reviewed: 10/18/2016  © 8490-8912 [employer]. [address] [place] [address], [place] [address]. All rights reserved. This information is not intended as a substitute for professional medical care. Always follow your healthcare professional's instructions.

## 2019-11-01 NOTE — PLAN OF CARE
"Pt woke up anxious stating she was " scared of vomiting bc she had a bad experience". Emotional support and reassurance given. Dr. Tang notified and zofran and ativan given. PT states she was "anxious in pre-op bc of blood pressure". Pts BP stable in recovery. Pt appears less anxious and calm. Patient tolerating oral liquids without difficulty. no complaints voiced at this time. Discharge instructions reviewed with patient/family/friend with good verbal feedback received. Patient ready for discharge  "

## 2019-11-01 NOTE — ANESTHESIA PREPROCEDURE EVALUATION
11/01/2019  Isela Smyth is a 44 y.o., female.    Anesthesia Evaluation    I have reviewed the Patient Summary Reports.    I have reviewed the Nursing Notes.      Review of Systems  Anesthesia Hx:  PONV after GA   Endocrine:   Hypothyroidism        Physical Exam  General:  Obesity    Airway/Jaw/Neck:  Airway Findings: Mallampati: II                Anesthesia Plan  Type of Anesthesia, risks & benefits discussed:  Anesthesia Type:  general  Patient's Preference:   Intra-op Monitoring Plan:   Intra-op Monitoring Plan Comments:   Post Op Pain Control Plan:   Post Op Pain Control Plan Comments:   Induction:   IV  Beta Blocker:  Patient is not currently on a Beta-Blocker (No further documentation required).       Informed Consent: Patient understands risks and agrees with Anesthesia plan.  Questions answered. Anesthesia consent signed with patient.  ASA Score: 2     Day of Surgery Review of History & Physical:    H&P update referred to the surgeon.         Ready For Surgery From Anesthesia Perspective.

## 2019-11-01 NOTE — [employer] PATIENT INSTRUCTIONS
Discharge Summary/Instructions after an Endoscopic Procedure  Patient Name: Isela Smyth  Patient MRN: [id number]  Patient YOB: 1975 Friday, November 01, 2019  Marquis Zuluaga MD  RESTRICTIONS:  During your procedure today, you received medications for sedation.  These   medications may affect your judgment, balance and coordination.  Therefore,   for 24 hours, you have the following restrictions:   - DO NOT drive a car, operate machinery, make legal/financial decisions,   sign important papers or drink alcohol.    ACTIVITY:  Today: no heavy lifting, straining or running due to procedural   sedation/anesthesia.  The following day: return to full activity including work.  DIET:  Eat and drink normally unless instructed otherwise.     TREATMENT FOR COMMON SIDE EFFECTS:  - Mild abdominal pain, nausea, belching, bloating or excessive gas:  rest,   eat lightly and use a heating pad.  - Sore Throat: treat with throat lozenges and/or gargle with warm salt   water.  - Because air was used during the procedure, expelling large amounts of air   from your rectum or belching is normal.  - If a bowel prep was taken, you may not have a bowel movement for 1-3 days.    This is normal.  SYMPTOMS TO WATCH FOR AND REPORT TO YOUR PHYSICIAN:  1. Abdominal pain or bloating, other than gas cramps.  2. Chest pain.  3. Back pain.  4. Signs of infection such as: chills or fever occurring within 24 hours   after the procedure.  5. Rectal bleeding, which would show as bright red, maroon, or black stools.   (A tablespoon of blood from the rectum is not serious, especially if   hemorrhoids are present.)  6. Vomiting.  7. Weakness or dizziness.  GO DIRECTLY TO THE NEAREST EMERGENCY ROOM IF YOU HAVE ANY OF THE FOLLOWING:      Difficulty breathing              Chills and/or fever over 101 F   Persistent vomiting and/or vomiting blood   Severe abdominal pain   Severe chest pain   Black, tarry stools   Bleeding- more than one  tablespoon   Any other symptom or condition that you feel may need urgent attention  Your doctor recommends these additional instructions:  If any biopsies were taken, your doctors clinic will contact you in 1 to 2   weeks with any results.  We are waiting for your pathology results.   Your physician has recommended a repeat colonoscopy at age 50 (please   contact the clinic prior to your 50th birthday to schedule) for screening   purposes.   You are being discharged to home.  For questions, problems or results please call your physician - Marquis Zuluaga MD at Work:  [phone].  EMERGENCY PHONE NUMBER: [phone], LAB RESULTS: [phone]  IF A COMPLICATION OR EMERGENCY SITUATION ARISES AND YOU ARE UNABLE TO REACH   YOUR PHYSICIAN - GO DIRECTLY TO THE EMERGENCY ROOM.  ___________________________________________  Nurse Signature  ___________________________________________  Patient/Designated Responsible Party Signature  Marquis Zuluaga MD  11/1/2019 12:49:14 PM  This report has been verified and signed electronically.  [employer]

## 2019-11-01 NOTE — TRANSFER OF CARE
"Anesthesia Transfer of Care Note    Patient: Isela Smyth    Procedure(s) Performed: Procedure(s) (LRB):  COLONOSCOPY (N/A)    Patient location: PACU    Anesthesia Type: general    Transport from OR: Transported from OR on room air with adequate spontaneous ventilation    Post pain: adequate analgesia    Post assessment: no apparent anesthetic complications and tolerated procedure well    Post vital signs: stable    Level of consciousness: awake    Nausea/Vomiting: no nausea/vomiting    Complications: none    Transfer of care protocol was followed      Last vitals:   Visit Vitals  BP (!) 151/92 (BP Location: Right arm, Patient Position: Lying)   Pulse 85   Temp 36.7 °C (98 °F) (Skin)   Resp 18   Ht 5' 7.5" (1.715 m)   Wt 78.5 kg (173 lb)   LMP 10/15/2019 (Exact Date)   SpO2 100%   Breastfeeding? No   BMI 26.70 kg/m²     "

## 2019-11-19 ENCOUNTER — TELEPHONE (OUTPATIENT)
Dept: ENDOSCOPY | Facility: HOSPITAL | Age: 44
End: 2019-11-19

## 2019-11-19 DIAGNOSIS — R10.9 ABDOMINAL PAIN, UNSPECIFIED ABDOMINAL LOCATION: ICD-10-CM

## 2019-11-19 DIAGNOSIS — R19.7 DIARRHEA, UNSPECIFIED TYPE: ICD-10-CM

## 2019-11-19 NOTE — TELEPHONE ENCOUNTER
Refill Routing Note    Medication(s) are appropriate for refill Outside of protocol      Requested Prescriptions   Pending Prescriptions Disp Refills    dicyclomine (BENTYL) 10 MG capsule [Pharmacy Med Name: DICYCLOMINE 10 MG CAPSULE] 90 capsule 2     Sig: TAKE 1 CAPSULE BY MOUTH 3 TIMES DAILY WITH MEALS.       There is no refill protocol information for this order

## 2019-11-19 NOTE — TELEPHONE ENCOUNTER
Spoke with pt re: EGD 11/21/19.  Pt stated that she will be out of town and will need to reschedule. Please call her to reschedule. NM

## 2019-11-20 ENCOUNTER — TELEPHONE (OUTPATIENT)
Dept: PHARMACY | Facility: CLINIC | Age: 44
End: 2019-11-20

## 2019-11-20 ENCOUNTER — TELEPHONE (OUTPATIENT)
Dept: GASTROENTEROLOGY | Facility: CLINIC | Age: 44
End: 2019-11-20

## 2019-11-20 RX ORDER — DICYCLOMINE HYDROCHLORIDE 10 MG/1
CAPSULE ORAL
Qty: 90 CAPSULE | Refills: 2 | Status: SHIPPED | OUTPATIENT
Start: 2019-11-20 | End: 2020-02-10

## 2019-11-20 NOTE — TELEPHONE ENCOUNTER
Spoke with pt. Rescheduled EGD. Pt verbalized understanding to new date. Pt states due to her daughters college basketball schedule she may have to call to reschedule again. Informed pt that is no issue.   Prep instructions & reminder letter placed in mail.

## 2019-12-20 ENCOUNTER — TELEPHONE (OUTPATIENT)
Dept: PHARMACY | Facility: CLINIC | Age: 44
End: 2019-12-20

## 2019-12-23 ENCOUNTER — TELEPHONE (OUTPATIENT)
Dept: FAMILY MEDICINE | Facility: CLINIC | Age: 44
End: 2019-12-23

## 2019-12-23 NOTE — TELEPHONE ENCOUNTER
----- Message from Juan Tracy sent at 12/23/2019 10:48 AM CST -----  Contact: pt  Type:  RX Refill Request    Who Called:  Pt  Refill or New Rx:  refill  RX Name and Strength:  HYDROcodone-acetaminophen (NORCO) 7.5-325 mg per tablet  How is the patient currently taking it? (ex. 1XDay):  Sig - Route: Take 1 tablet by mouth 2 (two) times daily as needed. - Oral   Is this a 30 day or 90 day RX:  30  Preferred Pharmacy with phone number:    [id number] [place] - [address]  [address]  [place] [address]  Phone: [phone] Fax: [phone]    Local or Mail Order:  local  Ordering Provider:    Best Call Back Number:  [phone]  Additional Information:  Last refill 11/25/2019 / next refill 12/25/2019

## 2019-12-23 NOTE — TELEPHONE ENCOUNTER
----- Message from Juan Tracy sent at 12/23/2019 10:52 AM CST -----  Contact: pt  Type:  Sooner Apoointment Request    Caller is requesting a sooner appointment.  Caller declined first available appointment listed below.  Caller will not accept being placed on the waitlist and is requesting a message be sent to doctor.    Name of Caller:  pt  When is the first available appointment?  1/13/2020 11:40am  Symptoms:  3mo followup  Best Call Back Number:  [phone]  Additional Information:

## 2019-12-24 RX ORDER — HYDROCODONE BITARTRATE AND ACETAMINOPHEN 7.5; 325 MG/1; MG/1
1 TABLET ORAL 2 TIMES DAILY PRN
Qty: 60 TABLET | Refills: 0 | Status: SHIPPED | OUTPATIENT
Start: 2019-12-24 | End: 2020-01-02 | Stop reason: SDUPTHER

## 2019-12-27 ENCOUNTER — TELEPHONE (OUTPATIENT)
Dept: FAMILY MEDICINE | Facility: CLINIC | Age: 44
End: 2019-12-27

## 2019-12-27 NOTE — TELEPHONE ENCOUNTER
Spoke with patient re: Dr Dan being out of the office until next Thursday and unable to get a new RX sent to [facility]. Patient stated she had a pain contract with Dr Dan. Verbalilzed understanding.

## 2019-12-27 NOTE — TELEPHONE ENCOUNTER
----- Message from Farhana Hale sent at 12/27/2019  1:15 PM CST -----  Type: Needs Medication ordered    Who Called:  Patient  Best Call Back Number: [phone]  Additional Information: Patient calling back to stated [facility] is out of medication: Norco/requesting it be ordered at [facility] on [facility] in [place] instead/please order and call patient back to advise.

## 2020-01-02 NOTE — TELEPHONE ENCOUNTER
----- Message from Jose Keith sent at 1/2/2020  2:39 PM CST -----  Contact: pt  Type: Needs Medical Advice    Who Called:  pt    Pharm[facility] and phone #[facility]  Phone: [phone]    Best Call Back Number: [phone]  Additional Information: pt states she was to have HYDROcodone-acetaminophen (NORCO) 7.5-325 mg per tablet sent to another pharmacy (listed above) due to the one it was sent to was out. Please call to advise

## 2020-01-04 RX ORDER — HYDROCODONE BITARTRATE AND ACETAMINOPHEN 7.5; 325 MG/1; MG/1
1 TABLET ORAL 2 TIMES DAILY PRN
Qty: 60 TABLET | Refills: 0 | Status: SHIPPED | OUTPATIENT
Start: 2020-01-04 | End: 2020-02-03

## 2020-01-17 ENCOUNTER — OFFICE VISIT (OUTPATIENT)
Dept: OBSTETRICS AND GYNECOLOGY | Facility: CLINIC | Age: 45
End: 2020-01-17
Payer: COMMERCIAL

## 2020-01-17 VITALS
HEIGHT: 68 IN | WEIGHT: 172.19 LBS | SYSTOLIC BLOOD PRESSURE: 120 MMHG | BODY MASS INDEX: 26.1 KG/M2 | DIASTOLIC BLOOD PRESSURE: 80 MMHG

## 2020-01-17 DIAGNOSIS — N83.201 RIGHT OVARIAN CYST: ICD-10-CM

## 2020-01-17 DIAGNOSIS — F32.A DEPRESSION, UNSPECIFIED DEPRESSION TYPE: ICD-10-CM

## 2020-01-17 DIAGNOSIS — R10.9 RIGHT SIDED ABDOMINAL PAIN: Primary | ICD-10-CM

## 2020-01-17 PROCEDURE — 99213 PR OFFICE/OUTPT VISIT, EST, LEVL III, 20-29 MIN: ICD-10-PCS | Mod: S$GLB,,, | Performed by: OBSTETRICS & GYNECOLOGY

## 2020-01-17 PROCEDURE — 99213 OFFICE O/P EST LOW 20 MIN: CPT | Mod: S$GLB,,, | Performed by: OBSTETRICS & GYNECOLOGY

## 2020-01-17 PROCEDURE — 99999 PR PBB SHADOW E&M-EST. PATIENT-LVL III: ICD-10-PCS | Mod: PBBFAC,,, | Performed by: OBSTETRICS & GYNECOLOGY

## 2020-01-17 PROCEDURE — 3008F BODY MASS INDEX DOCD: CPT | Mod: CPTII,S$GLB,, | Performed by: OBSTETRICS & GYNECOLOGY

## 2020-01-17 PROCEDURE — 99999 PR PBB SHADOW E&M-EST. PATIENT-LVL III: CPT | Mod: PBBFAC,,, | Performed by: OBSTETRICS & GYNECOLOGY

## 2020-01-17 PROCEDURE — 3008F PR BODY MASS INDEX (BMI) DOCUMENTED: ICD-10-PCS | Mod: CPTII,S$GLB,, | Performed by: OBSTETRICS & GYNECOLOGY

## 2020-01-17 RX ORDER — FERROUS GLUCONATE 324(38)MG
324 TABLET ORAL
COMMUNITY
End: 2021-03-02

## 2020-01-17 RX ORDER — VENLAFAXINE 50 MG/1
50 TABLET ORAL DAILY
Qty: 30 TABLET | Refills: 11 | Status: SHIPPED | OUTPATIENT
Start: 2020-01-17 | End: 2020-07-09

## 2020-01-17 NOTE — PROGRESS NOTES
Chief Complaint   Patient presents with    right sided pelvic pain       History of Present Illness: Isela Smyth is a 44 y.o. female that presents today 1/17/2020 for   Chief Complaint   Patient presents with    right sided pelvic pain     She reports that the right sided pain has consisted and worsened.  She reports more depression due to this pain.     Past Medical History:   Diagnosis Date    Anxiety     Arthritis     Chronic lumbar pain     Depression     Deviated nasal septum     Diverticulosis     GERD (gastroesophageal reflux disease)     Hemorrhoids     Hypothyroid     Kidney stone     passed 10 stones by herself over the years, one needed procedure    Migraine headache     PONV (postoperative nausea and vomiting)     severe    Shortness of breath     Urticaria        Past Surgical History:   Procedure Laterality Date    AUGMENTATION OF BREAST      CHOLECYSTECTOMY      COLONOSCOPY  prior to 2011    COLONOSCOPY N/A 9/26/2018    Dr. Zuluaga; diverticulosis; repeat in 10 years    COLONOSCOPY N/A 11/1/2019    Procedure: COLONOSCOPY;  Surgeon: Marquis Zuluaga MD;  Location: [facility];  Service: Endoscopy;  Laterality: N/A;    ESOPHAGOGASTRODUODENOSCOPY N/A 9/5/2018    Procedure: EGD (ESOPHAGOGASTRODUODENOSCOPY);  Surgeon: Marquis Zuluaga MD;  Location[facility];  Service: Endoscopy;  Laterality: N/A;    HERNIA REPAIR      HIATAL HERNIA REPAIR      KIDNEY STONE SURGERY  2009    [facility], had stone removed by dr renay george, stayed in hospital 4 days    LAPAROSCOPIC GASTRIC BANDING  2007    later removed in 2016    LUMBAR EPIDURAL INJECTION      SINUS SURGERY      SPINE SURGERY      6 back surgeries; 1 neck surgery    TUBAL LIGATION      UPPER GASTROINTESTINAL ENDOSCOPY  03/2013    Dr. Zuluaga    UPPER GASTROINTESTINAL ENDOSCOPY  09/14/2016    Dr. Zuluaga    UPPER GASTROINTESTINAL ENDOSCOPY  09/05/2018    Dr. Zuluaga; gastritis; bx negative    VAGINAL DELIVERY       times 3       Current Outpatient Medications   Medication Sig Dispense Refill    butalbital-acetaminophen-caffeine -40 mg (FIORICET, ESGIC) -40 mg per tablet 1 tab PO q6 hr PRN severe migraine. No more than 10 tab per 30 days. 10 tablet 3    cyclobenzaprine (FLEXERIL) 10 MG tablet Take 1 tablet (10 mg total) by mouth 3 (three) times daily as needed. 30 tablet 5    erenumab-aooe 140 mg/mL AtIn Inject 140 mg into the skin every 28 days. 1 mL 11    ferrous gluconate (FERGON) 324 MG tablet Take 324 mg by mouth daily with breakfast.      HYDROcodone-acetaminophen (NORCO) 7.5-325 mg per tablet Take 1 tablet by mouth 2 (two) times daily as needed. 60 tablet 0    ketorolac (TORADOL) 60 mg/2 mL Soln Inject 2 mLs (60 mg total) into the muscle 2 (two) times daily as needed (severe migraine.). No more than 2 days/week. 20 mL 3    levothyroxine (SYNTHROID) 75 MCG tablet TAKE 1 TABLET (75 MCG TOTAL) BY MOUTH EVERY MORNING. 90 tablet 3    linaclotide (LINZESS) 145 mcg Cap capsule Take 1 capsule (145 mcg total) by mouth daily as needed. 30 capsule 5    ondansetron (ZOFRAN ODT) 4 MG TbDL Take 1 tablet (4 mg total) by mouth every 6 (six) hours as needed. 10 tablet 0    promethazine (PHENERGAN) 12.5 MG Tab Take 1 tablet (12.5 mg total) by mouth every 6 (six) hours as needed. 90 tablet 1    sumatriptan (IMITREX) 100 MG tablet TAKE 1 TABLET BY MOUTH EVERY DAY AS NEEDED. MAY REPEAT IN 2 HOURS IF NEEDED. DO NOT EXCEED 2 TABLETS PER DAY 54 tablet 2    traZODone (DESYREL) 150 MG tablet TAKE 1 TABLET (150 MG TOTAL) BY MOUTH NIGHTLY. 30 tablet 5    venlafaxine (EFFEXOR-XR) 150 MG Cp24 TAKE 1 CAPSULE BY MOUTH EVERY DAY 90 capsule 3    dicyclomine (BENTYL) 10 MG capsule TAKE 1 CAPSULE BY MOUTH 3 TIMES DAILY WITH MEALS. 90 capsule 2    pantoprazole (PROTONIX) 40 MG tablet Take 1 tablet (40 mg total) by mouth once daily. 30 tablet 11    sucralfate (CARAFATE) 1 gram tablet TAKE 1 TABLET BY MOUTH THREE TIMES A DAY  (Patient not taking: Reported on 10/31/2019) 100 tablet 2    venlafaxine (EFFEXOR) 50 MG Tab Take 1 tablet (50 mg total) by mouth once daily. 30 tablet 11     No current facility-administered medications for this visit.      Facility-Administered Medications Ordered in Other Visits   Medication Dose Route Frequency Provider Last Rate Last Dose    lactated ringers infusion   Intravenous Continuous Marquis Zuluaga MD 75 mL/hr at 18 0845         Review of patient's allergies indicates:   Allergen Reactions    Gabapentin Other (See Comments)     Coming out of skin    Morphine Itching       Family History   Problem Relation Age of Onset    Ulcers Mother     Cancer Neg Hx     Heart disease Neg Hx     Hypertension Neg Hx     Diabetes Neg Hx     Angioedema Neg Hx     Allergies Neg Hx     Allergic rhinitis Neg Hx     Asthma Neg Hx     Eczema Neg Hx     Immunodeficiency Neg Hx     Colon cancer Neg Hx     Colon polyps Neg Hx     Crohn's disease Neg Hx     Ulcerative colitis Neg Hx     Nephrolithiasis Neg Hx     Stomach cancer Neg Hx     Esophageal cancer Neg Hx        Social History     Tobacco Use    Smoking status: Never Smoker    Smokeless tobacco: Never Used   Substance Use Topics    Alcohol use: No    Drug use: No     Types: Oxycodone       OB History    Para Term  AB Living   3 3 3     3   SAB TAB Ectopic Multiple Live Births                  # Outcome Date GA Lbr Emiliaon/2nd Weight Sex Delivery Anes PTL Lv   3 Term            2 Term            1 Term                Review of Symptoms:  GENERAL: Denies weight gain or weight loss. Feeling well overall.   SKIN: Denies rash or lesions.   HEAD: Denies head injury or headache.   NODES: Denies enlarged lymph nodes.   CHEST: Denies chest pain or shortness of breath.   CARDIOVASCULAR: Denies palpitations or left sided chest pain.   ABDOMEN: No abdominal pain, constipation, diarrhea, nausea, vomiting or rectal bleeding.   URINARY: No  "frequency, dysuria, hematuria, or burning on urination.  HEMATOLOGIC: No easy bruisability or excessive bleeding.   MUSCULOSKELETAL: Denies joint pain or swelling.     /80   Ht 5' 7.5" (1.715 m)   Wt 78.1 kg (172 lb 2.9 oz)   LMP 01/02/2020   Physical Exam:  APPEARANCE: Well nourished, well developed, in no acute distress.  SKIN: Normal skin turgor, no lesions.  NECK: Neck symmetric without masses   RESPIRATORY: Normal respiratory effort with no retractions or use of accessory muscles  CARDIOVASCULAR: Peripheral vascular system with no swelling no varicosities and palpation of pulses normal  LYMPHATIC: No enlargements of the lymph nodes noted in the neck, axillae, or groin  ABDOMEN: Soft. No tenderness or masses. No hepatosplenomegaly. No hernias.  PELVIC: Normal external female genitalia without lesions. Normal hair distribution. Adequate perineal body, normal urethral meatus. Urethra with no masses.  Bladder nontender. Vagina moist and well rugated without lesions or discharge. Cervix pink and without lesions. No significant cystocele or rectocele. Bimanual exam showed uterus normal size, shape, position, mobile and nontender. Adnexa without masses or tenderness. Urethra and bladder normal.  EXTREMITIES: No clubbing cyanosis or edema.    ASSESSMENT/PLAN:  Right sided abdominal pain    Depression, unspecified depression type  -     venlafaxine (EFFEXOR) 50 MG Tab; Take 1 tablet (50 mg total) by mouth once daily.  Dispense: 30 tablet; Refill: 11    Right ovarian cyst  -     US Pelvis Comp with Transvag NON-OB (xpd; Future; Expected date: 01/17/2020        15 minutes spent today with this patient. Greater than half spent in counseling today.     "

## 2020-01-20 ENCOUNTER — TELEPHONE (OUTPATIENT)
Dept: PHARMACY | Facility: CLINIC | Age: 45
End: 2020-01-20

## 2020-01-20 ENCOUNTER — OFFICE VISIT (OUTPATIENT)
Dept: FAMILY MEDICINE | Facility: CLINIC | Age: 45
End: 2020-01-20
Payer: COMMERCIAL

## 2020-01-20 VITALS
HEART RATE: 126 BPM | BODY MASS INDEX: 27.22 KG/M2 | WEIGHT: 176.38 LBS | SYSTOLIC BLOOD PRESSURE: 138 MMHG | OXYGEN SATURATION: 97 % | DIASTOLIC BLOOD PRESSURE: 84 MMHG

## 2020-01-20 DIAGNOSIS — M54.2 CHRONIC CERVICAL PAIN: ICD-10-CM

## 2020-01-20 DIAGNOSIS — G43.809 OTHER MIGRAINE WITHOUT STATUS MIGRAINOSUS, NOT INTRACTABLE: ICD-10-CM

## 2020-01-20 DIAGNOSIS — J06.9 UPPER RESPIRATORY TRACT INFECTION, UNSPECIFIED TYPE: Primary | ICD-10-CM

## 2020-01-20 DIAGNOSIS — F32.A DEPRESSION, UNSPECIFIED DEPRESSION TYPE: ICD-10-CM

## 2020-01-20 DIAGNOSIS — F41.9 ANXIETY: ICD-10-CM

## 2020-01-20 DIAGNOSIS — M47.816 LUMBAR FACET ARTHROPATHY: ICD-10-CM

## 2020-01-20 DIAGNOSIS — G89.29 CHRONIC CERVICAL PAIN: ICD-10-CM

## 2020-01-20 PROCEDURE — 99214 PR OFFICE/OUTPT VISIT, EST, LEVL IV, 30-39 MIN: ICD-10-PCS | Mod: S$GLB,,, | Performed by: FAMILY MEDICINE

## 2020-01-20 PROCEDURE — 99999 PR PBB SHADOW E&M-EST. PATIENT-LVL III: ICD-10-PCS | Mod: PBBFAC,,, | Performed by: FAMILY MEDICINE

## 2020-01-20 PROCEDURE — 99999 PR PBB SHADOW E&M-EST. PATIENT-LVL III: CPT | Mod: PBBFAC,,, | Performed by: FAMILY MEDICINE

## 2020-01-20 PROCEDURE — 3008F PR BODY MASS INDEX (BMI) DOCUMENTED: ICD-10-PCS | Mod: CPTII,S$GLB,, | Performed by: FAMILY MEDICINE

## 2020-01-20 PROCEDURE — 99214 OFFICE O/P EST MOD 30 MIN: CPT | Mod: S$GLB,,, | Performed by: FAMILY MEDICINE

## 2020-01-20 PROCEDURE — 3008F BODY MASS INDEX DOCD: CPT | Mod: CPTII,S$GLB,, | Performed by: FAMILY MEDICINE

## 2020-01-20 RX ORDER — TRAZODONE HYDROCHLORIDE 150 MG/1
150 TABLET ORAL NIGHTLY
Qty: 90 TABLET | Refills: 0 | OUTPATIENT
Start: 2020-01-20

## 2020-01-20 RX ORDER — CYCLOBENZAPRINE HCL 10 MG
10 TABLET ORAL 3 TIMES DAILY PRN
Qty: 30 TABLET | Refills: 11 | Status: SHIPPED | OUTPATIENT
Start: 2020-01-20 | End: 2020-04-20 | Stop reason: SDUPTHER

## 2020-01-20 RX ORDER — TRAZODONE HYDROCHLORIDE 150 MG/1
150 TABLET ORAL NIGHTLY
Qty: 30 TABLET | Refills: 11 | Status: SHIPPED | OUTPATIENT
Start: 2020-01-20 | End: 2020-07-09 | Stop reason: SDUPTHER

## 2020-01-20 NOTE — TELEPHONE ENCOUNTER
RX call attempt 1 regarding Aimovig from [facility]. Patient reached -- shipping 1/21 for 1/22 arrival with patients consent. Patients next injection is scheduled for 1/22. Patient denied the need for any supplies with this shipment.   copay $5.00 permission given to charge ccof [phone] @ 983.

## 2020-01-20 NOTE — PROGRESS NOTES
Subjective:       Patient ID: Isela Smyth is a 44 y.o. female.    Chief Complaint: Follow-up and Cough (non productive cough )    HPI     Here for a f/u.    Gets 3-5 migraines per month.  Takes fioricet and imitrex for pain control.  Also, receiving aimovig injections.      History of 6 low back spinal surgeries and 1 neck spinal surgery in past. Recent one in 2012.      Status post 08/14/2012 microscopic recurrent left L4-L5 laminotomy, discectomy, left L5 complete laminectomy, left L5-S1 laminotomy,    recurrent discectomy.      Chronic lumbago controlled while on norco 7.5 and flexeril. Ps: 2/10. Occasional left sciatic pain. Had an placido last month which helped with the pain.      Had mri L spine 9/2016 which showed:      1.  Postoperative change of left hemilaminectomy at L4-L5 and L5-S1.  2.  Multilevel degenerative change of the lumbar spine, most pronounced at L4-L5 and L5-S1 as detailed above  3.  Minimal descending central disc extrusion at L5-S1 which does not appear to encroach upon either the descending left or right S1 nerve.  4.  Probable conjoined left S2 nerve coursing in the left posterolateral spinal canal.  Less likely, this represents an intraspinal synovial cyst abutting the descending left S1 nerve.  5.  Central annular tear of the intervertebral disc at L4-L5.     Also, has chronic neck pain > 5 years. Hx of neck surgeries in past. Saw pain management recently and had an placido on 1/31/17. Norco 7.5 helps with pain.      Had mri of cervical spine on 1/19/17 which showed:  -Disc protrusion at the C5-C6 level and to the right lateral recess with possible anterior cord contact  -Loss of normal cervical lordosis which may be positional or related to muscular strain.  -Milder degenerative changes are noted within the body of the report.      Depression and anxiety stable while on effexor.     C/o dry cough x 7 days. Associated rhinorrhea and nasal congestion. No fever. Mild malaise.     Review of  Systems      Review of Systems   Constitutional: Negative for fever and chills.   HENT: Negative for hearing loss and neck stiffness.    Eyes: Negative for redness and itching.   Respiratory: Negative for choking.    Cardiovascular: Negative for chest pain and leg swelling.  Abdomen: Negative for abdominal pain and blood in stool.   Genitourinary: Negative for dysuria and flank pain.   Musculoskeletal: Negative for gait problem.   Neurological: Negative for light-headedness and headaches.   Hematological: Negative for adenopathy.   Psychiatric/Behavioral: Negative for behavioral problems.     Objective:      Physical Exam   Constitutional: She appears well-developed.   HENT:   Head: Normocephalic and atraumatic.   Eyes: Pupils are equal, round, and reactive to light. Conjunctivae are normal.   Neck: Normal range of motion.   Cardiovascular: Normal rate and regular rhythm.   No murmur heard.  Pulmonary/Chest: Effort normal and breath sounds normal.   Lymphadenopathy:     She has no cervical adenopathy.       Assessment:       1. Upper respiratory tract infection, unspecified type    2. Other migraine without status migrainosus, not intractable    3. Chronic cervical pain    4. Depression, unspecified depression type    5. Anxiety    6. Lumbar facet arthropathy        Plan:       Upper respiratory tract infection, unspecified type    Other migraine without status migrainosus, not intractable    Chronic cervical pain    Depression, unspecified depression type    Anxiety    Lumbar facet arthropathy    Other orders  -     traZODone (DESYREL) 150 MG tablet; Take 1 tablet (150 mg total) by mouth nightly.  Dispense: 30 tablet; Refill: 11  -     cyclobenzaprine (FLEXERIL) 10 MG tablet; Take 1 tablet (10 mg total) by mouth 3 (three) times daily as needed.  Dispense: 30 tablet; Refill: 11              Plan:  otc mucinex dm  Cont all other meds        Medication List with Changes/Refills   Current Medications     BUTALBITAL-ACETAMINOPHEN-CAFFEINE -40 MG (FIORICET, ESGIC) -40 MG PER TABLET    1 tab PO q6 hr PRN severe migraine. No more than 10 tab per 30 days.    DICYCLOMINE (BENTYL) 10 MG CAPSULE    TAKE 1 CAPSULE BY MOUTH 3 TIMES DAILY WITH MEALS.    ERENUMAB-AOOE 140 MG/ML ATIN    Inject 140 mg into the skin every 28 days.    FERROUS GLUCONATE (FERGON) 324 MG TABLET    Take 324 mg by mouth daily with breakfast.    HYDROCODONE-ACETAMINOPHEN (NORCO) 7.5-325 MG PER TABLET    Take 1 tablet by mouth 2 (two) times daily as needed.    KETOROLAC (TORADOL) 60 MG/2 ML SOLN    Inject 2 mLs (60 mg total) into the muscle 2 (two) times daily as needed (severe migraine.). No more than 2 days/week.    LEVOTHYROXINE (SYNTHROID) 75 MCG TABLET    TAKE 1 TABLET (75 MCG TOTAL) BY MOUTH EVERY MORNING.    LINACLOTIDE (LINZESS) 145 MCG CAP CAPSULE    Take 1 capsule (145 mcg total) by mouth daily as needed.    ONDANSETRON (ZOFRAN ODT) 4 MG TBDL    Take 1 tablet (4 mg total) by mouth every 6 (six) hours as needed.    PROMETHAZINE (PHENERGAN) 12.5 MG TAB    Take 1 tablet (12.5 mg total) by mouth every 6 (six) hours as needed.    SUCRALFATE (CARAFATE) 1 GRAM TABLET    TAKE 1 TABLET BY MOUTH THREE TIMES A DAY    SUMATRIPTAN (IMITREX) 100 MG TABLET    TAKE 1 TABLET BY MOUTH EVERY DAY AS NEEDED. MAY REPEAT IN 2 HOURS IF NEEDED. DO NOT EXCEED 2 TABLETS PER DAY    VENLAFAXINE (EFFEXOR) 50 MG TAB    Take 1 tablet (50 mg total) by mouth once daily.    VENLAFAXINE (EFFEXOR-XR) 150 MG CP24    TAKE 1 CAPSULE BY MOUTH EVERY DAY   Changed and/or Refilled Medications    Modified Medication Previous Medication    CYCLOBENZAPRINE (FLEXERIL) 10 MG TABLET cyclobenzaprine (FLEXERIL) 10 MG tablet       Take 1 tablet (10 mg total) by mouth 3 (three) times daily as needed.    Take 1 tablet (10 mg total) by mouth 3 (three) times daily as needed.    TRAZODONE (DESYREL) 150 MG TABLET traZODone (DESYREL) 150 MG tablet       Take 1 tablet (150 mg total) by mouth  nightly.    TAKE 1 TABLET (150 MG TOTAL) BY MOUTH NIGHTLY.   Discontinued Medications    PANTOPRAZOLE (PROTONIX) 40 MG TABLET    Take 1 tablet (40 mg total) by mouth once daily.

## 2020-01-20 NOTE — PROGRESS NOTES
Refill Authorization Note     is requesting a refill authorization.    Brief assessment and rationale for refill: QUICK DC: duplicate request                                         Comments:     Requested Prescriptions     Refused Prescriptions Disp Refills    traZODone (DESYREL) 150 MG tablet [Pharmacy Med Name: TRAZODONE 150 MG TABLET] 90 tablet 0     Sig: TAKE 1 TABLET (150 MG TOTAL) BY MOUTH NIGHTLY.     Refused By: LUIS MANUEL KIM     Reason for Refusal: Request already responded to by other means (e.g. phone or fax)       Last Prescribed Info:   Original Order:  traZODone (DESYREL) 150 MG tablet [[id number]]      Pharmacy:  [place] [id number] [facility] - [facility] [address] YESS[place] #:  --     Pharmacy Comments:  --          Fill quantity remaining:  -- Fill quantity used:  -- Next fill due: --       Outpatient Medication Detail      Disp Refills Start End    traZODone (DESYREL) 150 MG tablet 30 tablet 11 1/20/2020     Sig - Route: Take 1 tablet (150 mg total) by mouth nightly. - Oral    Sent to pharmacy as: traZODone (DESYREL) 150 MG tablet    E-Prescribing Status: Receipt confirmed by pharmacy (1/20/2020  2:10 PM CST)

## 2020-01-22 ENCOUNTER — HOSPITAL ENCOUNTER (OUTPATIENT)
Dept: RADIOLOGY | Facility: HOSPITAL | Age: 45
Discharge: HOME | End: 2020-01-22
Attending: OBSTETRICS & GYNECOLOGY
Payer: COMMERCIAL

## 2020-01-22 DIAGNOSIS — N83.201 RIGHT OVARIAN CYST: ICD-10-CM

## 2020-01-22 PROCEDURE — 76856 US EXAM PELVIC COMPLETE: CPT | Mod: TC,PN

## 2020-01-22 PROCEDURE — 76830 TRANSVAGINAL US NON-OB: CPT | Mod: 26,,, | Performed by: RADIOLOGY

## 2020-01-22 PROCEDURE — 76830 US PELVIS COMP WITH TRANSVAG NON-OB (XPD): ICD-10-PCS | Mod: 26,,, | Performed by: RADIOLOGY

## 2020-01-22 PROCEDURE — 76856 US EXAM PELVIC COMPLETE: CPT | Mod: 26,,, | Performed by: RADIOLOGY

## 2020-01-22 PROCEDURE — 76856 US PELVIS COMP WITH TRANSVAG NON-OB (XPD): ICD-10-PCS | Mod: 26,,, | Performed by: RADIOLOGY

## 2020-01-24 ENCOUNTER — TELEPHONE (OUTPATIENT)
Dept: OBSTETRICS AND GYNECOLOGY | Facility: CLINIC | Age: 45
End: 2020-01-24

## 2020-01-24 NOTE — TELEPHONE ENCOUNTER
----- Message from Sana Hall sent at 1/24/2020  4:55 PM CST -----  Type: Needs Medical Advice    Who Called:  Patient Barbara Reinoso Call Back Number: [phone]    Additional Information: patient would like to have Juan to RS the ovary please contact her directly to schedule the surgery she hasnt heard back yet just checking in on yall

## 2020-01-27 DIAGNOSIS — R10.9 RIGHT SIDED ABDOMINAL PAIN: ICD-10-CM

## 2020-01-27 DIAGNOSIS — N83.201 RIGHT OVARIAN CYST: Primary | ICD-10-CM

## 2020-01-27 DIAGNOSIS — F32.A DEPRESSION, UNSPECIFIED DEPRESSION TYPE: ICD-10-CM

## 2020-01-29 ENCOUNTER — ANESTHESIA (OUTPATIENT)
Dept: ENDOSCOPY | Facility: HOSPITAL | Age: 45
End: 2020-01-29
Payer: COMMERCIAL

## 2020-01-29 ENCOUNTER — HOSPITAL ENCOUNTER (OUTPATIENT)
Facility: HOSPITAL | Age: 45
Discharge: HOME | End: 2020-01-29
Attending: INTERNAL MEDICINE | Admitting: INTERNAL MEDICINE
Payer: COMMERCIAL

## 2020-01-29 ENCOUNTER — ANESTHESIA EVENT (OUTPATIENT)
Dept: ENDOSCOPY | Facility: HOSPITAL | Age: 45
End: 2020-01-29
Payer: COMMERCIAL

## 2020-01-29 DIAGNOSIS — R10.13 EPIGASTRIC PAIN: ICD-10-CM

## 2020-01-29 LAB
B-HCG UR QL: NEGATIVE
CTP QC/QA: YES

## 2020-01-29 PROCEDURE — 63600175 PHARM REV CODE 636 W HCPCS: Mod: PO | Performed by: INTERNAL MEDICINE

## 2020-01-29 PROCEDURE — D9220A PRA ANESTHESIA: Mod: CRNA,,, | Performed by: NURSE ANESTHETIST, CERTIFIED REGISTERED

## 2020-01-29 PROCEDURE — 43239 PR EGD, FLEX, W/BIOPSY, SGL/MULTI: ICD-10-PCS | Mod: ,,, | Performed by: INTERNAL MEDICINE

## 2020-01-29 PROCEDURE — 27201012 HC FORCEPS, HOT/COLD, DISP: Mod: PO | Performed by: INTERNAL MEDICINE

## 2020-01-29 PROCEDURE — 81025 URINE PREGNANCY TEST: CPT | Mod: PO | Performed by: INTERNAL MEDICINE

## 2020-01-29 PROCEDURE — D9220A PRA ANESTHESIA: ICD-10-PCS | Mod: CRNA,,, | Performed by: NURSE ANESTHETIST, CERTIFIED REGISTERED

## 2020-01-29 PROCEDURE — 88305 TISSUE EXAM BY PATHOLOGIST: ICD-10-PCS | Mod: 26,,, | Performed by: PATHOLOGY

## 2020-01-29 PROCEDURE — 88305 TISSUE EXAM BY PATHOLOGIST: CPT | Performed by: PATHOLOGY

## 2020-01-29 PROCEDURE — 43239 EGD BIOPSY SINGLE/MULTIPLE: CPT | Mod: PO | Performed by: INTERNAL MEDICINE

## 2020-01-29 PROCEDURE — 37000008 HC ANESTHESIA 1ST 15 MINUTES: Mod: PO | Performed by: INTERNAL MEDICINE

## 2020-01-29 PROCEDURE — D9220A PRA ANESTHESIA: Mod: ANES,,, | Performed by: ANESTHESIOLOGY

## 2020-01-29 PROCEDURE — D9220A PRA ANESTHESIA: ICD-10-PCS | Mod: ANES,,, | Performed by: ANESTHESIOLOGY

## 2020-01-29 PROCEDURE — 43239 EGD BIOPSY SINGLE/MULTIPLE: CPT | Mod: ,,, | Performed by: INTERNAL MEDICINE

## 2020-01-29 PROCEDURE — 88305 TISSUE EXAM BY PATHOLOGIST: CPT | Mod: 26,,, | Performed by: PATHOLOGY

## 2020-01-29 PROCEDURE — 37000009 HC ANESTHESIA EA ADD 15 MINS: Mod: PO | Performed by: INTERNAL MEDICINE

## 2020-01-29 PROCEDURE — 63600175 PHARM REV CODE 636 W HCPCS: Mod: PO | Performed by: NURSE ANESTHETIST, CERTIFIED REGISTERED

## 2020-01-29 RX ORDER — SODIUM CHLORIDE, SODIUM LACTATE, POTASSIUM CHLORIDE, CALCIUM CHLORIDE 600; 310; 30; 20 MG/100ML; MG/100ML; MG/100ML; MG/100ML
INJECTION, SOLUTION INTRAVENOUS CONTINUOUS
Status: DISCONTINUED | OUTPATIENT
Start: 2020-01-29 | End: 2020-01-29 | Stop reason: HOSPADM

## 2020-01-29 RX ORDER — PANTOPRAZOLE SODIUM 40 MG/1
40 TABLET, DELAYED RELEASE ORAL DAILY
Qty: 30 TABLET | Refills: 2 | Status: SHIPPED | OUTPATIENT
Start: 2020-01-29 | End: 2021-03-02

## 2020-01-29 RX ORDER — SODIUM CHLORIDE 0.9 % (FLUSH) 0.9 %
10 SYRINGE (ML) INJECTION
Status: DISCONTINUED | OUTPATIENT
Start: 2020-01-29 | End: 2020-01-29 | Stop reason: HOSPADM

## 2020-01-29 RX ORDER — LIDOCAINE HCL/PF 100 MG/5ML
SYRINGE (ML) INTRAVENOUS
Status: DISCONTINUED | OUTPATIENT
Start: 2020-01-29 | End: 2020-01-29

## 2020-01-29 RX ORDER — PROPOFOL 10 MG/ML
VIAL (ML) INTRAVENOUS
Status: DISCONTINUED | OUTPATIENT
Start: 2020-01-29 | End: 2020-01-29

## 2020-01-29 RX ADMIN — PROPOFOL 30 MG: 10 INJECTION, EMULSION INTRAVENOUS at 08:01

## 2020-01-29 RX ADMIN — SODIUM CHLORIDE, SODIUM LACTATE, POTASSIUM CHLORIDE, AND CALCIUM CHLORIDE: .6; .31; .03; .02 INJECTION, SOLUTION INTRAVENOUS at 07:01

## 2020-01-29 RX ADMIN — LIDOCAINE HYDROCHLORIDE 100 MG: 20 INJECTION, SOLUTION INTRAVENOUS at 08:01

## 2020-01-29 NOTE — TRANSFER OF CARE
"Anesthesia Transfer of Care Note    Patient: Isela Smyth    Procedure(s) Performed: Procedure(s) (LRB):  EGD (ESOPHAGOGASTRODUODENOSCOPY) (N/A)    Patient location: PACU    Anesthesia Type: general    Transport from OR: Transported from OR on room air with adequate spontaneous ventilation    Post pain: adequate analgesia    Post assessment: no apparent anesthetic complications and tolerated procedure well    Post vital signs: stable    Level of consciousness: awake and alert    Nausea/Vomiting: no nausea/vomiting    Complications: none    Transfer of care protocol was followed      Last vitals:   Visit Vitals  /82 (BP Location: Right arm, Patient Position: Lying)   Pulse 98   Temp 36.5 °C (97.7 °F) (Skin)   Resp 16   Ht 5' 7.5" (1.715 m)   Wt 76.2 kg (168 lb)   LMP 01/02/2020   SpO2 98%   Breastfeeding? No   BMI 25.92 kg/m²     "

## 2020-01-29 NOTE — DISCHARGE INSTRUCTIONS
Gastritis (Adult)    Gastritis is inflammation and irritation of the stomach lining. It can be present for a short time (acute) or be long lasting (chronic). Gastritis is often caused by infection with bacteria called H pylori. More than a third of people in the [place] have this bacteria in their bodies. In many cases, H pylori causes no problems or symptoms. In some people, though, the infection irritates the stomach lining and causes gastritis. Other causes of stomach irritation include drinking alcohol or taking pain-relieving medicines called NSAIDs (such as aspirin or ibuprofen).   Symptoms of gastritis can include:  · Abdominal pain or bloating  · Loss of appetite  · Nausea or vomiting  · Vomiting blood or having black stools  · Feeling more tired than usual  An inflamed and irritated stomach lining is more likely to develop a sore called an ulcer. To help prevent this, gastritis should be treated.  Home care  If needed, medicines may be prescribed. If you have H pylori infection, treating it will likely relieve your symptoms. Other changes can help reduce stomach irritation and help it heal.  · If you have been prescribed medicines for H pylori infection, take them as directed. Take all of the medicine until it is finished or your healthcare provider tells you to stop, even if you feel better.  · Your healthcare provider may recommend avoiding NSAIDs. If you take daily aspirin for your heart or other medical reasons, do not stop without talking to your healthcare provider first.  · Avoid drinking alcohol.  · Stop smoking. Smoking can irritate the stomach and delay healing. As much as possible, stay away from second hand smoke.  Follow-up care  Follow up with your healthcare provider, or as advised by our staff. Testing may be needed to check for inflammation or an ulcer.  When to seek medical advice  Call your healthcare provider for any of the following:  · Stomach pain that gets worse or moves to the lower  right abdomen (appendix area)  · Chest pain that appears or gets worse, or spreads to the back, neck, shoulder, or arm  · Frequent vomiting (cant keep down liquids)  · Blood in the stool or vomit (red or black in color)  · Feeling weak or dizzy  · Fever of 100.4ºF (38ºC) or higher, or as directed by your healthcare provider  Date Last Reviewed: 6/22/2015  © 2519-5554 [employer]. [address] [place] [address]. All rights reserved. This information is not intended as a substitute for professional medical care. Always follow your healthcare professional's instructions.

## 2020-01-29 NOTE — DISCHARGE SUMMARY
Discharge Note  Short Stay      SUMMARY     Admit Date: 1/29/2020    Attending Physician: Marquis Zuluaga MD     Discharge Physician: Marquis Zuluaga MD    Discharge Date: 1/29/2020 9:07 AM    Final Diagnosis: Epigastric pain [R10.13]  RLQ abdominal pain [R10.31]  Nausea [R11.0]  Vomiting, intractability of vomiting not specified, presence of nausea not specified, unspecified vomiting type [R11.10]  Diarrhea, unspecified type [R19.7]  Change in bowel habit [R19.4]  Hematochezia [K92.1]    Disposition: HOME OR SELF CARE    Patient Instructions:   Current Discharge Medication List      START taking these medications    Details   pantoprazole (PROTONIX) 40 MG tablet Take 1 tablet (40 mg total) by mouth once daily.  Qty: 30 tablet, Refills: 2         CONTINUE these medications which have NOT CHANGED    Details   butalbital-acetaminophen-caffeine -40 mg (FIORICET, ESGIC) -40 mg per tablet 1 tab PO q6 hr PRN severe migraine. No more than 10 tab per 30 days.  Qty: 10 tablet, Refills: 3    Associated Diagnoses: Intractable chronic migraine without aura and with status migrainosus      cyclobenzaprine (FLEXERIL) 10 MG tablet Take 1 tablet (10 mg total) by mouth 3 (three) times daily as needed.  Qty: 30 tablet, Refills: 11      erenumab-aooe 140 mg/mL AtIn Inject 140 mg into the skin every 28 days.  Qty: 1 mL, Refills: 11    Associated Diagnoses: Intractable chronic migraine without aura and with status migrainosus      ferrous gluconate (FERGON) 324 MG tablet Take 324 mg by mouth daily with breakfast.      HYDROcodone-acetaminophen (NORCO) 7.5-325 mg per tablet Take 1 tablet by mouth 2 (two) times daily as needed.  Qty: 60 tablet, Refills: 0    Comments: Quantity prescribed more than 7 day supply? Yes, quantity medically necessary      ketorolac (TORADOL) 60 mg/2 mL Soln Inject 2 mLs (60 mg total) into the muscle 2 (two) times daily as needed (severe migraine.). No more than 2 days/week.  Qty: 20 mL,  Refills: 3    Comments: Provide injection syringes and needles  Associated Diagnoses: Intractable chronic migraine without aura and with status migrainosus      levothyroxine (SYNTHROID) 75 MCG tablet TAKE 1 TABLET (75 MCG TOTAL) BY MOUTH EVERY MORNING.  Qty: 90 tablet, Refills: 3      linaclotide (LINZESS) 145 mcg Cap capsule Take 1 capsule (145 mcg total) by mouth daily as needed.  Qty: 30 capsule, Refills: 5      promethazine (PHENERGAN) 12.5 MG Tab Take 1 tablet (12.5 mg total) by mouth every 6 (six) hours as needed.  Qty: 90 tablet, Refills: 1      sumatriptan (IMITREX) 100 MG tablet TAKE 1 TABLET BY MOUTH EVERY DAY AS NEEDED. MAY REPEAT IN 2 HOURS IF NEEDED. DO NOT EXCEED 2 TABLETS PER DAY  Qty: 54 tablet, Refills: 2    Associated Diagnoses: Migraine without status migrainosus, not intractable, unspecified migraine type      traZODone (DESYREL) 150 MG tablet Take 1 tablet (150 mg total) by mouth nightly.  Qty: 30 tablet, Refills: 11      venlafaxine (EFFEXOR) 50 MG Tab Take 1 tablet (50 mg total) by mouth once daily.  Qty: 30 tablet, Refills: 11    Associated Diagnoses: Depression, unspecified depression type      venlafaxine (EFFEXOR-XR) 150 MG Cp24 TAKE 1 CAPSULE BY MOUTH EVERY DAY  Qty: 90 capsule, Refills: 3      dicyclomine (BENTYL) 10 MG capsule TAKE 1 CAPSULE BY MOUTH 3 TIMES DAILY WITH MEALS.  Qty: 90 capsule, Refills: 2    Associated Diagnoses: Diarrhea, unspecified type; Abdominal pain, unspecified abdominal location      ondansetron (ZOFRAN ODT) 4 MG TbDL Take 1 tablet (4 mg total) by mouth every 6 (six) hours as needed.  Qty: 10 tablet, Refills: 0      sucralfate (CARAFATE) 1 gram tablet TAKE 1 TABLET BY MOUTH THREE TIMES A DAY  Qty: 100 tablet, Refills: 2             Discharge Procedure Orders (must include Diet, Follow-up, Activity)    Follow Up:  Follow up with PCP as previously scheduled  Resume routine diet.  Activity as tolerated.    No driving day of procedure.

## 2020-01-29 NOTE — PLAN OF CARE
Patient refused PO intake. No apparent s&s of distress noted at this time, no complaints voiced at this time. Discharge instructions reviewed with patient/family/friend with good verbal feedback received. Patient ready for discharge

## 2020-01-29 NOTE — ANESTHESIA POSTPROCEDURE EVALUATION
Anesthesia Post Evaluation    Patient: Isela Smyth    Procedure(s) Performed: Procedure(s) (LRB):  EGD (ESOPHAGOGASTRODUODENOSCOPY) (N/A)    Final Anesthesia Type: general    Patient location during evaluation: PACU  Patient participation: Yes- Able to Participate  Level of consciousness: awake and alert and oriented  Post-procedure vital signs: reviewed and stable  Pain management: adequate  Airway patency: patent    PONV status at discharge: No PONV  Anesthetic complications: no      Cardiovascular status: blood pressure returned to baseline  Respiratory status: unassisted, spontaneous ventilation and room air  Hydration status: euvolemic  Follow-up not needed.          Vitals Value Taken Time   /82 1/29/2020  9:14 AM   Temp 36.3 °C (97.3 °F) 1/29/2020  8:59 AM   Pulse 89 1/29/2020  9:14 AM   Resp 14 1/29/2020  9:14 AM   SpO2 98 % 1/29/2020  9:14 AM         No case tracking events are documented in the log.      Pain/Saroj Score: Sraoj Score: 10 (1/29/2020  9:10 AM)

## 2020-01-29 NOTE — H&P
History & Physical - Short Stay  Gastroenterology      SUBJECTIVE:     Procedure: EGD    Chief Complaint/Indication for Procedure: Epigastric Pain and Change in Bowel Habits    PTA Medications   Medication Sig    butalbital-acetaminophen-caffeine -40 mg (FIORICET, ESGIC) -40 mg per tablet 1 tab PO q6 hr PRN severe migraine. No more than 10 tab per 30 days.    cyclobenzaprine (FLEXERIL) 10 MG tablet Take 1 tablet (10 mg total) by mouth 3 (three) times daily as needed.    erenumab-aooe 140 mg/mL AtIn Inject 140 mg into the skin every 28 days.    ferrous gluconate (FERGON) 324 MG tablet Take 324 mg by mouth daily with breakfast.    HYDROcodone-acetaminophen (NORCO) 7.5-325 mg per tablet Take 1 tablet by mouth 2 (two) times daily as needed.    ketorolac (TORADOL) 60 mg/2 mL Soln Inject 2 mLs (60 mg total) into the muscle 2 (two) times daily as needed (severe migraine.). No more than 2 days/week.    levothyroxine (SYNTHROID) 75 MCG tablet TAKE 1 TABLET (75 MCG TOTAL) BY MOUTH EVERY MORNING.    linaclotide (LINZESS) 145 mcg Cap capsule Take 1 capsule (145 mcg total) by mouth daily as needed.    promethazine (PHENERGAN) 12.5 MG Tab Take 1 tablet (12.5 mg total) by mouth every 6 (six) hours as needed.    sumatriptan (IMITREX) 100 MG tablet TAKE 1 TABLET BY MOUTH EVERY DAY AS NEEDED. MAY REPEAT IN 2 HOURS IF NEEDED. DO NOT EXCEED 2 TABLETS PER DAY    traZODone (DESYREL) 150 MG tablet Take 1 tablet (150 mg total) by mouth nightly.    venlafaxine (EFFEXOR) 50 MG Tab Take 1 tablet (50 mg total) by mouth once daily.    venlafaxine (EFFEXOR-XR) 150 MG Cp24 TAKE 1 CAPSULE BY MOUTH EVERY DAY    dicyclomine (BENTYL) 10 MG capsule TAKE 1 CAPSULE BY MOUTH 3 TIMES DAILY WITH MEALS.    ondansetron (ZOFRAN ODT) 4 MG TbDL Take 1 tablet (4 mg total) by mouth every 6 (six) hours as needed. (Patient not taking: Reported on 1/28/2020)    sucralfate (CARAFATE) 1 gram tablet TAKE 1 TABLET BY MOUTH THREE TIMES A DAY  (Patient not taking: Reported on 10/31/2019)       Review of patient's allergies indicates:   Allergen Reactions    Gabapentin Other (See Comments)     Coming out of skin    Morphine Itching        Past Medical History:   Diagnosis Date    Anxiety     Arthritis     Chronic lumbar pain     Depression     Deviated nasal septum     Diverticulosis     GERD (gastroesophageal reflux disease)     Hemorrhoids     Hypothyroid     Kidney stone     passed 10 stones by herself over the years, one needed procedure    Migraine headache     PONV (postoperative nausea and vomiting)     severe    Shortness of breath     Urticaria      Past Surgical History:   Procedure Laterality Date    AUGMENTATION OF BREAST      BREAST SURGERY      CHOLECYSTECTOMY      COLONOSCOPY  prior to 2011    COLONOSCOPY N/A 9/26/2018    Dr. Zuluaga; diverticulosis; repeat in 10 years    COLONOSCOPY N/A 11/1/2019    Procedure: COLONOSCOPY;  Surgeon: Marquis Zuluaga MD;  Location: [facility];  Service: Endoscopy;  Laterality: N/A;    ESOPHAGOGASTRODUODENOSCOPY N/A 9/5/2018    Procedure: EGD (ESOPHAGOGASTRODUODENOSCOPY);  Surgeon: Marquis Zuluaga MD;  Location: [facility];  Service: Endoscopy;  Laterality: N/A;    HERNIA REPAIR      HIATAL HERNIA REPAIR      KIDNEY STONE SURGERY  2009    [facility], had stone removed by dr renay george, stayed in hospital 4 days    LAPAROSCOPIC GASTRIC BANDING  2007    later removed in 2016    LUMBAR EPIDURAL INJECTION      SINUS SURGERY      SPINE SURGERY      6 back surgeries; 1 neck surgery    TUBAL LIGATION      UPPER GASTROINTESTINAL ENDOSCOPY  03/2013    Dr. Zuluaga    UPPER GASTROINTESTINAL ENDOSCOPY  09/14/2016    Dr. Zuluaga    UPPER GASTROINTESTINAL ENDOSCOPY  09/05/2018    Dr. Zuluaga; gastritis; bx negative    VAGINAL DELIVERY      times 3     Family History   Problem Relation Age of Onset    Ulcers Mother     Cancer Neg Hx     Heart disease Neg Hx     Hypertension Neg  Hx     Diabetes Neg Hx     Angioedema Neg Hx     Allergies Neg Hx     Allergic rhinitis Neg Hx     Asthma Neg Hx     Eczema Neg Hx     Immunodeficiency Neg Hx     Colon cancer Neg Hx     Colon polyps Neg Hx     Crohn's disease Neg Hx     Ulcerative colitis Neg Hx     Nephrolithiasis Neg Hx     Stomach cancer Neg Hx     Esophageal cancer Neg Hx      Social History     Tobacco Use    Smoking status: Never Smoker    Smokeless tobacco: Never Used   Substance Use Topics    Alcohol use: No    Drug use: No     Types: Oxycodone         OBJECTIVE:     Vital Signs (Most Recent)  Temp: 97.7 °F (36.5 °C) (01/29/20 0751)  Pulse: 98 (01/29/20 0751)  Resp: 16 (01/29/20 0751)  BP: 126/82 (01/29/20 0751)  SpO2: 98 % (01/29/20 0751)    Physical Exam:                                                       GENERAL:  Comfortable, in no acute distress.                                 HEENT EXAM:  Nonicteric.  No adenopathy.  Oropharynx is clear.               NECK:  Supple.                                                               LUNGS:  Clear.                                                               CARDIAC:  Regular rate and rhythm.  S1, S2.  No murmur.                      ABDOMEN:  Soft, positive bowel sounds, nontender.  No hepatosplenomegaly or masses.  No rebound or guarding.                                             EXTREMITIES:  No edema.     MENTAL STATUS:  Normal, alert and oriented.      ASSESSMENT/PLAN:     Assessment: Epigastric Pain and Change in Bowel Habits    Plan: EGD    Anesthesia Plan: General    ASA Grade: ASA 2 - Patient with mild systemic disease with no functional limitations    MALLAMPATI SCORE:  I (soft palate, uvula, fauces, and tonsillar pillars visible)

## 2020-01-29 NOTE — [employer] PATIENT INSTRUCTIONS
Discharge Summary/Instructions after an Endoscopic Procedure  Patient Name: Isela Smyth  Patient MRN: [id number]  Patient YOB: 1975 Wednesday, January 29, 2020  Marquis Zuluaga MD  RESTRICTIONS:  During your procedure today, you received medications for sedation.  These   medications may affect your judgment, balance and coordination.  Therefore,   for 24 hours, you have the following restrictions:   - DO NOT drive a car, operate machinery, make legal/financial decisions,   sign important papers or drink alcohol.    ACTIVITY:  Today: no heavy lifting, straining or running due to procedural   sedation/anesthesia.  The following day: return to full activity including work.  DIET:  Eat and drink normally unless instructed otherwise.     TREATMENT FOR COMMON SIDE EFFECTS:  - Mild abdominal pain, nausea, belching, bloating or excessive gas:  rest,   eat lightly and use a heating pad.  - Sore Throat: treat with throat lozenges and/or gargle with warm salt   water.  - Because air was used during the procedure, expelling large amounts of air   from your rectum or belching is normal.  - If a bowel prep was taken, you may not have a bowel movement for 1-3 days.    This is normal.  SYMPTOMS TO WATCH FOR AND REPORT TO YOUR PHYSICIAN:  1. Abdominal pain or bloating, other than gas cramps.  2. Chest pain.  3. Back pain.  4. Signs of infection such as: chills or fever occurring within 24 hours   after the procedure.  5. Rectal bleeding, which would show as bright red, maroon, or black stools.   (A tablespoon of blood from the rectum is not serious, especially if   hemorrhoids are present.)  6. Vomiting.  7. Weakness or dizziness.  GO DIRECTLY TO THE NEAREST EMERGENCY ROOM IF YOU HAVE ANY OF THE FOLLOWING:      Difficulty breathing              Chills and/or fever over 101 F   Persistent vomiting and/or vomiting blood   Severe abdominal pain   Severe chest pain   Black, tarry stools   Bleeding- more than one  tablespoon   Any other symptom or condition that you feel may need urgent attention  Your doctor recommends these additional instructions:  If any biopsies were taken, your doctors clinic will contact you in 1 to 2   weeks with any results.  We are waiting for your pathology results.   Continue your present medications.   You are being discharged to home.  For questions, problems or results please call your physician - Marquis Zuluaga MD at Work:  [phone].  EMERGENCY PHONE NUMBER: [phone], LAB RESULTS: [phone]  IF A COMPLICATION OR EMERGENCY SITUATION ARISES AND YOU ARE UNABLE TO REACH   YOUR PHYSICIAN - GO DIRECTLY TO THE EMERGENCY ROOM.  ___________________________________________  Nurse Signature  ___________________________________________  Patient/Designated Responsible Party Signature  Marquis Zuluaga MD  1/29/2020 9:00:08 AM  This report has been verified and signed electronically.  [employer]

## 2020-01-29 NOTE — ANESTHESIA PREPROCEDURE EVALUATION
01/29/2020  Isela Smyth is a 44 y.o., female.    Anesthesia Evaluation         Review of Systems  Anesthesia Hx:  Hx of Anesthetic complications PONV   Cardiovascular:   Exercise tolerance: good   Pulmonary:   Shortness of breath    Renal/:   Chronic Renal Disease renal calculi    Hepatic/GI:   GERD    Musculoskeletal:   Arthritis  Chronic back pain   Neurological:   Neuromuscular Disease, Headaches   Chronic Pain Syndrome   Endocrine:   Hypothyroidism    Psych:   Psychiatric History anxiety          Physical Exam  General:  Well nourished    Airway/Jaw/Neck:  Airway Findings: Mouth Opening: Normal Tongue: Normal  General Airway Assessment: Adult  Mallampati: II  TM Distance: Normal, at least 6 cm       Chest/Lungs:  Chest/Lungs Clear    Heart/Vascular:  Heart Findings: Normal            Anesthesia Plan  Type of Anesthesia, risks & benefits discussed:  Anesthesia Type:  general  Patient's Preference:   Intra-op Monitoring Plan: standard ASA monitors  Intra-op Monitoring Plan Comments:   Post Op Pain Control Plan: multimodal analgesia and IV/PO Opioids PRN  Post Op Pain Control Plan Comments: Opioids and analgesic adjuvants as needed  Regional blocks if applicable/indicated  Induction:   IV  Beta Blocker:  Patient is not currently on a Beta-Blocker (No further documentation required).       Informed Consent: Patient understands risks and agrees with Anesthesia plan.  Questions answered. Anesthesia consent signed with patient.  ASA Score: 2     Day of Surgery Review of History & Physical:    H&P update referred to the surgeon.     Anesthesia Plan Notes: I have personally evaluated the patient and discussed risk/benefits/alternatives of general anesthesia.        Ready For Surgery From Anesthesia Perspective.

## 2020-01-30 VITALS
BODY MASS INDEX: 25.46 KG/M2 | TEMPERATURE: 97 F | OXYGEN SATURATION: 100 % | HEIGHT: 68 IN | WEIGHT: 168 LBS | RESPIRATION RATE: 20 BRPM | DIASTOLIC BLOOD PRESSURE: 89 MMHG | HEART RATE: 86 BPM | SYSTOLIC BLOOD PRESSURE: 129 MMHG

## 2020-02-05 LAB
FINAL PATHOLOGIC DIAGNOSIS: NORMAL
GROSS: NORMAL

## 2020-02-07 ENCOUNTER — PATIENT OUTREACH (OUTPATIENT)
Dept: ADMINISTRATIVE | Facility: OTHER | Age: 45
End: 2020-02-07

## 2020-02-10 ENCOUNTER — OFFICE VISIT (OUTPATIENT)
Dept: OBSTETRICS AND GYNECOLOGY | Facility: CLINIC | Age: 45
End: 2020-02-10
Payer: COMMERCIAL

## 2020-02-10 VITALS — HEIGHT: 68 IN | BODY MASS INDEX: 25.46 KG/M2 | WEIGHT: 168 LBS

## 2020-02-10 DIAGNOSIS — N83.201 RIGHT OVARIAN CYST: Primary | ICD-10-CM

## 2020-02-10 DIAGNOSIS — R10.2 PELVIC PAIN IN FEMALE: ICD-10-CM

## 2020-02-10 DIAGNOSIS — R10.9 RIGHT SIDED ABDOMINAL PAIN: ICD-10-CM

## 2020-02-10 DIAGNOSIS — F32.A DEPRESSION, UNSPECIFIED DEPRESSION TYPE: ICD-10-CM

## 2020-02-10 PROCEDURE — 99499 NO LOS: ICD-10-PCS | Mod: S$GLB,,, | Performed by: OBSTETRICS & GYNECOLOGY

## 2020-02-10 PROCEDURE — 99499 UNLISTED E&M SERVICE: CPT | Mod: S$GLB,,, | Performed by: OBSTETRICS & GYNECOLOGY

## 2020-02-10 PROCEDURE — 99999 PR PBB SHADOW E&M-EST. PATIENT-LVL III: ICD-10-PCS | Mod: PBBFAC,,, | Performed by: OBSTETRICS & GYNECOLOGY

## 2020-02-10 PROCEDURE — 99999 PR PBB SHADOW E&M-EST. PATIENT-LVL III: CPT | Mod: PBBFAC,,, | Performed by: OBSTETRICS & GYNECOLOGY

## 2020-02-10 RX ORDER — SODIUM CHLORIDE, SODIUM LACTATE, POTASSIUM CHLORIDE, CALCIUM CHLORIDE 600; 310; 30; 20 MG/100ML; MG/100ML; MG/100ML; MG/100ML
INJECTION, SOLUTION INTRAVENOUS CONTINUOUS
Status: CANCELLED | OUTPATIENT
Start: 2020-02-10

## 2020-02-10 NOTE — PROGRESS NOTES
Chief Complaint   Patient presents with    Pre-op Exam       History of Present Illness: Isela Smyth is a 44 y.o. female that presents today 2/10/2020 for   Chief Complaint   Patient presents with    Pre-op Exam         Past Medical History:   Diagnosis Date    Anxiety     Arthritis     Chronic lumbar pain     Depression     Deviated nasal septum     Diverticulosis     GERD (gastroesophageal reflux disease)     Hemorrhoids     Hypothyroid     Kidney stone     passed 10 stones by herself over the years, one needed procedure    Migraine headache     PONV (postoperative nausea and vomiting)     severe    Right ovarian cyst 02/2020    Right sided abdominal pain     Shortness of breath     Urticaria        Past Surgical History:   Procedure Laterality Date    AUGMENTATION OF BREAST      BREAST SURGERY      CHOLECYSTECTOMY      COLONOSCOPY  prior to 2011    COLONOSCOPY N/A 9/26/2018    Dr. Zuluaga; diverticulosis; repeat in 10 years    COLONOSCOPY N/A 11/1/2019    Procedure: COLONOSCOPY;  Surgeon: Marquis Zuluaga MD;  Location: [facility];  Service: Endoscopy;  Laterality: N/A;    ESOPHAGOGASTRODUODENOSCOPY N/A 9/5/2018    Procedure: EGD (ESOPHAGOGASTRODUODENOSCOPY);  Surgeon: Marquis Zuluaga MD;  Location: [facility];  Service: Endoscopy;  Laterality: N/A;    ESOPHAGOGASTRODUODENOSCOPY N/A 1/29/2020    Procedure: EGD (ESOPHAGOGASTRODUODENOSCOPY);  Surgeon: Marquis Zuluaga MD;  Location: [facility];  Service: Endoscopy;  Laterality: N/A;    HERNIA REPAIR      HIATAL HERNIA REPAIR      KIDNEY STONE SURGERY  2009    [facility], had stone removed by dr renay george, stayed in hospital 4 days    LAPAROSCOPIC GASTRIC BANDING  2007    later removed in 2016    LUMBAR EPIDURAL INJECTION      SINUS SURGERY      SPINE SURGERY      6 back surgeries; 1 neck surgery    TUBAL LIGATION      UPPER GASTROINTESTINAL ENDOSCOPY  03/2013    Dr. Zuluaga    UPPER GASTROINTESTINAL ENDOSCOPY   09/14/2016    Dr. Zuluaga    UPPER GASTROINTESTINAL ENDOSCOPY  09/05/2018    Dr. Zuluaga; gastritis; bx negative    VAGINAL DELIVERY      times 3       Current Outpatient Medications   Medication Sig Dispense Refill    butalbital-acetaminophen-caffeine -40 mg (FIORICET, ESGIC) -40 mg per tablet 1 tab PO q6 hr PRN severe migraine. No more than 10 tab per 30 days. 10 tablet 3    cyclobenzaprine (FLEXERIL) 10 MG tablet Take 1 tablet (10 mg total) by mouth 3 (three) times daily as needed. 30 tablet 11    dicyclomine (BENTYL) 10 MG capsule TAKE 1 CAPSULE BY MOUTH 3 TIMES DAILY WITH MEALS. 90 capsule 2    erenumab-aooe 140 mg/mL AtIn Inject 140 mg into the skin every 28 days. 1 mL 11    ferrous gluconate (FERGON) 324 MG tablet Take 324 mg by mouth daily with breakfast.      ketorolac (TORADOL) 60 mg/2 mL Soln Inject 2 mLs (60 mg total) into the muscle 2 (two) times daily as needed (severe migraine.). No more than 2 days/week. 20 mL 3    levothyroxine (SYNTHROID) 75 MCG tablet TAKE 1 TABLET (75 MCG TOTAL) BY MOUTH EVERY MORNING. 90 tablet 3    linaclotide (LINZESS) 145 mcg Cap capsule Take 1 capsule (145 mcg total) by mouth daily as needed. 30 capsule 5    pantoprazole (PROTONIX) 40 MG tablet Take 1 tablet (40 mg total) by mouth once daily. 30 tablet 2    promethazine (PHENERGAN) 12.5 MG Tab Take 1 tablet (12.5 mg total) by mouth every 6 (six) hours as needed. 90 tablet 1    ondansetron (ZOFRAN ODT) 4 MG TbDL Take 1 tablet (4 mg total) by mouth every 6 (six) hours as needed. (Patient not taking: Reported on 2/10/2020) 10 tablet 0    sucralfate (CARAFATE) 1 gram tablet TAKE 1 TABLET BY MOUTH THREE TIMES A DAY (Patient not taking: Reported on 10/31/2019) 100 tablet 2    sumatriptan (IMITREX) 100 MG tablet TAKE 1 TABLET BY MOUTH EVERY DAY AS NEEDED. MAY REPEAT IN 2 HOURS IF NEEDED. DO NOT EXCEED 2 TABLETS PER DAY (Patient not taking: Reported on 2/10/2020) 54 tablet 2    traZODone (DESYREL) 150  MG tablet Take 1 tablet (150 mg total) by mouth nightly. (Patient not taking: Reported on 2/10/2020) 30 tablet 11    venlafaxine (EFFEXOR) 50 MG Tab Take 1 tablet (50 mg total) by mouth once daily. (Patient not taking: Reported on 2/10/2020) 30 tablet 11    venlafaxine (EFFEXOR-XR) 150 MG Cp24 TAKE 1 CAPSULE BY MOUTH EVERY DAY (Patient not taking: Reported on 2/10/2020) 90 capsule 3     No current facility-administered medications for this visit.      Facility-Administered Medications Ordered in Other Visits   Medication Dose Route Frequency Provider Last Rate Last Dose    lactated ringers infusion   Intravenous Continuous Marquis Zuluaga MD 75 mL/hr at 18 0845         Review of patient's allergies indicates:   Allergen Reactions    Gabapentin Other (See Comments)     Coming out of skin    Morphine Itching       Family History   Problem Relation Age of Onset    Ulcers Mother     Cancer Neg Hx     Heart disease Neg Hx     Hypertension Neg Hx     Diabetes Neg Hx     Angioedema Neg Hx     Allergies Neg Hx     Allergic rhinitis Neg Hx     Asthma Neg Hx     Eczema Neg Hx     Immunodeficiency Neg Hx     Colon cancer Neg Hx     Colon polyps Neg Hx     Crohn's disease Neg Hx     Ulcerative colitis Neg Hx     Nephrolithiasis Neg Hx     Stomach cancer Neg Hx     Esophageal cancer Neg Hx        Social History     Tobacco Use    Smoking status: Never Smoker    Smokeless tobacco: Never Used   Substance Use Topics    Alcohol use: No    Drug use: No     Types: Oxycodone       OB History    Para Term  AB Living   3 3 3     3   SAB TAB Ectopic Multiple Live Births                  # Outcome Date GA Lbr Emiliano/2nd Weight Sex Delivery Anes PTL Lv   3 Term            2 Term            1 Term                Review of Symptoms:  GENERAL: Denies weight gain or weight loss. Feeling well overall.   SKIN: Denies rash or lesions.   HEAD: Denies head injury or headache.   NODES: Denies enlarged  "lymph nodes.   CHEST: Denies chest pain or shortness of breath.   CARDIOVASCULAR: Denies palpitations or left sided chest pain.   ABDOMEN: No abdominal pain, constipation, diarrhea, nausea, vomiting or rectal bleeding.   URINARY: No frequency, dysuria, hematuria, or burning on urination.  HEMATOLOGIC: No easy bruisability or excessive bleeding.   MUSCULOSKELETAL: Denies joint pain or swelling.     Ht 5' 7.5" (1.715 m)   Wt 76.2 kg (168 lb)   LMP 01/28/2020   Physical Exam:  APPEARANCE: Well nourished, well developed, in no acute distress.  SKIN: Normal skin turgor, no lesions.  NECK: Neck symmetric without masses   RESPIRATORY: Normal respiratory effort with no retractions or use of accessory muscles  CARDIOVASCULAR: Peripheral vascular system with no swelling no varicosities and palpation of pulses normal  LYMPHATIC: No enlargements of the lymph nodes noted in the neck, axillae, or groin  ABDOMEN: Soft. No tenderness or masses. No hepatosplenomegaly. No hernias.  PELVIC: Normal external female genitalia without lesions. Normal hair distribution. Adequate perineal body, normal urethral meatus. Urethra with no masses.  Bladder nontender. Vagina moist and well rugated without lesions or discharge. Cervix pink and without lesions. No significant cystocele or rectocele. Bimanual exam showed uterus normal size, shape, position, mobile and nontender. Adnexa without masses or tenderness. Urethra and bladder normal.  EXTREMITIES: No clubbing cyanosis or edema.    ASSESSMENT/PLAN:  Right ovarian cyst  -     Full code; Standing  -     Insert peripheral IV; Standing  -     Chlorhexidine (CHG) 2% Wipes; Standing  -     Notify Physician - Potential Need of Opioid Reversal; Standing  -     Diet NPO; Standing  -     X-Ray Chest PA And Lateral; Standing    Depression, unspecified depression type  -     Full code; Standing  -     Insert peripheral IV; Standing  -     Chlorhexidine (CHG) 2% Wipes; Standing  -     Notify Physician - " Potential Need of Opioid Reversal; Standing  -     Diet NPO; Standing  -     X-Ray Chest PA And Lateral; Standing    Right sided abdominal pain  -     Full code; Standing  -     Insert peripheral IV; Standing  -     Chlorhexidine (CHG) 2% Wipes; Standing  -     Notify Physician - Potential Need of Opioid Reversal; Standing  -     Diet NPO; Standing  -     X-Ray Chest PA And Lateral; Standing    Pelvic pain in female  -     Full code; Standing  -     Insert peripheral IV; Standing  -     Chlorhexidine (CHG) 2% Wipes; Standing  -     Notify Physician - Potential Need of Opioid Reversal; Standing  -     Diet NPO; Standing  -     X-Ray Chest PA And Lateral; Standing        Plan right cystectomy with possible right oophorectomy and other indicated procedure.

## 2020-02-12 ENCOUNTER — TELEPHONE (OUTPATIENT)
Dept: PHARMACY | Facility: CLINIC | Age: 45
End: 2020-02-12

## 2020-02-14 ENCOUNTER — TELEPHONE (OUTPATIENT)
Dept: FAMILY MEDICINE | Facility: CLINIC | Age: 45
End: 2020-02-14

## 2020-02-14 NOTE — TELEPHONE ENCOUNTER
----- Message from Faith Anderson sent at 2/14/2020 11:02 AM CST -----    Type:  RX Refill Request    Who Called:  pt  Refill  RX Name and Strength:   Narco 7.5 mg  How is the patient currently taking it? (ex. 1XDay):  Twice a day   Is this a 30 day or 90 day RX: 30  day  Preferred Pharmacy with phone number:   [id number] [place] - [address]  [address]  [place] [address]  Phone: [phone] Fax: [phone]  Best Call Back Number:  [phone]  Additional Information:    Calling to  Speak  To the nurse

## 2020-02-14 NOTE — TELEPHONE ENCOUNTER
Called pt, notified of message per Dr. Dan she verbally understood. She thought she was supposed to call now for future refills, advised to call about 1 week prior.

## 2020-02-17 NOTE — TELEPHONE ENCOUNTER
RX call attempt 2 regarding Aimovig refill from [facility]. Patient reached-- shipping out  for  arrival with patients consent. Copay of $5.00 charging [employer] (1925) @ 045. Patient stated she was not in need of any supplies with this refill shipment. Address and  confirmed. Patient has 0 doses on hand at this time. Patient has not started any new medications, has had no missed doses and no side effects present. Patient is currently taking the medication as directed by doctors instruction, Inject 140 mg into the skin every 28 days. Patient does have a safe place in their residence to keep medication at desired temperature away from small children and pets. Patient also does have the capability of contacting 911 in the event of an emergency. Patient states they do not have any questions or concerns at this time. Patients next injection is due on Wednesday, .

## 2020-02-19 ENCOUNTER — NURSE TRIAGE (OUTPATIENT)
Dept: ADMINISTRATIVE | Facility: CLINIC | Age: 45
End: 2020-02-19

## 2020-02-19 NOTE — TELEPHONE ENCOUNTER
"  The patient reported bleeding and was advised per protocol and was advised to call back with any questions,concerns or symptoms. The patient verbalized understanding.  Reason for Disposition   MODERATE vaginal bleeding (i.e., soaking 1 pad or tampon per hour and present > 6 hours; 1 menstrual cup every 6 hours)   SEVERE abdominal pain    Additional Information   Negative: Shock suspected (e.g., cold/pale/clammy skin, too weak to stand, low BP, rapid pulse)   Negative: Passed out (i.e., lost consciousness, collapsed and was not responding)   Negative: Sounds like a life-threatening emergency to the triager   Negative: Followed a genital area injury   Negative: Pregnant > 20 weeks  (5 months or more)   Negative: Pregnant < 20 weeks  (less than 5 months)   Negative: Postpartum < 1 month since delivery ("Did you recently give birth?")   Negative: Bleeding occurring > 12 months after menopause   Negative: Bleeding from sexual abuse or rape   Negative: [1] Vaginal discharge is main symptom AND [2] small amount of blood   Negative: Difficult to awaken or acting confused (e.g., disoriented, slurred speech)    Protocols used: VAGINAL BLEEDING - [id number]      "

## 2020-02-21 ENCOUNTER — TELEPHONE (OUTPATIENT)
Dept: OBSTETRICS AND GYNECOLOGY | Facility: CLINIC | Age: 45
End: 2020-02-21

## 2020-02-21 DIAGNOSIS — G43.711 INTRACTABLE CHRONIC MIGRAINE WITHOUT AURA AND WITH STATUS MIGRAINOSUS: ICD-10-CM

## 2020-02-21 RX ORDER — KETOROLAC TROMETHAMINE 30 MG/ML
INJECTION, SOLUTION INTRAMUSCULAR; INTRAVENOUS
Qty: 20 ML | Refills: 3 | Status: SHIPPED | OUTPATIENT
Start: 2020-02-21 | End: 2020-08-20

## 2020-02-21 NOTE — TELEPHONE ENCOUNTER
Patient called in stating that the bleeding she has been having has gotten better and she would like to cancel her appointment for today with Dr. Crespo and wait to do her post-op visit with Dr. Martinez. Appointment made with Dr. Martinez. Appointment cancelled with Dr. Crespo.

## 2020-02-23 DIAGNOSIS — G43.711 INTRACTABLE CHRONIC MIGRAINE WITHOUT AURA AND WITH STATUS MIGRAINOSUS: ICD-10-CM

## 2020-02-24 RX ORDER — BUTALBITAL, ACETAMINOPHEN AND CAFFEINE 50; 325; 40 MG/1; MG/1; MG/1
TABLET ORAL
Qty: 10 TABLET | Refills: 0 | Status: SHIPPED | OUTPATIENT
Start: 2020-02-24 | End: 2020-05-18

## 2020-02-28 NOTE — TELEPHONE ENCOUNTER
----- Message from Leyla Craven sent at 2/28/2020 12:40 PM CST -----  Contact: patient  Type:  RX Refill Request    Who Called:  patient  Refill or New Rx:  refill  RX Name and Strength:  HYDROcodone-acetaminophen (NORCO) 7.5-325 mg per tablet  How is the patient currently taking it? (ex. 1XDay):    Is this a 30 day or 90 day RX:    Preferred Pharmacy with phone number:    Local or Mail Order:  local  Ordering Provider:  Dr.benning Reinoso Call Back Number:  [phone]  Additional Information:  Requesting a call back when this is done

## 2020-03-03 RX ORDER — HYDROCODONE BITARTRATE AND ACETAMINOPHEN 7.5; 325 MG/1; MG/1
1 TABLET ORAL EVERY 6 HOURS PRN
Qty: 120 TABLET | Refills: 0 | Status: SHIPPED | OUTPATIENT
Start: 2020-03-03 | End: 2020-04-20 | Stop reason: SDUPTHER

## 2020-03-04 ENCOUNTER — OFFICE VISIT (OUTPATIENT)
Dept: OBSTETRICS AND GYNECOLOGY | Facility: CLINIC | Age: 45
End: 2020-03-04
Payer: COMMERCIAL

## 2020-03-04 VITALS
BODY MASS INDEX: 26.23 KG/M2 | SYSTOLIC BLOOD PRESSURE: 124 MMHG | WEIGHT: 173.06 LBS | HEIGHT: 68 IN | DIASTOLIC BLOOD PRESSURE: 86 MMHG

## 2020-03-04 DIAGNOSIS — Z09 POSTOP CHECK: Primary | ICD-10-CM

## 2020-03-04 PROCEDURE — 99024 POSTOP FOLLOW-UP VISIT: CPT | Mod: S$GLB,,, | Performed by: OBSTETRICS & GYNECOLOGY

## 2020-03-04 PROCEDURE — 99024 PR POST-OP FOLLOW-UP VISIT: ICD-10-PCS | Mod: S$GLB,,, | Performed by: OBSTETRICS & GYNECOLOGY

## 2020-03-04 PROCEDURE — 99999 PR PBB SHADOW E&M-EST. PATIENT-LVL IV: ICD-10-PCS | Mod: PBBFAC,,, | Performed by: OBSTETRICS & GYNECOLOGY

## 2020-03-04 PROCEDURE — 99999 PR PBB SHADOW E&M-EST. PATIENT-LVL IV: CPT | Mod: PBBFAC,,, | Performed by: OBSTETRICS & GYNECOLOGY

## 2020-03-04 NOTE — PROGRESS NOTES
POST-OP NOTE    3/4/2020    HPI: no complaints    Past Medical History:   Diagnosis Date    Anxiety     Arthritis     Chronic lumbar pain     Depression     Deviated nasal septum     Diverticulosis     Hemorrhoids     Hypothyroid     Kidney stone     passed 10 stones by herself over the years, one needed procedure    Migraine headache     PONV (postoperative nausea and vomiting)     severe    Right ovarian cyst 02/2020    Right sided abdominal pain     Urticaria      Past Surgical History:   Procedure Laterality Date    AUGMENTATION OF BREAST      BREAST SURGERY      CHOLECYSTECTOMY      COLONOSCOPY  prior to 2011    COLONOSCOPY N/A 9/26/2018    Dr. Zuluaga; diverticulosis; repeat in 10 years    COLONOSCOPY N/A 11/1/2019    Procedure: COLONOSCOPY;  Surgeon: Marquis Zuluaga MD;  Location: [facility];  Service: Endoscopy;  Laterality: N/A;    ESOPHAGOGASTRODUODENOSCOPY N/A 9/5/2018    Procedure: EGD (ESOPHAGOGASTRODUODENOSCOPY);  Surgeon: Marquis Zuluaga MD;  Location: [facility];  Service: Endoscopy;  Laterality: N/A;    ESOPHAGOGASTRODUODENOSCOPY N/A 1/29/2020    Procedure: EGD (ESOPHAGOGASTRODUODENOSCOPY);  Surgeon: Marquis Zuluaga MD;  Location: [facility];  Service: Endoscopy;  Laterality: N/A;    HERNIA REPAIR      HIATAL HERNIA REPAIR      KIDNEY STONE SURGERY  2009    [facility], had stone removed by dr renay george, stayed in hospital 4 days    LAPAROSCOPIC GASTRIC BANDING  2007    later removed in 2016    LAPAROSCOPIC SALPINGO-OOPHORECTOMY Right 2/12/2020    Procedure: SALPINGO-OOPHORECTOMY, LAPAROSCOPIC;  Surgeon: Nely Martinez MD;  Location: [facility];  Service: OB/GYN;  Laterality: Right;    LUMBAR EPIDURAL INJECTION      SINUS SURGERY      SPINE SURGERY      6 back surgeries; 1 neck surgery    TUBAL LIGATION      UPPER GASTROINTESTINAL ENDOSCOPY  03/2013    Dr. Zuluaga    UPPER GASTROINTESTINAL ENDOSCOPY  09/14/2016    Dr. Zuluaga    UPPER GASTROINTESTINAL  ENDOSCOPY  09/05/2018    Dr. Zuluaga; gastritis; bx negative    VAGINAL DELIVERY      times 3     Current Outpatient Medications   Medication Sig Dispense Refill    butalbital-acetaminophen-caffeine -40 mg (FIORICET, ESGIC) -40 mg per tablet TAKE ONE TABLET BY MOUTH EVERY 6 HOURS AS NEEDED FOR SEVERE MIGRAINE. NO MORE THAN 10 IN 30 DAYS. 10 tablet 0    cyclobenzaprine (FLEXERIL) 10 MG tablet Take 1 tablet (10 mg total) by mouth 3 (three) times daily as needed. (Patient taking differently: Take 10 mg by mouth 3 (three) times daily as needed. IF NEEDED TAKE AM OF SURGERY WITH A SIP OF WATER) 30 tablet 11    erenumab-aooe 140 mg/mL AtIn Inject 140 mg into the skin every 28 days. 1 mL 11    HYDROcodone-acetaminophen (NORCO) 7.5-325 mg per tablet Take 1 tablet by mouth every 6 (six) hours as needed for Pain. 120 tablet 0    ketorolac (TORADOL) 60 mg/2 mL Soln INJECT 2 MLS INTO THE MUSCLE 2 TIMES DAILY AS NEEDED FOR SEVERE MIGRAINE. NO MORE THAN 2 DAYS/WEEK. 20 mL 3    levothyroxine (SYNTHROID) 75 MCG tablet TAKE 1 TABLET (75 MCG TOTAL) BY MOUTH EVERY MORNING. (Patient taking differently: Take 75 mcg by mouth every morning. TAKE AM OF SURGERY WITH A SIP OF WATER) 90 tablet 3    linaclotide (LINZESS) 145 mcg Cap capsule Take 1 capsule (145 mcg total) by mouth daily as needed. 30 capsule 5    pantoprazole (PROTONIX) 40 MG tablet Take 1 tablet (40 mg total) by mouth once daily. (Patient taking differently: Take 40 mg by mouth once daily. TAKE AM OF SURGERY WITH A SIP OF WATER) 30 tablet 2    promethazine (PHENERGAN) 12.5 MG Tab Take 1 tablet (12.5 mg total) by mouth every 6 (six) hours as needed. 90 tablet 1    promethazine (PHENERGAN) 25 MG suppository Place 25 mg rectally every 6 (six) hours as needed for Nausea.      sumatriptan (IMITREX) 100 MG tablet TAKE 1 TABLET BY MOUTH EVERY DAY AS NEEDED. MAY REPEAT IN 2 HOURS IF NEEDED. DO NOT EXCEED 2 TABLETS PER DAY 54 tablet 2    traZODone (DESYREL)  "150 MG tablet Take 1 tablet (150 mg total) by mouth nightly. 30 tablet 11    venlafaxine (EFFEXOR-XR) 150 MG Cp24 TAKE 1 CAPSULE BY MOUTH EVERY DAY (Patient taking differently: Take 50 mg by mouth once daily. TAKE AM OF SURGERY WITH A SIP OF WATER) 90 capsule 3    ferrous gluconate (FERGON) 324 MG tablet Take 324 mg by mouth daily with breakfast.      ibuprofen (ADVIL,MOTRIN) 800 MG tablet Take 1 tablet (800 mg total) by mouth every 8 (eight) hours as needed for Pain. (Patient not taking: Reported on 3/4/2020) 20 tablet 0    medroxyPROGESTERone (PROVERA) 10 MG tablet Take 2 tablets (20 mg total) by mouth 3 (three) times daily. for 5 days (Patient not taking: Reported on 3/4/2020) 30 tablet 0    naproxen (NAPROSYN) 500 MG tablet Take 1 tablet (500 mg total) by mouth 2 (two) times daily with meals. (Patient not taking: Reported on 3/4/2020) 15 tablet 0    oxyCODONE-acetaminophen (PERCOCET) 5-325 mg per tablet Take 1 tablet by mouth every 4 (four) hours as needed for Pain. (Patient not taking: Reported on 3/4/2020) 14 tablet 0    venlafaxine (EFFEXOR) 50 MG Tab Take 1 tablet (50 mg total) by mouth once daily. (Patient not taking: Reported on 3/4/2020) 30 tablet 11     No current facility-administered medications for this visit.      Facility-Administered Medications Ordered in Other Visits   Medication Dose Route Frequency Provider Last Rate Last Dose    lactated ringers infusion   Intravenous Continuous Marquis Zuluaga MD 75 mL/hr at 09/26/18 0845       Review of patient's allergies indicates:   Allergen Reactions    Gabapentin Other (See Comments)     Coming out of skin    Morphine Itching     Social History     Tobacco Use    Smoking status: Never Smoker    Smokeless tobacco: Never Used   Substance Use Topics    Alcohol use: Yes     Comment: ocassionally    Drug use: No     Types: Oxycodone     /86   Ht 5' 7.5" (1.715 m)   Wt 78.5 kg (173 lb 1 oz)     ROS:  GENERAL: Denies weight gain, " weight loss, fatigue, and malaise.   SKIN: Denies rash or lesions.   HEAD: Denies head injury or headache.   NODES: Denies enlarged lymph nodes.   CHEST: Denies chest pain or shortness of breath.   CARDIOVASCULAR: Denies palpitations or left sided chest pain.   ABDOMEN: No abdominal pain, constipation, diarrhea, nausea, vomiting or rectal bleeding.   URINARY: No frequency, urgency, dysuria, or hematuria  MUSCULOSKELETAL: Denies joint pain or swelling.   NEUROLOGIC: Denies seizures.    PE:   APPEARANCE: Well nourished, well developed, in no acute distress.  SKIN: Normal skin turgor, no lesions.  ABDOMEN: Incision healing fine. Soft. No tenderness or masses. No hepatosplenomegaly. No hernias.EXTREMITIES: NO clubbing cyanosis or edema    ASSESSMENT  Encounter Diagnoses   Name Primary?    Postop check Yes       PLAN  1. RTC:4 weeks

## 2020-03-11 ENCOUNTER — TELEPHONE (OUTPATIENT)
Dept: PHARMACY | Facility: CLINIC | Age: 45
End: 2020-03-11

## 2020-03-25 NOTE — PROGRESS NOTES
Refill Routing Note     Medication(s) are not appropriate for processing by [facility]:    Medication Outside of Protocol    Appointments  past 12m or future 3m with PCP    Date Provider   Last Visit   1/20/2020 Hardy Dan MD   Next Visit   4/20/2020 Hardy Dan MD           Automatic Epic Protocol Generated Data:    Requested Prescriptions   Pending Prescriptions Disp Refills    linaCLOtide (LINZESS) 145 mcg Cap capsule 30 capsule 5     Sig: Take 1 capsule (145 mcg total) by mouth daily as needed.       There is no refill protocol information for this order           Note composed:8:24 AM 03/25/2020

## 2020-03-31 ENCOUNTER — TELEPHONE (OUTPATIENT)
Dept: PHARMACY | Facility: CLINIC | Age: 45
End: 2020-03-31

## 2020-03-31 NOTE — TELEPHONE ENCOUNTER
DOCUMENTATION ONLY:  Prior RE -  authorization for Aimovig approved from 03/31/2020 to 03/30/2021  Case ID: [id number]    Co-pay: $55.00 - $5.00 with co-pay card    Patient Assistance IS required and is on file. Sending to clinical pharmacist for  and shipment. Barbara GRAYSON

## 2020-04-07 ENCOUNTER — TELEPHONE (OUTPATIENT)
Dept: PHARMACY | Facility: CLINIC | Age: 45
End: 2020-04-07

## 2020-04-14 ENCOUNTER — TELEPHONE (OUTPATIENT)
Dept: PHARMACY | Facility: CLINIC | Age: 45
End: 2020-04-14

## 2020-04-20 ENCOUNTER — OFFICE VISIT (OUTPATIENT)
Dept: FAMILY MEDICINE | Facility: CLINIC | Age: 45
End: 2020-04-20
Payer: COMMERCIAL

## 2020-04-20 DIAGNOSIS — F32.A DEPRESSION, UNSPECIFIED DEPRESSION TYPE: ICD-10-CM

## 2020-04-20 DIAGNOSIS — G43.909 MIGRAINE WITHOUT STATUS MIGRAINOSUS, NOT INTRACTABLE, UNSPECIFIED MIGRAINE TYPE: ICD-10-CM

## 2020-04-20 DIAGNOSIS — M54.40 CHRONIC LOW BACK PAIN WITH SCIATICA, SCIATICA LATERALITY UNSPECIFIED, UNSPECIFIED BACK PAIN LATERALITY: ICD-10-CM

## 2020-04-20 DIAGNOSIS — M54.2 CHRONIC CERVICAL PAIN: Primary | ICD-10-CM

## 2020-04-20 DIAGNOSIS — G89.29 CHRONIC CERVICAL PAIN: Primary | ICD-10-CM

## 2020-04-20 DIAGNOSIS — F41.9 ANXIETY: ICD-10-CM

## 2020-04-20 DIAGNOSIS — G89.29 CHRONIC LOW BACK PAIN WITH SCIATICA, SCIATICA LATERALITY UNSPECIFIED, UNSPECIFIED BACK PAIN LATERALITY: ICD-10-CM

## 2020-04-20 DIAGNOSIS — E03.9 HYPOTHYROIDISM, UNSPECIFIED TYPE: ICD-10-CM

## 2020-04-20 PROCEDURE — 99213 PR OFFICE/OUTPT VISIT, EST, LEVL III, 20-29 MIN: ICD-10-PCS | Mod: 95,,, | Performed by: FAMILY MEDICINE

## 2020-04-20 PROCEDURE — 99213 OFFICE O/P EST LOW 20 MIN: CPT | Mod: 95,,, | Performed by: FAMILY MEDICINE

## 2020-04-20 RX ORDER — HYDROCODONE BITARTRATE AND ACETAMINOPHEN 7.5; 325 MG/1; MG/1
1 TABLET ORAL EVERY 6 HOURS PRN
Qty: 120 TABLET | Refills: 0 | Status: SHIPPED | OUTPATIENT
Start: 2020-04-20 | End: 2020-04-20 | Stop reason: SDUPTHER

## 2020-04-20 RX ORDER — HYDROCODONE BITARTRATE AND ACETAMINOPHEN 7.5; 325 MG/1; MG/1
1 TABLET ORAL EVERY 6 HOURS PRN
Qty: 120 TABLET | Refills: 0 | Status: SHIPPED | OUTPATIENT
Start: 2020-06-19 | End: 2020-04-20 | Stop reason: SDUPTHER

## 2020-04-20 RX ORDER — PROMETHAZINE HYDROCHLORIDE 25 MG/1
25 SUPPOSITORY RECTAL EVERY 6 HOURS PRN
Qty: 30 SUPPOSITORY | Refills: 2 | Status: SHIPPED | OUTPATIENT
Start: 2020-04-20 | End: 2022-05-26 | Stop reason: SDUPTHER

## 2020-04-20 RX ORDER — HYDROCODONE BITARTRATE AND ACETAMINOPHEN 7.5; 325 MG/1; MG/1
1 TABLET ORAL EVERY 6 HOURS PRN
Qty: 120 TABLET | Refills: 0 | Status: SHIPPED | OUTPATIENT
Start: 2020-05-20 | End: 2020-06-18 | Stop reason: SDUPTHER

## 2020-04-20 RX ORDER — HYDROCODONE BITARTRATE AND ACETAMINOPHEN 7.5; 325 MG/1; MG/1
1 TABLET ORAL EVERY 6 HOURS PRN
Qty: 120 TABLET | Refills: 0 | Status: SHIPPED | OUTPATIENT
Start: 2020-04-20 | End: 2020-05-20

## 2020-04-20 RX ORDER — CYCLOBENZAPRINE HCL 10 MG
10 TABLET ORAL 3 TIMES DAILY PRN
Qty: 30 TABLET | Refills: 11 | Status: SHIPPED | OUTPATIENT
Start: 2020-04-20 | End: 2020-10-21 | Stop reason: SDUPTHER

## 2020-04-20 RX ORDER — SUMATRIPTAN SUCCINATE 100 MG/1
TABLET ORAL
Qty: 54 TABLET | Refills: 2 | Status: SHIPPED | OUTPATIENT
Start: 2020-04-20 | End: 2021-04-21 | Stop reason: SDUPTHER

## 2020-04-20 NOTE — PROGRESS NOTES
Subjective:       Patient ID: Isela Smyth is a 44 y.o. female.    Chief Complaint: No chief complaint on file.    HPI     The patient location is:  Home   The chief complaint leading to consultation is:  Migraines  Visit type: audiovisual  Total time spent with patient:  15 min  Each patient to whom he or she provides medical services by telemedicine is:  (1) informed of the relationship between the physician and patient and the respective role of any other health care provider with respect to management of the patient; and (2) notified that he or she may decline to receive medical services by telemedicine and may withdraw from such care at any time.    Notes:     Here for a f/u.     Gets 3-5 migraines per month.  Takes fioricet and imitrex for pain control.  Also, receiving aimovig injections.      History of 6 low back spinal surgeries and 1 neck spinal surgery in past. Recent one in 2012.      Status post 08/14/2012 microscopic recurrent left L4-L5 laminotomy, discectomy, left L5 complete laminectomy, left L5-S1 laminotomy,    recurrent discectomy.      Chronic lumbago controlled while on norco 7.5 and flexeril. Ps: 2/10. Occasional left sciatic pain. Had an placido last month which helped with the pain.      Had mri L spine 9/2016 which showed:      1.  Postoperative change of left hemilaminectomy at L4-L5 and L5-S1.  2.  Multilevel degenerative change of the lumbar spine, most pronounced at L4-L5 and L5-S1 as detailed above  3.  Minimal descending central disc extrusion at L5-S1 which does not appear to encroach upon either the descending left or right S1 nerve.  4.  Probable conjoined left S2 nerve coursing in the left posterolateral spinal canal.  Less likely, this represents an intraspinal synovial cyst abutting the descending left S1 nerve.  5.  Central annular tear of the intervertebral disc at L4-L5.     Also, has chronic neck pain > 5 years. Hx of neck surgeries in past. Saw pain management recently and  had an placido on 1/31/17. Norco 7.5 helps with pain.      Had mri of cervical spine on 1/19/17 which showed:  -Disc protrusion at the C5-C6 level and to the right lateral recess with possible anterior cord contact  -Loss of normal cervical lordosis which may be positional or related to muscular strain.  -Milder degenerative changes are noted within the body of the report.      Depression and anxiety stable while on effexor.       Review of Systems    Objective:      Physical Exam    Assessment:       1. Chronic cervical pain    2. Migraine without status migrainosus, not intractable, unspecified migraine type    3. Depression, unspecified depression type    4. Anxiety    5. Hypothyroidism, unspecified type    6. Chronic low back pain with sciatica, sciatica laterality unspecified, unspecified back pain laterality        Plan:       Chronic cervical pain    Migraine without status migrainosus, not intractable, unspecified migraine type  -     sumatriptan (IMITREX) 100 MG tablet; TAKE 1 TABLET BY MOUTH EVERY DAY AS NEEDED. MAY REPEAT IN 2 HOURS IF NEEDED. DO NOT EXCEED 2 TABLETS PER DAY  Dispense: 54 tablet; Refill: 2    Depression, unspecified depression type    Anxiety    Hypothyroidism, unspecified type    Chronic low back pain with sciatica, sciatica laterality unspecified, unspecified back pain laterality    Other orders  -     cyclobenzaprine (FLEXERIL) 10 MG tablet; Take 1 tablet (10 mg total) by mouth 3 (three) times daily as needed.  Dispense: 30 tablet; Refill: 11  -     promethazine (PHENERGAN) 25 MG suppository; Place 1 suppository (25 mg total) rectally every 6 (six) hours as needed for Nausea.  Dispense: 30 suppository; Refill: 2  -     Discontinue: HYDROcodone-acetaminophen (NORCO) 7.5-325 mg per tablet; Take 1 tablet by mouth every 6 (six) hours as needed for Pain.  Dispense: 120 tablet; Refill: 0  -     HYDROcodone-acetaminophen (NORCO) 7.5-325 mg per tablet; Take 1 tablet by mouth every 6 (six) hours as  needed for Pain.  Dispense: 120 tablet; Refill: 0  -     Discontinue: HYDROcodone-acetaminophen (NORCO) 7.5-325 mg per tablet; Take 1 tablet by mouth every 6 (six) hours as needed for Pain.  Dispense: 120 tablet; Refill: 0  -     HYDROcodone-acetaminophen (NORCO) 7.5-325 mg per tablet; Take 1 tablet by mouth every 6 (six) hours as needed for Pain.  Dispense: 120 tablet; Refill: 0              Plan:  Continue current meds            Medication List with Changes/Refills   New Medications    HYDROCODONE-ACETAMINOPHEN (NORCO) 7.5-325 MG PER TABLET    Take 1 tablet by mouth every 6 (six) hours as needed for Pain.   Current Medications    BUTALBITAL-ACETAMINOPHEN-CAFFEINE -40 MG (FIORICET, ESGIC) -40 MG PER TABLET    TAKE ONE TABLET BY MOUTH EVERY 6 HOURS AS NEEDED FOR SEVERE MIGRAINE. NO MORE THAN 10 IN 30 DAYS.    ERENUMAB-AOOE 140 MG/ML ATIN    Inject 140 mg into the skin every 28 days.    FERROUS GLUCONATE (FERGON) 324 MG TABLET    Take 324 mg by mouth daily with breakfast.    IBUPROFEN (ADVIL,MOTRIN) 800 MG TABLET    Take 1 tablet (800 mg total) by mouth every 8 (eight) hours as needed for Pain.    KETOROLAC (TORADOL) 60 MG/2 ML SOLN    INJECT 2 MLS INTO THE MUSCLE 2 TIMES DAILY AS NEEDED FOR SEVERE MIGRAINE. NO MORE THAN 2 DAYS/WEEK.    LEVOTHYROXINE (SYNTHROID) 75 MCG TABLET    TAKE 1 TABLET (75 MCG TOTAL) BY MOUTH EVERY MORNING.    LINACLOTIDE (LINZESS) 145 MCG CAP CAPSULE    Take 1 capsule (145 mcg total) by mouth daily as needed.    MEDROXYPROGESTERONE (PROVERA) 10 MG TABLET    Take 2 tablets (20 mg total) by mouth 3 (three) times daily. for 5 days    NAPROXEN (NAPROSYN) 500 MG TABLET    Take 1 tablet (500 mg total) by mouth 2 (two) times daily with meals.    OXYCODONE-ACETAMINOPHEN (PERCOCET) 5-325 MG PER TABLET    Take 1 tablet by mouth every 4 (four) hours as needed for Pain.    PANTOPRAZOLE (PROTONIX) 40 MG TABLET    Take 1 tablet (40 mg total) by mouth once daily.    PROMETHAZINE (PHENERGAN)  12.5 MG TAB    Take 1 tablet (12.5 mg total) by mouth every 6 (six) hours as needed.    TRAZODONE (DESYREL) 150 MG TABLET    Take 1 tablet (150 mg total) by mouth nightly.    VENLAFAXINE (EFFEXOR) 50 MG TAB    Take 1 tablet (50 mg total) by mouth once daily.    VENLAFAXINE (EFFEXOR-XR) 150 MG CP24    TAKE 1 CAPSULE BY MOUTH EVERY DAY   Changed and/or Refilled Medications    Modified Medication Previous Medication    CYCLOBENZAPRINE (FLEXERIL) 10 MG TABLET cyclobenzaprine (FLEXERIL) 10 MG tablet       Take 1 tablet (10 mg total) by mouth 3 (three) times daily as needed.    Take 1 tablet (10 mg total) by mouth 3 (three) times daily as needed.    HYDROCODONE-ACETAMINOPHEN (NORCO) 7.5-325 MG PER TABLET HYDROcodone-acetaminophen (NORCO) 7.5-325 mg per tablet       Take 1 tablet by mouth every 6 (six) hours as needed for Pain.    Take 1 tablet by mouth every 6 (six) hours as needed for Pain.    PROMETHAZINE (PHENERGAN) 25 MG SUPPOSITORY promethazine (PHENERGAN) 25 MG suppository       Place 1 suppository (25 mg total) rectally every 6 (six) hours as needed for Nausea.    Place 25 mg rectally every 6 (six) hours as needed for Nausea.    SUMATRIPTAN (IMITREX) 100 MG TABLET sumatriptan (IMITREX) 100 MG tablet       TAKE 1 TABLET BY MOUTH EVERY DAY AS NEEDED. MAY REPEAT IN 2 HOURS IF NEEDED. DO NOT EXCEED 2 TABLETS PER DAY    TAKE 1 TABLET BY MOUTH EVERY DAY AS NEEDED. MAY REPEAT IN 2 HOURS IF NEEDED. DO NOT EXCEED 2 TABLETS PER DAY

## 2020-05-07 ENCOUNTER — TELEPHONE (OUTPATIENT)
Dept: PHARMACY | Facility: CLINIC | Age: 45
End: 2020-05-07

## 2020-05-16 DIAGNOSIS — G43.711 INTRACTABLE CHRONIC MIGRAINE WITHOUT AURA AND WITH STATUS MIGRAINOSUS: ICD-10-CM

## 2020-05-18 RX ORDER — BUTALBITAL, ACETAMINOPHEN AND CAFFEINE 50; 325; 40 MG/1; MG/1; MG/1
TABLET ORAL
Qty: 10 TABLET | Refills: 0 | Status: SHIPPED | OUTPATIENT
Start: 2020-05-18 | End: 2020-07-14

## 2020-06-02 ENCOUNTER — TELEPHONE (OUTPATIENT)
Dept: PHARMACY | Facility: CLINIC | Age: 45
End: 2020-06-02

## 2020-06-02 NOTE — TELEPHONE ENCOUNTER
RX call attempt 1 regarding Aimovig refill from [facility]. Patient was not reached, left voicemail. Copay $5.00.

## 2020-06-08 ENCOUNTER — TELEPHONE (OUTPATIENT)
Dept: PHARMACY | Facility: CLINIC | Age: 45
End: 2020-06-08

## 2020-06-08 NOTE — TELEPHONE ENCOUNTER
RX call attempt #2 regarding Aimovig refill from [facility]. Patient was not reached, voicemail left. Copay $5.00

## 2020-06-18 NOTE — TELEPHONE ENCOUNTER
----- Message from Leyla Craven sent at 6/18/2020  3:13 PM CDT -----  Type:  RX Refill Request    Who Called:  patient  Refill or New Rx:  refill  RX Name and Strength:  HYDROcodone-acetaminophen (NORCO) 7.5-325 mg per tablet  How is the patient currently taking it? (ex. 1XDay):    Is this a 30 day or 90 day RX:    Preferred Pharm[facility] with phone number:  [facility] [place]  Local or Mail Order:  local  Ordering Provider:  Dr.Benning Reinoso Call Back Number:  [phone]  Additional Information:  requesting a call back need to know if she needs appt to received refill?

## 2020-06-21 RX ORDER — HYDROCODONE BITARTRATE AND ACETAMINOPHEN 7.5; 325 MG/1; MG/1
1 TABLET ORAL EVERY 6 HOURS PRN
Qty: 120 TABLET | Refills: 0 | Status: SHIPPED | OUTPATIENT
Start: 2020-06-21 | End: 2020-07-09 | Stop reason: SDUPTHER

## 2020-07-02 ENCOUNTER — TELEPHONE (OUTPATIENT)
Dept: PHARMACY | Facility: CLINIC | Age: 45
End: 2020-07-02

## 2020-07-08 RX ORDER — VENLAFAXINE HYDROCHLORIDE 150 MG/1
150 CAPSULE, EXTENDED RELEASE ORAL DAILY
Qty: 90 CAPSULE | Refills: 3 | Status: CANCELLED | OUTPATIENT
Start: 2020-07-08

## 2020-07-08 NOTE — TELEPHONE ENCOUNTER
Refill Routing Note   Medication(s) are not appropriate for processing by [facility]:       - Dose adjustment     Medication-related problems identified: Dose adjustment  Medication Therapy Plan: venlafaxine 50 mg prescribed by Nely Martinez MD in addition to venlafaxine 150 mg. Epic adherence data indicates patient has filled both strengths. Unclear if patient should continue both strengths. Defer to PCP for review.   Medication reconciliation completed: No      Automatic Epic Generated Protocol Data:    Requested Prescriptions   Pending Prescriptions Disp Refills    venlafaxine (EFFEXOR-XR) 150 MG Cp24 90 capsule 3     Sig: Take 1 capsule (150 mg total) by mouth once daily.       Psychiatry: Antidepressants - SNRI - desvenlafaxine & venlafaxine Passed - 7/8/2020 11:39 AM        Passed - Patient is at least 18 years old        Passed - Negative Pregnancy Status Check        Passed - Last BP in normal range within 360 days.     BP Readings from Last 3 Encounters:   03/04/20 124/86   02/19/20 132/88   02/15/20 124/80              Passed - Office visit in past 6 months or future 90 days.     Recent Outpatient Visits            2 months ago Chronic cervical pain    [facility] Hardy Dan MD    4 months ago Postop check    Luh Martinez MD    4 months ago Right ovarian cyst    Luh Martinez MD    5 months ago Upper respiratory tract infection, unspecified type    [facility] Hardy Dan MD    5 months ago Right sided abdominal pain    Luh Martinez MD          Future Appointments              Tomorrow Haryd Dan MD [facility]                Passed - Cr is 1.3 or below and within 360 days     Creatinine   Date Value Ref Range Status   02/19/2020 0.59 0.50 - 1.40 mg/dL Final   02/15/2020 0.48 (L) 0.50 - 1.40 mg/dL Final   09/23/2019 0.50 0.50 -  1.40 mg/dL Final   08/10/2012 0.6 0.2 - 1.4 mg/dl Final              Passed - eGFR within 360 days     Glom Filt Rate, Est    Date Value Ref Range Status   08/10/2012 >60 >60 Final     Glom filt Rate, Est non-   Date Value Ref Range Status   08/10/2012 >60 >60 Final     Comment:     eGFR INTERPRETATION: THE eGFR VALUES ARE CALCULATED USING THE ISOTOPE  DILUTION MASS SPECTROMETRY (IDMS)-TRACEABLE MODIFICATION OF DIET IN RENAL  DISEASE (MDRD) STUDY EQUIATION. VALUES ARE RACE, SEX AND AGE DEPENDENT AND  UNITS ARE ml/min/1.732 m2. IN THE FOLLOWING CLINICAL SETTINGS AN ACTUAL  CLEARANCE MEASUREMENT MAY BE REQUIRED: EXTREMES OF AGE OR BODY SIZE,  SEVERE MALNUTRITION OR OBESITY, DISEASES OF SKELETAL MUSCLE, PARAPLEGIA OR  QUADRAPLAGIA, STRICT VEGETARIAN DIET, RAPIDLY CHANGING KIDNEY FUNCTION OR  PRIOR TO DOSING DRUGS WITH SIGNIFICANT TOXICITY THAT ARE EXCRETED BY THE  KIDNEY. DECREASED eGFR FOR >3 MONTHS TO LEVELS OF 30-59 IS CLASSIFIED BY  THE [facility] [facility] AS CHRONIC RENAL DISEASE, STAGE 3. 15-29  IS CLASSIFIED AS STAGE 4.     eGFR if non    Date Value Ref Range Status   02/19/2020 >60 >60 mL/min/1.73 m^2 Final     Comment:     Calculation used to obtain the estimated glomerular filtration  rate (eGFR) is the CKD-EPI equation.      02/15/2020 >60 >60 mL/min/1.73 m^2 Final     Comment:     Calculation used to obtain the estimated glomerular filtration  rate (eGFR) is the CKD-EPI equation.      09/23/2019 >60 >60 mL/min/1.73 m^2 Final     Comment:     Calculation used to obtain the estimated glomerular filtration  rate (eGFR) is the CKD-EPI equation.        eGFR if    Date Value Ref Range Status   02/19/2020 >60 >60 mL/min/1.73 m^2 Final   02/15/2020 >60 >60 mL/min/1.73 m^2 Final   09/23/2019 >60 >60 mL/min/1.73 m^2 Final                    Appointments  past 12m or future 3m with PCP    Date Provider   Last Visit   4/20/2020 Hardy Dan MD    Next Visit   7/9/2020 Hardy Dan MD   ED visits in past 90 days: [unfilled]     Note composed:2:54 PM 07/08/2020

## 2020-07-08 NOTE — TELEPHONE ENCOUNTER
----- Message from Fabi Latif sent at 7/8/2020 11:32 AM CDT -----  Regarding: refill  Contact: Patient/[phone] (home)  Type:  RX Refill Request    Who Called:  Patient/[phone] (home)     Refill or New Rx:  Refill   RX Name and Strength:  venlafaxine (EFFEXOR-XR) 150 MG Cp24      How is the patient currently taking it? (ex. 1XDay):  1xday  Is this a 30 day or 90 day RX:  90 pills  Preferred Pharmacy with phone number:    [id number] [place] - [address]  [address]  [place] [address]  Phone: [phone] Fax: [phone]    Local or Mail Order:  local  Ordering Provider:  same    Additional Information:  Please call when completed.

## 2020-07-09 ENCOUNTER — OFFICE VISIT (OUTPATIENT)
Dept: FAMILY MEDICINE | Facility: CLINIC | Age: 45
End: 2020-07-09
Payer: COMMERCIAL

## 2020-07-09 DIAGNOSIS — G43.909 MIGRAINE WITHOUT STATUS MIGRAINOSUS, NOT INTRACTABLE, UNSPECIFIED MIGRAINE TYPE: Primary | ICD-10-CM

## 2020-07-09 DIAGNOSIS — F32.A DEPRESSION, UNSPECIFIED DEPRESSION TYPE: ICD-10-CM

## 2020-07-09 DIAGNOSIS — M54.40 CHRONIC LOW BACK PAIN WITH SCIATICA, SCIATICA LATERALITY UNSPECIFIED, UNSPECIFIED BACK PAIN LATERALITY: ICD-10-CM

## 2020-07-09 DIAGNOSIS — F41.9 ANXIETY: ICD-10-CM

## 2020-07-09 DIAGNOSIS — G89.29 CHRONIC LOW BACK PAIN WITH SCIATICA, SCIATICA LATERALITY UNSPECIFIED, UNSPECIFIED BACK PAIN LATERALITY: ICD-10-CM

## 2020-07-09 DIAGNOSIS — M54.2 CHRONIC CERVICAL PAIN: ICD-10-CM

## 2020-07-09 DIAGNOSIS — G89.29 CHRONIC CERVICAL PAIN: ICD-10-CM

## 2020-07-09 PROCEDURE — 99214 PR OFFICE/OUTPT VISIT, EST, LEVL IV, 30-39 MIN: ICD-10-PCS | Mod: 95,,, | Performed by: FAMILY MEDICINE

## 2020-07-09 PROCEDURE — 99214 OFFICE O/P EST MOD 30 MIN: CPT | Mod: 95,,, | Performed by: FAMILY MEDICINE

## 2020-07-09 RX ORDER — VENLAFAXINE HYDROCHLORIDE 150 MG/1
150 CAPSULE, EXTENDED RELEASE ORAL DAILY
Qty: 90 CAPSULE | Refills: 3 | Status: SHIPPED | OUTPATIENT
Start: 2020-07-09 | End: 2021-06-01

## 2020-07-09 RX ORDER — HYDROCODONE BITARTRATE AND ACETAMINOPHEN 7.5; 325 MG/1; MG/1
1 TABLET ORAL EVERY 6 HOURS PRN
Qty: 120 TABLET | Refills: 0 | Status: SHIPPED | OUTPATIENT
Start: 2020-09-19 | End: 2020-10-19

## 2020-07-09 RX ORDER — HYDROCODONE BITARTRATE AND ACETAMINOPHEN 7.5; 325 MG/1; MG/1
1 TABLET ORAL EVERY 6 HOURS PRN
Qty: 120 TABLET | Refills: 0 | Status: SHIPPED | OUTPATIENT
Start: 2020-07-21 | End: 2020-08-20

## 2020-07-09 RX ORDER — HYDROCODONE BITARTRATE AND ACETAMINOPHEN 7.5; 325 MG/1; MG/1
1 TABLET ORAL EVERY 6 HOURS PRN
Qty: 120 TABLET | Refills: 0 | Status: SHIPPED | OUTPATIENT
Start: 2020-08-20 | End: 2020-09-19

## 2020-07-09 RX ORDER — TRAZODONE HYDROCHLORIDE 150 MG/1
150 TABLET ORAL NIGHTLY
Qty: 90 TABLET | Refills: 3 | Status: SHIPPED | OUTPATIENT
Start: 2020-07-09 | End: 2021-06-13

## 2020-07-09 RX ORDER — LEVOTHYROXINE SODIUM 75 UG/1
75 TABLET ORAL EVERY MORNING
Qty: 90 TABLET | Refills: 3 | Status: SHIPPED | OUTPATIENT
Start: 2020-07-09 | End: 2021-07-19

## 2020-07-09 NOTE — PROGRESS NOTES
Subjective:       Patient ID: Isela Smyth is a 45 y.o. female.    Chief Complaint: No chief complaint on file.    HPI     The patient location is: home  The chief complaint leading to consultation is: chronic pain    Visit type: audiovisual    Face to Face time with patient:   25 minutes of total time spent on the encounter, which includes face to face time and non-face to face time preparing to see the patient (eg, review of tests), Obtaining and/or reviewing separately obtained history, Documenting clinical information in the electronic or other health record, Independently interpreting results (not separately reported) and communicating results to the patient/family/caregiver, or Care coordination (not separately reported).         Each patient to whom he or she provides medical services by telemedicine is:  (1) informed of the relationship between the physician and patient and the respective role of any other health care provider with respect to management of the patient; and (2) notified that he or she may decline to receive medical services by telemedicine and may withdraw from such care at any time.    Notes:       Here for a f/u.     Gets 3-5 migraines per month.  Takes fioricet and imitrex for pain control.  Also, receiving aimovig injections.      History of 6 low back spinal surgeries and 1 neck spinal surgery in past. Recent one in 2012.      Status post 08/14/2012 microscopic recurrent left L4-L5 laminotomy, discectomy, left L5 complete laminectomy, left L5-S1 laminotomy,    recurrent discectomy.      Chronic lumbago controlled while on norco 7.5 and flexeril. Ps: 2/10. Occasional left sciatic pain. Had an placido last month which helped with the pain.      Had mri L spine 9/2016 which showed:      1.  Postoperative change of left hemilaminectomy at L4-L5 and L5-S1.  2.  Multilevel degenerative change of the lumbar spine, most pronounced at L4-L5 and L5-S1 as detailed above  3.  Minimal descending  central disc extrusion at L5-S1 which does not appear to encroach upon either the descending left or right S1 nerve.  4.  Probable conjoined left S2 nerve coursing in the left posterolateral spinal canal.  Less likely, this represents an intraspinal synovial cyst abutting the descending left S1 nerve.  5.  Central annular tear of the intervertebral disc at L4-L5.     Also, has chronic neck pain > 5 years. Hx of neck surgeries in past. Saw pain management recently and had an placido on 1/31/17. Norco 7.5 helps with pain.      Had mri of cervical spine on 1/19/17 which showed:  -Disc protrusion at the C5-C6 level and to the right lateral recess with possible anterior cord contact  -Loss of normal cervical lordosis which may be positional or related to muscular strain.  -Milder degenerative changes are noted within the body of the report.      Depression and anxiety stable while on effexor.       Review of Systems    Objective:      Physical Exam    Assessment:       1. Migraine without status migrainosus, not intractable, unspecified migraine type    2. Chronic cervical pain    3. Depression, unspecified depression type    4. Anxiety    5. Chronic low back pain with sciatica, sciatica laterality unspecified, unspecified back pain laterality        Plan:       Migraine without status migrainosus, not intractable, unspecified migraine type    Chronic cervical pain    Depression, unspecified depression type    Anxiety    Chronic low back pain with sciatica, sciatica laterality unspecified, unspecified back pain laterality    Other orders  -     HYDROcodone-acetaminophen (NORCO) 7.5-325 mg per tablet; Take 1 tablet by mouth every 6 (six) hours as needed for Pain.  Dispense: 120 tablet; Refill: 0  -     traZODone (DESYREL) 150 MG tablet; Take 1 tablet (150 mg total) by mouth nightly.  Dispense: 90 tablet; Refill: 3  -     venlafaxine (EFFEXOR-XR) 150 MG Cp24; Take 1 capsule (150 mg total) by mouth once daily.  Dispense: 90  capsule; Refill: 3  -     levothyroxine (SYNTHROID) 75 MCG tablet; Take 1 tablet (75 mcg total) by mouth every morning.  Dispense: 90 tablet; Refill: 3  -     HYDROcodone-acetaminophen (NORCO) 7.5-325 mg per tablet; Take 1 tablet by mouth every 6 (six) hours as needed for Pain.  Dispense: 120 tablet; Refill: 0  -     HYDROcodone-acetaminophen (NORCO) 7.5-325 mg per tablet; Take 1 tablet by mouth every 6 (six) hours as needed for Pain.  Dispense: 120 tablet; Refill: 0            Plan:  Cont current meds      Medication List with Changes/Refills   New Medications    HYDROCODONE-ACETAMINOPHEN (NORCO) 7.5-325 MG PER TABLET    Take 1 tablet by mouth every 6 (six) hours as needed for Pain.    HYDROCODONE-ACETAMINOPHEN (NORCO) 7.5-325 MG PER TABLET    Take 1 tablet by mouth every 6 (six) hours as needed for Pain.   Current Medications    BUTALBITAL-ACETAMINOPHEN-CAFFEINE -40 MG (FIORICET, ESGIC) -40 MG PER TABLET    TAKE ONE TABLET BY MOUTH EVERY 6 HOURS AS NEEDED FOR SEVERE MIGRAINE. NO MORE THAN 10 IN 30 DAYS.    CYCLOBENZAPRINE (FLEXERIL) 10 MG TABLET    Take 1 tablet (10 mg total) by mouth 3 (three) times daily as needed.    ERENUMAB-AOOE 140 MG/ML ATIN    Inject 140 mg into the skin every 28 days.    FERROUS GLUCONATE (FERGON) 324 MG TABLET    Take 324 mg by mouth daily with breakfast.    IBUPROFEN (ADVIL,MOTRIN) 800 MG TABLET    Take 1 tablet (800 mg total) by mouth every 8 (eight) hours as needed for Pain.    KETOROLAC (TORADOL) 60 MG/2 ML SOLN    INJECT 2 MLS INTO THE MUSCLE 2 TIMES DAILY AS NEEDED FOR SEVERE MIGRAINE. NO MORE THAN 2 DAYS/WEEK.    LINACLOTIDE (LINZESS) 145 MCG CAP CAPSULE    Take 1 capsule (145 mcg total) by mouth daily as needed.    MEDROXYPROGESTERONE (PROVERA) 10 MG TABLET    Take 2 tablets (20 mg total) by mouth 3 (three) times daily. for 5 days    NAPROXEN (NAPROSYN) 500 MG TABLET    Take 1 tablet (500 mg total) by mouth 2 (two) times daily with meals.    OXYCODONE-ACETAMINOPHEN  (PERCOCET) 5-325 MG PER TABLET    Take 1 tablet by mouth every 4 (four) hours as needed for Pain.    PANTOPRAZOLE (PROTONIX) 40 MG TABLET    Take 1 tablet (40 mg total) by mouth once daily.    PROMETHAZINE (PHENERGAN) 12.5 MG TAB    Take 1 tablet (12.5 mg total) by mouth every 6 (six) hours as needed.    PROMETHAZINE (PHENERGAN) 25 MG SUPPOSITORY    Place 1 suppository (25 mg total) rectally every 6 (six) hours as needed for Nausea.    SUMATRIPTAN (IMITREX) 100 MG TABLET    TAKE 1 TABLET BY MOUTH EVERY DAY AS NEEDED. MAY REPEAT IN 2 HOURS IF NEEDED. DO NOT EXCEED 2 TABLETS PER DAY   Changed and/or Refilled Medications    Modified Medication Previous Medication    HYDROCODONE-ACETAMINOPHEN (NORCO) 7.5-325 MG PER TABLET HYDROcodone-acetaminophen (NORCO) 7.5-325 mg per tablet       Take 1 tablet by mouth every 6 (six) hours as needed for Pain.    Take 1 tablet by mouth every 6 (six) hours as needed for Pain.    LEVOTHYROXINE (SYNTHROID) 75 MCG TABLET levothyroxine (SYNTHROID) 75 MCG tablet       Take 1 tablet (75 mcg total) by mouth every morning.    TAKE 1 TABLET (75 MCG TOTAL) BY MOUTH EVERY MORNING.    TRAZODONE (DESYREL) 150 MG TABLET traZODone (DESYREL) 150 MG tablet       Take 1 tablet (150 mg total) by mouth nightly.    Take 1 tablet (150 mg total) by mouth nightly.    VENLAFAXINE (EFFEXOR-XR) 150 MG CP24 venlafaxine (EFFEXOR-XR) 150 MG Cp24       Take 1 capsule (150 mg total) by mouth once daily.    TAKE 1 CAPSULE BY MOUTH EVERY DAY   Discontinued Medications    VENLAFAXINE (EFFEXOR) 50 MG TAB    Take 1 tablet (50 mg total) by mouth once daily.

## 2020-07-10 ENCOUNTER — TELEPHONE (OUTPATIENT)
Dept: FAMILY MEDICINE | Facility: CLINIC | Age: 45
End: 2020-07-10

## 2020-07-10 NOTE — TELEPHONE ENCOUNTER
----- Message from Princess MARAL Amaro sent at 7/9/2020  2:26 PM CDT -----  Regarding: appt aces  Contact: pt  Type: Needs Medical Advice  Who Called pt  Best Call Back Number: [phone] (home)     Additional Information: requesting a call in regards to patient is wanting her facetime VV appt to be on tomorrow if possible. Please advise

## 2020-07-10 NOTE — TELEPHONE ENCOUNTER
Spoke with patient. She stated she had an appointment with Dr. Dan yesterday. She has no questions or concerns

## 2020-07-30 ENCOUNTER — TELEPHONE (OUTPATIENT)
Dept: PHARMACY | Facility: CLINIC | Age: 45
End: 2020-07-30

## 2020-08-24 DIAGNOSIS — G43.711 INTRACTABLE CHRONIC MIGRAINE WITHOUT AURA AND WITH STATUS MIGRAINOSUS: ICD-10-CM

## 2020-08-24 RX ORDER — BUTALBITAL, ACETAMINOPHEN AND CAFFEINE 50; 325; 40 MG/1; MG/1; MG/1
TABLET ORAL
Qty: 10 TABLET | Refills: 0 | Status: SHIPPED | OUTPATIENT
Start: 2020-08-24 | End: 2020-10-02 | Stop reason: SDUPTHER

## 2020-09-01 ENCOUNTER — TELEPHONE (OUTPATIENT)
Dept: PHARMACY | Facility: CLINIC | Age: 45
End: 2020-09-01

## 2020-09-17 NOTE — TELEPHONE ENCOUNTER
"Refill call for Aimovig. Patient confirmed need of the refill. Will deliver via [employer] on  to arrive on  with patient consent. Copay $5 at 004 with auth to charge [phone]. Address confirmed. Patient has 0 doses remaining. Pt is unsure of last dose of Aimovig was. She does not have access to her calendar as she is out of town. Encouraged pt to use alarm on her phone for reminders. She states she did not receive our voicemails as her phone was acting "weird." Patient denies missed doses and no side effects.  No new medications/allergies/medical conditions. No Sharps container needed. Patient taking the medication as directed. Patient denies any further questions. Confirmed 2 patient identifiers - Name and .     Minor Aguila, PharmD  [facility]  "

## 2020-10-02 DIAGNOSIS — G43.711 INTRACTABLE CHRONIC MIGRAINE WITHOUT AURA AND WITH STATUS MIGRAINOSUS: ICD-10-CM

## 2020-10-02 RX ORDER — BUTALBITAL, ACETAMINOPHEN AND CAFFEINE 50; 325; 40 MG/1; MG/1; MG/1
TABLET ORAL
Qty: 10 TABLET | Refills: 0 | Status: SHIPPED | OUTPATIENT
Start: 2020-10-02 | End: 2020-11-25 | Stop reason: SDUPTHER

## 2020-10-13 DIAGNOSIS — G43.711 INTRACTABLE CHRONIC MIGRAINE WITHOUT AURA AND WITH STATUS MIGRAINOSUS: ICD-10-CM

## 2020-10-14 ENCOUNTER — TELEPHONE (OUTPATIENT)
Dept: FAMILY MEDICINE | Facility: CLINIC | Age: 45
End: 2020-10-14

## 2020-10-14 NOTE — TELEPHONE ENCOUNTER
FOR DOCUMENTATION ONLY:    [Patient gave permission to renew Aimovig copay card]  Financial Assistance for Aimovig approved with a Co-Pay Card from 10/14/20 - 10/14/21    ID: [id number]  BIN: [id number]  PCN: [id number]  GRP:  [id number]    Max Amount: $5 Co-Pay per fill with a cap of $3500/yr in assistance.

## 2020-10-14 NOTE — TELEPHONE ENCOUNTER
----- Message from Faith Anderson sent at 10/14/2020  8:47 AM CDT -----  Contact: call pt [phone]    Type:  Sooner Apoointment Request    Caller is requesting a sooner appointment.  Caller declined first available appointment listed below.  Caller will not accept being placed on the waitlist and is requesting a message be sent to doctor.    Name of Caller:  pt  When is the first available appointment?  #  pt asking  for a   face rinku edith // not VV // pt was offed a VV appt  turned down // please call  to  discuss  Symptoms:  med  refill  Best Call Back Number:  call pt [phone]  Additional Information:   please call  to discuss a  facetime edith  with out  mychart // per pt

## 2020-10-19 ENCOUNTER — TELEPHONE (OUTPATIENT)
Dept: FAMILY MEDICINE | Facility: CLINIC | Age: 45
End: 2020-10-19

## 2020-10-19 NOTE — TELEPHONE ENCOUNTER
----- Message from Yusra Rivera sent at 10/16/2020  5:25 PM CDT -----  Type:  Patient Returning Call    Who Called: pt   Who Left Message for Patient: pt   Does the patient know what this is regarding? Pt just missed a call   Would the patient rather a call back or a response via [facility]?  Call   Best Call Back Number:[phone]  Additional Information:  call back

## 2020-10-19 NOTE — TELEPHONE ENCOUNTER
----- Message from Gisele Nicanor sent at 10/16/2020  4:04 PM CDT -----  Contact: pt at [phone]  Type:  Sooner Apoointment Request    Caller is requesting a sooner appointment.  Caller declined first available appointment listed below.  Caller will not accept being placed on the waitlist and is requesting a message be sent to doctor.    Name of Caller:  pt  When is the first available appointment? 10/21  Symptoms: med refill  Best Call Back Number: [phone]  Additional Information:  Patient is trying to be seen by 10/19 or 10/20.Please call back and advise.

## 2020-10-19 NOTE — TELEPHONE ENCOUNTER
----- Message from Sarai Davidson, Patient Care Assistant sent at 10/19/2020  1:58 PM CDT -----  Regarding: returning call  Contact: patient  Type:  Patient Returning Call    Who Called:  patient  Who Left Message for Patient:  not sure  Does the patient know what this is regarding?:  appointment   Best Call Back Number:    Additional Information:  please call patient to advice. Thanks!

## 2020-10-21 ENCOUNTER — OFFICE VISIT (OUTPATIENT)
Dept: FAMILY MEDICINE | Facility: CLINIC | Age: 45
End: 2020-10-21
Payer: COMMERCIAL

## 2020-10-21 ENCOUNTER — TELEPHONE (OUTPATIENT)
Dept: FAMILY MEDICINE | Facility: CLINIC | Age: 45
End: 2020-10-21

## 2020-10-21 DIAGNOSIS — G89.29 CHRONIC LOW BACK PAIN WITH SCIATICA, SCIATICA LATERALITY UNSPECIFIED, UNSPECIFIED BACK PAIN LATERALITY: ICD-10-CM

## 2020-10-21 DIAGNOSIS — F32.A DEPRESSION, UNSPECIFIED DEPRESSION TYPE: ICD-10-CM

## 2020-10-21 DIAGNOSIS — F41.9 ANXIETY: ICD-10-CM

## 2020-10-21 DIAGNOSIS — E61.1 IRON DEFICIENCY: ICD-10-CM

## 2020-10-21 DIAGNOSIS — E03.9 HYPOTHYROIDISM, UNSPECIFIED TYPE: Primary | ICD-10-CM

## 2020-10-21 DIAGNOSIS — G43.909 MIGRAINE WITHOUT STATUS MIGRAINOSUS, NOT INTRACTABLE, UNSPECIFIED MIGRAINE TYPE: ICD-10-CM

## 2020-10-21 DIAGNOSIS — M54.2 CHRONIC CERVICAL PAIN: ICD-10-CM

## 2020-10-21 DIAGNOSIS — M54.40 CHRONIC LOW BACK PAIN WITH SCIATICA, SCIATICA LATERALITY UNSPECIFIED, UNSPECIFIED BACK PAIN LATERALITY: ICD-10-CM

## 2020-10-21 DIAGNOSIS — G89.29 CHRONIC CERVICAL PAIN: ICD-10-CM

## 2020-10-21 PROCEDURE — 99214 OFFICE O/P EST MOD 30 MIN: CPT | Mod: 95,,, | Performed by: FAMILY MEDICINE

## 2020-10-21 PROCEDURE — 99214 PR OFFICE/OUTPT VISIT, EST, LEVL IV, 30-39 MIN: ICD-10-PCS | Mod: 95,,, | Performed by: FAMILY MEDICINE

## 2020-10-21 RX ORDER — HYDROCODONE BITARTRATE AND ACETAMINOPHEN 7.5; 325 MG/1; MG/1
1 TABLET ORAL 4 TIMES DAILY PRN
Qty: 120 TABLET | Refills: 0 | Status: SHIPPED | OUTPATIENT
Start: 2020-12-20 | End: 2021-01-19 | Stop reason: SDUPTHER

## 2020-10-21 RX ORDER — HYDROCODONE BITARTRATE AND ACETAMINOPHEN 7.5; 325 MG/1; MG/1
1 TABLET ORAL 4 TIMES DAILY PRN
Qty: 120 TABLET | Refills: 0 | Status: SHIPPED | OUTPATIENT
Start: 2020-10-21 | End: 2020-11-20

## 2020-10-21 RX ORDER — CYCLOBENZAPRINE HCL 10 MG
10 TABLET ORAL 3 TIMES DAILY PRN
Qty: 30 TABLET | Refills: 11 | Status: SHIPPED | OUTPATIENT
Start: 2020-10-21 | End: 2021-04-19 | Stop reason: SDUPTHER

## 2020-10-21 RX ORDER — HYDROCODONE BITARTRATE AND ACETAMINOPHEN 7.5; 325 MG/1; MG/1
1 TABLET ORAL 4 TIMES DAILY PRN
Qty: 120 TABLET | Refills: 0 | Status: SHIPPED | OUTPATIENT
Start: 2020-11-20 | End: 2020-12-20

## 2020-11-17 ENCOUNTER — PATIENT MESSAGE (OUTPATIENT)
Dept: PHARMACY | Facility: CLINIC | Age: 45
End: 2020-11-17

## 2020-11-23 ENCOUNTER — SPECIALTY PHARMACY (OUTPATIENT)
Dept: PHARMACY | Facility: CLINIC | Age: 45
End: 2020-11-23

## 2020-11-23 NOTE — TELEPHONE ENCOUNTER
Specialty Pharmacy - Refill Coordination    Specialty Medication Orders Linked to Encounter      Most Recent Value   Medication #1  erenumab-aooe (AIMOVIG) 140 mg/mL autoinjector (Order#[id number], Rx#[id number])          Refill Questions - Documented Responses      Most Recent Value   Relationship to patient of person spoken to?  Self   HIPAA/medical authority confirmed?  Yes   Any changes in contact preferences or allowed representatives?  No   Has the patient had any insurance changes?  No   Has the patient had any changes to specialty medication, dose, or instructions?  No   Has the patient started taking any new medications, herbals, or supplements?  No   Has the patient been diagnosed with any new medical conditions?  No   Does the patient have any new allergies to medications or foods?  No   Does the patient have any concerns about side effects?  No   Can the patient store medication/sharps container properly (at the correct temperature, away from children/pets, etc.)?  Yes   Can the patient call emergency services (911) in the event of an emergency?  Yes   Does the patient have any concerns or questions about taking or administering this medication as prescribed?  No   How many doses did the patient miss in the past 4 weeks or since the last fill?  0   How many doses does the patient have on hand?  0   How many days does the patient report on hand quantity will last?  0   Does the number of doses/days supply remaining match pharmacy expected amounts?  Yes   Does the patient feel that this medication is effective?  Yes   During the past 4 weeks, has patient missed any activities due to condition or medication?  No   During the past 4 weeks, did patient have any of the following urgent care visits?  None   How will the patient receive the medication?  Mail   When does the patient need to receive the medication?  11/24/20   Shipping Address  Home   Address in [facility] confirmed and updated if  neccessary?  Yes   Expected Copay ($)  5   Payment Method  CC on file   Days supply of Refill  28   Would patient like to speak to a pharmacist?  No   Do you want to trigger an intervention?  No   Do you want to trigger an additional referral task?  No   Refill activity completed?  Yes   Refill activity plan  Refill scheduled   Shipment/Pickup Date:  11/23/20          Current Outpatient Medications   Medication Sig    butalbital-acetaminophen-caffeine -40 mg (FIORICET, ESGIC) -40 mg per tablet 1 tab PO PRN migraine, no more than 10 tabs in one month    cyclobenzaprine (FLEXERIL) 10 MG tablet Take 1 tablet (10 mg total) by mouth 3 (three) times daily as needed.    erenumab-aooe (AIMOVIG) 140 mg/mL autoinjector Inject 140 mg into the skin every 28 days.    ferrous gluconate (FERGON) 324 MG tablet Take 324 mg by mouth daily with breakfast.    HYDROcodone-acetaminophen (NORCO) 7.5-325 mg per tablet Take 1 tablet by mouth 4 (four) times daily as needed for Pain.    [START ON 12/20/2020] HYDROcodone-acetaminophen (NORCO) 7.5-325 mg per tablet Take 1 tablet by mouth 4 (four) times daily as needed for Pain.    ibuprofen (ADVIL,MOTRIN) 800 MG tablet Take 1 tablet (800 mg total) by mouth every 8 (eight) hours as needed for Pain. (Patient not taking: Reported on 3/4/2020)    ketorolac (TORADOL) 60 mg/2 mL Soln INJECT 2 MLS INTO THE MUSCLE 2 TIMES DAILY AS NEEDED FOR SEVERE MIGRAINE. NO MORE THAN 2 DAYS/WEEK.    levothyroxine (SYNTHROID) 75 MCG tablet Take 1 tablet (75 mcg total) by mouth every morning.    linaCLOtide (LINZESS) 145 mcg Cap capsule Take 1 capsule (145 mcg total) by mouth daily as needed.    medroxyPROGESTERone (PROVERA) 10 MG tablet Take 2 tablets (20 mg total) by mouth 3 (three) times daily. for 5 days (Patient not taking: Reported on 3/4/2020)    naproxen (NAPROSYN) 500 MG tablet Take 1 tablet (500 mg total) by mouth 2 (two) times daily with meals. (Patient not taking: Reported on  3/4/2020)    pantoprazole (PROTONIX) 40 MG tablet Take 1 tablet (40 mg total) by mouth once daily. (Patient taking differently: Take 40 mg by mouth once daily. TAKE AM OF SURGERY WITH A SIP OF WATER)    promethazine (PHENERGAN) 12.5 MG Tab Take 1 tablet (12.5 mg total) by mouth every 6 (six) hours as needed.    promethazine (PHENERGAN) 25 MG suppository Place 1 suppository (25 mg total) rectally every 6 (six) hours as needed for Nausea.    sumatriptan (IMITREX) 100 MG tablet TAKE 1 TABLET BY MOUTH EVERY DAY AS NEEDED. MAY REPEAT IN 2 HOURS IF NEEDED. DO NOT EXCEED 2 TABLETS PER DAY    traZODone (DESYREL) 150 MG tablet Take 1 tablet (150 mg total) by mouth nightly.    venlafaxine (EFFEXOR-XR) 150 MG Cp24 Take 1 capsule (150 mg total) by mouth once daily.   Last reviewed on 11/23/2020 10:46 AM by Romelia Ford    Review of patient's allergies indicates:   Allergen Reactions    Gabapentin Other (See Comments)     Coming out of skin    Morphine Itching    Last reviewed on  11/23/2020 10:46 AM by Romelia Ford      Tasks added this encounter   12/14/2020 - Refill Call (Auto Added)   Tasks due within next 3 months   No tasks due.     Romelia Ford  [facility]  [address] [place] [id number]  Phone: [phone]  Fax: [phone]

## 2020-11-25 ENCOUNTER — TELEPHONE (OUTPATIENT)
Dept: NEUROLOGY | Facility: CLINIC | Age: 45
End: 2020-11-25

## 2020-11-25 DIAGNOSIS — G43.711 INTRACTABLE CHRONIC MIGRAINE WITHOUT AURA AND WITH STATUS MIGRAINOSUS: ICD-10-CM

## 2020-11-25 RX ORDER — BUTALBITAL, ACETAMINOPHEN AND CAFFEINE 50; 325; 40 MG/1; MG/1; MG/1
TABLET ORAL
Qty: 10 TABLET | Refills: 0 | Status: SHIPPED | OUTPATIENT
Start: 2020-11-25 | End: 2021-10-26 | Stop reason: SDUPTHER

## 2020-11-25 NOTE — TELEPHONE ENCOUNTER
----- Message from Tori Lee sent at 11/25/2020 12:51 PM CST -----  Type:  Pharmacy Calling to Clarify an RX    Name of Caller:  Susu  Pharmacy Name:  [facility]  Prescription Name:  butalbital-acetaminophen-caffeine -40 mg (FIORICET, ESGIC) -40 mg per tablet  What do they need to clarify?:  she is asking for permission to fill this as a generic,   Best Call Back Number:  [phone]  Additional Information:  thank you!

## 2020-11-25 NOTE — TELEPHONE ENCOUNTER
Called [facility], spoke with Mariam and informed that it would be fine to switch to the generic. Verbalized understanding.

## 2020-11-25 NOTE — TELEPHONE ENCOUNTER
----- Message from Radha Dejesus sent at 11/25/2020  1:16 PM CST -----  Regarding: Pharmacy  Contact: Susu with [facility]  Type:  Pharmacy Calling to Clarify an RX    Name of Caller:  Susu  Pharmacy Name:  [facility]  Prescription Name:  butalbital-acetaminophen-caffeine -40 mg (FIORICET, ESGIC) -40 mg per tablet  What do they need to clarify?:  Needing another prescription sent in because it was accidentally deleted.   Best Call Back Number:  [phone]

## 2020-12-16 ENCOUNTER — SPECIALTY PHARMACY (OUTPATIENT)
Dept: PHARMACY | Facility: CLINIC | Age: 45
End: 2020-12-16

## 2020-12-16 NOTE — TELEPHONE ENCOUNTER
Specialty Pharmacy - Refill Coordination    Specialty Medication Orders Linked to Encounter      Most Recent Value   Medication #1  erenumab-aooe (AIMOVIG) 140 mg/mL autoinjector (Order#[id number], Rx#[id number])          Refill Questions - Documented Responses      Most Recent Value   Relationship to patient of person spoken to?  Self   HIPAA/medical authority confirmed?  Yes   How will the patient receive the medication?  Mail   When does the patient need to receive the medication?  12/19/20   Shipping Address  Home   Address in [facility] confirmed and updated if neccessary?  Yes   Expected Copay ($)  5   Is the patient able to afford the medication copay?  Yes   Payment Method  CC on file   Days supply of Refill  28   Would patient like to speak to a pharmacist?  No   Do you want to trigger an intervention?  No   Do you want to trigger an additional referral task?  No   Refill activity completed?  Yes   Refill activity plan  Refill scheduled   Shipment/Pickup Date:  12/17/20          Current Outpatient Medications   Medication Sig    butalbital-acetaminophen-caffeine -40 mg (FIORICET, ESGIC) -40 mg per tablet 1 tab PO PRN migraine, no more than 10 tabs in one month    cyclobenzaprine (FLEXERIL) 10 MG tablet Take 1 tablet (10 mg total) by mouth 3 (three) times daily as needed.    erenumab-aooe (AIMOVIG) 140 mg/mL autoinjector Inject 140 mg into the skin every 28 days.    ferrous gluconate (FERGON) 324 MG tablet Take 324 mg by mouth daily with breakfast.    HYDROcodone-acetaminophen (NORCO) 7.5-325 mg per tablet Take 1 tablet by mouth 4 (four) times daily as needed for Pain.    [START ON 12/20/2020] HYDROcodone-acetaminophen (NORCO) 7.5-325 mg per tablet Take 1 tablet by mouth 4 (four) times daily as needed for Pain.    ibuprofen (ADVIL,MOTRIN) 800 MG tablet Take 1 tablet (800 mg total) by mouth every 8 (eight) hours as needed for Pain. (Patient not taking: Reported on 3/4/2020)     ketorolac (TORADOL) 60 mg/2 mL Soln INJECT 2 MLS INTO THE MUSCLE 2 TIMES DAILY AS NEEDED FOR SEVERE MIGRAINE. NO MORE THAN 2 DAYS/WEEK.    levothyroxine (SYNTHROID) 75 MCG tablet Take 1 tablet (75 mcg total) by mouth every morning.    linaCLOtide (LINZESS) 145 mcg Cap capsule Take 1 capsule (145 mcg total) by mouth daily as needed.    medroxyPROGESTERone (PROVERA) 10 MG tablet Take 2 tablets (20 mg total) by mouth 3 (three) times daily. for 5 days (Patient not taking: Reported on 3/4/2020)    naproxen (NAPROSYN) 500 MG tablet Take 1 tablet (500 mg total) by mouth 2 (two) times daily with meals. (Patient not taking: Reported on 3/4/2020)    pantoprazole (PROTONIX) 40 MG tablet Take 1 tablet (40 mg total) by mouth once daily. (Patient taking differently: Take 40 mg by mouth once daily. TAKE AM OF SURGERY WITH A SIP OF WATER)    promethazine (PHENERGAN) 12.5 MG Tab Take 1 tablet (12.5 mg total) by mouth every 6 (six) hours as needed.    promethazine (PHENERGAN) 25 MG suppository Place 1 suppository (25 mg total) rectally every 6 (six) hours as needed for Nausea.    sumatriptan (IMITREX) 100 MG tablet TAKE 1 TABLET BY MOUTH EVERY DAY AS NEEDED. MAY REPEAT IN 2 HOURS IF NEEDED. DO NOT EXCEED 2 TABLETS PER DAY    traZODone (DESYREL) 150 MG tablet Take 1 tablet (150 mg total) by mouth nightly.    venlafaxine (EFFEXOR-XR) 150 MG Cp24 Take 1 capsule (150 mg total) by mouth once daily.   Last reviewed on 11/23/2020 10:46 AM by Romelia Ford    Review of patient's allergies indicates:   Allergen Reactions    Gabapentin Other (See Comments)     Coming out of skin    Morphine Itching    Last reviewed on  11/23/2020 10:46 AM by Romelia Ford      Tasks added this encounter   1/8/2021 - Refill Call (Auto Added)   Tasks due within next 3 months   No tasks due.     Romelia Ford  [facility]  [address] [place] [id number]  Phone: [phone]  Fax:  [phone]

## 2021-01-19 ENCOUNTER — OFFICE VISIT (OUTPATIENT)
Dept: FAMILY MEDICINE | Facility: CLINIC | Age: 46
End: 2021-01-19
Payer: COMMERCIAL

## 2021-01-19 DIAGNOSIS — G89.29 CHRONIC CERVICAL PAIN: Primary | ICD-10-CM

## 2021-01-19 DIAGNOSIS — M54.40 CHRONIC LOW BACK PAIN WITH SCIATICA, SCIATICA LATERALITY UNSPECIFIED, UNSPECIFIED BACK PAIN LATERALITY: ICD-10-CM

## 2021-01-19 DIAGNOSIS — G89.29 CHRONIC LOW BACK PAIN WITH SCIATICA, SCIATICA LATERALITY UNSPECIFIED, UNSPECIFIED BACK PAIN LATERALITY: ICD-10-CM

## 2021-01-19 DIAGNOSIS — F32.A DEPRESSION, UNSPECIFIED DEPRESSION TYPE: ICD-10-CM

## 2021-01-19 DIAGNOSIS — F41.9 ANXIETY: ICD-10-CM

## 2021-01-19 DIAGNOSIS — M54.2 CHRONIC CERVICAL PAIN: Primary | ICD-10-CM

## 2021-01-19 DIAGNOSIS — E03.9 HYPOTHYROIDISM, UNSPECIFIED TYPE: ICD-10-CM

## 2021-01-19 DIAGNOSIS — G43.909 MIGRAINE WITHOUT STATUS MIGRAINOSUS, NOT INTRACTABLE, UNSPECIFIED MIGRAINE TYPE: ICD-10-CM

## 2021-01-19 PROCEDURE — 99214 OFFICE O/P EST MOD 30 MIN: CPT | Mod: 95,,, | Performed by: FAMILY MEDICINE

## 2021-01-19 PROCEDURE — 99214 PR OFFICE/OUTPT VISIT, EST, LEVL IV, 30-39 MIN: ICD-10-PCS | Mod: 95,,, | Performed by: FAMILY MEDICINE

## 2021-01-19 RX ORDER — HYDROCODONE BITARTRATE AND ACETAMINOPHEN 7.5; 325 MG/1; MG/1
1 TABLET ORAL 4 TIMES DAILY PRN
Qty: 120 TABLET | Refills: 0 | Status: SHIPPED | OUTPATIENT
Start: 2021-03-20 | End: 2021-04-19 | Stop reason: SDUPTHER

## 2021-01-19 RX ORDER — HYDROCODONE BITARTRATE AND ACETAMINOPHEN 7.5; 325 MG/1; MG/1
1 TABLET ORAL 4 TIMES DAILY PRN
Qty: 120 TABLET | Refills: 0 | Status: SHIPPED | OUTPATIENT
Start: 2021-01-19 | End: 2021-02-18

## 2021-01-19 RX ORDER — HYDROCODONE BITARTRATE AND ACETAMINOPHEN 7.5; 325 MG/1; MG/1
1 TABLET ORAL 4 TIMES DAILY PRN
Qty: 120 TABLET | Refills: 0 | Status: SHIPPED | OUTPATIENT
Start: 2021-02-18 | End: 2021-03-02

## 2021-01-21 DIAGNOSIS — Z12.31 OTHER SCREENING MAMMOGRAM: ICD-10-CM

## 2021-01-29 DIAGNOSIS — G43.711 INTRACTABLE CHRONIC MIGRAINE WITHOUT AURA AND WITH STATUS MIGRAINOSUS: ICD-10-CM

## 2021-01-29 RX ORDER — BUTALBITAL, ACETAMINOPHEN AND CAFFEINE 50; 325; 40 MG/1; MG/1; MG/1
TABLET ORAL
Qty: 10 TABLET | Refills: 0 | Status: CANCELLED | OUTPATIENT
Start: 2021-01-29

## 2021-02-24 ENCOUNTER — NURSE TRIAGE (OUTPATIENT)
Dept: ADMINISTRATIVE | Facility: CLINIC | Age: 46
End: 2021-02-24

## 2021-03-01 ENCOUNTER — TELEPHONE (OUTPATIENT)
Dept: NEUROLOGY | Facility: CLINIC | Age: 46
End: 2021-03-01

## 2021-03-02 ENCOUNTER — OFFICE VISIT (OUTPATIENT)
Dept: NEUROLOGY | Facility: CLINIC | Age: 46
End: 2021-03-02
Payer: COMMERCIAL

## 2021-03-02 ENCOUNTER — PATIENT OUTREACH (OUTPATIENT)
Dept: ADMINISTRATIVE | Facility: OTHER | Age: 46
End: 2021-03-02

## 2021-03-02 DIAGNOSIS — G43.711 INTRACTABLE CHRONIC MIGRAINE WITHOUT AURA AND WITH STATUS MIGRAINOSUS: Primary | ICD-10-CM

## 2021-03-02 PROCEDURE — 1125F AMNT PAIN NOTED PAIN PRSNT: CPT | Mod: ,,, | Performed by: NURSE PRACTITIONER

## 2021-03-02 PROCEDURE — 99214 OFFICE O/P EST MOD 30 MIN: CPT | Mod: 95,,, | Performed by: NURSE PRACTITIONER

## 2021-03-02 PROCEDURE — 1125F PR PAIN SEVERITY QUANTIFIED, PAIN PRESENT: ICD-10-PCS | Mod: ,,, | Performed by: NURSE PRACTITIONER

## 2021-03-02 PROCEDURE — 99214 PR OFFICE/OUTPT VISIT, EST, LEVL IV, 30-39 MIN: ICD-10-PCS | Mod: 95,,, | Performed by: NURSE PRACTITIONER

## 2021-03-02 RX ORDER — PREDNISONE 10 MG/1
TABLET ORAL
Qty: 20 TABLET | Refills: 0 | Status: SHIPPED | OUTPATIENT
Start: 2021-03-02 | End: 2021-06-23

## 2021-03-12 RX ORDER — LINACLOTIDE 145 UG/1
CAPSULE, GELATIN COATED ORAL
Qty: 30 CAPSULE | Refills: 5 | Status: SHIPPED | OUTPATIENT
Start: 2021-03-12 | End: 2022-07-01

## 2021-04-05 ENCOUNTER — TELEPHONE (OUTPATIENT)
Dept: PHARMACY | Facility: CLINIC | Age: 46
End: 2021-04-05

## 2021-04-06 ENCOUNTER — PATIENT MESSAGE (OUTPATIENT)
Dept: ADMINISTRATIVE | Facility: HOSPITAL | Age: 46
End: 2021-04-06

## 2021-04-07 DIAGNOSIS — G43.711 INTRACTABLE CHRONIC MIGRAINE WITHOUT AURA AND WITH STATUS MIGRAINOSUS: ICD-10-CM

## 2021-04-07 RX ORDER — KETOROLAC TROMETHAMINE 30 MG/ML
INJECTION, SOLUTION INTRAMUSCULAR; INTRAVENOUS
Qty: 20 ML | Refills: 3 | Status: SHIPPED | OUTPATIENT
Start: 2021-04-07 | End: 2023-06-27

## 2021-04-19 ENCOUNTER — PATIENT MESSAGE (OUTPATIENT)
Dept: FAMILY MEDICINE | Facility: CLINIC | Age: 46
End: 2021-04-19

## 2021-04-19 RX ORDER — CYCLOBENZAPRINE HCL 10 MG
10 TABLET ORAL 3 TIMES DAILY PRN
Qty: 30 TABLET | Refills: 5 | Status: SHIPPED | OUTPATIENT
Start: 2021-04-19 | End: 2021-11-17

## 2021-04-19 RX ORDER — HYDROCODONE BITARTRATE AND ACETAMINOPHEN 7.5; 325 MG/1; MG/1
1 TABLET ORAL 4 TIMES DAILY PRN
Qty: 120 TABLET | Refills: 0 | Status: SHIPPED | OUTPATIENT
Start: 2021-04-19 | End: 2021-05-19 | Stop reason: SDUPTHER

## 2021-04-21 DIAGNOSIS — G43.909 MIGRAINE WITHOUT STATUS MIGRAINOSUS, NOT INTRACTABLE, UNSPECIFIED MIGRAINE TYPE: ICD-10-CM

## 2021-04-23 RX ORDER — SUMATRIPTAN SUCCINATE 100 MG/1
TABLET ORAL
Qty: 27 TABLET | Refills: 3 | Status: SHIPPED | OUTPATIENT
Start: 2021-04-23 | End: 2022-05-09

## 2021-04-29 ENCOUNTER — PATIENT MESSAGE (OUTPATIENT)
Dept: RESEARCH | Facility: HOSPITAL | Age: 46
End: 2021-04-29

## 2021-05-19 ENCOUNTER — OFFICE VISIT (OUTPATIENT)
Dept: FAMILY MEDICINE | Facility: CLINIC | Age: 46
End: 2021-05-19
Payer: COMMERCIAL

## 2021-05-19 DIAGNOSIS — M54.12 CERVICAL RADICULOPATHY: ICD-10-CM

## 2021-05-19 DIAGNOSIS — M54.40 CHRONIC LOW BACK PAIN WITH SCIATICA, SCIATICA LATERALITY UNSPECIFIED, UNSPECIFIED BACK PAIN LATERALITY: ICD-10-CM

## 2021-05-19 DIAGNOSIS — M51.36 DDD (DEGENERATIVE DISC DISEASE), LUMBAR: ICD-10-CM

## 2021-05-19 DIAGNOSIS — G89.29 CHRONIC LOW BACK PAIN WITH SCIATICA, SCIATICA LATERALITY UNSPECIFIED, UNSPECIFIED BACK PAIN LATERALITY: ICD-10-CM

## 2021-05-19 DIAGNOSIS — M54.2 CHRONIC CERVICAL PAIN: Primary | ICD-10-CM

## 2021-05-19 DIAGNOSIS — G89.29 CHRONIC CERVICAL PAIN: Primary | ICD-10-CM

## 2021-05-19 PROCEDURE — 99213 PR OFFICE/OUTPT VISIT, EST, LEVL III, 20-29 MIN: ICD-10-PCS | Mod: 95,,, | Performed by: NURSE PRACTITIONER

## 2021-05-19 PROCEDURE — 99213 OFFICE O/P EST LOW 20 MIN: CPT | Mod: 95,,, | Performed by: NURSE PRACTITIONER

## 2021-05-19 RX ORDER — HYDROCODONE BITARTRATE AND ACETAMINOPHEN 7.5; 325 MG/1; MG/1
1 TABLET ORAL 4 TIMES DAILY PRN
Qty: 120 TABLET | Refills: 0 | Status: SHIPPED | OUTPATIENT
Start: 2021-05-19 | End: 2021-06-18

## 2021-06-01 RX ORDER — VENLAFAXINE HYDROCHLORIDE 150 MG/1
150 CAPSULE, EXTENDED RELEASE ORAL DAILY
Qty: 90 CAPSULE | Refills: 2 | Status: SHIPPED | OUTPATIENT
Start: 2021-06-01 | End: 2021-11-23 | Stop reason: SDUPTHER

## 2021-06-13 RX ORDER — TRAZODONE HYDROCHLORIDE 150 MG/1
TABLET ORAL
Qty: 90 TABLET | Refills: 3 | Status: SHIPPED | OUTPATIENT
Start: 2021-06-13 | End: 2022-11-28 | Stop reason: SDUPTHER

## 2021-06-23 ENCOUNTER — TELEPHONE (OUTPATIENT)
Dept: FAMILY MEDICINE | Facility: CLINIC | Age: 46
End: 2021-06-23

## 2021-06-23 ENCOUNTER — OFFICE VISIT (OUTPATIENT)
Dept: FAMILY MEDICINE | Facility: CLINIC | Age: 46
End: 2021-06-23
Payer: COMMERCIAL

## 2021-06-23 DIAGNOSIS — M54.12 CERVICAL RADICULOPATHY: Primary | ICD-10-CM

## 2021-06-23 DIAGNOSIS — G89.29 CHRONIC LOW BACK PAIN WITH SCIATICA, SCIATICA LATERALITY UNSPECIFIED, UNSPECIFIED BACK PAIN LATERALITY: ICD-10-CM

## 2021-06-23 DIAGNOSIS — E03.9 HYPOTHYROIDISM, UNSPECIFIED TYPE: ICD-10-CM

## 2021-06-23 DIAGNOSIS — F32.A DEPRESSION, UNSPECIFIED DEPRESSION TYPE: ICD-10-CM

## 2021-06-23 DIAGNOSIS — M54.40 CHRONIC LOW BACK PAIN WITH SCIATICA, SCIATICA LATERALITY UNSPECIFIED, UNSPECIFIED BACK PAIN LATERALITY: ICD-10-CM

## 2021-06-23 DIAGNOSIS — G43.909 MIGRAINE WITHOUT STATUS MIGRAINOSUS, NOT INTRACTABLE, UNSPECIFIED MIGRAINE TYPE: ICD-10-CM

## 2021-06-23 PROCEDURE — 99214 PR OFFICE/OUTPT VISIT, EST, LEVL IV, 30-39 MIN: ICD-10-PCS | Mod: 95,,, | Performed by: FAMILY MEDICINE

## 2021-06-23 PROCEDURE — 99214 OFFICE O/P EST MOD 30 MIN: CPT | Mod: 95,,, | Performed by: FAMILY MEDICINE

## 2021-06-23 RX ORDER — HYDROCODONE BITARTRATE AND ACETAMINOPHEN 7.5; 325 MG/1; MG/1
1 TABLET ORAL 4 TIMES DAILY PRN
Qty: 120 TABLET | Refills: 0 | Status: SHIPPED | OUTPATIENT
Start: 2021-07-23 | End: 2021-08-22

## 2021-06-23 RX ORDER — HYDROCODONE BITARTRATE AND ACETAMINOPHEN 7.5; 325 MG/1; MG/1
1 TABLET ORAL 4 TIMES DAILY PRN
Qty: 120 TABLET | Refills: 0 | Status: SHIPPED | OUTPATIENT
Start: 2021-08-22 | End: 2021-09-16 | Stop reason: SDUPTHER

## 2021-06-23 RX ORDER — HYDROCODONE BITARTRATE AND ACETAMINOPHEN 7.5; 325 MG/1; MG/1
1 TABLET ORAL 4 TIMES DAILY PRN
Qty: 120 TABLET | Refills: 0 | Status: SHIPPED | OUTPATIENT
Start: 2021-06-23 | End: 2021-07-23

## 2021-07-20 RX ORDER — LEVOTHYROXINE SODIUM 75 UG/1
TABLET ORAL
Qty: 90 TABLET | Refills: 3 | Status: SHIPPED | OUTPATIENT
Start: 2021-07-20 | End: 2022-07-16

## 2021-07-29 ENCOUNTER — LAB VISIT (OUTPATIENT)
Dept: LAB | Facility: HOSPITAL | Age: 46
End: 2021-07-29
Attending: FAMILY MEDICINE
Payer: COMMERCIAL

## 2021-07-29 DIAGNOSIS — E03.9 HYPOTHYROIDISM, UNSPECIFIED TYPE: ICD-10-CM

## 2021-07-29 DIAGNOSIS — E61.1 IRON DEFICIENCY: ICD-10-CM

## 2021-07-29 LAB
BASOPHILS # BLD AUTO: 0.04 K/UL (ref 0–0.2)
BASOPHILS NFR BLD: 0.9 % (ref 0–1.9)
DIFFERENTIAL METHOD: ABNORMAL
EOSINOPHIL # BLD AUTO: 0.3 K/UL (ref 0–0.5)
EOSINOPHIL NFR BLD: 7 % (ref 0–8)
ERYTHROCYTE [DISTWIDTH] IN BLOOD BY AUTOMATED COUNT: 13.2 % (ref 11.5–14.5)
HCT VFR BLD AUTO: 37.5 % (ref 37–48.5)
HGB BLD-MCNC: 11.9 G/DL (ref 12–16)
IMM GRANULOCYTES # BLD AUTO: 0.01 K/UL (ref 0–0.04)
IMM GRANULOCYTES NFR BLD AUTO: 0.2 % (ref 0–0.5)
LYMPHOCYTES # BLD AUTO: 1.4 K/UL (ref 1–4.8)
LYMPHOCYTES NFR BLD: 32.3 % (ref 18–48)
MCH RBC QN AUTO: 29.6 PG (ref 27–31)
MCHC RBC AUTO-ENTMCNC: 31.7 G/DL (ref 32–36)
MCV RBC AUTO: 93 FL (ref 82–98)
MONOCYTES # BLD AUTO: 0.5 K/UL (ref 0.3–1)
MONOCYTES NFR BLD: 10.5 % (ref 4–15)
NEUTROPHILS # BLD AUTO: 2.1 K/UL (ref 1.8–7.7)
NEUTROPHILS NFR BLD: 49.1 % (ref 38–73)
NRBC BLD-RTO: 0 /100 WBC
PLATELET # BLD AUTO: 228 K/UL (ref 150–450)
PMV BLD AUTO: 11 FL (ref 9.2–12.9)
RBC # BLD AUTO: 4.02 M/UL (ref 4–5.4)
WBC # BLD AUTO: 4.3 K/UL (ref 3.9–12.7)

## 2021-07-29 PROCEDURE — 84443 ASSAY THYROID STIM HORMONE: CPT | Performed by: FAMILY MEDICINE

## 2021-07-29 PROCEDURE — 80061 LIPID PANEL: CPT | Performed by: FAMILY MEDICINE

## 2021-07-29 PROCEDURE — 85025 COMPLETE CBC W/AUTO DIFF WBC: CPT | Performed by: FAMILY MEDICINE

## 2021-07-29 PROCEDURE — 36415 COLL VENOUS BLD VENIPUNCTURE: CPT | Mod: PO | Performed by: FAMILY MEDICINE

## 2021-07-29 PROCEDURE — 82728 ASSAY OF FERRITIN: CPT | Performed by: FAMILY MEDICINE

## 2021-07-29 PROCEDURE — 83540 ASSAY OF IRON: CPT | Performed by: FAMILY MEDICINE

## 2021-07-29 PROCEDURE — 80053 COMPREHEN METABOLIC PANEL: CPT | Performed by: FAMILY MEDICINE

## 2021-07-30 LAB
ALBUMIN SERPL BCP-MCNC: 3.8 G/DL (ref 3.5–5.2)
ALP SERPL-CCNC: 53 U/L (ref 55–135)
ALT SERPL W/O P-5'-P-CCNC: 16 U/L (ref 10–44)
ANION GAP SERPL CALC-SCNC: 9 MMOL/L (ref 8–16)
AST SERPL-CCNC: 23 U/L (ref 10–40)
BILIRUB SERPL-MCNC: 0.3 MG/DL (ref 0.1–1)
BUN SERPL-MCNC: 16 MG/DL (ref 6–20)
CALCIUM SERPL-MCNC: 9.1 MG/DL (ref 8.7–10.5)
CHLORIDE SERPL-SCNC: 110 MMOL/L (ref 95–110)
CHOLEST SERPL-MCNC: 185 MG/DL (ref 120–199)
CHOLEST/HDLC SERPL: 3.4 {RATIO} (ref 2–5)
CO2 SERPL-SCNC: 24 MMOL/L (ref 23–29)
CREAT SERPL-MCNC: 0.7 MG/DL (ref 0.5–1.4)
EST. GFR  (AFRICAN AMERICAN): >60 ML/MIN/1.73 M^2
EST. GFR  (NON AFRICAN AMERICAN): >60 ML/MIN/1.73 M^2
GLUCOSE SERPL-MCNC: 83 MG/DL (ref 70–110)
HDLC SERPL-MCNC: 54 MG/DL (ref 40–75)
HDLC SERPL: 29.2 % (ref 20–50)
IRON SERPL-MCNC: 100 UG/DL (ref 30–160)
LDLC SERPL CALC-MCNC: 111.4 MG/DL (ref 63–159)
NONHDLC SERPL-MCNC: 131 MG/DL
POTASSIUM SERPL-SCNC: 4.6 MMOL/L (ref 3.5–5.1)
PROT SERPL-MCNC: 6.7 G/DL (ref 6–8.4)
SATURATED IRON: 28 % (ref 20–50)
SODIUM SERPL-SCNC: 143 MMOL/L (ref 136–145)
TOTAL IRON BINDING CAPACITY: 360 UG/DL (ref 250–450)
TRANSFERRIN SERPL-MCNC: 243 MG/DL (ref 200–375)
TRANSFERRIN SERPL-MCNC: 243 MG/DL (ref 200–375)
TRIGL SERPL-MCNC: 98 MG/DL (ref 30–150)
TSH SERPL DL<=0.005 MIU/L-ACNC: 1.9 UIU/ML (ref 0.4–4)

## 2021-07-31 LAB — FERRITIN SERPL-MCNC: 20 NG/ML (ref 20–300)

## 2021-08-10 ENCOUNTER — TELEPHONE (OUTPATIENT)
Dept: OBSTETRICS AND GYNECOLOGY | Facility: CLINIC | Age: 46
End: 2021-08-10

## 2021-08-10 DIAGNOSIS — N95.1 MENOPAUSAL SYMPTOM: Primary | ICD-10-CM

## 2021-09-16 ENCOUNTER — OFFICE VISIT (OUTPATIENT)
Dept: FAMILY MEDICINE | Facility: CLINIC | Age: 46
End: 2021-09-16
Payer: COMMERCIAL

## 2021-09-16 DIAGNOSIS — F41.9 ANXIETY: ICD-10-CM

## 2021-09-16 DIAGNOSIS — F32.A DEPRESSION, UNSPECIFIED DEPRESSION TYPE: ICD-10-CM

## 2021-09-16 DIAGNOSIS — M54.12 CERVICAL RADICULOPATHY: ICD-10-CM

## 2021-09-16 DIAGNOSIS — G43.909 MIGRAINE WITHOUT STATUS MIGRAINOSUS, NOT INTRACTABLE, UNSPECIFIED MIGRAINE TYPE: ICD-10-CM

## 2021-09-16 DIAGNOSIS — E03.9 HYPOTHYROIDISM, UNSPECIFIED TYPE: ICD-10-CM

## 2021-09-16 DIAGNOSIS — G89.29 CHRONIC LOW BACK PAIN WITH SCIATICA, SCIATICA LATERALITY UNSPECIFIED, UNSPECIFIED BACK PAIN LATERALITY: Primary | ICD-10-CM

## 2021-09-16 DIAGNOSIS — M54.40 CHRONIC LOW BACK PAIN WITH SCIATICA, SCIATICA LATERALITY UNSPECIFIED, UNSPECIFIED BACK PAIN LATERALITY: Primary | ICD-10-CM

## 2021-09-16 PROCEDURE — 1159F MED LIST DOCD IN RCRD: CPT | Mod: CPTII,95,, | Performed by: FAMILY MEDICINE

## 2021-09-16 PROCEDURE — 1160F PR REVIEW ALL MEDS BY PRESCRIBER/CLIN PHARMACIST DOCUMENTED: ICD-10-PCS | Mod: CPTII,95,, | Performed by: FAMILY MEDICINE

## 2021-09-16 PROCEDURE — 99214 OFFICE O/P EST MOD 30 MIN: CPT | Mod: 95,,, | Performed by: FAMILY MEDICINE

## 2021-09-16 PROCEDURE — 1159F PR MEDICATION LIST DOCUMENTED IN MEDICAL RECORD: ICD-10-PCS | Mod: CPTII,95,, | Performed by: FAMILY MEDICINE

## 2021-09-16 PROCEDURE — 99214 PR OFFICE/OUTPT VISIT, EST, LEVL IV, 30-39 MIN: ICD-10-PCS | Mod: 95,,, | Performed by: FAMILY MEDICINE

## 2021-09-16 PROCEDURE — 1160F RVW MEDS BY RX/DR IN RCRD: CPT | Mod: CPTII,95,, | Performed by: FAMILY MEDICINE

## 2021-09-16 RX ORDER — HYDROCODONE BITARTRATE AND ACETAMINOPHEN 7.5; 325 MG/1; MG/1
1 TABLET ORAL 4 TIMES DAILY PRN
Qty: 120 TABLET | Refills: 0 | Status: SHIPPED | OUTPATIENT
Start: 2021-09-21 | End: 2021-10-21

## 2021-09-16 RX ORDER — HYDROCODONE BITARTRATE AND ACETAMINOPHEN 7.5; 325 MG/1; MG/1
1 TABLET ORAL 4 TIMES DAILY PRN
Qty: 120 TABLET | Refills: 0 | Status: SHIPPED | OUTPATIENT
Start: 2021-10-21 | End: 2021-11-20

## 2021-09-16 RX ORDER — HYDROCODONE BITARTRATE AND ACETAMINOPHEN 7.5; 325 MG/1; MG/1
1 TABLET ORAL 4 TIMES DAILY PRN
Qty: 120 TABLET | Refills: 0 | Status: SHIPPED | OUTPATIENT
Start: 2021-11-20 | End: 2021-11-23 | Stop reason: SDUPTHER

## 2021-09-17 RX ORDER — PROMETHAZINE HYDROCHLORIDE 12.5 MG/1
12.5 TABLET ORAL EVERY 6 HOURS PRN
Qty: 30 TABLET | Refills: 0 | Status: SHIPPED | OUTPATIENT
Start: 2021-09-17 | End: 2022-05-26 | Stop reason: SDUPTHER

## 2021-11-08 ENCOUNTER — LAB VISIT (OUTPATIENT)
Dept: LAB | Facility: HOSPITAL | Age: 46
End: 2021-11-08
Attending: OBSTETRICS & GYNECOLOGY
Payer: COMMERCIAL

## 2021-11-08 DIAGNOSIS — N95.1 MENOPAUSAL SYMPTOM: ICD-10-CM

## 2021-11-08 LAB
ESTRADIOL SERPL-MCNC: 36 PG/ML
FSH SERPL-ACNC: 16.31 MIU/ML

## 2021-11-08 PROCEDURE — 82670 ASSAY OF TOTAL ESTRADIOL: CPT | Performed by: OBSTETRICS & GYNECOLOGY

## 2021-11-08 PROCEDURE — 36415 COLL VENOUS BLD VENIPUNCTURE: CPT | Mod: PO | Performed by: OBSTETRICS & GYNECOLOGY

## 2021-11-08 PROCEDURE — 83001 ASSAY OF GONADOTROPIN (FSH): CPT | Performed by: OBSTETRICS & GYNECOLOGY

## 2021-11-09 NOTE — TELEPHONE ENCOUNTER
General Health and concerns:  HEART HEALTHY DIET:  A heart healthy diet is one that is low in cholesterol (less than 300 mg daily), fat (less than 80 g daily) . You should also minimize carbohydrates / sugars (less amounts of breads, pastas, potato and potato products and sugary foods/snacks, cookies, cakes, etc) . Try to eat whole wheat/multigrain breads and pastas and eat more vegetables. Cook with olive oil (or no oil) and grill, bake, broil or boil foods. Less red meat and more chicken , fish and lean cuts of beef (limited). [id number] calories per day is sufficient [id number] is acceptable for weight loss. EXERCISE:  You should do exercise 3-5 days per week (minimum) to include increasing your heart rate for 30 to 45 minutes. At least a pace of a brisk walk should do that. This build up your heart and lung endurance and muscles and helps many function of the body. OTHER:    IF your condition(s) do not improve, get worse and/or if any concerns arise, please call or come by the office. Routine Health maintenance: You need to get a yearly follow up/physical exam to review, discuss age and gender appropriate exams, labs, vaccines and screening tests. This includes cardiovascular health risk, cancer screens and other abi related topics. Medications-Take all medications as directed. Please do not stop unless you talk to your doctor or health care provider first. Report any problems immediately. PLEASE CHECK THE LIST FROM TODAY's VISIT and make SURE IT IS ACCURATE-Please bring any issues to our concern IMMEDIATELY!! Referrals: if you have been given a referral, please call the office if you do not hear from provider in one week. You may make the appointment yourself. Please keep all appointments with specialists and ask them to send their notes, thoughts, recommendations to us , as your PCP.     KEEP all upcoming appointments with our office UNLESS otherwise and RX sent 10/22    specifically told not to. CHECK your diagnosis/problem list for today and that orders and prescriptions are what we discussed as well as MAKE sure all information is accurate and has been discussed to your satisfaction. PLEASE make sure all your questions have been answered and feel free to call or come back should any concerns arise. Imaging/Labs:  Be sure to get these images in a timely manner. IF your test must be scheduled, let us know if you need help getting this done and if you do not hear from that provider in a week , call us or them. BE SURE to call the office if you do not hear regarding the results in one week after the test is performed Image or lab). It is our intention to inform you of the results ALWAYS, even if normal you should get a notification (Call, portal message). PLEASE jalil if you do not get the results. PLEASE follow all recommendations and call/come in /ask questions if you do not understand of if problems develop after or in between visits. Failure to comply with recommended health care advise could result in serious health consequences. Thank you for choosing our practice and please let us know how we can help you feel better and stay well!

## 2021-11-16 ENCOUNTER — TELEPHONE (OUTPATIENT)
Dept: OBSTETRICS AND GYNECOLOGY | Facility: CLINIC | Age: 46
End: 2021-11-16
Payer: MEDICARE

## 2021-11-16 DIAGNOSIS — G43.711 INTRACTABLE CHRONIC MIGRAINE WITHOUT AURA AND WITH STATUS MIGRAINOSUS: ICD-10-CM

## 2021-11-16 RX ORDER — BUTALBITAL, ACETAMINOPHEN AND CAFFEINE 50; 325; 40 MG/1; MG/1; MG/1
TABLET ORAL
Qty: 10 TABLET | Refills: 0 | OUTPATIENT
Start: 2021-11-16

## 2021-11-17 RX ORDER — CYCLOBENZAPRINE HCL 10 MG
TABLET ORAL
Qty: 30 TABLET | Refills: 0 | Status: SHIPPED | OUTPATIENT
Start: 2021-11-17 | End: 2021-12-16 | Stop reason: SDUPTHER

## 2021-11-22 ENCOUNTER — TELEPHONE (OUTPATIENT)
Dept: FAMILY MEDICINE | Facility: CLINIC | Age: 46
End: 2021-11-22
Payer: MEDICARE

## 2021-11-23 ENCOUNTER — OFFICE VISIT (OUTPATIENT)
Dept: FAMILY MEDICINE | Facility: CLINIC | Age: 46
End: 2021-11-23
Payer: COMMERCIAL

## 2021-11-23 DIAGNOSIS — G89.29 CHRONIC LOW BACK PAIN WITH SCIATICA, SCIATICA LATERALITY UNSPECIFIED, UNSPECIFIED BACK PAIN LATERALITY: Primary | ICD-10-CM

## 2021-11-23 DIAGNOSIS — F41.9 ANXIETY: ICD-10-CM

## 2021-11-23 DIAGNOSIS — G43.909 MIGRAINE WITHOUT STATUS MIGRAINOSUS, NOT INTRACTABLE, UNSPECIFIED MIGRAINE TYPE: ICD-10-CM

## 2021-11-23 DIAGNOSIS — M54.12 CERVICAL RADICULOPATHY: ICD-10-CM

## 2021-11-23 DIAGNOSIS — M54.40 CHRONIC LOW BACK PAIN WITH SCIATICA, SCIATICA LATERALITY UNSPECIFIED, UNSPECIFIED BACK PAIN LATERALITY: Primary | ICD-10-CM

## 2021-11-23 DIAGNOSIS — E03.9 HYPOTHYROIDISM, UNSPECIFIED TYPE: ICD-10-CM

## 2021-11-23 DIAGNOSIS — F32.A DEPRESSION, UNSPECIFIED DEPRESSION TYPE: ICD-10-CM

## 2021-11-23 PROCEDURE — 99214 PR OFFICE/OUTPT VISIT, EST, LEVL IV, 30-39 MIN: ICD-10-PCS | Mod: 95,,, | Performed by: FAMILY MEDICINE

## 2021-11-23 PROCEDURE — 99214 OFFICE O/P EST MOD 30 MIN: CPT | Mod: 95,,, | Performed by: FAMILY MEDICINE

## 2021-11-23 RX ORDER — HYDROCODONE BITARTRATE AND ACETAMINOPHEN 7.5; 325 MG/1; MG/1
1 TABLET ORAL 4 TIMES DAILY PRN
Qty: 120 TABLET | Refills: 0 | Status: SHIPPED | OUTPATIENT
Start: 2022-01-22 | End: 2022-02-21

## 2021-11-23 RX ORDER — HYDROCODONE BITARTRATE AND ACETAMINOPHEN 7.5; 325 MG/1; MG/1
1 TABLET ORAL 4 TIMES DAILY PRN
Qty: 120 TABLET | Refills: 0 | Status: SHIPPED | OUTPATIENT
Start: 2021-11-23 | End: 2021-12-23

## 2021-11-23 RX ORDER — HYDROCODONE BITARTRATE AND ACETAMINOPHEN 7.5; 325 MG/1; MG/1
1 TABLET ORAL 4 TIMES DAILY PRN
Qty: 120 TABLET | Refills: 0 | Status: SHIPPED | OUTPATIENT
Start: 2021-12-23 | End: 2022-01-22

## 2021-11-23 RX ORDER — VENLAFAXINE HYDROCHLORIDE 150 MG/1
150 CAPSULE, EXTENDED RELEASE ORAL DAILY
Qty: 90 CAPSULE | Refills: 3 | Status: SHIPPED | OUTPATIENT
Start: 2021-11-23 | End: 2022-12-14 | Stop reason: SDUPTHER

## 2021-12-16 ENCOUNTER — PATIENT MESSAGE (OUTPATIENT)
Dept: FAMILY MEDICINE | Facility: CLINIC | Age: 46
End: 2021-12-16
Payer: MEDICARE

## 2021-12-16 RX ORDER — HYDROCODONE BITARTRATE AND ACETAMINOPHEN 7.5; 325 MG/1; MG/1
1 TABLET ORAL 4 TIMES DAILY PRN
Qty: 120 TABLET | Refills: 0 | Status: CANCELLED | OUTPATIENT
Start: 2021-12-16 | End: 2022-01-15

## 2021-12-16 RX ORDER — CYCLOBENZAPRINE HCL 10 MG
10 TABLET ORAL 3 TIMES DAILY PRN
Qty: 30 TABLET | Refills: 5 | Status: SHIPPED | OUTPATIENT
Start: 2021-12-16 | End: 2022-05-26 | Stop reason: SDUPTHER

## 2022-02-09 DIAGNOSIS — G43.711 INTRACTABLE CHRONIC MIGRAINE WITHOUT AURA AND WITH STATUS MIGRAINOSUS: ICD-10-CM

## 2022-02-09 RX ORDER — ERENUMAB-AOOE 140 MG/ML
140 INJECTION, SOLUTION SUBCUTANEOUS
Qty: 1 ML | Refills: 11 | Status: SHIPPED | OUTPATIENT
Start: 2022-02-09 | End: 2022-10-24

## 2022-02-10 DIAGNOSIS — G43.711 INTRACTABLE CHRONIC MIGRAINE WITHOUT AURA AND WITH STATUS MIGRAINOSUS: ICD-10-CM

## 2022-02-10 RX ORDER — BUTALBITAL, ACETAMINOPHEN AND CAFFEINE 50; 325; 40 MG/1; MG/1; MG/1
TABLET ORAL
Qty: 10 TABLET | Refills: 0 | Status: SHIPPED | OUTPATIENT
Start: 2022-02-10 | End: 2022-02-24

## 2022-02-24 ENCOUNTER — OFFICE VISIT (OUTPATIENT)
Dept: FAMILY MEDICINE | Facility: CLINIC | Age: 47
End: 2022-02-24
Payer: COMMERCIAL

## 2022-02-24 VITALS
OXYGEN SATURATION: 99 % | BODY MASS INDEX: 27.66 KG/M2 | WEIGHT: 176.56 LBS | SYSTOLIC BLOOD PRESSURE: 130 MMHG | DIASTOLIC BLOOD PRESSURE: 78 MMHG | HEART RATE: 89 BPM

## 2022-02-24 DIAGNOSIS — G43.711 INTRACTABLE CHRONIC MIGRAINE WITHOUT AURA AND WITH STATUS MIGRAINOSUS: ICD-10-CM

## 2022-02-24 DIAGNOSIS — Z12.39 ENCOUNTER FOR SCREENING FOR MALIGNANT NEOPLASM OF BREAST, UNSPECIFIED SCREENING MODALITY: ICD-10-CM

## 2022-02-24 DIAGNOSIS — F32.A DEPRESSION, UNSPECIFIED DEPRESSION TYPE: ICD-10-CM

## 2022-02-24 DIAGNOSIS — M54.12 CERVICAL RADICULOPATHY: ICD-10-CM

## 2022-02-24 DIAGNOSIS — G43.909 MIGRAINE WITHOUT STATUS MIGRAINOSUS, NOT INTRACTABLE, UNSPECIFIED MIGRAINE TYPE: ICD-10-CM

## 2022-02-24 DIAGNOSIS — M54.40 CHRONIC LOW BACK PAIN WITH SCIATICA, SCIATICA LATERALITY UNSPECIFIED, UNSPECIFIED BACK PAIN LATERALITY: Primary | ICD-10-CM

## 2022-02-24 DIAGNOSIS — F41.9 ANXIETY: ICD-10-CM

## 2022-02-24 DIAGNOSIS — E03.9 HYPOTHYROIDISM, UNSPECIFIED TYPE: ICD-10-CM

## 2022-02-24 DIAGNOSIS — G89.29 CHRONIC LOW BACK PAIN WITH SCIATICA, SCIATICA LATERALITY UNSPECIFIED, UNSPECIFIED BACK PAIN LATERALITY: Primary | ICD-10-CM

## 2022-02-24 PROCEDURE — 99214 PR OFFICE/OUTPT VISIT, EST, LEVL IV, 30-39 MIN: ICD-10-PCS | Mod: S$GLB,,, | Performed by: FAMILY MEDICINE

## 2022-02-24 PROCEDURE — 1159F MED LIST DOCD IN RCRD: CPT | Mod: CPTII,S$GLB,, | Performed by: FAMILY MEDICINE

## 2022-02-24 PROCEDURE — 1160F RVW MEDS BY RX/DR IN RCRD: CPT | Mod: CPTII,S$GLB,, | Performed by: FAMILY MEDICINE

## 2022-02-24 PROCEDURE — 3078F PR MOST RECENT DIASTOLIC BLOOD PRESSURE < 80 MM HG: ICD-10-PCS | Mod: CPTII,S$GLB,, | Performed by: FAMILY MEDICINE

## 2022-02-24 PROCEDURE — 3008F BODY MASS INDEX DOCD: CPT | Mod: CPTII,S$GLB,, | Performed by: FAMILY MEDICINE

## 2022-02-24 PROCEDURE — 99999 PR PBB SHADOW E&M-EST. PATIENT-LVL IV: ICD-10-PCS | Mod: PBBFAC,,, | Performed by: FAMILY MEDICINE

## 2022-02-24 PROCEDURE — 3075F SYST BP GE 130 - 139MM HG: CPT | Mod: CPTII,S$GLB,, | Performed by: FAMILY MEDICINE

## 2022-02-24 PROCEDURE — 3075F PR MOST RECENT SYSTOLIC BLOOD PRESS GE 130-139MM HG: ICD-10-PCS | Mod: CPTII,S$GLB,, | Performed by: FAMILY MEDICINE

## 2022-02-24 PROCEDURE — 3008F PR BODY MASS INDEX (BMI) DOCUMENTED: ICD-10-PCS | Mod: CPTII,S$GLB,, | Performed by: FAMILY MEDICINE

## 2022-02-24 PROCEDURE — 1159F PR MEDICATION LIST DOCUMENTED IN MEDICAL RECORD: ICD-10-PCS | Mod: CPTII,S$GLB,, | Performed by: FAMILY MEDICINE

## 2022-02-24 PROCEDURE — 99214 OFFICE O/P EST MOD 30 MIN: CPT | Mod: S$GLB,,, | Performed by: FAMILY MEDICINE

## 2022-02-24 PROCEDURE — 1160F PR REVIEW ALL MEDS BY PRESCRIBER/CLIN PHARMACIST DOCUMENTED: ICD-10-PCS | Mod: CPTII,S$GLB,, | Performed by: FAMILY MEDICINE

## 2022-02-24 PROCEDURE — 99999 PR PBB SHADOW E&M-EST. PATIENT-LVL IV: CPT | Mod: PBBFAC,,, | Performed by: FAMILY MEDICINE

## 2022-02-24 PROCEDURE — 3078F DIAST BP <80 MM HG: CPT | Mod: CPTII,S$GLB,, | Performed by: FAMILY MEDICINE

## 2022-02-24 RX ORDER — HYDROCODONE BITARTRATE AND ACETAMINOPHEN 7.5; 325 MG/1; MG/1
1 TABLET ORAL EVERY 6 HOURS PRN
Qty: 120 TABLET | Refills: 0 | Status: SHIPPED | OUTPATIENT
Start: 2022-02-24 | End: 2022-03-26

## 2022-02-24 RX ORDER — HYDROCODONE BITARTRATE AND ACETAMINOPHEN 7.5; 325 MG/1; MG/1
1 TABLET ORAL EVERY 6 HOURS PRN
Qty: 120 TABLET | Refills: 0 | Status: SHIPPED | OUTPATIENT
Start: 2022-04-25 | End: 2022-05-26 | Stop reason: SDUPTHER

## 2022-02-24 RX ORDER — BUTALBITAL, ACETAMINOPHEN AND CAFFEINE 50; 325; 40 MG/1; MG/1; MG/1
TABLET ORAL
Qty: 10 TABLET | Refills: 0 | Status: SHIPPED | OUTPATIENT
Start: 2022-02-24 | End: 2022-04-21

## 2022-02-24 RX ORDER — HYDROCODONE BITARTRATE AND ACETAMINOPHEN 7.5; 325 MG/1; MG/1
1 TABLET ORAL EVERY 6 HOURS PRN
COMMUNITY
End: 2022-02-24 | Stop reason: SDUPTHER

## 2022-02-24 RX ORDER — HYDROCODONE BITARTRATE AND ACETAMINOPHEN 7.5; 325 MG/1; MG/1
1 TABLET ORAL EVERY 6 HOURS PRN
Qty: 120 TABLET | Refills: 0 | Status: SHIPPED | OUTPATIENT
Start: 2022-03-26 | End: 2022-04-25

## 2022-02-24 NOTE — PROGRESS NOTES
Subjective:       Patient ID: Isela Smyth is a 46 y.o. female.    Chief Complaint: Medication Refill    HPI     Here for a f/u    Reports that her  has a new job opportunity in [place]. Planning to leave in 2-3 months' time.      Gets 3-5 migraines per month.  Takes fioricet and imitrex for pain control.  Also, receiving aimovig injections. Sees neurology for management.      History of 6 low back spinal surgeries and 1 neck spinal surgery in past. Recent one in 2012.      Status post 08/14/2012 microscopic recurrent left L4-L5 laminotomy, discectomy, left L5 complete laminectomy, left L5-S1 laminotomy,    recurrent discectomy.      Chronic lumbago controlled while on norco 7.5 and flexeril. Ps: 2/10. Occasional left sciatic pain. Had an placido last month which helped with the pain.      Had mri L spine 9/2016 which showed:      1.  Postoperative change of left hemilaminectomy at L4-L5 and L5-S1.  2.  Multilevel degenerative change of the lumbar spine, most pronounced at L4-L5 and L5-S1 as detailed above  3.  Minimal descending central disc extrusion at L5-S1 which does not appear to encroach upon either the descending left or right S1 nerve.  4.  Probable conjoined left S2 nerve coursing in the left posterolateral spinal canal.  Less likely, this represents an intraspinal synovial cyst abutting the descending left S1 nerve.  5.  Central annular tear of the intervertebral disc at L4-L5.     Also, has chronic neck pain > 5 years. Hx of neck surgeries in past. Saw pain management recently and had an placido on 1/31/17. Norco 7.5 helps with pain.      Had mri of cervical spine on 1/19/17 which showed:  -Disc protrusion at the C5-C6 level and to the right lateral recess with possible anterior cord contact  -Loss of normal cervical lordosis which may be positional or related to muscular strain.  -Milder degenerative changes are noted within the body of the report.      Depression and anxiety stable while on  effexor.          Review of Systems      Review of Systems   Constitutional: Negative for fever and chills.   HENT: Negative for hearing loss and neck stiffness.    Eyes: Negative for redness and itching.   Respiratory: Negative for cough and choking.    Cardiovascular: Negative for chest pain and leg swelling.  Abdomen: Negative for abdominal pain and blood in stool.   Genitourinary: Negative for dysuria and flank pain.   Neurological: Negative for light-headedness and headaches.   Hematological: Negative for adenopathy.   Psychiatric/Behavioral: Negative for behavioral problems.     Objective:      Physical Exam  Constitutional:       Appearance: She is well-developed.   HENT:      Head: Normocephalic and atraumatic.   Eyes:      Conjunctiva/sclera: Conjunctivae normal.      Pupils: Pupils are equal, round, and reactive to light.   Cardiovascular:      Rate and Rhythm: Normal rate and regular rhythm.      Heart sounds: No murmur heard.  Pulmonary:      Effort: Pulmonary effort is normal.      Breath sounds: Normal breath sounds.   Musculoskeletal:      Cervical back: Normal range of motion.   Lymphadenopathy:      Cervical: No cervical adenopathy.         Assessment:       1. Chronic low back pain with sciatica, sciatica laterality unspecified, unspecified back pain laterality    2. Encounter for screening for malignant neoplasm of breast, unspecified screening modality    3. Migraine without status migrainosus, not intractable, unspecified migraine type    4. Hypothyroidism, unspecified type    5. Depression, unspecified depression type    6. Cervical radiculopathy    7. Anxiety        Plan:       Chronic low back pain with sciatica, sciatica laterality unspecified, unspecified back pain laterality    Encounter for screening for malignant neoplasm of breast, unspecified screening modality  -     Mammo Digital Screening Bilat; Future    Migraine without status migrainosus, not intractable, unspecified migraine  type    Hypothyroidism, unspecified type    Depression, unspecified depression type    Cervical radiculopathy    Anxiety    Other orders  -     HYDROcodone-acetaminophen (NORCO) 7.5-325 mg per tablet; Take 1 tablet by mouth every 6 (six) hours as needed for Pain.  Dispense: 120 tablet; Refill: 0  -     HYDROcodone-acetaminophen (NORCO) 7.5-325 mg per tablet; Take 1 tablet by mouth every 6 (six) hours as needed for Pain.  Dispense: 120 tablet; Refill: 0  -     HYDROcodone-acetaminophen (NORCO) 7.5-325 mg per tablet; Take 1 tablet by mouth every 6 (six) hours as needed for Pain.  Dispense: 120 tablet; Refill: 0              Plan:  rf norco 7.5  Order mmg   Cont current meds        Medication List with Changes/Refills   New Medications    HYDROCODONE-ACETAMINOPHEN (NORCO) 7.5-325 MG PER TABLET    Take 1 tablet by mouth every 6 (six) hours as needed for Pain.    HYDROCODONE-ACETAMINOPHEN (NORCO) 7.5-325 MG PER TABLET    Take 1 tablet by mouth every 6 (six) hours as needed for Pain.   Current Medications    CYCLOBENZAPRINE (FLEXERIL) 10 MG TABLET    Take 1 tablet (10 mg total) by mouth 3 (three) times daily as needed.    ERENUMAB-AOOE (AIMOVIG AUTOINJECTOR) 140 MG/ML AUTOINJECTOR    Inject 140 mg into the skin every 28 days.    KETOROLAC (TORADOL) 60 MG/2 ML SOLN    INJECT 2 MLS INTO THE MUSCLE 2 TIMES DAILY AS NEEDED FOR SEVERE MIGRAINE. NO MORE THAN 2 DAYS/WEEK.    LEVOTHYROXINE (SYNTHROID) 75 MCG TABLET    TAKE 1 TABLET BY MOUTH EVERY DAY IN THE MORNING    LINZESS 145 MCG CAP CAPSULE    TAKE 1 CAPSULE (145 MCG TOTAL) BY MOUTH DAILY AS NEEDED.    PROMETHAZINE (PHENERGAN) 12.5 MG TAB    Take 1 tablet (12.5 mg total) by mouth every 6 (six) hours as needed (nausea).    PROMETHAZINE (PHENERGAN) 25 MG SUPPOSITORY    Place 1 suppository (25 mg total) rectally every 6 (six) hours as needed for Nausea.    SUMATRIPTAN (IMITREX) 100 MG TABLET    TAKE 1 TABLET BY MOUTH EVERY DAY AS NEEDED. MAY REPEAT IN 2 HOURS IF NEEDED. DO NOT  EXCEED 2 TABLETS PER DAY    TRAZODONE (DESYREL) 150 MG TABLET    TAKE 1 TABLET BY MOUTH EVERY DAY AT NIGHT    VENLAFAXINE (EFFEXOR-XR) 150 MG CP24    Take 1 capsule (150 mg total) by mouth once daily.   Changed and/or Refilled Medications    Modified Medication Previous Medication    BUTALBITAL-ACETAMINOPHEN-CAFFEINE -40 MG (FIORICET, ESGIC) -40 MG PER TABLET butalbital-acetaminophen-caffeine -40 mg (FIORICET, ESGIC) -40 mg per tablet       TAKE 1 TABLET BY MOUTH AS NEEDED FOR MIGRAINE NO MORE THAN 10 TABLETS IN 1 MONTH    1 tab PO PRN migraine, no more than 10 tabs in one month    HYDROCODONE-ACETAMINOPHEN (NORCO) 7.5-325 MG PER TABLET HYDROcodone-acetaminophen (NORCO) 7.5-325 mg per tablet       Take 1 tablet by mouth every 6 (six) hours as needed for Pain.    Take 1 tablet by mouth every 6 (six) hours as needed for Pain.

## 2022-04-12 ENCOUNTER — TELEPHONE (OUTPATIENT)
Dept: PHARMACY | Facility: CLINIC | Age: 47
End: 2022-04-12
Payer: MEDICARE

## 2022-04-21 ENCOUNTER — PATIENT MESSAGE (OUTPATIENT)
Dept: NEUROLOGY | Facility: CLINIC | Age: 47
End: 2022-04-21
Payer: MEDICARE

## 2022-04-21 DIAGNOSIS — G43.711 INTRACTABLE CHRONIC MIGRAINE WITHOUT AURA AND WITH STATUS MIGRAINOSUS: ICD-10-CM

## 2022-04-21 RX ORDER — BUTALBITAL, ACETAMINOPHEN AND CAFFEINE 50; 325; 40 MG/1; MG/1; MG/1
TABLET ORAL
Qty: 10 TABLET | Refills: 0 | Status: SHIPPED | OUTPATIENT
Start: 2022-04-21 | End: 2023-02-20 | Stop reason: SDUPTHER

## 2022-05-09 ENCOUNTER — PATIENT MESSAGE (OUTPATIENT)
Dept: FAMILY MEDICINE | Facility: CLINIC | Age: 47
End: 2022-05-09
Payer: MEDICARE

## 2022-05-09 ENCOUNTER — PATIENT MESSAGE (OUTPATIENT)
Dept: SMOKING CESSATION | Facility: CLINIC | Age: 47
End: 2022-05-09
Payer: MEDICARE

## 2022-05-09 DIAGNOSIS — G43.909 MIGRAINE WITHOUT STATUS MIGRAINOSUS, NOT INTRACTABLE, UNSPECIFIED MIGRAINE TYPE: ICD-10-CM

## 2022-05-09 NOTE — TELEPHONE ENCOUNTER
No new care gaps identified.  [facility] Embedded Care Gaps. Reference number: [id number]. 5/09/2022   2:13:00 PM CDT

## 2022-05-12 RX ORDER — SUMATRIPTAN SUCCINATE 100 MG/1
TABLET ORAL
Qty: 27 TABLET | Refills: 3 | Status: SHIPPED | OUTPATIENT
Start: 2022-05-12 | End: 2023-01-04 | Stop reason: SDUPTHER

## 2022-05-26 ENCOUNTER — OFFICE VISIT (OUTPATIENT)
Dept: FAMILY MEDICINE | Facility: CLINIC | Age: 47
End: 2022-05-26
Payer: MEDICARE

## 2022-05-26 VITALS
TEMPERATURE: 99 F | BODY MASS INDEX: 26 KG/M2 | DIASTOLIC BLOOD PRESSURE: 78 MMHG | SYSTOLIC BLOOD PRESSURE: 118 MMHG | WEIGHT: 166 LBS | OXYGEN SATURATION: 97 % | HEART RATE: 79 BPM

## 2022-05-26 DIAGNOSIS — M54.40 CHRONIC LOW BACK PAIN WITH SCIATICA, SCIATICA LATERALITY UNSPECIFIED, UNSPECIFIED BACK PAIN LATERALITY: Primary | ICD-10-CM

## 2022-05-26 DIAGNOSIS — F33.9 RECURRENT MAJOR DEPRESSIVE DISORDER, REMISSION STATUS UNSPECIFIED: ICD-10-CM

## 2022-05-26 DIAGNOSIS — G43.909 MIGRAINE WITHOUT STATUS MIGRAINOSUS, NOT INTRACTABLE, UNSPECIFIED MIGRAINE TYPE: ICD-10-CM

## 2022-05-26 DIAGNOSIS — G89.29 CHRONIC LOW BACK PAIN WITH SCIATICA, SCIATICA LATERALITY UNSPECIFIED, UNSPECIFIED BACK PAIN LATERALITY: Primary | ICD-10-CM

## 2022-05-26 DIAGNOSIS — M54.2 CHRONIC CERVICAL PAIN: ICD-10-CM

## 2022-05-26 DIAGNOSIS — G89.29 CHRONIC CERVICAL PAIN: ICD-10-CM

## 2022-05-26 PROCEDURE — 99214 OFFICE O/P EST MOD 30 MIN: CPT | Mod: S$PBB,,, | Performed by: FAMILY MEDICINE

## 2022-05-26 PROCEDURE — 99999 PR PBB SHADOW E&M-EST. PATIENT-LVL III: CPT | Mod: PBBFAC,,, | Performed by: FAMILY MEDICINE

## 2022-05-26 PROCEDURE — 99214 PR OFFICE/OUTPT VISIT, EST, LEVL IV, 30-39 MIN: ICD-10-PCS | Mod: S$PBB,,, | Performed by: FAMILY MEDICINE

## 2022-05-26 PROCEDURE — 99999 PR PBB SHADOW E&M-EST. PATIENT-LVL III: ICD-10-PCS | Mod: PBBFAC,,, | Performed by: FAMILY MEDICINE

## 2022-05-26 PROCEDURE — 99213 OFFICE O/P EST LOW 20 MIN: CPT | Mod: PBBFAC,PO | Performed by: FAMILY MEDICINE

## 2022-05-26 RX ORDER — PROMETHAZINE HYDROCHLORIDE 12.5 MG/1
12.5 TABLET ORAL EVERY 6 HOURS PRN
Qty: 30 TABLET | Refills: 0 | Status: SHIPPED | OUTPATIENT
Start: 2022-05-26 | End: 2022-11-28 | Stop reason: SDUPTHER

## 2022-05-26 RX ORDER — HYDROCODONE BITARTRATE AND ACETAMINOPHEN 7.5; 325 MG/1; MG/1
1 TABLET ORAL EVERY 6 HOURS PRN
Qty: 120 TABLET | Refills: 0 | Status: SHIPPED | OUTPATIENT
Start: 2022-06-27 | End: 2022-07-27

## 2022-05-26 RX ORDER — PHENTERMINE HYDROCHLORIDE 37.5 MG/1
18.75 TABLET ORAL 2 TIMES DAILY
COMMUNITY
Start: 2022-05-20 | End: 2022-12-14

## 2022-05-26 RX ORDER — HYDROCODONE BITARTRATE AND ACETAMINOPHEN 7.5; 325 MG/1; MG/1
1 TABLET ORAL EVERY 6 HOURS PRN
Qty: 120 TABLET | Refills: 0 | Status: SHIPPED | OUTPATIENT
Start: 2022-05-28 | End: 2022-06-27

## 2022-05-26 RX ORDER — HYDROCODONE BITARTRATE AND ACETAMINOPHEN 7.5; 325 MG/1; MG/1
1 TABLET ORAL EVERY 6 HOURS PRN
Qty: 120 TABLET | Refills: 0 | Status: SHIPPED | OUTPATIENT
Start: 2022-07-27 | End: 2022-08-22 | Stop reason: SDUPTHER

## 2022-05-26 RX ORDER — CYCLOBENZAPRINE HCL 10 MG
10 TABLET ORAL 3 TIMES DAILY PRN
Qty: 30 TABLET | Refills: 5 | Status: SHIPPED | OUTPATIENT
Start: 2022-05-26 | End: 2022-11-25

## 2022-05-26 RX ORDER — PROMETHAZINE HYDROCHLORIDE 25 MG/1
25 SUPPOSITORY RECTAL EVERY 6 HOURS PRN
Qty: 30 SUPPOSITORY | Refills: 0 | Status: SHIPPED | OUTPATIENT
Start: 2022-05-26 | End: 2022-10-26 | Stop reason: SDUPTHER

## 2022-05-26 NOTE — PROGRESS NOTES
Subjective:       Patient ID: Isela Smyth is a 47 y.o. female.    Chief Complaint: Medication Refill    HPI     Here for a f/u     History of 6 low back spinal surgeries and 1 neck spinal surgery in past. Recent one in 2012.      Status post 08/14/2012 microscopic recurrent left L4-L5 laminotomy, discectomy, left L5 complete laminectomy, left L5-S1 laminotomy,    recurrent discectomy.      Chronic lumbago controlled while on norco 7.5 and flexeril. Ps: 2/10. Occasional left sciatic pain. Had an placido last month which helped with the pain.      Had mri L spine 9/2016 which showed:      1.  Postoperative change of left hemilaminectomy at L4-L5 and L5-S1.  2.  Multilevel degenerative change of the lumbar spine, most pronounced at L4-L5 and L5-S1 as detailed above  3.  Minimal descending central disc extrusion at L5-S1 which does not appear to encroach upon either the descending left or right S1 nerve.  4.  Probable conjoined left S2 nerve coursing in the left posterolateral spinal canal.  Less likely, this represents an intraspinal synovial cyst abutting the descending left S1 nerve.  5.  Central annular tear of the intervertebral disc at L4-L5.     Also, has chronic neck pain > 5 years. Hx of neck surgeries in past. Saw pain management recently and had an placido on 1/31/17. Norco 7.5 helps with pain.      Had mri of cervical spine on 1/19/17 which showed:  -Disc protrusion at the C5-C6 level and to the right lateral recess with possible anterior cord contact  -Loss of normal cervical lordosis which may be positional or related to muscular strain.  -Milder degenerative changes are noted within the body of the report.      Depression and anxiety stable while on effexor.      Gets 3-5 migraines per month.  Takes fioricet and imitrex for pain control.  Also, receiving aimovig injections. Sees neurology for management.     Review of Systems      Review of Systems   Constitutional: Negative for fever and chills.   HENT:  Negative for hearing loss and neck stiffness.    Eyes: Negative for redness and itching.   Respiratory: Negative for cough and choking.    Cardiovascular: Negative for chest pain and leg swelling.  Abdomen: Negative for abdominal pain and blood in stool.   Genitourinary: Negative for dysuria and flank pain.   Musculoskeletal: Negative for gait problem.   Neurological: Negative for light-headedness   Hematological: Negative for adenopathy.   Psychiatric/Behavioral: Negative for behavioral problems.     Objective:      Physical Exam  Constitutional:       Appearance: She is well-developed.   HENT:      Head: Normocephalic and atraumatic.   Eyes:      Conjunctiva/sclera: Conjunctivae normal.      Pupils: Pupils are equal, round, and reactive to light.   Cardiovascular:      Rate and Rhythm: Normal rate and regular rhythm.      Heart sounds: No murmur heard.  Pulmonary:      Effort: Pulmonary effort is normal.      Breath sounds: Normal breath sounds.   Musculoskeletal:      Cervical back: Normal range of motion.   Lymphadenopathy:      Cervical: No cervical adenopathy.         Assessment:       1. Chronic low back pain with sciatica, sciatica laterality unspecified, unspecified back pain laterality    2. Chronic cervical pain    3. Recurrent major depressive disorder, remission status unspecified    4. Migraine without status migrainosus, not intractable, unspecified migraine type        Plan:       Chronic low back pain with sciatica, sciatica laterality unspecified, unspecified back pain laterality    Chronic cervical pain    Recurrent major depressive disorder, remission status unspecified    Migraine without status migrainosus, not intractable, unspecified migraine type    Other orders  -     HYDROcodone-acetaminophen (NORCO) 7.5-325 mg per tablet; Take 1 tablet by mouth every 6 (six) hours as needed for Pain.  Dispense: 120 tablet; Refill: 0  -     HYDROcodone-acetaminophen (NORCO) 7.5-325 mg per tablet; Take 1  tablet by mouth every 6 (six) hours as needed for Pain.  Dispense: 120 tablet; Refill: 0  -     HYDROcodone-acetaminophen (NORCO) 7.5-325 mg per tablet; Take 1 tablet by mouth every 6 (six) hours as needed for Pain.  Dispense: 120 tablet; Refill: 0  -     promethazine (PHENERGAN) 25 MG suppository; Place 1 suppository (25 mg total) rectally every 6 (six) hours as needed for Nausea.  Dispense: 30 suppository; Refill: 0  -     promethazine (PHENERGAN) 12.5 MG Tab; Take 1 tablet (12.5 mg total) by mouth every 6 (six) hours as needed (nausea).  Dispense: 30 tablet; Refill: 0  -     cyclobenzaprine (FLEXERIL) 10 MG tablet; Take 1 tablet (10 mg total) by mouth 3 (three) times daily as needed.  Dispense: 30 tablet; Refill: 5          Plan:  Cont current meds        Medication List with Changes/Refills   New Medications    HYDROCODONE-ACETAMINOPHEN (NORCO) 7.5-325 MG PER TABLET    Take 1 tablet by mouth every 6 (six) hours as needed for Pain.    HYDROCODONE-ACETAMINOPHEN (NORCO) 7.5-325 MG PER TABLET    Take 1 tablet by mouth every 6 (six) hours as needed for Pain.   Current Medications    BUTALBITAL-ACETAMINOPHEN-CAFFEINE -40 MG (FIORICET, ESGIC) -40 MG PER TABLET    TAKE 1 TABLET BY MOUTH AS NEEDED FOR MIGRAINE NO MORE THAN 10 TABLETS IN 1 MONTH    ERENUMAB-AOOE (AIMOVIG AUTOINJECTOR) 140 MG/ML AUTOINJECTOR    Inject 140 mg into the skin every 28 days.    KETOROLAC (TORADOL) 60 MG/2 ML SOLN    INJECT 2 MLS INTO THE MUSCLE 2 TIMES DAILY AS NEEDED FOR SEVERE MIGRAINE. NO MORE THAN 2 DAYS/WEEK.    LEVOTHYROXINE (SYNTHROID) 75 MCG TABLET    TAKE 1 TABLET BY MOUTH EVERY DAY IN THE MORNING    LINZESS 145 MCG CAP CAPSULE    TAKE 1 CAPSULE (145 MCG TOTAL) BY MOUTH DAILY AS NEEDED.    PHENTERMINE (ADIPEX-P) 37.5 MG TABLET    Take 18.75 mg by mouth 2 (two) times daily.    SUMATRIPTAN (IMITREX) 100 MG TABLET    TAKE 1 TABLET BY MOUTH EVERY DAY AS NEEDED. MAY REPEAT IN 2 HOURS IF NEEDED. DO NOT EXCEED 2 TABLETS PER DAY     TRAZODONE (DESYREL) 150 MG TABLET    TAKE 1 TABLET BY MOUTH EVERY DAY AT NIGHT    VENLAFAXINE (EFFEXOR-XR) 150 MG CP24    Take 1 capsule (150 mg total) by mouth once daily.   Changed and/or Refilled Medications    Modified Medication Previous Medication    CYCLOBENZAPRINE (FLEXERIL) 10 MG TABLET cyclobenzaprine (FLEXERIL) 10 MG tablet       Take 1 tablet (10 mg total) by mouth 3 (three) times daily as needed.    Take 1 tablet (10 mg total) by mouth 3 (three) times daily as needed.    HYDROCODONE-ACETAMINOPHEN (NORCO) 7.5-325 MG PER TABLET HYDROcodone-acetaminophen (NORCO) 7.5-325 mg per tablet       Take 1 tablet by mouth every 6 (six) hours as needed for Pain.    Take 1 tablet by mouth every 6 (six) hours as needed for Pain.    PROMETHAZINE (PHENERGAN) 12.5 MG TAB promethazine (PHENERGAN) 12.5 MG Tab       Take 1 tablet (12.5 mg total) by mouth every 6 (six) hours as needed (nausea).    Take 1 tablet (12.5 mg total) by mouth every 6 (six) hours as needed (nausea).    PROMETHAZINE (PHENERGAN) 25 MG SUPPOSITORY promethazine (PHENERGAN) 25 MG suppository       Place 1 suppository (25 mg total) rectally every 6 (six) hours as needed for Nausea.    Place 1 suppository (25 mg total) rectally every 6 (six) hours as needed for Nausea.

## 2022-05-31 ENCOUNTER — PATIENT MESSAGE (OUTPATIENT)
Dept: ADMINISTRATIVE | Facility: HOSPITAL | Age: 47
End: 2022-05-31
Payer: MEDICARE

## 2022-08-22 NOTE — TELEPHONE ENCOUNTER
----- Message from Aniceto Martinez sent at 8/22/2022 10:28 AM CDT -----  Type: Needs Medical Advice  Who Called:  patient  Best Call Back Number: [phone]   Additional Information: She was scheduled an appointment for September, but she needs an appointment today or tomorrow for her refills. Please call back to advise.

## 2022-08-22 NOTE — TELEPHONE ENCOUNTER
Pt requesting refill, has appt 9/22.     Pt stated she messed up and scheduled for September instead of August.       Please advise.

## 2022-08-24 ENCOUNTER — PATIENT MESSAGE (OUTPATIENT)
Dept: ADMINISTRATIVE | Facility: HOSPITAL | Age: 47
End: 2022-08-24
Payer: MEDICARE

## 2022-08-25 RX ORDER — HYDROCODONE BITARTRATE AND ACETAMINOPHEN 7.5; 325 MG/1; MG/1
1 TABLET ORAL EVERY 6 HOURS PRN
Qty: 120 TABLET | Refills: 0 | Status: SHIPPED | OUTPATIENT
Start: 2022-08-25 | End: 2022-09-22 | Stop reason: SDUPTHER

## 2022-09-22 ENCOUNTER — OFFICE VISIT (OUTPATIENT)
Dept: FAMILY MEDICINE | Facility: CLINIC | Age: 47
End: 2022-09-22
Payer: COMMERCIAL

## 2022-09-22 DIAGNOSIS — G43.909 MIGRAINE WITHOUT STATUS MIGRAINOSUS, NOT INTRACTABLE, UNSPECIFIED MIGRAINE TYPE: ICD-10-CM

## 2022-09-22 DIAGNOSIS — G89.29 CHRONIC CERVICAL PAIN: ICD-10-CM

## 2022-09-22 DIAGNOSIS — F33.9 RECURRENT MAJOR DEPRESSIVE DISORDER, REMISSION STATUS UNSPECIFIED: ICD-10-CM

## 2022-09-22 DIAGNOSIS — M54.40 CHRONIC LOW BACK PAIN WITH SCIATICA, SCIATICA LATERALITY UNSPECIFIED, UNSPECIFIED BACK PAIN LATERALITY: Primary | ICD-10-CM

## 2022-09-22 DIAGNOSIS — M54.2 CHRONIC CERVICAL PAIN: ICD-10-CM

## 2022-09-22 DIAGNOSIS — G89.29 CHRONIC LOW BACK PAIN WITH SCIATICA, SCIATICA LATERALITY UNSPECIFIED, UNSPECIFIED BACK PAIN LATERALITY: Primary | ICD-10-CM

## 2022-09-22 PROCEDURE — 99213 OFFICE O/P EST LOW 20 MIN: CPT | Mod: 95,,, | Performed by: FAMILY MEDICINE

## 2022-09-22 PROCEDURE — 99213 PR OFFICE/OUTPT VISIT, EST, LEVL III, 20-29 MIN: ICD-10-PCS | Mod: 95,,, | Performed by: FAMILY MEDICINE

## 2022-09-22 RX ORDER — HYDROCODONE BITARTRATE AND ACETAMINOPHEN 7.5; 325 MG/1; MG/1
1 TABLET ORAL EVERY 6 HOURS PRN
Qty: 120 TABLET | Refills: 0 | Status: SHIPPED | OUTPATIENT
Start: 2022-11-24 | End: 2022-12-14 | Stop reason: SDUPTHER

## 2022-09-22 RX ORDER — HYDROCODONE BITARTRATE AND ACETAMINOPHEN 7.5; 325 MG/1; MG/1
1 TABLET ORAL EVERY 6 HOURS PRN
Qty: 120 TABLET | Refills: 0 | Status: SHIPPED | OUTPATIENT
Start: 2022-09-25 | End: 2022-10-25

## 2022-09-22 RX ORDER — HYDROCODONE BITARTRATE AND ACETAMINOPHEN 7.5; 325 MG/1; MG/1
1 TABLET ORAL EVERY 6 HOURS PRN
Qty: 120 TABLET | Refills: 0 | Status: SHIPPED | OUTPATIENT
Start: 2022-10-25 | End: 2022-11-24

## 2022-09-22 NOTE — PROGRESS NOTES
Subjective:       Patient ID: Isela Smyth is a 47 y.o. female.    Chief Complaint: No chief complaint on file.    HPI    The patient location is: [place]  The chief complaint leading to consultation is: chronic lumbar back pain    Visit type: audiovisual    Face to Face time with patient:   20 minutes of total time spent on the encounter, which includes face to face time and non-face to face time preparing to see the patient (eg, review of tests), Obtaining and/or reviewing separately obtained history, Documenting clinical information in the electronic or other health record, Independently interpreting results (not separately reported) and communicating results to the patient/family/caregiver, or Care coordination (not separately reported).         Each patient to whom he or she provides medical services by telemedicine is:  (1) informed of the relationship between the physician and patient and the respective role of any other health care provider with respect to management of the patient; and (2) notified that he or she may decline to receive medical services by telemedicine and may withdraw from such care at any time.    Notes:     Here for a f/u    History of 6 low back spinal surgeries and 1 neck spinal surgery in past. Recent one in 2012.      Status post 08/14/2012 microscopic recurrent left L4-L5 laminotomy, discectomy, left L5 complete laminectomy, left L5-S1 laminotomy,    recurrent discectomy.      Chronic lumbago controlled while on norco 7.5 and flexeril. Ps: 2/10. Occasional left sciatic pain. Had an placido last month which helped with the pain.      Had mri L spine 9/2016 which showed:      1.  Postoperative change of left hemilaminectomy at L4-L5 and L5-S1.  2.  Multilevel degenerative change of the lumbar spine, most pronounced at L4-L5 and L5-S1 as detailed above  3.  Minimal descending central disc extrusion at L5-S1 which does not appear to encroach upon either the descending left or right S1  nerve.  4.  Probable conjoined left S2 nerve coursing in the left posterolateral spinal canal.  Less likely, this represents an intraspinal synovial cyst abutting the descending left S1 nerve.  5.  Central annular tear of the intervertebral disc at L4-L5.     Also, has chronic neck pain > 5 years. Hx of neck surgeries in past. Saw pain management recently and had an placido on 1/31/17. Norco 7.5 helps with pain.      Had mri of cervical spine on 1/19/17 which showed:  -Disc protrusion at the C5-C6 level and to the right lateral recess with possible anterior cord contact  -Loss of normal cervical lordosis which may be positional or related to muscular strain.  -Milder degenerative changes are noted within the body of the report.      Depression and anxiety stable while on effexor.      Gets 3-5 migraines per month.  Takes fioricet and imitrex for pain control.  Also, receiving aimovig injections. Sees neurology for management.         Review of Systems          Objective:      Physical Exam    Assessment:       1. Chronic low back pain with sciatica, sciatica laterality unspecified, unspecified back pain laterality    2. Chronic cervical pain    3. Recurrent major depressive disorder, remission status unspecified    4. Migraine without status migrainosus, not intractable, unspecified migraine type        Plan:       Chronic low back pain with sciatica, sciatica laterality unspecified, unspecified back pain laterality    Chronic cervical pain    Recurrent major depressive disorder, remission status unspecified    Migraine without status migrainosus, not intractable, unspecified migraine type    Other orders  -     HYDROcodone-acetaminophen (NORCO) 7.5-325 mg per tablet; Take 1 tablet by mouth every 6 (six) hours as needed for Pain.  Dispense: 120 tablet; Refill: 0  -     HYDROcodone-acetaminophen (NORCO) 7.5-325 mg per tablet; Take 1 tablet by mouth every 6 (six) hours as needed for Pain.  Dispense: 120 tablet; Refill:  0  -     HYDROcodone-acetaminophen (NORCO) 7.5-325 mg per tablet; Take 1 tablet by mouth every 6 (six) hours as needed for Pain.  Dispense: 120 tablet; Refill: 0              Plan:  Rf norco  Cont current meds      Medication List with Changes/Refills   New Medications    HYDROCODONE-ACETAMINOPHEN (NORCO) 7.5-325 MG PER TABLET    Take 1 tablet by mouth every 6 (six) hours as needed for Pain.    HYDROCODONE-ACETAMINOPHEN (NORCO) 7.5-325 MG PER TABLET    Take 1 tablet by mouth every 6 (six) hours as needed for Pain.   Current Medications    BUTALBITAL-ACETAMINOPHEN-CAFFEINE -40 MG (FIORICET, ESGIC) -40 MG PER TABLET    TAKE 1 TABLET BY MOUTH AS NEEDED FOR MIGRAINE NO MORE THAN 10 TABLETS IN 1 MONTH    CYCLOBENZAPRINE (FLEXERIL) 10 MG TABLET    Take 1 tablet (10 mg total) by mouth 3 (three) times daily as needed.    ERENUMAB-AOOE (AIMOVIG AUTOINJECTOR) 140 MG/ML AUTOINJECTOR    Inject 140 mg into the skin every 28 days.    KETOROLAC (TORADOL) 60 MG/2 ML SOLN    INJECT 2 MLS INTO THE MUSCLE 2 TIMES DAILY AS NEEDED FOR SEVERE MIGRAINE. NO MORE THAN 2 DAYS/WEEK.    LEVOTHYROXINE (SYNTHROID) 75 MCG TABLET    TAKE 1 TABLET BY MOUTH EVERY DAY IN THE MORNING    LINZESS 145 MCG CAP CAPSULE    TAKE 1 CAPSULE (145 MCG TOTAL) BY MOUTH DAILY AS NEEDED.    PHENTERMINE (ADIPEX-P) 37.5 MG TABLET    Take 18.75 mg by mouth 2 (two) times daily.    PROMETHAZINE (PHENERGAN) 12.5 MG TAB    Take 1 tablet (12.5 mg total) by mouth every 6 (six) hours as needed (nausea).    PROMETHAZINE (PHENERGAN) 25 MG SUPPOSITORY    Place 1 suppository (25 mg total) rectally every 6 (six) hours as needed for Nausea.    SUMATRIPTAN (IMITREX) 100 MG TABLET    TAKE 1 TABLET BY MOUTH EVERY DAY AS NEEDED. MAY REPEAT IN 2 HOURS IF NEEDED. DO NOT EXCEED 2 TABLETS PER DAY    TRAZODONE (DESYREL) 150 MG TABLET    TAKE 1 TABLET BY MOUTH EVERY DAY AT NIGHT    VENLAFAXINE (EFFEXOR-XR) 150 MG CP24    Take 1 capsule (150 mg total) by mouth once daily.    Changed and/or Refilled Medications    Modified Medication Previous Medication    HYDROCODONE-ACETAMINOPHEN (NORCO) 7.5-325 MG PER TABLET HYDROcodone-acetaminophen (NORCO) 7.5-325 mg per tablet       Take 1 tablet by mouth every 6 (six) hours as needed for Pain.    Take 1 tablet by mouth every 6 (six) hours as needed for Pain.

## 2022-10-10 ENCOUNTER — PATIENT MESSAGE (OUTPATIENT)
Dept: ADMINISTRATIVE | Facility: HOSPITAL | Age: 47
End: 2022-10-10
Payer: MEDICARE

## 2022-10-26 DIAGNOSIS — G43.711 INTRACTABLE CHRONIC MIGRAINE WITHOUT AURA AND WITH STATUS MIGRAINOSUS: ICD-10-CM

## 2022-10-26 RX ORDER — KETOROLAC TROMETHAMINE 30 MG/ML
INJECTION, SOLUTION INTRAMUSCULAR; INTRAVENOUS
Qty: 20 ML | Refills: 3 | Status: CANCELLED | OUTPATIENT
Start: 2022-10-26

## 2022-12-12 ENCOUNTER — TELEPHONE (OUTPATIENT)
Dept: FAMILY MEDICINE | Facility: CLINIC | Age: 47
End: 2022-12-12
Payer: MEDICARE

## 2022-12-12 NOTE — TELEPHONE ENCOUNTER
----- Message from Concepcion Floyd sent at 12/12/2022 12:56 PM CST -----  Contact: Patient  Type: Patient Call Back         Who called: Patient         What is the request in detail: state she needs refills; wants to speak with nurse to see if she can be squeezed in for a virtual visit for this month w/ pcp; please advise        Best call back number: [phone]         Additional Information:   [id number] [place] - [address] [address]  [address]  [place] [address]  Phone: [phone] Fax: [phone]               Thank You

## 2022-12-14 ENCOUNTER — OFFICE VISIT (OUTPATIENT)
Dept: FAMILY MEDICINE | Facility: CLINIC | Age: 47
End: 2022-12-14
Payer: COMMERCIAL

## 2022-12-14 DIAGNOSIS — M54.2 CHRONIC CERVICAL PAIN: ICD-10-CM

## 2022-12-14 DIAGNOSIS — G89.29 CHRONIC LOW BACK PAIN WITH SCIATICA, SCIATICA LATERALITY UNSPECIFIED, UNSPECIFIED BACK PAIN LATERALITY: Primary | ICD-10-CM

## 2022-12-14 DIAGNOSIS — M54.40 CHRONIC LOW BACK PAIN WITH SCIATICA, SCIATICA LATERALITY UNSPECIFIED, UNSPECIFIED BACK PAIN LATERALITY: Primary | ICD-10-CM

## 2022-12-14 DIAGNOSIS — G89.29 CHRONIC CERVICAL PAIN: ICD-10-CM

## 2022-12-14 PROCEDURE — 99213 OFFICE O/P EST LOW 20 MIN: CPT | Mod: 95,,, | Performed by: INTERNAL MEDICINE

## 2022-12-14 PROCEDURE — 99213 PR OFFICE/OUTPT VISIT, EST, LEVL III, 20-29 MIN: ICD-10-PCS | Mod: 95,,, | Performed by: INTERNAL MEDICINE

## 2022-12-14 RX ORDER — HYDROCODONE BITARTRATE AND ACETAMINOPHEN 7.5; 325 MG/1; MG/1
1 TABLET ORAL EVERY 6 HOURS PRN
Qty: 120 TABLET | Refills: 0 | Status: SHIPPED | OUTPATIENT
Start: 2022-12-14 | End: 2023-01-12 | Stop reason: SDUPTHER

## 2022-12-14 RX ORDER — VENLAFAXINE HYDROCHLORIDE 150 MG/1
150 CAPSULE, EXTENDED RELEASE ORAL DAILY
Qty: 90 CAPSULE | Refills: 3 | Status: SHIPPED | OUTPATIENT
Start: 2022-12-14 | End: 2023-08-09 | Stop reason: SDUPTHER

## 2022-12-14 RX ORDER — CYCLOBENZAPRINE HCL 10 MG
10 TABLET ORAL 3 TIMES DAILY PRN
Qty: 30 TABLET | Refills: 0 | OUTPATIENT
Start: 2022-12-14 | End: 2022-12-21

## 2022-12-14 NOTE — PROGRESS NOTES
Patient ID: Isela Smyth is a 47 y.o. female.    Chief Complaint: No chief complaint on file.    VIRTUAL VISIT    HPI     Medication refill  And feeling bad (sinus drip and cough) taking mucinex DM. Home covid test negative.     She is needing refill of norco for chronic neck and back pain (7 surgeries)  this helps with daily function. Hydrocodone gets it down to about 4 to 5 /10.     Anxiety and depression- controlled on venlafaxine.     Insomnia - stable on trazodone (prn)      Assessment and plan     Continue hydrocodone  Continue venlafaxine   Continue Flexeril  Follow up in 3 mo.     Diagnoses and all orders for this visit:    Chronic low back pain with sciatica, sciatica laterality unspecified, unspecified back pain laterality  -     HYDROcodone-acetaminophen (NORCO) 7.5-325 mg per tablet; Take 1 tablet by mouth every 6 (six) hours as needed for Pain.    Chronic cervical pain  -     HYDROcodone-acetaminophen (NORCO) 7.5-325 mg per tablet; Take 1 tablet by mouth every 6 (six) hours as needed for Pain.    Other orders  -     venlafaxine (EFFEXOR-XR) 150 MG Cp24; Take 1 capsule (150 mg total) by mouth once daily.  -     cyclobenzaprine (FLEXERIL) 10 MG tablet; Take 1 tablet (10 mg total) by mouth 3 (three) times daily as needed for Muscle spasms. TAKE 1 TABLET BY MOUTH THREE TIMES A DAY AS NEEDED  Strength: 10 mg        Review of Systems   Constitutional:  Negative for activity change and unexpected weight change.   HENT:  Positive for rhinorrhea. Negative for hearing loss and trouble swallowing.    Eyes:  Negative for discharge and visual disturbance.   Respiratory:  Positive for chest tightness. Negative for wheezing.    Cardiovascular:  Negative for chest pain and palpitations.   Gastrointestinal:  Negative for blood in stool, constipation, diarrhea and vomiting.   Endocrine: Negative for polydipsia and polyuria.   Genitourinary:  Negative for difficulty urinating, dysuria, hematuria and menstrual problem.    Musculoskeletal:  Positive for neck pain. Negative for arthralgias and joint swelling.   Neurological:  Positive for headaches. Negative for weakness.   Psychiatric/Behavioral:  Negative for confusion and dysphoric mood.    Wt Readings from Last 3 Encounters:   05/26/22 1321 75.3 kg (166 lb 0.1 oz)   02/24/22 1349 80.1 kg (176 lb 9.4 oz)   02/24/21 2154 78.5 kg (173 lb)     Medication List with Changes/Refills   Current Medications    BUTALBITAL-ACETAMINOPHEN-CAFFEINE -40 MG (FIORICET, ESGIC) -40 MG PER TABLET    TAKE 1 TABLET BY MOUTH AS NEEDED FOR MIGRAINE NO MORE THAN 10 TABLETS IN 1 MONTH    ERENUMAB-AOOE (AIMOVIG AUTOINJECTOR) 140 MG/ML AUTOINJECTOR    INJECT THE CONTENTS OF 1 PEN UNDER THE SKIN EVERY 28 DAYS    KETOROLAC (TORADOL) 60 MG/2 ML SOLN    INJECT 2 MLS INTO THE MUSCLE 2 TIMES DAILY AS NEEDED FOR SEVERE MIGRAINE. NO MORE THAN 2 DAYS/WEEK.    LEVOTHYROXINE (SYNTHROID) 75 MCG TABLET    TAKE 1 TABLET BY MOUTH EVERY DAY IN THE MORNING    LINZESS 145 MCG CAP CAPSULE    TAKE 1 CAPSULE (145 MCG TOTAL) BY MOUTH DAILY AS NEEDED.    PROMETHAZINE (PHENERGAN) 12.5 MG TAB    Take 1 tablet (12.5 mg total) by mouth every 6 (six) hours as needed (nausea).    PROMETHAZINE (PHENERGAN) 25 MG SUPPOSITORY    Place 1 suppository (25 mg total) rectally every 6 (six) hours as needed for Nausea.    SUMATRIPTAN (IMITREX) 100 MG TABLET    TAKE 1 TABLET BY MOUTH EVERY DAY AS NEEDED. MAY REPEAT IN 2 HOURS IF NEEDED. DO NOT EXCEED 2 TABLETS PER DAY    TRAZODONE (DESYREL) 150 MG TABLET    Take 1 tablet (150 mg total) by mouth every evening.   Changed and/or Refilled Medications    Modified Medication Previous Medication    CYCLOBENZAPRINE (FLEXERIL) 10 MG TABLET cyclobenzaprine (FLEXERIL) 10 MG tablet       Take 1 tablet (10 mg total) by mouth 3 (three) times daily as needed for Muscle spasms. TAKE 1 TABLET BY MOUTH THREE TIMES A DAY AS NEEDED  Strength: 10 mg    Take 1 tablet (10 mg total) by mouth 3 (three) times  daily as needed for Muscle spasms. TAKE 1 TABLET BY MOUTH THREE TIMES A DAY AS NEEDED  Strength: 10 mg    HYDROCODONE-ACETAMINOPHEN (NORCO) 7.5-325 MG PER TABLET HYDROcodone-acetaminophen (NORCO) 7.5-325 mg per tablet       Take 1 tablet by mouth every 6 (six) hours as needed for Pain.    Take 1 tablet by mouth every 6 (six) hours as needed for Pain.    VENLAFAXINE (EFFEXOR-XR) 150 MG CP24 venlafaxine (EFFEXOR-XR) 150 MG Cp24       Take 1 capsule (150 mg total) by mouth once daily.    Take 1 capsule (150 mg total) by mouth once daily.   Discontinued Medications    PHENTERMINE (ADIPEX-P) 37.5 MG TABLET    Take 18.75 mg by mouth 2 (two) times daily.                    The patient location is:  [place]  The chief complaint leading to consultation is: med refill   Face to Face time with patient: 15 min     minutes of total time spent on the encounter, which includes face to face time and   non-face to face time preparing to see the patient (eg, review of tests), Obtaining and/or   reviewing separately obtained history, Documenting clinical information in the electronic   or other health record, Independently interpreting results (not separately reported) and   communicating results to the patient/family/caregiver, or   Care coordination (not separately reported).     Each patient to whom he or she provides medical services by telemedicine is:    (1) informed of the relationship between the physician and patient and the respective   role of any other health care provider with respect to management of the patient; and   (2) notified that he or she may decline to receive medical services by telemedicine and   may withdraw from such care at any time.    I personally reviewed past medical, family and social history.

## 2023-01-05 ENCOUNTER — PATIENT MESSAGE (OUTPATIENT)
Dept: FAMILY MEDICINE | Facility: CLINIC | Age: 48
End: 2023-01-05
Payer: MEDICARE

## 2023-01-11 ENCOUNTER — PATIENT MESSAGE (OUTPATIENT)
Dept: FAMILY MEDICINE | Facility: CLINIC | Age: 48
End: 2023-01-11
Payer: MEDICARE

## 2023-01-11 DIAGNOSIS — G43.909 MIGRAINE WITHOUT STATUS MIGRAINOSUS, NOT INTRACTABLE, UNSPECIFIED MIGRAINE TYPE: ICD-10-CM

## 2023-01-11 DIAGNOSIS — G89.29 CHRONIC LOW BACK PAIN WITH SCIATICA, SCIATICA LATERALITY UNSPECIFIED, UNSPECIFIED BACK PAIN LATERALITY: ICD-10-CM

## 2023-01-11 DIAGNOSIS — G89.29 CHRONIC CERVICAL PAIN: ICD-10-CM

## 2023-01-11 DIAGNOSIS — M54.2 CHRONIC CERVICAL PAIN: ICD-10-CM

## 2023-01-11 DIAGNOSIS — M54.40 CHRONIC LOW BACK PAIN WITH SCIATICA, SCIATICA LATERALITY UNSPECIFIED, UNSPECIFIED BACK PAIN LATERALITY: ICD-10-CM

## 2023-01-12 NOTE — TELEPHONE ENCOUNTER
Care Due:                  Date            Visit Type   Department     Provider  --------------------------------------------------------------------------------                                ESTABLISHED                              PATIENT -    [facility]  Last Visit: 12-      [facility]       Cristhian Longo  Next Visit: None Scheduled  None         None Found                                                            Last  Test          Frequency    Reason                     Performed    Due Date  --------------------------------------------------------------------------------    TSH.........  12 months..  levothyroxine............  07- 07-    [facility] Embedded Care Gaps. Reference number: [id number]. 1/12/2023   7:18:24 AM CST

## 2023-01-14 RX ORDER — HYDROCODONE BITARTRATE AND ACETAMINOPHEN 7.5; 325 MG/1; MG/1
1 TABLET ORAL EVERY 6 HOURS PRN
Qty: 120 TABLET | Refills: 0 | Status: SHIPPED | OUTPATIENT
Start: 2023-01-14 | End: 2023-02-21 | Stop reason: SDUPTHER

## 2023-01-14 RX ORDER — TRAZODONE HYDROCHLORIDE 150 MG/1
150 TABLET ORAL NIGHTLY
Qty: 90 TABLET | Refills: 3 | Status: SHIPPED | OUTPATIENT
Start: 2023-01-14

## 2023-01-14 RX ORDER — SUMATRIPTAN SUCCINATE 100 MG/1
TABLET ORAL
Qty: 27 TABLET | Refills: 3 | Status: SHIPPED | OUTPATIENT
Start: 2023-01-14 | End: 2023-03-27 | Stop reason: SDUPTHER

## 2023-01-17 ENCOUNTER — PATIENT MESSAGE (OUTPATIENT)
Dept: ADMINISTRATIVE | Facility: HOSPITAL | Age: 48
End: 2023-01-17
Payer: MEDICARE

## 2023-02-14 DIAGNOSIS — M54.2 CHRONIC CERVICAL PAIN: ICD-10-CM

## 2023-02-14 DIAGNOSIS — G89.29 CHRONIC CERVICAL PAIN: ICD-10-CM

## 2023-02-14 DIAGNOSIS — M54.40 CHRONIC LOW BACK PAIN WITH SCIATICA, SCIATICA LATERALITY UNSPECIFIED, UNSPECIFIED BACK PAIN LATERALITY: ICD-10-CM

## 2023-02-14 DIAGNOSIS — G89.29 CHRONIC LOW BACK PAIN WITH SCIATICA, SCIATICA LATERALITY UNSPECIFIED, UNSPECIFIED BACK PAIN LATERALITY: ICD-10-CM

## 2023-02-14 RX ORDER — HYDROCODONE BITARTRATE AND ACETAMINOPHEN 7.5; 325 MG/1; MG/1
1 TABLET ORAL EVERY 6 HOURS PRN
Qty: 120 TABLET | Refills: 0 | Status: CANCELLED | OUTPATIENT
Start: 2023-02-14 | End: 2023-03-16

## 2023-02-14 RX ORDER — LEVOTHYROXINE SODIUM 75 UG/1
75 TABLET ORAL DAILY
Qty: 90 TABLET | Refills: 0 | Status: CANCELLED | OUTPATIENT
Start: 2023-02-14

## 2023-02-15 NOTE — TELEPHONE ENCOUNTER
No new care gaps identified.  [facility] Embedded Care Gaps. Reference number: [id number]. 2/14/2023   8:00:05 PM CST

## 2023-02-20 DIAGNOSIS — G43.711 INTRACTABLE CHRONIC MIGRAINE WITHOUT AURA AND WITH STATUS MIGRAINOSUS: ICD-10-CM

## 2023-02-20 NOTE — TELEPHONE ENCOUNTER
----- Message from Papi Harper sent at 2/20/2023  4:42 PM CST -----  Contact: self  Type: Needs Medical Advice  Who Called:  Patient    Best Call Back Number: [phone]    Additional Information: Pt states she need refill on medications pharmacy is telling her she don't have anything for refills. She want to speak with the nurse Please call back

## 2023-02-20 NOTE — TELEPHONE ENCOUNTER
Care Due:                  Date            Visit Type   Department     Provider  --------------------------------------------------------------------------------                                ESTABLISHED                              PATIENT -    [facility]  Last Visit: 12-      [facility]       Cristhian Longo  Next Visit: None Scheduled  None         None Found                                                            Last  Test          Frequency    Reason                     Performed    Due Date  --------------------------------------------------------------------------------    Cr..........  12 months..  venlafaxine..............  07- 07-    [facility] Embedded Care Gaps. Reference number: [id number]. 2/20/2023   5:09:03 PM CST

## 2023-02-21 ENCOUNTER — PATIENT MESSAGE (OUTPATIENT)
Dept: FAMILY MEDICINE | Facility: CLINIC | Age: 48
End: 2023-02-21
Payer: MEDICARE

## 2023-02-22 RX ORDER — BUTALBITAL, ACETAMINOPHEN AND CAFFEINE 50; 325; 40 MG/1; MG/1; MG/1
TABLET ORAL
Qty: 10 TABLET | Refills: 0 | Status: SHIPPED | OUTPATIENT
Start: 2023-02-22 | End: 2024-04-27

## 2023-02-22 RX ORDER — LEVOTHYROXINE SODIUM 75 UG/1
75 TABLET ORAL DAILY
Qty: 90 TABLET | Refills: 0 | Status: SHIPPED | OUTPATIENT
Start: 2023-02-22 | End: 2023-05-27 | Stop reason: SDUPTHER

## 2023-02-28 ENCOUNTER — TELEPHONE (OUTPATIENT)
Dept: OBSTETRICS AND GYNECOLOGY | Facility: CLINIC | Age: 48
End: 2023-02-28
Payer: MEDICARE

## 2023-02-28 ENCOUNTER — PATIENT MESSAGE (OUTPATIENT)
Dept: FAMILY MEDICINE | Facility: CLINIC | Age: 48
End: 2023-02-28
Payer: MEDICARE

## 2023-02-28 NOTE — TELEPHONE ENCOUNTER
----- Message from Yola Giles sent at 2/28/2023  4:14 PM CST -----  Type: Needs Medical Advice  Who Called:  pt  Best Call Back Number: [phone]  Additional Information: pt has a painful cyst

## 2023-03-03 ENCOUNTER — TELEPHONE (OUTPATIENT)
Dept: OBSTETRICS AND GYNECOLOGY | Facility: CLINIC | Age: 48
End: 2023-03-03
Payer: MEDICARE

## 2023-03-03 NOTE — TELEPHONE ENCOUNTER
Spoke with pt and she stated that she is still in [place] and needed to reschedule her appointment. I informed pt that we need to know a date of when she is returning. Pt stated that she will call back. Pt verbalized understanding.

## 2023-03-03 NOTE — TELEPHONE ENCOUNTER
----- Message from Dayna Cloud sent at 3/3/2023  1:11 PM CST -----  Type: Needs Medical Advice  Who Called: Pt   Symptoms (please be specific):   How long has patient had these symptoms:    Pharmacy name and phone #:    Best Call Back Number: [phone]  Additional Information: Pt requesting a call back from the nurse concerning her appt.

## 2023-03-14 ENCOUNTER — OFFICE VISIT (OUTPATIENT)
Dept: FAMILY MEDICINE | Facility: CLINIC | Age: 48
End: 2023-03-14
Payer: MEDICARE

## 2023-03-14 DIAGNOSIS — G89.29 CHRONIC LOW BACK PAIN WITH SCIATICA, SCIATICA LATERALITY UNSPECIFIED, UNSPECIFIED BACK PAIN LATERALITY: ICD-10-CM

## 2023-03-14 DIAGNOSIS — G89.29 CHRONIC CERVICAL PAIN: ICD-10-CM

## 2023-03-14 DIAGNOSIS — Z12.31 ENCOUNTER FOR SCREENING MAMMOGRAM FOR MALIGNANT NEOPLASM OF BREAST: ICD-10-CM

## 2023-03-14 DIAGNOSIS — Z12.39 ENCOUNTER FOR SCREENING FOR MALIGNANT NEOPLASM OF BREAST, UNSPECIFIED SCREENING MODALITY: ICD-10-CM

## 2023-03-14 DIAGNOSIS — D64.9 ANEMIA, UNSPECIFIED TYPE: Primary | ICD-10-CM

## 2023-03-14 DIAGNOSIS — M54.40 CHRONIC LOW BACK PAIN WITH SCIATICA, SCIATICA LATERALITY UNSPECIFIED, UNSPECIFIED BACK PAIN LATERALITY: ICD-10-CM

## 2023-03-14 DIAGNOSIS — M54.2 CHRONIC CERVICAL PAIN: ICD-10-CM

## 2023-03-14 PROCEDURE — 99213 PR OFFICE/OUTPT VISIT, EST, LEVL III, 20-29 MIN: ICD-10-PCS | Mod: 95,,, | Performed by: INTERNAL MEDICINE

## 2023-03-14 PROCEDURE — 99213 OFFICE O/P EST LOW 20 MIN: CPT | Mod: 95,,, | Performed by: INTERNAL MEDICINE

## 2023-03-14 RX ORDER — HYDROCODONE BITARTRATE AND ACETAMINOPHEN 7.5; 325 MG/1; MG/1
1 TABLET ORAL EVERY 6 HOURS PRN
Qty: 120 TABLET | Refills: 0 | Status: SHIPPED | OUTPATIENT
Start: 2023-03-14 | End: 2023-04-13

## 2023-03-14 NOTE — PROGRESS NOTES
Patient ID: Isela Smyth is a 47 y.o. female.    Chief Complaint: No chief complaint on file.    VIRTUAL VISIT    HPI     Med refill of hydrocodone- chronic neck and back pain (7 surgeries)  this helps with daily function. Hydrocodone gets it down to about 3 to 4/10.   also. Kidney stone in [place]. Anemia noted  H/H 8.3/27.2 she is taking iron. Continuing Norco as previously prescribed. Check labs.     Assessment and plan       Diagnoses and all orders for this visit:    Anemia, unspecified type  -     CBC Auto Differential; Future  -     TSH; Future    Chronic low back pain with sciatica, sciatica laterality unspecified, unspecified back pain laterality  -     Comprehensive Metabolic Panel; Future  -     HYDROcodone-acetaminophen (NORCO) 7.5-325 mg per tablet; Take 1 tablet by mouth every 6 (six) hours as needed for Pain.    Encounter for screening for malignant neoplasm of breast, unspecified screening modality  -     Mammo Digital Screening Bilat w/ Deepak; Future    Encounter for screening mammogram for malignant neoplasm of breast  -     Mammo Digital Screening Bilat w/ Deepak; Future    Chronic cervical pain  -     HYDROcodone-acetaminophen (NORCO) 7.5-325 mg per tablet; Take 1 tablet by mouth every 6 (six) hours as needed for Pain.        Review of Systems   Constitutional:  Negative for activity change and unexpected weight change.   HENT:  Negative for hearing loss, rhinorrhea and trouble swallowing.    Eyes:  Negative for discharge and visual disturbance.   Respiratory:  Negative for chest tightness and wheezing.    Cardiovascular:  Negative for chest pain and palpitations.   Gastrointestinal:  Negative for blood in stool, constipation, diarrhea and vomiting.   Endocrine: Negative for polydipsia and polyuria.   Genitourinary:  Negative for difficulty urinating, dysuria, hematuria and menstrual problem.   Musculoskeletal:  Positive for neck pain. Negative for arthralgias and joint swelling.   Neurological:   Positive for headaches. Negative for weakness.   Psychiatric/Behavioral:  Negative for confusion and dysphoric mood.    Wt Readings from Last 3 Encounters:   05/26/22 1321 75.3 kg (166 lb 0.1 oz)   02/24/22 1349 80.1 kg (176 lb 9.4 oz)   02/24/21 2154 78.5 kg (173 lb)     Medication List with Changes/Refills   Current Medications    BUTALBITAL-ACETAMINOPHEN-CAFFEINE -40 MG (FIORICET, ESGIC) -40 MG PER TABLET    TAKE 1 TABLET BY MOUTH AS NEEDED FOR MIGRAINE NO MORE THAN 10 TABLETS IN 1 MONTH    CYCLOBENZAPRINE (FLEXERIL) 10 MG TABLET    Take 1 tablet (10 mg total) by mouth 3 (three) times daily as needed for Muscle spasms.    ERENUMAB-AOOE (AIMOVIG AUTOINJECTOR) 140 MG/ML AUTOINJECTOR    INJECT THE CONTENTS OF 1 PEN UNDER THE SKIN EVERY 28 DAYS    KETOROLAC (TORADOL) 60 MG/2 ML SOLN    INJECT 2 MLS INTO THE MUSCLE 2 TIMES DAILY AS NEEDED FOR SEVERE MIGRAINE. NO MORE THAN 2 DAYS/WEEK.    LEVOTHYROXINE (SYNTHROID) 75 MCG TABLET    Take 1 tablet (75 mcg total) by mouth once daily.    LINACLOTIDE (LINZESS) 145 MCG CAP CAPSULE    Take 1 capsule (145 mcg total) by mouth before breakfast.    PROMETHAZINE (PHENERGAN) 12.5 MG TAB    Take 1 tablet (12.5 mg total) by mouth every 6 (six) hours as needed (nausea).    PROMETHAZINE (PHENERGAN) 25 MG SUPPOSITORY    Place 1 suppository (25 mg total) rectally every 6 (six) hours as needed for Nausea.    SUMATRIPTAN (IMITREX) 100 MG TABLET    TAKE 1 TABLET BY MOUTH EVERY DAY AS NEEDED. MAY REPEAT IN 2 HOURS IF NEEDED. DO NOT EXCEED 2 TABLETS PER DAY    TRAZODONE (DESYREL) 150 MG TABLET    Take 1 tablet (150 mg total) by mouth every evening.    VENLAFAXINE (EFFEXOR-XR) 150 MG CP24    Take 1 capsule (150 mg total) by mouth once daily.   Changed and/or Refilled Medications    Modified Medication Previous Medication    HYDROCODONE-ACETAMINOPHEN (NORCO) 7.5-325 MG PER TABLET HYDROcodone-acetaminophen (NORCO) 7.5-325 mg per tablet       Take 1 tablet by mouth every 6 (six) hours  as needed for Pain.    Take 1 tablet by mouth every 6 (six) hours as needed for Pain.       The patient location is:  [place]  The chief complaint leading to consultation is: Med refill  Face to Face time with patient: 15 min   minutes of total time spent on the encounter, which includes face to face time and   non-face to face time preparing to see the patient (eg, review of tests), Obtaining and/or   reviewing separately obtained history, Documenting clinical information in the electronic   or other health record, Independently interpreting results (not separately reported) and   communicating results to the patient/family/caregiver, or   Care coordination (not separately reported).     Each patient to whom he or she provides medical services by telemedicine is:    (1) informed of the relationship between the physician and patient and the respective   role of any other health care provider with respect to management of the patient; and   (2) notified that he or she may decline to receive medical services by telemedicine and   may withdraw from such care at any time.    I personally reviewed past medical, family and social history.

## 2023-03-16 ENCOUNTER — LAB VISIT (OUTPATIENT)
Dept: LAB | Facility: HOSPITAL | Age: 48
End: 2023-03-16
Attending: INTERNAL MEDICINE
Payer: MEDICARE

## 2023-03-16 DIAGNOSIS — M54.40 CHRONIC LOW BACK PAIN WITH SCIATICA, SCIATICA LATERALITY UNSPECIFIED, UNSPECIFIED BACK PAIN LATERALITY: ICD-10-CM

## 2023-03-16 DIAGNOSIS — G89.29 CHRONIC LOW BACK PAIN WITH SCIATICA, SCIATICA LATERALITY UNSPECIFIED, UNSPECIFIED BACK PAIN LATERALITY: ICD-10-CM

## 2023-03-16 DIAGNOSIS — D64.9 ANEMIA, UNSPECIFIED TYPE: ICD-10-CM

## 2023-03-16 LAB
BASOPHILS # BLD AUTO: 0.07 K/UL (ref 0–0.2)
BASOPHILS NFR BLD: 1.6 % (ref 0–1.9)
DIFFERENTIAL METHOD: ABNORMAL
EOSINOPHIL # BLD AUTO: 0.2 K/UL (ref 0–0.5)
EOSINOPHIL NFR BLD: 4.5 % (ref 0–8)
ERYTHROCYTE [DISTWIDTH] IN BLOOD BY AUTOMATED COUNT: 20 % (ref 11.5–14.5)
HCT VFR BLD AUTO: 30.3 % (ref 37–48.5)
HGB BLD-MCNC: 8 G/DL (ref 12–16)
IMM GRANULOCYTES # BLD AUTO: 0.01 K/UL (ref 0–0.04)
IMM GRANULOCYTES NFR BLD AUTO: 0.2 % (ref 0–0.5)
LYMPHOCYTES # BLD AUTO: 1.3 K/UL (ref 1–4.8)
LYMPHOCYTES NFR BLD: 30.8 % (ref 18–48)
MCH RBC QN AUTO: 22.5 PG (ref 27–31)
MCHC RBC AUTO-ENTMCNC: 26.4 G/DL (ref 32–36)
MCV RBC AUTO: 85 FL (ref 82–98)
MONOCYTES # BLD AUTO: 0.4 K/UL (ref 0.3–1)
MONOCYTES NFR BLD: 10.1 % (ref 4–15)
NEUTROPHILS # BLD AUTO: 2.3 K/UL (ref 1.8–7.7)
NEUTROPHILS NFR BLD: 52.8 % (ref 38–73)
NRBC BLD-RTO: 0 /100 WBC
PLATELET # BLD AUTO: 576 K/UL (ref 150–450)
PMV BLD AUTO: 10.6 FL (ref 9.2–12.9)
RBC # BLD AUTO: 3.55 M/UL (ref 4–5.4)
WBC # BLD AUTO: 4.26 K/UL (ref 3.9–12.7)

## 2023-03-16 PROCEDURE — 80053 COMPREHEN METABOLIC PANEL: CPT | Performed by: INTERNAL MEDICINE

## 2023-03-16 PROCEDURE — 85025 COMPLETE CBC W/AUTO DIFF WBC: CPT | Performed by: INTERNAL MEDICINE

## 2023-03-16 PROCEDURE — 84439 ASSAY OF FREE THYROXINE: CPT | Performed by: INTERNAL MEDICINE

## 2023-03-16 PROCEDURE — 84443 ASSAY THYROID STIM HORMONE: CPT | Performed by: INTERNAL MEDICINE

## 2023-03-16 PROCEDURE — 36415 COLL VENOUS BLD VENIPUNCTURE: CPT | Mod: PO | Performed by: INTERNAL MEDICINE

## 2023-03-17 ENCOUNTER — PATIENT MESSAGE (OUTPATIENT)
Dept: FAMILY MEDICINE | Facility: CLINIC | Age: 48
End: 2023-03-17
Payer: MEDICARE

## 2023-03-17 DIAGNOSIS — D64.9 ANEMIA, UNSPECIFIED TYPE: Primary | ICD-10-CM

## 2023-03-17 DIAGNOSIS — D64.9 NORMOCYTIC ANEMIA: Primary | ICD-10-CM

## 2023-03-17 LAB
ALBUMIN SERPL BCP-MCNC: 3.9 G/DL (ref 3.5–5.2)
ALP SERPL-CCNC: 61 U/L (ref 55–135)
ALT SERPL W/O P-5'-P-CCNC: 9 U/L (ref 10–44)
ANION GAP SERPL CALC-SCNC: 9 MMOL/L (ref 8–16)
AST SERPL-CCNC: 19 U/L (ref 10–40)
BILIRUB SERPL-MCNC: 0.2 MG/DL (ref 0.1–1)
BUN SERPL-MCNC: 10 MG/DL (ref 6–20)
CALCIUM SERPL-MCNC: 9 MG/DL (ref 8.7–10.5)
CHLORIDE SERPL-SCNC: 111 MMOL/L (ref 95–110)
CO2 SERPL-SCNC: 22 MMOL/L (ref 23–29)
CREAT SERPL-MCNC: 0.7 MG/DL (ref 0.5–1.4)
EST. GFR  (NO RACE VARIABLE): >60 ML/MIN/1.73 M^2
GLUCOSE SERPL-MCNC: 104 MG/DL (ref 70–110)
POTASSIUM SERPL-SCNC: 4.9 MMOL/L (ref 3.5–5.1)
PROT SERPL-MCNC: 7.1 G/DL (ref 6–8.4)
SODIUM SERPL-SCNC: 142 MMOL/L (ref 136–145)
T4 FREE SERPL-MCNC: 1.15 NG/DL (ref 0.71–1.51)
TSH SERPL DL<=0.005 MIU/L-ACNC: 0.24 UIU/ML (ref 0.4–4)

## 2023-03-20 ENCOUNTER — TELEPHONE (OUTPATIENT)
Dept: HEMATOLOGY/ONCOLOGY | Facility: CLINIC | Age: 48
End: 2023-03-20
Payer: MEDICARE

## 2023-03-20 NOTE — TELEPHONE ENCOUNTER
Called and spoke to pt about Hem referral.  Pt said she was in [place] until the first week of April.  Pt scheduled for April 3rd, confirmed appt date time and location. Said she will look on her portal for appt details/address.

## 2023-03-22 ENCOUNTER — PATIENT MESSAGE (OUTPATIENT)
Dept: FAMILY MEDICINE | Facility: CLINIC | Age: 48
End: 2023-03-22
Payer: MEDICARE

## 2023-03-27 DIAGNOSIS — G43.909 MIGRAINE WITHOUT STATUS MIGRAINOSUS, NOT INTRACTABLE, UNSPECIFIED MIGRAINE TYPE: ICD-10-CM

## 2023-03-27 NOTE — TELEPHONE ENCOUNTER
----- Message from Gi Blood sent at 3/27/2023 10:36 AM CDT -----  Contact: called at [phone]  Type:  Pharmacy Calling to Clarify an RX    Name of Caller:  Ms. Jones  Pharmacy Name:    [id number] - [place], [place] - [address] [facility]  [address]  [place] [address]  Phone: [phone] Fax: [phone]  Prescription Name:  sumatriptan (IMITREX) 100 MG tablet  What do they need to clarify?:  specify instructions as to how the pt should take this med.   Best Call Back Number:  [phone]  Additional Information:  The pharm is calling in regards to the med (sumatriptan (IMITREX) 100 MG tablet). The were transferred this RX and they need the specify instructions as to how the pt should take this med. Please call back and advise.     Called lvm to call office back

## 2023-03-27 NOTE — TELEPHONE ENCOUNTER
No new care gaps identified.  [facility] Embedded Care Gaps. Reference number: [id number]. 3/27/2023   2:12:19 PM CDT

## 2023-03-31 RX ORDER — SUMATRIPTAN SUCCINATE 100 MG/1
TABLET ORAL
Qty: 27 TABLET | Refills: 3 | Status: SHIPPED | OUTPATIENT
Start: 2023-03-31 | End: 2023-04-23 | Stop reason: SDUPTHER

## 2023-04-03 ENCOUNTER — TELEPHONE (OUTPATIENT)
Dept: HEMATOLOGY/ONCOLOGY | Facility: CLINIC | Age: 48
End: 2023-04-03
Payer: MEDICARE

## 2023-04-03 NOTE — TELEPHONE ENCOUNTER
----- Message from Christine Guajardo MA sent at 4/3/2023 11:55 AM CDT -----  [facility]  [facility]  Caller: Unspecified (Today, 11:44 AM)  Type: Needs Medical Advice   Who Called:  Patient   Symptoms (please be specific):     How long has patient had these symptoms:     Pharmacy name and phone #:     Best Call Back Number: [phone]   Additional Information: Patient is requesting a call back to reschedule her appt..       New pt needs to be rescheduled please and thank you

## 2023-04-03 NOTE — TELEPHONE ENCOUNTER
Pt canceled hem appt and will repeat her labs with iron studies on April 24th.    Reminder note put in to check on these results.

## 2023-04-03 NOTE — TELEPHONE ENCOUNTER
Called pt  and discussed re-scheduling hem referral.   Pt said she was not back in town for todays appt.    Pt is having labs on the 24th, but would like to christos this week the hem appt due to last lab result.  Pt rescheduled for tomorrow afternoon.   Confirmed new appt date time and location.

## 2023-04-05 ENCOUNTER — TELEPHONE (OUTPATIENT)
Dept: HEMATOLOGY/ONCOLOGY | Facility: CLINIC | Age: 48
End: 2023-04-05
Payer: MEDICARE

## 2023-04-05 NOTE — TELEPHONE ENCOUNTER
----- Message from Shabana Jameson sent at 4/5/2023  4:44 PM CDT -----  Type: Need Medical Advice   Who Called: Isela  Kemar callback number: [phone]  Additional Information: Patient called to reschedule her missed appointment   Please call to further assist, Thanks

## 2023-04-05 NOTE — TELEPHONE ENCOUNTER
----- Message from Cristina Aleman RN sent at 4/4/2023  1:11 PM CDT -----    ----- Message -----  From: Shabana Jameson  Sent: 4/4/2023  12:28 PM CDT  To: [facility] Staff    Type: Need Medical Advice   Who Called: Patient   Best callback number: [phone]  Additional Information: Patient need to reschedule todays appointment   Please call to further assist, Thanks       4/5/23  ls  Attempt to return pt call and unable to leave a voice mail.

## 2023-04-06 NOTE — TELEPHONE ENCOUNTER
Called and spoke to pt about re-scheduling hem appt.   She stated her daughter came in from out of state and needs to rs.  Pt stated she did start on the iron pills and intake of iron foods.   Labs are sched for 4/24,  rs'd hem appt for 4/25 with ABDIRASHID Park NP.

## 2023-04-18 ENCOUNTER — PATIENT MESSAGE (OUTPATIENT)
Dept: FAMILY MEDICINE | Facility: CLINIC | Age: 48
End: 2023-04-18
Payer: MEDICARE

## 2023-04-18 DIAGNOSIS — G43.909 MIGRAINE WITHOUT STATUS MIGRAINOSUS, NOT INTRACTABLE, UNSPECIFIED MIGRAINE TYPE: ICD-10-CM

## 2023-04-20 ENCOUNTER — PATIENT MESSAGE (OUTPATIENT)
Dept: FAMILY MEDICINE | Facility: CLINIC | Age: 48
End: 2023-04-20
Payer: MEDICARE

## 2023-04-20 DIAGNOSIS — G43.909 MIGRAINE WITHOUT STATUS MIGRAINOSUS, NOT INTRACTABLE, UNSPECIFIED MIGRAINE TYPE: ICD-10-CM

## 2023-04-20 RX ORDER — SUMATRIPTAN SUCCINATE 100 MG/1
TABLET ORAL
Qty: 27 TABLET | Refills: 3 | Status: CANCELLED | OUTPATIENT
Start: 2023-04-20

## 2023-04-20 RX ORDER — CYCLOBENZAPRINE HCL 10 MG
10 TABLET ORAL 3 TIMES DAILY PRN
Qty: 30 TABLET | Refills: 2 | OUTPATIENT
Start: 2023-04-20

## 2023-04-23 RX ORDER — HYDROCODONE BITARTRATE AND ACETAMINOPHEN 7.5; 325 MG/1; MG/1
1 TABLET ORAL 4 TIMES DAILY PRN
Qty: 120 TABLET | Refills: 0 | Status: SHIPPED | OUTPATIENT
Start: 2023-05-27 | End: 2023-06-27 | Stop reason: SDUPTHER

## 2023-04-23 RX ORDER — SUMATRIPTAN SUCCINATE 100 MG/1
TABLET ORAL
Qty: 27 TABLET | Refills: 3 | Status: SHIPPED | OUTPATIENT
Start: 2023-04-23 | End: 2023-07-24 | Stop reason: SDUPTHER

## 2023-04-23 RX ORDER — HYDROCODONE BITARTRATE AND ACETAMINOPHEN 7.5; 325 MG/1; MG/1
1 TABLET ORAL 4 TIMES DAILY PRN
Qty: 120 TABLET | Refills: 0 | Status: SHIPPED | OUTPATIENT
Start: 2023-04-27 | End: 2023-05-27

## 2023-04-24 ENCOUNTER — PATIENT MESSAGE (OUTPATIENT)
Dept: FAMILY MEDICINE | Facility: CLINIC | Age: 48
End: 2023-04-24
Payer: MEDICARE

## 2023-04-24 ENCOUNTER — LAB VISIT (OUTPATIENT)
Dept: LAB | Facility: HOSPITAL | Age: 48
End: 2023-04-24
Attending: INTERNAL MEDICINE
Payer: MEDICARE

## 2023-04-24 DIAGNOSIS — D64.9 NORMOCYTIC ANEMIA: ICD-10-CM

## 2023-04-24 DIAGNOSIS — M48.02 SPINAL STENOSIS, CERVICAL REGION: Primary | ICD-10-CM

## 2023-04-24 LAB
BASOPHILS # BLD AUTO: 0.03 K/UL (ref 0–0.2)
BASOPHILS NFR BLD: 0.3 % (ref 0–1.9)
DIFFERENTIAL METHOD: ABNORMAL
EOSINOPHIL # BLD AUTO: 0 K/UL (ref 0–0.5)
EOSINOPHIL NFR BLD: 0.3 % (ref 0–8)
ERYTHROCYTE [DISTWIDTH] IN BLOOD BY AUTOMATED COUNT: 19.1 % (ref 11.5–14.5)
FERRITIN SERPL-MCNC: 19 NG/ML (ref 20–300)
HCT VFR BLD AUTO: 34.9 % (ref 37–48.5)
HGB BLD-MCNC: 10.4 G/DL (ref 12–16)
IMM GRANULOCYTES # BLD AUTO: 0.04 K/UL (ref 0–0.04)
IMM GRANULOCYTES NFR BLD AUTO: 0.4 % (ref 0–0.5)
IRON SERPL-MCNC: 28 UG/DL (ref 30–160)
LYMPHOCYTES # BLD AUTO: 3.3 K/UL (ref 1–4.8)
LYMPHOCYTES NFR BLD: 35.4 % (ref 18–48)
MCH RBC QN AUTO: 25.2 PG (ref 27–31)
MCHC RBC AUTO-ENTMCNC: 29.8 G/DL (ref 32–36)
MCV RBC AUTO: 85 FL (ref 82–98)
MONOCYTES # BLD AUTO: 1 K/UL (ref 0.3–1)
MONOCYTES NFR BLD: 10.5 % (ref 4–15)
NEUTROPHILS # BLD AUTO: 4.9 K/UL (ref 1.8–7.7)
NEUTROPHILS NFR BLD: 53.1 % (ref 38–73)
NRBC BLD-RTO: 0 /100 WBC
PATH REV BLD -IMP: NORMAL
PLATELET # BLD AUTO: 265 K/UL (ref 150–450)
PMV BLD AUTO: 10.8 FL (ref 9.2–12.9)
RBC # BLD AUTO: 4.12 M/UL (ref 4–5.4)
RETICS/RBC NFR AUTO: 1.2 % (ref 0.5–2.5)
SATURATED IRON: 7 % (ref 20–50)
TOTAL IRON BINDING CAPACITY: 425 UG/DL (ref 250–450)
TRANSFERRIN SERPL-MCNC: 287 MG/DL (ref 200–375)
WBC # BLD AUTO: 9.2 K/UL (ref 3.9–12.7)

## 2023-04-24 PROCEDURE — 85025 COMPLETE CBC W/AUTO DIFF WBC: CPT | Performed by: INTERNAL MEDICINE

## 2023-04-24 PROCEDURE — 36415 COLL VENOUS BLD VENIPUNCTURE: CPT | Mod: PO | Performed by: INTERNAL MEDICINE

## 2023-04-24 PROCEDURE — 85060 PATHOLOGIST REVIEW: ICD-10-PCS | Mod: ,,, | Performed by: PATHOLOGY

## 2023-04-24 PROCEDURE — 85060 BLOOD SMEAR INTERPRETATION: CPT | Mod: ,,, | Performed by: PATHOLOGY

## 2023-04-24 PROCEDURE — 82728 ASSAY OF FERRITIN: CPT | Performed by: INTERNAL MEDICINE

## 2023-04-24 PROCEDURE — 84466 ASSAY OF TRANSFERRIN: CPT | Performed by: INTERNAL MEDICINE

## 2023-04-24 PROCEDURE — 85045 AUTOMATED RETICULOCYTE COUNT: CPT | Performed by: INTERNAL MEDICINE

## 2023-04-24 NOTE — PROGRESS NOTES
PATIENT: Isela Smyth  MRN: [id number]  DATE: 4/25/2023      Diagnosis:   1. Iron deficiency anemia due to dietary causes    2. Nutritional anemia    3. Elevated blood pressure reading without diagnosis of hypertension        Chief Complaint: Iron deficiency anemia due to dietary causes      Subjective:   HPI: Ms. Smyth is a 47 y.o. female with history of migraine headaches, Depression, hypothyroidism, GERD, history of iron deficiency anemia, s/p gastric sleeve who is seen today for evaluation of iron deficiency anemia at the request of Dr. Longo.     Patient has been seen in this clinic in 2019 by Dr. Rudd for the same diagnosis, was taking oral iron supplements at that time.     History of lap band that was removed. No other gastric surgeries.     Review of records shows a decline in Hgb that initially started in 2018, had a GI work-up in August 2018 with upper EGD and colonoscopy that showed diverticulosis and gastritis.      01/29/2020 Upper GI showed normal esophagus with gastritis    In recent months, she has experienced kidney stones with hematuria. Menstrual bleeding has become irregular with periods lasting 7-10 days. She also admits that she does not always eat a balanced diet; she is not vegetarian.   Was feeling more fatigued with myalgias and reached out to her primary physician.   3/16/23 of this year her Hgb was 8 g/dL. Patient started PO iron supplements at that time though she has not taken them consistently.   B/P is elevated today 158/92, she states she is currently seeing a chiropractor and taking Prednisone for bulging disc at C5.  Labs repeated 4/24/23 show improved Hgb at 10.4 g/dL, Ferritin is low at 19.     Denies HA, CP, cough, SOB, abd pain, changes in bowel habits, hematochezia, melena, N/V, dysuria, hematuria, swelling. No fevers, chills, new lumps bumps.     Past Medical History:   Past Medical History:   Diagnosis Date    Anxiety     Arthritis     Chronic lumbar pain     Depression      Deviated nasal septum     Diverticulosis     Hemorrhoids     Hypothyroid     Kidney stone     passed 10 stones by herself over the years, one needed procedure    Migraine headache     PONV (postoperative nausea and vomiting)     severe    Right ovarian cyst 02/2020    Right sided abdominal pain     Urticaria        Past Surgical HIstory:   Past Surgical History:   Procedure Laterality Date    AUGMENTATION OF BREAST      BREAST SURGERY      CHOLECYSTECTOMY      COLONOSCOPY  prior to 2011    COLONOSCOPY N/A 9/26/2018    Dr. Zuluaga; diverticulosis; repeat in 10 years    COLONOSCOPY N/A 11/1/2019    Procedure: COLONOSCOPY;  Surgeon: Marquis Zuluaga MD;  Location: [facility];  Service: Endoscopy;  Laterality: N/A;    ESOPHAGOGASTRODUODENOSCOPY N/A 9/5/2018    Procedure: EGD (ESOPHAGOGASTRODUODENOSCOPY);  Surgeon: Marquis Zuluaga MD;  Location: [facility];  Service: Endoscopy;  Laterality: N/A;    ESOPHAGOGASTRODUODENOSCOPY N/A 1/29/2020    Procedure: EGD (ESOPHAGOGASTRODUODENOSCOPY);  Surgeon: Marquis Zuluaga MD;  Location: [facility];  Service: Endoscopy;  Laterality: N/A;    HERNIA REPAIR      HIATAL HERNIA REPAIR      KIDNEY STONE SURGERY  2009    [facility], had stone removed by dr renay george, stayed in hospital 4 days    LAPAROSCOPIC GASTRIC BANDING  2007    later removed in 2016    LAPAROSCOPIC SALPINGO-OOPHORECTOMY Right 2/12/2020    Procedure: SALPINGO-OOPHORECTOMY, LAPAROSCOPIC;  Surgeon: Nely Martinez MD;  Location: [facility];  Service: OB/GYN;  Laterality: Right;    LUMBAR EPIDURAL INJECTION      SINUS SURGERY      SPINE SURGERY      6 back surgeries; 1 neck surgery    TUBAL LIGATION      UPPER GASTROINTESTINAL ENDOSCOPY  03/2013    Dr. Zuluaga    UPPER GASTROINTESTINAL ENDOSCOPY  09/14/2016    Dr. Zuluaga    UPPER GASTROINTESTINAL ENDOSCOPY  09/05/2018    Dr. Zuluaga; gastritis; bx negative    VAGINAL DELIVERY      times 3       Family History:   Family History   Problem Relation Age of  Onset    Ulcers Mother     No Known Problems Father     Cancer Neg Hx     Heart disease Neg Hx     Hypertension Neg Hx     Diabetes Neg Hx     Angioedema Neg Hx     Allergies Neg Hx     Allergic rhinitis Neg Hx     Asthma Neg Hx     Eczema Neg Hx     Immunodeficiency Neg Hx     Colon cancer Neg Hx     Colon polyps Neg Hx     Crohn's disease Neg Hx     Ulcerative colitis Neg Hx     Nephrolithiasis Neg Hx     Stomach cancer Neg Hx     Esophageal cancer Neg Hx        Social History:  reports that she has never smoked. She has never used smokeless tobacco. She reports current alcohol use. She reports that she does not use drugs.    Allergies:  Review of patient's allergies indicates:   Allergen Reactions    Morphine Itching and Rash    Gabapentin Other (See Comments)     Coming out of skin       Medications:  Current Outpatient Medications   Medication Sig Dispense Refill    acyclovir (ZOVIRAX) 800 MG Tab Take 800 mg by mouth 3 (three) times daily.      azithromycin (Z-PALLAVI) 250 MG tablet Take by mouth.      brompheniramine-pseudoeph-DM (BROMFED DM) 2-30-10 mg/5 mL Syrp Take 10 mLs by mouth every 6 (six) hours as needed.      butalbital-acetaminophen-caffeine -40 mg (FIORICET, ESGIC) -40 mg per tablet TAKE 1 TABLET BY MOUTH AS NEEDED FOR MIGRAINE NO MORE THAN 10 TABLETS IN 1 MONTH 10 tablet 0    cefpodoxime (VANTIN) 200 MG tablet Take 200 mg by mouth every 12 (twelve) hours.      cetirizine (ZYRTEC) 10 MG tablet Take 10 mg by mouth.      cyclobenzaprine (FLEXERIL) 10 MG tablet Take 1 tablet (10 mg total) by mouth 3 (three) times daily as needed for Muscle spasms. 30 tablet 2    erenumab-aooe (AIMOVIG AUTOINJECTOR) 140 mg/mL autoinjector INJECT THE CONTENTS OF 1 PEN UNDER THE SKIN EVERY 28 DAYS 1 each 3    fluticasone propionate (FLONASE) 50 mcg/actuation nasal spray 2 sprays by Each Nostril route.      [START ON 4/27/2023] HYDROcodone-acetaminophen (NORCO) 7.5-325 mg per tablet Take 1 tablet by mouth 4 (four)  times daily as needed for Pain. 120 tablet 0    ketorolac (TORADOL) 60 mg/2 mL Soln INJECT 2 MLS INTO THE MUSCLE 2 TIMES DAILY AS NEEDED FOR SEVERE MIGRAINE. NO MORE THAN 2 DAYS/WEEK. 20 mL 3    levothyroxine (SYNTHROID) 75 MCG tablet Take 1 tablet (75 mcg total) by mouth once daily. 90 tablet 0    linaCLOtide (LINZESS) 145 mcg Cap capsule Take 1 capsule (145 mcg total) by mouth before breakfast. 30 capsule 5    methocarbamoL (ROBAXIN) 500 MG Tab Take 500 mg by mouth every 8 (eight) hours.      oxybutynin (DITROPAN) 5 MG Tab Take 5 mg by mouth 2 (two) times daily as needed.      OZEMPIC 1 mg/dose (4 mg/3 mL) Inject 1 mg into the skin every 7 days.      predniSONE (DELTASONE) 20 MG tablet Take 40 mg by mouth.      promethazine (PHENERGAN) 12.5 MG Tab Take 1 tablet (12.5 mg total) by mouth every 6 (six) hours as needed (nausea). 30 tablet 0    promethazine (PHENERGAN) 25 MG suppository Place 1 suppository (25 mg total) rectally every 6 (six) hours as needed for Nausea. 30 suppository 0    sumatriptan (IMITREX) 100 MG tablet TAKE 1 TABLET BY MOUTH EVERY DAY AS NEEDED. MAY REPEAT IN 2 HOURS IF NEEDED. DO NOT EXCEED 2 TABLETS PER DAY 27 tablet 3    tamsulosin (FLOMAX) 0.4 mg Cap Take 1 capsule by mouth.      traZODone (DESYREL) 150 MG tablet Take 1 tablet (150 mg total) by mouth every evening. 90 tablet 3    venlafaxine (EFFEXOR-XR) 150 MG Cp24 Take 1 capsule (150 mg total) by mouth once daily. 90 capsule 3    VYVANSE 30 mg capsule       HYDROcodone-acetaminophen (NORCO)  mg per tablet Take 1 tablet by mouth every 6 (six) hours as needed.      [START ON 5/27/2023] HYDROcodone-acetaminophen (NORCO) 7.5-325 mg per tablet Take 1 tablet by mouth 4 (four) times daily as needed for Pain. (Patient not taking: Reported on 4/25/2023) 120 tablet 0    VYVANSE 20 mg capsule Take 20 mg by mouth.       No current facility-administered medications for this visit.     Facility-Administered Medications Ordered in Other Visits  "  Medication Dose Route Frequency Provider Last Rate Last Admin    lactated ringers infusion   Intravenous Continuous Marquis Zuluaga MD 75 mL/hr at 09/26/18 0845 New Bag at 09/26/18 0845       Review of Systems   Constitutional:  Positive for fatigue. Negative for appetite change and fever.   HENT:  Negative for sore throat and trouble swallowing.    Respiratory:  Negative for cough and shortness of breath.    Cardiovascular:  Negative for chest pain, palpitations and leg swelling.   Gastrointestinal:  Negative for abdominal pain, blood in stool, constipation, diarrhea, nausea and vomiting.   Genitourinary:  Negative for dysuria and hematuria.   Musculoskeletal:  Positive for myalgias and neck pain.   Skin:  Negative for rash.   Neurological:  Negative for light-headedness and headaches.   Hematological:  Negative for adenopathy.   Psychiatric/Behavioral:  The patient is not nervous/anxious.      ECOG Performance Status:   ECOG SCORE    0 - Fully active-able to carry on all pre-disease performance without restriction         Objective:      Vitals:   Vitals:    04/25/23 1456 04/25/23 1522   BP: (!) 150/100 (!) 158/92   Pulse: 107    Resp: 16    Temp: 98.4 °F (36.9 °C)    TempSrc: Temporal    SpO2: 98%    Weight: 70.2 kg (154 lb 12.2 oz)    Height: 5' 7.5" (1.715 m)      BMI: Body mass index is 23.88 kg/m².    Physical Exam  Vitals reviewed.   Constitutional:       General: She is not in acute distress.     Appearance: She is not diaphoretic.   Eyes:      General: No scleral icterus.  Cardiovascular:      Rate and Rhythm: Normal rate and regular rhythm.      Heart sounds: Normal heart sounds. No murmur heard.  Pulmonary:      Effort: Pulmonary effort is normal. No respiratory distress.      Breath sounds: No wheezing.   Abdominal:      General: Bowel sounds are normal. There is no distension.   Musculoskeletal:      Right lower leg: No edema.      Left lower leg: No edema.   Lymphadenopathy:      Cervical: No " cervical adenopathy.   Skin:     General: Skin is warm.      Findings: No bruising or rash.   Neurological:      Mental Status: She is alert and oriented to person, place, and time.   Psychiatric:         Mood and Affect: Mood normal.         Behavior: Behavior normal.       Laboratory Data:  Lab Results   Component Value Date    WBC 9.20 04/24/2023    HGB 10.4 (L) 04/24/2023    HCT 34.9 (L) 04/24/2023    MCV 85 04/24/2023     04/24/2023          Imaging:   Assessment:       1. Iron deficiency anemia due to dietary causes    2. Nutritional anemia    3. Elevated blood pressure reading without diagnosis of hypertension           Plan:   Iron deficiency anemia   -Discussed possible reasons to include blood loss with hematuria, menorrhagia; nutritional, malabsorption  -Improved Hgb at 10.4 g/dL  -Continue taking PO supplements daily   -Follow up in 3 months with CBC, Ferritin, Iron/TIBC, B12, Folate 1 week prior to appointment   -Encouraged her to call if she can not remain compliant with plan for PO supplements and we will discuss parenteral replacement    Elevated B/P  -Patient agrees to monitor at home and keep log  -Believes this is due to increased pain  -Will follow up with PCP    Patient queried and all questions were answered.   Assessment/Plan reviewed and approved by Dr. Chou  45 minutes were spent in coordination of patient's care, record review and counseling.      Route Chart for Scheduling    Med Onc Chart Routing      Follow up with physician    Follow up with [facility] Other. 12 weeks with CBC, Ferritin, Iron/TIBC, B12 and Foalte 1 week prior to appointment   Infusion scheduling note    Injection scheduling note    Labs    Imaging    Pharmacy appointment    Other referrals

## 2023-04-25 ENCOUNTER — OFFICE VISIT (OUTPATIENT)
Dept: HEMATOLOGY/ONCOLOGY | Facility: CLINIC | Age: 48
End: 2023-04-25
Payer: MEDICARE

## 2023-04-25 ENCOUNTER — TELEPHONE (OUTPATIENT)
Dept: HEMATOLOGY/ONCOLOGY | Facility: CLINIC | Age: 48
End: 2023-04-25
Payer: MEDICARE

## 2023-04-25 VITALS
RESPIRATION RATE: 16 BRPM | DIASTOLIC BLOOD PRESSURE: 92 MMHG | WEIGHT: 154.75 LBS | HEART RATE: 107 BPM | BODY MASS INDEX: 23.45 KG/M2 | OXYGEN SATURATION: 98 % | HEIGHT: 68 IN | TEMPERATURE: 98 F | SYSTOLIC BLOOD PRESSURE: 158 MMHG

## 2023-04-25 DIAGNOSIS — D53.9 NUTRITIONAL ANEMIA: ICD-10-CM

## 2023-04-25 DIAGNOSIS — D50.8 IRON DEFICIENCY ANEMIA DUE TO DIETARY CAUSES: Primary | ICD-10-CM

## 2023-04-25 DIAGNOSIS — R03.0 ELEVATED BLOOD PRESSURE READING WITHOUT DIAGNOSIS OF HYPERTENSION: ICD-10-CM

## 2023-04-25 LAB — PATH REV BLD -IMP: NORMAL

## 2023-04-25 PROCEDURE — 99215 OFFICE O/P EST HI 40 MIN: CPT | Mod: PBBFAC,PN

## 2023-04-25 PROCEDURE — 99204 OFFICE O/P NEW MOD 45 MIN: CPT | Mod: S$PBB,,,

## 2023-04-25 PROCEDURE — 99999 PR PBB SHADOW E&M-EST. PATIENT-LVL V: ICD-10-PCS | Mod: PBBFAC,,,

## 2023-04-25 PROCEDURE — 99204 PR OFFICE/OUTPT VISIT, NEW, LEVL IV, 45-59 MIN: ICD-10-PCS | Mod: S$PBB,,,

## 2023-04-25 PROCEDURE — 99999 PR PBB SHADOW E&M-EST. PATIENT-LVL V: CPT | Mod: PBBFAC,,,

## 2023-04-25 RX ORDER — LISDEXAMFETAMINE DIMESYLATE 20 MG/1
20 CAPSULE ORAL
COMMUNITY
Start: 2023-02-16 | End: 2023-06-27

## 2023-04-25 RX ORDER — FLUTICASONE PROPIONATE 50 MCG
2 SPRAY, SUSPENSION (ML) NASAL
COMMUNITY
Start: 2022-12-15

## 2023-04-25 RX ORDER — SEMAGLUTIDE 1.34 MG/ML
1 INJECTION, SOLUTION SUBCUTANEOUS
COMMUNITY
Start: 2023-04-15 | End: 2023-06-27

## 2023-04-25 RX ORDER — OXYBUTYNIN CHLORIDE 5 MG/1
5 TABLET ORAL 2 TIMES DAILY PRN
COMMUNITY
Start: 2023-01-30

## 2023-04-25 RX ORDER — CETIRIZINE HYDROCHLORIDE 10 MG/1
10 TABLET ORAL
COMMUNITY
Start: 2022-12-15

## 2023-04-25 RX ORDER — CEFPODOXIME PROXETIL 200 MG/1
200 TABLET, FILM COATED ORAL EVERY 12 HOURS
COMMUNITY
Start: 2023-01-30 | End: 2023-06-27

## 2023-04-25 RX ORDER — LISDEXAMFETAMINE DIMESYLATE 30 MG/1
CAPSULE ORAL
COMMUNITY
Start: 2023-03-14 | End: 2023-06-27

## 2023-04-25 RX ORDER — METHOCARBAMOL 500 MG/1
500 TABLET, FILM COATED ORAL EVERY 8 HOURS
COMMUNITY
Start: 2023-01-27 | End: 2023-06-27

## 2023-04-25 RX ORDER — TAMSULOSIN HYDROCHLORIDE 0.4 MG/1
1 CAPSULE ORAL
COMMUNITY
Start: 2023-01-30 | End: 2023-06-27

## 2023-04-25 RX ORDER — BROMPHENIRAMINE MALEATE, PSEUDOEPHEDRINE HYDROCHLORIDE, AND DEXTROMETHORPHAN HYDROBROMIDE 2; 30; 10 MG/5ML; MG/5ML; MG/5ML
10 SYRUP ORAL EVERY 6 HOURS PRN
COMMUNITY
Start: 2022-12-15 | End: 2023-10-25

## 2023-04-25 RX ORDER — PREDNISONE 20 MG/1
40 TABLET ORAL
COMMUNITY
Start: 2022-12-16 | End: 2023-06-27

## 2023-04-25 RX ORDER — AZITHROMYCIN 250 MG/1
TABLET, FILM COATED ORAL
COMMUNITY
Start: 2022-12-16 | End: 2023-06-27

## 2023-04-25 RX ORDER — ACYCLOVIR 800 MG/1
800 TABLET ORAL 3 TIMES DAILY
COMMUNITY
Start: 2023-04-18

## 2023-04-25 RX ORDER — HYDROCODONE BITARTRATE AND ACETAMINOPHEN 10; 325 MG/1; MG/1
1 TABLET ORAL EVERY 6 HOURS PRN
COMMUNITY
Start: 2023-01-27 | End: 2023-07-24

## 2023-04-25 NOTE — TELEPHONE ENCOUNTER
----- Message from Tati Frederick LPN sent at 4/3/2023 11:38 AM CDT -----  Pt canceled hem appt and will repeat her labs with iron studies on April 24th.    Reminder note put in to check on these results.

## 2023-04-25 NOTE — TELEPHONE ENCOUNTER
Pt had labs yesterday and per physician, Dr Longo result note: Iron deficiency confirmed.  Called pt and confirmed today's heme appt.

## 2023-04-26 LAB — STFR SERPL-MCNC: 4.2 MG/L (ref 1.8–4.6)

## 2023-05-08 ENCOUNTER — PATIENT MESSAGE (OUTPATIENT)
Dept: RADIOLOGY | Facility: HOSPITAL | Age: 48
End: 2023-05-08
Payer: MEDICARE

## 2023-05-16 NOTE — PROGRESS NOTES
Subjective   Daisy Toure is a 24 y.o. female.     History of Present Illness  Pt presents to discuss her ADHD and restart medication. She just had her daughter 5 days ago and everything is going well.  She did have a lot of anxiety during her pregnancy despite taking her zoloft.  She denies any postpartum depression.  She feels like her anxiety is much better when on her adderall.       The following portions of the patient's history were reviewed and updated as appropriate: allergies, current medications, past family history, past medical history, past social history, past surgical history and problem list.  Past Medical History:   Diagnosis Date   • Anxiety    • Depression    • Migraine      No past surgical history on file.  Family History   Problem Relation Age of Onset   • Anxiety disorder Mother    • Cancer Maternal Grandmother         Uterus and lung   • COPD Maternal Grandmother    • Diabetes Maternal Grandmother      Social History     Socioeconomic History   • Marital status:    Tobacco Use   • Smoking status: Never   • Smokeless tobacco: Never   Vaping Use   • Vaping Use: Never used   Substance and Sexual Activity   • Alcohol use: Never   • Drug use: Never   • Sexual activity: Yes     Partners: Male         Current Outpatient Medications:   •  famotidine (Pepcid) 20 MG tablet, Take 1 tablet by mouth 2 (Two) Times a Day., Disp: 60 tablet, Rfl: 0  •  hydrOXYzine (ATARAX) 10 MG tablet, Take 1 tablet by mouth Daily As Needed for Anxiety., Disp: , Rfl:   •  ibuprofen (ADVIL,MOTRIN) 600 MG tablet, Take 1 tablet by mouth Every 6 (Six) Hours As Needed for Mild Pain (First Line: Mild pain.)., Disp: 20 tablet, Rfl: 0  •  sertraline (ZOLOFT) 50 MG tablet, Take 1.5 tablets by mouth Daily., Disp: 45 tablet, Rfl: 2  •  ALPRAZolam (Xanax) 0.25 MG tablet, Take 1 tablet by mouth Daily As Needed for Anxiety., Disp: 15 tablet, Rfl: 2  •  amphetamine-dextroamphetamine (Adderall) 10 MG tablet, Take 1 tablet by  Subjective:       Patient ID: Isela Smyth is a 43 y.o. female.    Chief Complaint: Hyperthyroidism    HPI         Here for a hospital f/u.     Last week, went to [facility] er for chest pain. Negative cardiac w/u with negative stress test.  Since discharge, no more c/o chest pain.  Will plan to f/u with Dr. Askew, cardiologist in [place], in near future.      Gets 2-5 migraines per month.  Takes fioricet and imitrex for pain control.       History of 6 low back spinal surgeries and 1 neck spinal surgery in past. Recent one in 2012.      Status post 08/14/2012 microscopic recurrent left L4-L5 laminotomy, discectomy, left L5 complete laminectomy, left L5-S1 laminotomy,    recurrent discectomy.      Chronic lumbago controlled while on norco 7.5 and flexeril. Ps: 2/10. Occasional left sciatic pain. Had an placido last month which helped with the pain.      Had mri L spine 9/2016 which showed:      1.  Postoperative change of left hemilaminectomy at L4-L5 and L5-S1.  2.  Multilevel degenerative change of the lumbar spine, most pronounced at L4-L5 and L5-S1 as detailed above  3.  Minimal descending central disc extrusion at L5-S1 which does not appear to encroach upon either the descending left or right S1 nerve.  4.  Probable conjoined left S2 nerve coursing in the left posterolateral spinal canal.  Less likely, this represents an intraspinal synovial cyst abutting the descending left S1 nerve.  5.  Central annular tear of the intervertebral disc at L4-L5.     Also, has chronic neck pain > 5 years. Hx of neck surgeries in past. Saw pain management recently and had an placido on 1/31/17. Norco 7.5 helps with pain.      Had mri of cervical spine on 1/19/17 which showed:  -Disc protrusion at the C5-C6 level and to the right lateral recess with possible anterior cord contact  -Loss of normal cervical lordosis which may be positional or related to muscular strain.  -Milder degenerative changes are noted within the body of the  "mouth 2 (Two) Times a Day., Disp: 60 tablet, Rfl: 0  •  clobetasol (TEMOVATE) 0.05 % cream, Apply 1 application topically to the appropriate area as directed 2 (Two) Times a Day., Disp: 30 g, Rfl: 5  •  clobetasol (TEMOVATE) 0.05 % external solution, Apply  topically to the appropriate area as directed 2 (Two) Times a Day., Disp: 50 mL, Rfl: 5    Review of Systems   Constitutional: Positive for fatigue. Negative for activity change, appetite change, chills, diaphoresis, fever, unexpected weight gain and unexpected weight loss.   HENT: Negative for trouble swallowing.    Eyes: Negative for blurred vision and visual disturbance.   Respiratory: Negative for chest tightness and shortness of breath.    Cardiovascular: Negative for chest pain, palpitations and leg swelling.   Gastrointestinal: Negative for abdominal pain.   Musculoskeletal: Negative for gait problem.   Neurological: Negative for dizziness, tremors, syncope, weakness, light-headedness, headache and confusion.   Psychiatric/Behavioral: Positive for sleep disturbance and stress. Negative for depressed mood. The patient is nervous/anxious.      /69 (BP Location: Left arm, Patient Position: Sitting, Cuff Size: Large Adult)   Pulse 78   Temp 97.7 °F (36.5 °C) (Temporal)   Resp 16   Ht 165.1 cm (65\")   Wt 93.9 kg (207 lb)   LMP 05/10/2022 (Approximate)   SpO2 98%   BMI 34.45 kg/m²       Objective   Physical Exam  Vitals and nursing note reviewed.   Constitutional:       Appearance: Normal appearance.   HENT:      Head: Normocephalic and atraumatic.   Neck:      Thyroid: No thyroid mass, thyromegaly or thyroid tenderness.      Vascular: No carotid bruit.   Cardiovascular:      Rate and Rhythm: Normal rate and regular rhythm.      Heart sounds: Normal heart sounds.   Pulmonary:      Effort: Pulmonary effort is normal.      Breath sounds: Normal breath sounds.   Musculoskeletal:      Cervical back: Normal range of motion and neck supple.      Right " report.      Depression and anxiety stable while on effexor.                                                                                     gerd stable.      On levothyroxine for management of hypothyroidism.         Review of Systems      Review of Systems   Constitutional: Negative for fever and chills.   HENT: Negative for hearing loss and neck stiffness.    Eyes: Negative for redness and itching.   Respiratory: Negative for cough and choking.    Cardiovascular: Negative for chest pain and leg swelling.  Abdomen: Negative for abdominal pain and blood in stool.   Genitourinary: Negative for dysuria and flank pain.   Musculoskeletal: Negative for back pain and gait problem.   Neurological: Negative for light-headedness and headaches.   Hematological: Negative for adenopathy.   Psychiatric/Behavioral: Negative for behavioral problems.       Objective:      Physical Exam   Constitutional: She appears well-developed.   HENT:   Head: Normocephalic and atraumatic.   Eyes: Conjunctivae are normal. Pupils are equal, round, and reactive to light.   Neck: Normal range of motion.   Cardiovascular: Normal rate and regular rhythm.   No murmur heard.  Pulmonary/Chest: Effort normal and breath sounds normal.   Lymphadenopathy:     She has no cervical adenopathy.       Assessment:       1. Hypothyroidism, unspecified type    2. Cervical cancer screening    3. Depression, unspecified depression type    4. Anxiety    5. Other migraine without status migrainosus, not intractable    6. Chronic low back pain, unspecified back pain laterality, with sciatica presence unspecified    7. Chronic cervical pain        Plan:       Hypothyroidism, unspecified type    Cervical cancer screening  -     Ambulatory referral to Obstetrics / Gynecology    Depression, unspecified depression type    Anxiety    Other migraine without status migrainosus, not intractable    Chronic low back pain, unspecified back pain laterality, with sciatica  lower leg: No edema.      Left lower leg: No edema.   Skin:     General: Skin is warm and dry.   Neurological:      General: No focal deficit present.      Mental Status: She is alert and oriented to person, place, and time.   Psychiatric:         Mood and Affect: Mood normal.         Behavior: Behavior normal.         Thought Content: Thought content normal.         Procedures       Diagnoses and all orders for this visit:    1. Panic disorder (episodic paroxysmal anxiety) (Primary)  Comments:  Will start back on xanax low dose prn.  Continue zoloft.  Orders:  -     ALPRAZolam (Xanax) 0.25 MG tablet; Take 1 tablet by mouth Daily As Needed for Anxiety.  Dispense: 15 tablet; Refill: 2    2. Attention deficit disorder (ADD) without hyperactivity  Comments:  Will start back on low dose adderall.  Orders:  -     amphetamine-dextroamphetamine (Adderall) 10 MG tablet; Take 1 tablet by mouth 2 (Two) Times a Day.  Dispense: 60 tablet; Refill: 0    3. Psoriasis  Comments:  Scalp solution and cream refilled.  Orders:  -     clobetasol (TEMOVATE) 0.05 % external solution; Apply  topically to the appropriate area as directed 2 (Two) Times a Day.  Dispense: 50 mL; Refill: 5  -     clobetasol (TEMOVATE) 0.05 % cream; Apply 1 application topically to the appropriate area as directed 2 (Two) Times a Day.  Dispense: 30 g; Refill: 5                presence unspecified    Chronic cervical pain    Other orders  -     HYDROcodone-acetaminophen (NORCO) 7.5-325 mg per tablet; Take 1 tablet by mouth 2 (two) times daily as needed.  Dispense: 60 tablet; Refill: 0  -     HYDROcodone-acetaminophen (NORCO) 7.5-325 mg per tablet; Take 1 tablet by mouth 2 (two) times daily as needed.  Dispense: 60 tablet; Refill: 0  -     HYDROcodone-acetaminophen (NORCO) 7.5-325 mg per tablet; Take 1 tablet by mouth 2 (two) times daily as needed.  Dispense: 60 tablet; Refill: 0                  Plan:  Cont current meds    Current Outpatient Medications   Medication Sig Dispense Refill    butalbital-acetaminophen-caffeine -40 mg (FIORICET, ESGIC) -40 mg per tablet TAKE 1 TABLET BY MOUTH EVERY 6 HOURS AS NEEDED 60 tablet 0    cyclobenzaprine (FLEXERIL) 10 MG tablet TAKE 1 TABLET BY MOUTH 3 TIMES DAILY AS NEEDED. 30 tablet 2    fluticasone (FLONASE) 50 mcg/actuation nasal spray 2 sprays by Each Nare route once daily. (Patient taking differently: 2 sprays by Each Nare route daily as needed. ) 16 g 11    [START ON 3/29/2019] HYDROcodone-acetaminophen (NORCO) 7.5-325 mg per tablet Take 1 tablet by mouth 2 (two) times daily as needed. 60 tablet 0    [START ON 4/28/2019] HYDROcodone-acetaminophen (NORCO) 7.5-325 mg per tablet Take 1 tablet by mouth 2 (two) times daily as needed. 60 tablet 0    [START ON 5/28/2019] HYDROcodone-acetaminophen (NORCO) 7.5-325 mg per tablet Take 1 tablet by mouth 2 (two) times daily as needed. 60 tablet 0    levothyroxine (SYNTHROID) 75 MCG tablet TAKE 1 TABLET (75 MCG TOTAL) BY MOUTH EVERY MORNING. 90 tablet 3    linaclotide (LINZESS) 145 mcg Cap capsule Take 1 capsule (145 mcg total) by mouth daily as needed. 30 capsule 5    ondansetron (ZOFRAN ODT) 8 MG TbDL Take 8 mg by mouth every 8 (eight) hours as needed.      pantoprazole (PROTONIX) 20 MG tablet TAKE 1 TABLET BY MOUTH EVERY DAY 90 tablet 1    promethazine (PHENERGAN) 25 MG suppository  PLACE 1 SUPPOSITORY (25 MG TOTAL) RECTALLY EVERY 6 (SIX) HOURS AS NEEDED FOR NAUSEA. 12 suppository 1    sucralfate (CARAFATE) 1 gram tablet TAKE 1 TABLET BY MOUTH THREE TIMES A  tablet 2    sumatriptan (IMITREX) 100 MG tablet TAKE 1 TABLET BY MOUTH EVERY DAY AS NEEDED. MAY REPEAT IN 2 HOURS IF NEEDED. DO NOT EXCEED 2 TABLETS PER DAY 54 tablet 2    traZODone (DESYREL) 150 MG tablet Take 1 tablet (150 mg total) by mouth nightly. 30 tablet 11    venlafaxine (EFFEXOR-XR) 150 MG Cp24 Take 1 capsule (150 mg total) by mouth once daily. 90 capsule 3     No current facility-administered medications for this visit.      Facility-Administered Medications Ordered in Other Visits   Medication Dose Route Frequency Provider Last Rate Last Dose    lactated ringers infusion   Intravenous Continuous Marquis Zuluaga MD 75 mL/hr at 09/26/18 3420

## 2023-05-27 ENCOUNTER — PATIENT MESSAGE (OUTPATIENT)
Dept: FAMILY MEDICINE | Facility: CLINIC | Age: 48
End: 2023-05-27
Payer: MEDICARE

## 2023-05-27 NOTE — TELEPHONE ENCOUNTER
No care due was identified.  [facility] Embedded Care Due Messages. Reference number: [id number].   5/27/2023 2:10:37 PM CDT

## 2023-05-28 ENCOUNTER — PATIENT MESSAGE (OUTPATIENT)
Dept: FAMILY MEDICINE | Facility: CLINIC | Age: 48
End: 2023-05-28
Payer: MEDICARE

## 2023-05-29 RX ORDER — LEVOTHYROXINE SODIUM 75 UG/1
75 TABLET ORAL DAILY
Qty: 90 TABLET | Refills: 0 | Status: SHIPPED | OUTPATIENT
Start: 2023-05-29 | End: 2023-06-27 | Stop reason: SDUPTHER

## 2023-05-29 NOTE — TELEPHONE ENCOUNTER
Refill Decision Note   Isela Smyth  is requesting a refill authorization.  Brief Assessment and Rationale for Refill:  Approve     Medication Therapy Plan:  T4 within normal limits    Medication Reconciliation Completed: No   Comments:     No Care Gaps recommended.     Note composed:3:54 AM 05/29/2023

## 2023-06-08 ENCOUNTER — PATIENT MESSAGE (OUTPATIENT)
Dept: FAMILY MEDICINE | Facility: CLINIC | Age: 48
End: 2023-06-08
Payer: MEDICARE

## 2023-06-12 ENCOUNTER — TELEPHONE (OUTPATIENT)
Dept: OBSTETRICS AND GYNECOLOGY | Facility: CLINIC | Age: 48
End: 2023-06-12
Payer: MEDICARE

## 2023-06-12 NOTE — TELEPHONE ENCOUNTER
----- Message from Geneva Santaquin sent at 6/12/2023  1:15 PM CDT -----  Contact: self  Type:  Sooner Apoointment Request    Caller is requesting a sooner appointment.  Caller declined first available appointment listed below.  Caller will not accept being placed on the waitlist and is requesting a message be sent to doctor.  Name of Caller:self  When is the first available appointment?404  Symptoms:cyst issues, wants a pap  Would the patient rather a call back or a response via [facility]? call  Best Call Back Number:[phone] (home)     Additional Information: pt would like to be seen sooner. Please advise and thank you.

## 2023-06-20 ENCOUNTER — TELEPHONE (OUTPATIENT)
Dept: OBSTETRICS AND GYNECOLOGY | Facility: CLINIC | Age: 48
End: 2023-06-20
Payer: MEDICARE

## 2023-06-20 NOTE — TELEPHONE ENCOUNTER
----- Message from Popeye Garcia sent at 6/20/2023  4:35 PM CDT -----  Regarding: appt  Type:  Sooner Appointment Request    Caller is requesting a sooner appointment.  Caller declined first available appointment listed below.  Caller will not accept being placed on the waitlist and is requesting a message be sent to doctor.    Name of Caller:  pt  When is the first available appointment?  8/1/23  Symptoms:  ovaries issues  Best Call Back Number:  [phone]    Additional Information:

## 2023-06-27 ENCOUNTER — OFFICE VISIT (OUTPATIENT)
Dept: FAMILY MEDICINE | Facility: CLINIC | Age: 48
End: 2023-06-27
Payer: MEDICARE

## 2023-06-27 ENCOUNTER — PATIENT MESSAGE (OUTPATIENT)
Dept: DERMATOLOGY | Facility: CLINIC | Age: 48
End: 2023-06-27
Payer: MEDICARE

## 2023-06-27 ENCOUNTER — TELEPHONE (OUTPATIENT)
Dept: OBSTETRICS AND GYNECOLOGY | Facility: CLINIC | Age: 48
End: 2023-06-27
Payer: MEDICARE

## 2023-06-27 ENCOUNTER — LAB VISIT (OUTPATIENT)
Dept: LAB | Facility: HOSPITAL | Age: 48
End: 2023-06-27
Attending: STUDENT IN AN ORGANIZED HEALTH CARE EDUCATION/TRAINING PROGRAM
Payer: MEDICARE

## 2023-06-27 ENCOUNTER — TELEPHONE (OUTPATIENT)
Dept: DERMATOLOGY | Facility: CLINIC | Age: 48
End: 2023-06-27
Payer: MEDICARE

## 2023-06-27 VITALS
SYSTOLIC BLOOD PRESSURE: 136 MMHG | BODY MASS INDEX: 25.28 KG/M2 | WEIGHT: 161.06 LBS | HEART RATE: 106 BPM | OXYGEN SATURATION: 98 % | DIASTOLIC BLOOD PRESSURE: 98 MMHG | HEIGHT: 67 IN

## 2023-06-27 DIAGNOSIS — E03.9 HYPOTHYROIDISM, UNSPECIFIED TYPE: ICD-10-CM

## 2023-06-27 DIAGNOSIS — M47.816 LUMBAR FACET ARTHROPATHY: Primary | ICD-10-CM

## 2023-06-27 DIAGNOSIS — F90.2 ATTENTION DEFICIT HYPERACTIVITY DISORDER (ADHD), COMBINED TYPE: ICD-10-CM

## 2023-06-27 DIAGNOSIS — R19.7 DIARRHEA, UNSPECIFIED TYPE: ICD-10-CM

## 2023-06-27 DIAGNOSIS — M54.12 CERVICAL RADICULOPATHY: ICD-10-CM

## 2023-06-27 PROBLEM — D50.9 IRON (FE) DEFICIENCY ANEMIA: Status: RESOLVED | Noted: 2018-12-06 | Resolved: 2023-06-27

## 2023-06-27 PROBLEM — K92.1 MELENA: Status: RESOLVED | Noted: 2018-09-05 | Resolved: 2023-06-27

## 2023-06-27 PROBLEM — R19.4 CHANGE IN BOWEL HABITS: Status: RESOLVED | Noted: 2018-09-26 | Resolved: 2023-06-27

## 2023-06-27 LAB — TSH SERPL DL<=0.005 MIU/L-ACNC: 2.64 UIU/ML (ref 0.4–4)

## 2023-06-27 PROCEDURE — 99999 PR PBB SHADOW E&M-EST. PATIENT-LVL IV: CPT | Mod: PBBFAC,,, | Performed by: STUDENT IN AN ORGANIZED HEALTH CARE EDUCATION/TRAINING PROGRAM

## 2023-06-27 PROCEDURE — 99214 OFFICE O/P EST MOD 30 MIN: CPT | Mod: PBBFAC,PO | Performed by: STUDENT IN AN ORGANIZED HEALTH CARE EDUCATION/TRAINING PROGRAM

## 2023-06-27 PROCEDURE — 36415 COLL VENOUS BLD VENIPUNCTURE: CPT | Mod: PO | Performed by: STUDENT IN AN ORGANIZED HEALTH CARE EDUCATION/TRAINING PROGRAM

## 2023-06-27 PROCEDURE — 99999 PR PBB SHADOW E&M-EST. PATIENT-LVL IV: ICD-10-PCS | Mod: PBBFAC,,, | Performed by: STUDENT IN AN ORGANIZED HEALTH CARE EDUCATION/TRAINING PROGRAM

## 2023-06-27 PROCEDURE — 99215 OFFICE O/P EST HI 40 MIN: CPT | Mod: S$PBB,,, | Performed by: STUDENT IN AN ORGANIZED HEALTH CARE EDUCATION/TRAINING PROGRAM

## 2023-06-27 PROCEDURE — 84443 ASSAY THYROID STIM HORMONE: CPT | Performed by: STUDENT IN AN ORGANIZED HEALTH CARE EDUCATION/TRAINING PROGRAM

## 2023-06-27 PROCEDURE — 99215 PR OFFICE/OUTPT VISIT, EST, LEVL V, 40-54 MIN: ICD-10-PCS | Mod: S$PBB,,, | Performed by: STUDENT IN AN ORGANIZED HEALTH CARE EDUCATION/TRAINING PROGRAM

## 2023-06-27 RX ORDER — LEVOTHYROXINE SODIUM 75 UG/1
75 TABLET ORAL DAILY
Qty: 90 TABLET | Refills: 0 | Status: SHIPPED | OUTPATIENT
Start: 2023-06-27 | End: 2023-08-09 | Stop reason: SDUPTHER

## 2023-06-27 RX ORDER — PROMETHAZINE HYDROCHLORIDE 12.5 MG/1
12.5 TABLET ORAL EVERY 6 HOURS PRN
Qty: 30 TABLET | Refills: 0 | Status: SHIPPED | OUTPATIENT
Start: 2023-06-27 | End: 2023-09-29 | Stop reason: SDUPTHER

## 2023-06-27 RX ORDER — ATOMOXETINE 40 MG/1
40 CAPSULE ORAL DAILY
Qty: 30 CAPSULE | Refills: 0 | Status: SHIPPED | OUTPATIENT
Start: 2023-06-27 | End: 2023-07-06

## 2023-06-27 RX ORDER — HYDROCODONE BITARTRATE AND ACETAMINOPHEN 7.5; 325 MG/1; MG/1
1 TABLET ORAL 4 TIMES DAILY PRN
Qty: 120 TABLET | Refills: 0 | Status: SHIPPED | OUTPATIENT
Start: 2023-06-27 | End: 2023-07-24 | Stop reason: SDUPTHER

## 2023-06-27 NOTE — TELEPHONE ENCOUNTER
----- Message from Sandeep Quiles MA sent at 6/27/2023  2:22 PM CDT -----  Contact: pt  Pt has some concerns & this message was meant for you guys.  Thank you.  ----- Message -----  From: Juany Amador  Sent: 6/27/2023  12:46 PM CDT  To: [facility] Staff    Type:  Needs Medical Advice    Who Called: Pt  Would the patient rather a call back or a response via [facility]? call  Best Call Back Number: [phone]  Additional Information: Pt states that she need a callback as soon as possible. States that she really need to speak with provider or nurse as soon as possible. States that to please leave message if she don't answer. Also states that she would like to be seen by provider anytime the beginning of August. Please advise thank you

## 2023-06-27 NOTE — ASSESSMENT & PLAN NOTE
Variably controlled. Continue current medications for now - I would like to discuss treatments she has tried in the past at our next visit and determine if any other medications may help her. Her goal is to maintain functionality.

## 2023-06-27 NOTE — TELEPHONE ENCOUNTER
----- Message from Juany Amador sent at 6/27/2023 12:43 PM CDT -----  Contact: pt  Type:  Needs Medical Advice    Who Called: Pt  Would the patient rather a call back or a response via [facility]? call  Best Call Back Number: [phone]  Additional Information: Pt states that she need a callback as soon as possible. States that she really need to speak with provider or nurse as soon as possible. States that to please leave message if she don't answer. Also states that she would like to be seen by provider anytime the beginning of August. Please advise thank you

## 2023-06-27 NOTE — TELEPHONE ENCOUNTER
----- Message from Sandeep Quiles MA sent at 6/27/2023  2:22 PM CDT -----  Contact: pt  Pt has some concerns & this message was meant for you guys.  Thank you.  ----- Message -----  From: Juany Amador  Sent: 6/27/2023  12:46 PM CDT  To: [facility] Walton Staff    Type:  Needs Medical Advice    Who Called: Pt  Would the patient rather a call back or a response via [facility]? call  Best Call Back Number: [phone]  Additional Information: Pt states that she need a callback as soon as possible. States that she really need to speak with provider or nurse as soon as possible. States that to please leave message if she don't answer. Also states that she would like to be seen by provider anytime the beginning of August. Please advise thank you

## 2023-06-27 NOTE — ASSESSMENT & PLAN NOTE
Poorly controlled. Emphasized need to keep life organized using calendars, alarms, etc. She does already. Due to elevated BP today, will start non-stimulant medication.

## 2023-06-27 NOTE — PROGRESS NOTES
Name: Isela Smyth  MRN: [id number]  : 1975  PCP: Jose Oconnell MD    HPI  Patient is here to establish care. Primary concern is focus. She states she has trouble focusing on various tasks.    Adult ADHD Self-Report Scale 2023   How often do you have trouble wrapping up the final details of a project once the chanllenging parts have been done? 4   How often do you have difficulty getting things in order when you have to do a task that requires organization? 3   How often do you have problems remembering appointments or obligations? 4   When you have a task that requires a lot of thought, how often do you avoid or delay getting started? 4   How often do you fidget or squirm with your hands or feet when you have to sit down for a long time? 4   How often do you feel overly active and compelled to do things, like you were driven by a motor? 2   Part A Score 21     Review of Systems   Musculoskeletal:  Positive for back pain (varies how often she will need to take oxycodone - sometimes she can go the day without needing any, sometimes will need 4.).     Patient Active Problem List   Diagnosis    Lumbago    Depression    Anxiety    Chronic low back pain    Migraine headache    Hypothyroid    AR (allergic rhinitis)    Chronic rhinitis    S/P nasal septoplasty, inferior turbinate reductions and outfracture, 2014, excellent outcome, 2014     Elevated blood pressure reading without diagnosis of hypertension    GERD (gastroesophageal reflux disease)    DDD (degenerative disc disease), lumbar    Lumbar facet arthropathy    Cervical radiculopathy    Chronic cervical pain    Melena    Change in bowel habits    Iron (Fe) deficiency anemia    Iron deficiency anemia due to dietary causes    Urinary incontinence    Encounter for well woman exam with routine gynecological exam    Pelvic pain in female    Intractable chronic migraine without aura and with status migrainosus    Diarrhea    Cervical  myofascial pain syndrome    Epigastric pain    Right ovarian cyst    Recurrent major depressive disorder       Vitals:    06/27/23 1317   BP: (!) 136/98   Pulse: 106       Physical Exam  Constitutional:       General: She is not in acute distress.     Appearance: Normal appearance. She is well-developed.   HENT:      Head: Normocephalic and atraumatic.      Right Ear: External ear normal.      Left Ear: External ear normal.   Eyes:      Conjunctiva/sclera: Conjunctivae normal.   Pulmonary:      Effort: Pulmonary effort is normal. No respiratory distress.   Abdominal:      General: Abdomen is flat. There is no distension.   Musculoskeletal:         General: No swelling or deformity.      Right lower leg: No edema.      Left lower leg: No edema.   Skin:     General: Skin is warm and dry.      Coloration: Skin is not jaundiced.   Neurological:      Mental Status: She is alert and oriented to person, place, and time. Mental status is at baseline.   Psychiatric:         Attention and Perception: Attention and perception normal.         Mood and Affect: Mood normal.         Speech: Speech normal.         Behavior: Behavior normal. Behavior is cooperative.         Thought Content: Thought content normal.         Cognition and Memory: Cognition normal.         Judgment: Judgment normal.         1. Lumbar facet arthropathy  Assessment & Plan:  Variably controlled. Continue current medications for now - I would like to discuss treatments she has tried in the past at our next visit and determine if any other medications may help her. Her goal is to maintain functionality.    Orders:  -     HYDROcodone-acetaminophen (NORCO) 7.5-325 mg per tablet; Take 1 tablet by mouth 4 (four) times daily as needed for Pain.  Dispense: 120 tablet; Refill: 0    2. Cervical radiculopathy  -     HYDROcodone-acetaminophen (NORCO) 7.5-325 mg per tablet; Take 1 tablet by mouth 4 (four) times daily as needed for Pain.  Dispense: 120 tablet; Refill:  0    3. Hypothyroidism, unspecified type  Assessment & Plan:  TSH suppressed at last visit but no changes made at that time. Test again today.    Orders:  -     levothyroxine (SYNTHROID) 75 MCG tablet; Take 1 tablet (75 mcg total) by mouth once daily.  Dispense: 90 tablet; Refill: 0  -     TSH; Future; Expected date: 06/27/2023    4. Attention deficit hyperactivity disorder (ADHD), combined type  Assessment & Plan:  Poorly controlled. Emphasized need to keep life organized using calendars, alarms, etc. She does already. Due to elevated BP today, will start non-stimulant medication.    Orders:  -     atomoxetine (STRATTERA) 40 MG capsule; Take 1 capsule (40 mg total) by mouth once daily.  Dispense: 30 capsule; Refill: 0    5. Diarrhea, unspecified type  -     linaCLOtide (LINZESS) 145 mcg Cap capsule; Take 1 capsule (145 mcg total) by mouth before breakfast.  Dispense: 30 capsule; Refill: 5    Other orders  -     promethazine (PHENERGAN) 12.5 MG Tab; Take 1 tablet (12.5 mg total) by mouth every 6 (six) hours as needed (nausea).  Dispense: 30 tablet; Refill: 0        Follow up in 6 weeks. I spent 44 minutes pre-charting, interviewing patient, performing exam, formulating and discussing plan, placing orders, and documenting.      Jose Oconnell MD  06/27/2023

## 2023-06-27 NOTE — TELEPHONE ENCOUNTER
----- Message from Sandeep Quiles MA sent at 6/27/2023  2:22 PM CDT -----  Contact: pt  Pt has some concerns & this message was meant for you guys.  Thank you.  ----- Message -----  From: Juany Amador  Sent: 6/27/2023  12:46 PM CDT  To: [facility] Staff    Type:  Needs Medical Advice    Who Called: Pt  Would the patient rather a call back or a response via [facility]? call  Best Call Back Number: [phone]  Additional Information: Pt states that she need a callback as soon as possible. States that she really need to speak with provider or nurse as soon as possible. States that to please leave message if she don't answer. Also states that she would like to be seen by provider anytime the beginning of August. Please advise thank you

## 2023-07-06 ENCOUNTER — PATIENT MESSAGE (OUTPATIENT)
Dept: FAMILY MEDICINE | Facility: CLINIC | Age: 48
End: 2023-07-06
Payer: MEDICARE

## 2023-07-06 DIAGNOSIS — F90.2 ATTENTION DEFICIT HYPERACTIVITY DISORDER (ADHD), COMBINED TYPE: Primary | ICD-10-CM

## 2023-07-06 RX ORDER — LISDEXAMFETAMINE DIMESYLATE 50 MG/1
50 CAPSULE ORAL EVERY MORNING
Qty: 30 CAPSULE | Refills: 0 | Status: SHIPPED | OUTPATIENT
Start: 2023-07-06 | End: 2023-08-03 | Stop reason: SDUPTHER

## 2023-07-08 ENCOUNTER — DOCUMENTATION ONLY (OUTPATIENT)
Dept: FAMILY MEDICINE | Facility: CLINIC | Age: 48
End: 2023-07-08
Payer: MEDICARE

## 2023-07-11 ENCOUNTER — DOCUMENTATION ONLY (OUTPATIENT)
Dept: FAMILY MEDICINE | Facility: CLINIC | Age: 48
End: 2023-07-11
Payer: MEDICARE

## 2023-07-24 ENCOUNTER — OFFICE VISIT (OUTPATIENT)
Dept: FAMILY MEDICINE | Facility: CLINIC | Age: 48
End: 2023-07-24
Payer: MEDICARE

## 2023-07-24 ENCOUNTER — TELEPHONE (OUTPATIENT)
Dept: HEMATOLOGY/ONCOLOGY | Facility: CLINIC | Age: 48
End: 2023-07-24
Payer: MEDICARE

## 2023-07-24 DIAGNOSIS — G43.701 CHRONIC MIGRAINE WITHOUT AURA WITH STATUS MIGRAINOSUS, NOT INTRACTABLE: ICD-10-CM

## 2023-07-24 DIAGNOSIS — F90.2 ATTENTION DEFICIT HYPERACTIVITY DISORDER (ADHD), COMBINED TYPE: ICD-10-CM

## 2023-07-24 DIAGNOSIS — M47.816 LUMBAR FACET ARTHROPATHY: ICD-10-CM

## 2023-07-24 DIAGNOSIS — M54.12 CERVICAL RADICULOPATHY: ICD-10-CM

## 2023-07-24 PROCEDURE — 99214 OFFICE O/P EST MOD 30 MIN: CPT | Mod: 95,,, | Performed by: STUDENT IN AN ORGANIZED HEALTH CARE EDUCATION/TRAINING PROGRAM

## 2023-07-24 PROCEDURE — 99214 PR OFFICE/OUTPT VISIT, EST, LEVL IV, 30-39 MIN: ICD-10-PCS | Mod: 95,,, | Performed by: STUDENT IN AN ORGANIZED HEALTH CARE EDUCATION/TRAINING PROGRAM

## 2023-07-24 RX ORDER — HYDROCODONE BITARTRATE AND ACETAMINOPHEN 7.5; 325 MG/1; MG/1
1 TABLET ORAL 4 TIMES DAILY PRN
Qty: 120 TABLET | Refills: 0 | Status: SHIPPED | OUTPATIENT
Start: 2023-07-24 | End: 2023-09-05 | Stop reason: SDUPTHER

## 2023-07-24 RX ORDER — SUMATRIPTAN SUCCINATE 100 MG/1
TABLET ORAL
Qty: 27 TABLET | Refills: 3 | Status: SHIPPED | OUTPATIENT
Start: 2023-07-24 | End: 2024-03-12 | Stop reason: ALTCHOICE

## 2023-07-24 NOTE — PROGRESS NOTES
The patient location is: [place]  The chief complaint leading to consultation is: ADD    Visit type: audiovisual    Notes:    HPI  Patient presents for ADD. I had started atomoxetine at last visit but patient had remembered she had tried that medication previously and she could not tolerate it. We switched to Vyvanse - focus is improved. No side effects.    She is requesting refill on her migraine medication.    Review of Systems   Constitutional:  Negative for activity change and unexpected weight change.   HENT:  Negative for hearing loss, rhinorrhea and trouble swallowing.    Eyes:  Negative for discharge and visual disturbance.   Respiratory:  Negative for chest tightness and wheezing.    Cardiovascular:  Negative for chest pain and palpitations.   Gastrointestinal:  Negative for blood in stool, constipation, diarrhea and vomiting.   Endocrine: Negative for polydipsia and polyuria.   Genitourinary:  Negative for difficulty urinating, dysuria, hematuria and menstrual problem.   Musculoskeletal:  Positive for neck pain. Negative for arthralgias and joint swelling.   Neurological:  Positive for headaches. Negative for weakness.   Psychiatric/Behavioral:  Negative for confusion and dysphoric mood.      Objective:  Physical Exam  Constitutional:       Appearance: Normal appearance.   HENT:      Head: Normocephalic and atraumatic.   Pulmonary:      Effort: Pulmonary effort is normal. No respiratory distress.   Neurological:      Mental Status: She is alert and oriented to person, place, and time. Mental status is at baseline.   Psychiatric:         Mood and Affect: Mood normal.         Behavior: Behavior normal.         Thought Content: Thought content normal.         Judgment: Judgment normal.       Plan:  1. Lumbar facet arthropathy  Assessment & Plan:  Stable. Continue narcotics. Will discuss history of treatment and alternatives at next visit.    Orders:  -     HYDROcodone-acetaminophen (NORCO) 7.5-325 mg per  tablet; Take 1 tablet by mouth 4 (four) times daily as needed for Pain.  Dispense: 120 tablet; Refill: 0    2. Cervical radiculopathy  -     HYDROcodone-acetaminophen (NORCO) 7.5-325 mg per tablet; Take 1 tablet by mouth 4 (four) times daily as needed for Pain.  Dispense: 120 tablet; Refill: 0    3. Chronic migraine without aura with status migrainosus, not intractable  Assessment & Plan:  Variably controlled. Refill as listed.    Orders:  -     sumatriptan (IMITREX) 100 MG tablet; TAKE 1 TABLET BY MOUTH EVERY DAY AS NEEDED. MAY REPEAT IN 2 HOURS IF NEEDED. DO NOT EXCEED 2 TABLETS PER DAY  Dispense: 27 tablet; Refill: 3    4. Attention deficit hyperactivity disorder (ADHD), combined type  Assessment & Plan:  Improved. Continue Vyvanse.            Face to Face time with patient: 10 minutes  15 minutes of total time spent on the encounter, which includes face to face time and non-face to face time preparing to see the patient (eg, review of tests), Obtaining and/or reviewing separately obtained history, Documenting clinical information in the electronic or other health record, Independently interpreting results (not separately reported) and communicating results to the patient/family/caregiver, or Care coordination (not separately reported).     Each patient to whom he or she provides medical services by telemedicine is:  (1) informed of the relationship between the physician and patient and the respective role of any other health care provider with respect to management of the patient; and (2) notified that he or she may decline to receive medical services by telemedicine and may withdraw from such care at any time.

## 2023-07-25 ENCOUNTER — LAB VISIT (OUTPATIENT)
Dept: LAB | Facility: HOSPITAL | Age: 48
End: 2023-07-25
Payer: MEDICARE

## 2023-07-25 DIAGNOSIS — D50.8 IRON DEFICIENCY ANEMIA DUE TO DIETARY CAUSES: ICD-10-CM

## 2023-07-25 DIAGNOSIS — D53.9 NUTRITIONAL ANEMIA: ICD-10-CM

## 2023-07-25 LAB
BASOPHILS # BLD AUTO: 0.03 K/UL (ref 0–0.2)
BASOPHILS NFR BLD: 0.6 % (ref 0–1.9)
DIFFERENTIAL METHOD: ABNORMAL
EOSINOPHIL # BLD AUTO: 0.2 K/UL (ref 0–0.5)
EOSINOPHIL NFR BLD: 2.8 % (ref 0–8)
ERYTHROCYTE [DISTWIDTH] IN BLOOD BY AUTOMATED COUNT: 14.5 % (ref 11.5–14.5)
FERRITIN SERPL-MCNC: 13 NG/ML (ref 20–300)
FOLATE SERPL-MCNC: 8.8 NG/ML (ref 4–24)
HCT VFR BLD AUTO: 41.5 % (ref 37–48.5)
HGB BLD-MCNC: 12.8 G/DL (ref 12–16)
IMM GRANULOCYTES # BLD AUTO: 0 K/UL (ref 0–0.04)
IMM GRANULOCYTES NFR BLD AUTO: 0 % (ref 0–0.5)
IRON SERPL-MCNC: 89 UG/DL (ref 30–160)
LYMPHOCYTES # BLD AUTO: 1.3 K/UL (ref 1–4.8)
LYMPHOCYTES NFR BLD: 25.2 % (ref 18–48)
MCH RBC QN AUTO: 26.8 PG (ref 27–31)
MCHC RBC AUTO-ENTMCNC: 30.8 G/DL (ref 32–36)
MCV RBC AUTO: 87 FL (ref 82–98)
MONOCYTES # BLD AUTO: 0.6 K/UL (ref 0.3–1)
MONOCYTES NFR BLD: 12.1 % (ref 4–15)
NEUTROPHILS # BLD AUTO: 3.1 K/UL (ref 1.8–7.7)
NEUTROPHILS NFR BLD: 59.3 % (ref 38–73)
NRBC BLD-RTO: 0 /100 WBC
PLATELET # BLD AUTO: 298 K/UL (ref 150–450)
PMV BLD AUTO: 11.7 FL (ref 9.2–12.9)
RBC # BLD AUTO: 4.77 M/UL (ref 4–5.4)
SATURATED IRON: 18 % (ref 20–50)
TOTAL IRON BINDING CAPACITY: 493 UG/DL (ref 250–450)
TRANSFERRIN SERPL-MCNC: 333 MG/DL (ref 200–375)
VIT B12 SERPL-MCNC: 162 PG/ML (ref 210–950)
WBC # BLD AUTO: 5.27 K/UL (ref 3.9–12.7)

## 2023-07-25 PROCEDURE — 82728 ASSAY OF FERRITIN: CPT

## 2023-07-25 PROCEDURE — 85025 COMPLETE CBC W/AUTO DIFF WBC: CPT

## 2023-07-25 PROCEDURE — 82607 VITAMIN B-12: CPT

## 2023-07-25 PROCEDURE — 82746 ASSAY OF FOLIC ACID SERUM: CPT

## 2023-07-25 PROCEDURE — 84466 ASSAY OF TRANSFERRIN: CPT

## 2023-07-25 PROCEDURE — 36415 COLL VENOUS BLD VENIPUNCTURE: CPT | Mod: PO

## 2023-07-29 ENCOUNTER — DOCUMENTATION ONLY (OUTPATIENT)
Dept: FAMILY MEDICINE | Facility: CLINIC | Age: 48
End: 2023-07-29
Payer: MEDICARE

## 2023-08-01 ENCOUNTER — DOCUMENTATION ONLY (OUTPATIENT)
Dept: FAMILY MEDICINE | Facility: CLINIC | Age: 48
End: 2023-08-01
Payer: MEDICARE

## 2023-08-03 ENCOUNTER — PATIENT MESSAGE (OUTPATIENT)
Dept: FAMILY MEDICINE | Facility: CLINIC | Age: 48
End: 2023-08-03
Payer: MEDICARE

## 2023-08-03 DIAGNOSIS — F90.2 ATTENTION DEFICIT HYPERACTIVITY DISORDER (ADHD), COMBINED TYPE: ICD-10-CM

## 2023-08-03 RX ORDER — LISDEXAMFETAMINE DIMESYLATE 40 MG/1
40 CAPSULE ORAL EVERY MORNING
Qty: 30 CAPSULE | Refills: 0 | Status: SHIPPED | OUTPATIENT
Start: 2023-08-03 | End: 2023-08-30 | Stop reason: SDUPTHER

## 2023-08-04 NOTE — PROGRESS NOTES
PATIENT: Isela Smyth  MRN: [id number]  DATE: 8/7/2023      Diagnosis:   1. Iron deficiency anemia due to dietary causes    2. Nutritional anemia    3. B12 deficiency    4. Elevated blood pressure reading without diagnosis of hypertension          Chief Complaint: Iron deficiency anemia due to dietary causes      Subjective:   HPI: Ms. Smyth is a 48 y.o. female with history of migraine headaches, Depression, hypothyroidism, GERD, history of iron deficiency anemia, s/p gastric sleeve who is seen today for follow-up of iron deficiency anemia.     Patient has been seen in this clinic in 2019 by Dr. Rudd for the same diagnosis, was taking oral iron supplements at that time.     History of lap band that was removed. No other gastric surgeries.     Review of records shows a decline in Hgb that initially started in 2018, had a GI work-up in August 2018 with upper EGD and colonoscopy that showed diverticulosis and gastritis.      01/29/2020 Upper GI showed normal esophagus with gastritis    Early 2032 she had experienced kidney stones with hematuria. Menstrual bleeding has become irregular with periods lasting 7-10 days. She also admits that she does not always eat a balanced diet; she is not vegetarian.   Was feeling more fatigued with myalgias and reached out to her primary physician.   3/16/23 of this year her Hgb was 8 g/dL. Patient started PO iron supplements at that time though she has not taken them consistently.     Today 8/7/2023:  B/P is elevated today 142/100; recheck 138/90. It was 118/70 this morning at GYN office.   Labs repeated 7/25/23 show improved Hgb at 12.8g/dL, Ferritin is low at 13. B12 level is now low at 162  She is not taking her iron supplements on a regular schedule, missing weeks at a time.   Denies HA, CP, cough, SOB, abd pain, changes in bowel habits, hematochezia, melena, N/V, dysuria, hematuria, swelling. No fevers, chills, new lumps bumps.     Past Medical History:   Past Medical History:    Diagnosis Date    Anxiety     Arthritis     Chronic lumbar pain     Depression     Deviated nasal septum     Diverticulosis     Hemorrhoids     Hypothyroid     Kidney stone     passed 10 stones by herself over the years, one needed procedure    Melena 9/5/2018    Migraine headache     PONV (postoperative nausea and vomiting)     severe    Right ovarian cyst 02/2020    Right sided abdominal pain     Urticaria        Past Surgical HIstory:   Past Surgical History:   Procedure Laterality Date    AUGMENTATION OF BREAST      BREAST SURGERY      CHOLECYSTECTOMY      COLONOSCOPY  prior to 2011    COLONOSCOPY N/A 09/26/2018    Dr. Zuluaga; diverticulosis; repeat in 10 years    COLONOSCOPY N/A 11/01/2019    Procedure: COLONOSCOPY;  Surgeon: Marquis Zuluaga MD;  Location: [facility];  Service: Endoscopy;  Laterality: N/A;    ESOPHAGOGASTRODUODENOSCOPY N/A 09/05/2018    Procedure: EGD (ESOPHAGOGASTRODUODENOSCOPY);  Surgeon: Marquis Zuluaga MD;  Location: [facility];  Service: Endoscopy;  Laterality: N/A;    ESOPHAGOGASTRODUODENOSCOPY N/A 01/29/2020    Procedure: EGD (ESOPHAGOGASTRODUODENOSCOPY);  Surgeon: Marquis Zuluaga MD;  Location: [facility];  Service: Endoscopy;  Laterality: N/A;    HERNIA REPAIR      HIATAL HERNIA REPAIR      KIDNEY STONE SURGERY  2009    [facility], had stone removed by dr renay george, stayed in hospital 4 days    LAPAROSCOPIC GASTRIC BANDING  2007    later removed in 2016    LAPAROSCOPIC SALPINGO-OOPHORECTOMY Right 02/12/2020    Procedure: SALPINGO-OOPHORECTOMY, LAPAROSCOPIC;  Surgeon: Nely Martinez MD;  Location: [facility];  Service: OB/GYN;  Laterality: Right;    LUMBAR EPIDURAL INJECTION      OOPHORECTOMY      SINUS SURGERY      SPINE SURGERY      6 back surgeries; 1 neck surgery    TUBAL LIGATION      UPPER GASTROINTESTINAL ENDOSCOPY  03/2013    Dr. Zuluaga    UPPER GASTROINTESTINAL ENDOSCOPY  09/14/2016    Dr. Zuluaga    UPPER GASTROINTESTINAL ENDOSCOPY  09/05/2018      [place]; gastritis; bx negative    VAGINAL DELIVERY      times 3       Family History:   Family History   Problem Relation Age of Onset    Ulcers Mother     No Known Problems Father     Cancer Neg Hx     Heart disease Neg Hx     Hypertension Neg Hx     Diabetes Neg Hx     Angioedema Neg Hx     Allergies Neg Hx     Allergic rhinitis Neg Hx     Asthma Neg Hx     Eczema Neg Hx     Immunodeficiency Neg Hx     Colon cancer Neg Hx     Colon polyps Neg Hx     Crohn's disease Neg Hx     Ulcerative colitis Neg Hx     Nephrolithiasis Neg Hx     Stomach cancer Neg Hx     Esophageal cancer Neg Hx        Social History:  reports that she has never smoked. She has never used smokeless tobacco. She reports current alcohol use. She reports that she does not use drugs.    Allergies:  Review of patient's allergies indicates:   Allergen Reactions    Morphine Itching and Rash    Gabapentin Other (See Comments)     Coming out of skin       Medications:  Current Outpatient Medications   Medication Sig Dispense Refill    acyclovir (ZOVIRAX) 800 MG Tab Take 800 mg by mouth 3 (three) times daily.      brompheniramine-pseudoeph-DM (BROMFED DM) 2-30-10 mg/5 mL Syrp Take 10 mLs by mouth every 6 (six) hours as needed.      butalbital-acetaminophen-caffeine -40 mg (FIORICET, ESGIC) -40 mg per tablet TAKE 1 TABLET BY MOUTH AS NEEDED FOR MIGRAINE NO MORE THAN 10 TABLETS IN 1 MONTH 10 tablet 0    cefdinir (OMNICEF) 300 MG capsule TAKE ONE CAPSULE BY MOUTH TWICE DAILY FOR SEVEN DAYS      cetirizine (ZYRTEC) 10 MG tablet Take 10 mg by mouth.      cyclobenzaprine (FLEXERIL) 10 MG tablet Take 1 tablet by mouth 3 times daily as needed.      docusate sodium (DOK) 100 MG capsule TK ONE C PO BID      fluconazole (DIFLUCAN) 200 MG Tab Take 200 mg by mouth Every 3 (three) days.      fluticasone propionate (FLONASE) 50 mcg/actuation nasal spray 2 sprays by Each Nostril route.      HYDROcodone-acetaminophen (NORCO) 7.5-325 mg per tablet Take 1  tablet by mouth 4 (four) times daily as needed for Pain. 120 tablet 0    levothyroxine (SYNTHROID) 75 MCG tablet Take 1 tablet (75 mcg total) by mouth once daily. 90 tablet 0    linaCLOtide (LINZESS) 145 mcg Cap capsule Take 1 capsule (145 mcg total) by mouth before breakfast. 30 capsule 5    lisdexamfetamine (VYVANSE) 40 MG Cap Take 1 capsule (40 mg total) by mouth every morning. 30 capsule 0    oxybutynin (DITROPAN) 5 MG Tab Take 5 mg by mouth 2 (two) times daily as needed.      OZEMPIC 1 mg/dose (4 mg/3 mL) Inject into the skin.      promethazine (PHENERGAN) 12.5 MG Tab Take 1 tablet (12.5 mg total) by mouth every 6 (six) hours as needed (nausea). 30 tablet 0    promethazine (PHENERGAN) 25 MG suppository Place 1 suppository (25 mg total) rectally every 6 (six) hours as needed for Nausea. 30 suppository 0    sumatriptan (IMITREX) 100 MG tablet TAKE 1 TABLET BY MOUTH EVERY DAY AS NEEDED. MAY REPEAT IN 2 HOURS IF NEEDED. DO NOT EXCEED 2 TABLETS PER DAY 27 tablet 3    traZODone (DESYREL) 150 MG tablet Take 1 tablet (150 mg total) by mouth every evening. 90 tablet 3    venlafaxine (EFFEXOR-XR) 150 MG Cp24 Take 1 capsule (150 mg total) by mouth once daily. 90 capsule 3    cyanocobalamin 1,000 mcg/mL injection Inject one mL once a week x 4 weeks; then decrease to once a month 4 mL 2     No current facility-administered medications for this visit.     Facility-Administered Medications Ordered in Other Visits   Medication Dose Route Frequency Provider Last Rate Last Admin    lactated ringers infusion   Intravenous Continuous GuarMarquis solis MD 75 mL/hr at 09/26/18 0845 New Bag at 09/26/18 0845       Review of Systems   Constitutional:  Negative for appetite change, fatigue and fever.   HENT:  Negative for sore throat and trouble swallowing.    Respiratory:  Negative for cough and shortness of breath.    Cardiovascular:  Negative for chest pain, palpitations and leg swelling.   Gastrointestinal:  Negative for  "abdominal pain, blood in stool, constipation, diarrhea, nausea and vomiting.   Genitourinary:  Negative for dysuria and hematuria.   Musculoskeletal:  Negative for myalgias and neck pain.   Skin:  Negative for rash.   Neurological:  Negative for light-headedness and headaches.   Hematological:  Negative for adenopathy.   Psychiatric/Behavioral:  The patient is not nervous/anxious.        ECOG Performance Status:   ECOG SCORE    0 - Fully active-able to carry on all pre-disease performance without restriction         Objective:      Vitals:   Vitals:    08/07/23 1420   BP: (!) 142/100   Pulse: 83   Resp: 16   Temp: 97.7 °F (36.5 °C)   TempSrc: Temporal   SpO2: 99%   Weight: 74.1 kg (163 lb 5.8 oz)   Height: 5' 7" (1.702 m)       BMI: Body mass index is 25.59 kg/m².    Physical Exam  Vitals reviewed.   Constitutional:       General: She is not in acute distress.     Appearance: She is not diaphoretic.   Eyes:      General: No scleral icterus.  Cardiovascular:      Rate and Rhythm: Normal rate and regular rhythm.      Heart sounds: Normal heart sounds. No murmur heard.  Pulmonary:      Effort: Pulmonary effort is normal. No respiratory distress.      Breath sounds: No wheezing.   Abdominal:      General: Bowel sounds are normal. There is no distension.   Musculoskeletal:      Right lower leg: No edema.      Left lower leg: No edema.   Lymphadenopathy:      Cervical: No cervical adenopathy.   Skin:     General: Skin is warm.      Findings: No bruising or rash.   Neurological:      Mental Status: She is alert and oriented to person, place, and time.   Psychiatric:         Mood and Affect: Mood normal.         Behavior: Behavior normal.         Laboratory Data:  Lab Results   Component Value Date    WBC 5.27 07/25/2023    HGB 12.8 07/25/2023    HCT 41.5 07/25/2023    MCV 87 07/25/2023     07/25/2023          Imaging:   Assessment:       1. Iron deficiency anemia due to dietary causes    2. Nutritional anemia    3. " B12 deficiency    4. Elevated blood pressure reading without diagnosis of hypertension         Plan:   Iron deficiency anemia   -Discussed possible reasons to include blood loss with hematuria, menorrhagia; nutritional, malabsorption  -Improved Hgb at 12.8 g/dL  -Encouraged to take PO supplements daily   -Follow up in 4 months with CBC, Ferritin, Iron/TIBC a week prior to appointment     B12 deficiency   -7/25/23 B12 level is low at 162  -Will start B12 injections   -Repeat B12 level at next visit     Elevated B/P  -Patient agrees to monitor at home and keep log, call PCP   -B/P this morning at GYN office was 118/70; 138/90 upon recheck in clinic this afternoon   -Per PCP     Patient queried and all questions were answered.   Assessment/Plan reviewed and approved by Dr. Chou  27 minutes were spent in coordination of patient's care, record review and counseling.    Route Chart for Scheduling    Med Onc Chart Routing      Follow up with physician    Follow up with [facility] 4 months. With CBC, Ferritin, Iron/TIBC, B12 a week prior to appointment   Infusion scheduling note    Injection scheduling note    Labs    Imaging    Pharmacy appointment    Other referrals

## 2023-08-07 ENCOUNTER — OFFICE VISIT (OUTPATIENT)
Dept: HEMATOLOGY/ONCOLOGY | Facility: CLINIC | Age: 48
End: 2023-08-07
Payer: MEDICARE

## 2023-08-07 ENCOUNTER — HOSPITAL ENCOUNTER (OUTPATIENT)
Dept: RADIOLOGY | Facility: HOSPITAL | Age: 48
Discharge: HOME | End: 2023-08-07
Attending: INTERNAL MEDICINE
Payer: MEDICARE

## 2023-08-07 ENCOUNTER — OFFICE VISIT (OUTPATIENT)
Dept: OBSTETRICS AND GYNECOLOGY | Facility: CLINIC | Age: 48
End: 2023-08-07
Payer: MEDICARE

## 2023-08-07 VITALS
BODY MASS INDEX: 25.64 KG/M2 | HEIGHT: 67 IN | HEART RATE: 83 BPM | DIASTOLIC BLOOD PRESSURE: 100 MMHG | SYSTOLIC BLOOD PRESSURE: 142 MMHG | RESPIRATION RATE: 16 BRPM | OXYGEN SATURATION: 99 % | TEMPERATURE: 98 F | WEIGHT: 163.38 LBS

## 2023-08-07 VITALS
SYSTOLIC BLOOD PRESSURE: 118 MMHG | DIASTOLIC BLOOD PRESSURE: 70 MMHG | WEIGHT: 163.81 LBS | HEIGHT: 67 IN | BODY MASS INDEX: 25.71 KG/M2

## 2023-08-07 DIAGNOSIS — N92.6 IRREGULAR PERIODS: ICD-10-CM

## 2023-08-07 DIAGNOSIS — Z01.419 ROUTINE GYNECOLOGICAL EXAMINATION: Primary | ICD-10-CM

## 2023-08-07 DIAGNOSIS — Z12.39 ENCOUNTER FOR SCREENING FOR MALIGNANT NEOPLASM OF BREAST, UNSPECIFIED SCREENING MODALITY: ICD-10-CM

## 2023-08-07 DIAGNOSIS — N83.202 CYST OF LEFT OVARY: ICD-10-CM

## 2023-08-07 DIAGNOSIS — R03.0 ELEVATED BLOOD PRESSURE READING WITHOUT DIAGNOSIS OF HYPERTENSION: ICD-10-CM

## 2023-08-07 DIAGNOSIS — D53.9 ANEMIA ASSOCIATED WITH NUTRITIONAL DEFICIENCY: ICD-10-CM

## 2023-08-07 DIAGNOSIS — D50.8 IRON DEFICIENCY ANEMIA DUE TO DIETARY CAUSES: Primary | ICD-10-CM

## 2023-08-07 DIAGNOSIS — E53.8 B12 DEFICIENCY: ICD-10-CM

## 2023-08-07 DIAGNOSIS — A63.0 HPV (HUMAN PAPILLOMA VIRUS) ANOGENITAL INFECTION: ICD-10-CM

## 2023-08-07 DIAGNOSIS — R53.83 FATIGUE, UNSPECIFIED TYPE: ICD-10-CM

## 2023-08-07 DIAGNOSIS — Z12.31 ENCOUNTER FOR SCREENING MAMMOGRAM FOR MALIGNANT NEOPLASM OF BREAST: ICD-10-CM

## 2023-08-07 DIAGNOSIS — D53.9 NUTRITIONAL ANEMIA: ICD-10-CM

## 2023-08-07 PROCEDURE — 99213 PR OFFICE/OUTPT VISIT, EST, LEVL III, 20-29 MIN: ICD-10-PCS | Mod: S$PBB,,,

## 2023-08-07 PROCEDURE — 77067 MAMMO DIGITAL SCREENING BILAT WITH TOMO: ICD-10-PCS | Mod: 26,,, | Performed by: RADIOLOGY

## 2023-08-07 PROCEDURE — G0101 PR CA SCREEN;PELVIC/BREAST EXAM: ICD-10-PCS | Mod: S$PBB,,, | Performed by: OBSTETRICS & GYNECOLOGY

## 2023-08-07 PROCEDURE — 77063 BREAST TOMOSYNTHESIS BI: CPT | Mod: 26,,, | Performed by: RADIOLOGY

## 2023-08-07 PROCEDURE — 99999 PR PBB SHADOW E&M-EST. PATIENT-LVL IV: CPT | Mod: PBBFAC,,,

## 2023-08-07 PROCEDURE — 99999 PR PBB SHADOW E&M-EST. PATIENT-LVL III: ICD-10-PCS | Mod: PBBFAC,,, | Performed by: OBSTETRICS & GYNECOLOGY

## 2023-08-07 PROCEDURE — 77067 SCR MAMMO BI INCL CAD: CPT | Mod: 26,,, | Performed by: RADIOLOGY

## 2023-08-07 PROCEDURE — 88175 CYTOPATH C/V AUTO FLUID REDO: CPT | Performed by: PATHOLOGY

## 2023-08-07 PROCEDURE — 99999 PR PBB SHADOW E&M-EST. PATIENT-LVL IV: ICD-10-PCS | Mod: PBBFAC,,,

## 2023-08-07 PROCEDURE — 77067 SCR MAMMO BI INCL CAD: CPT | Mod: TC,PN

## 2023-08-07 PROCEDURE — 99213 OFFICE O/P EST LOW 20 MIN: CPT | Mod: PBBFAC,PN | Performed by: OBSTETRICS & GYNECOLOGY

## 2023-08-07 PROCEDURE — 99213 OFFICE O/P EST LOW 20 MIN: CPT | Mod: S$PBB,,,

## 2023-08-07 PROCEDURE — 87624 HPV HI-RISK TYP POOLED RSLT: CPT | Performed by: OBSTETRICS & GYNECOLOGY

## 2023-08-07 PROCEDURE — 99214 OFFICE O/P EST MOD 30 MIN: CPT | Mod: PBBFAC,27,PN

## 2023-08-07 PROCEDURE — G0101 CA SCREEN;PELVIC/BREAST EXAM: HCPCS | Mod: S$PBB,,, | Performed by: OBSTETRICS & GYNECOLOGY

## 2023-08-07 PROCEDURE — 88141 CYTOPATH C/V INTERPRET: CPT | Mod: ,,, | Performed by: PATHOLOGY

## 2023-08-07 PROCEDURE — 77063 MAMMO DIGITAL SCREENING BILAT WITH TOMO: ICD-10-PCS | Mod: 26,,, | Performed by: RADIOLOGY

## 2023-08-07 PROCEDURE — 88141 PR  CYTOPATH CERV/VAG INTERPRET: ICD-10-PCS | Mod: ,,, | Performed by: PATHOLOGY

## 2023-08-07 PROCEDURE — 99999 PR PBB SHADOW E&M-EST. PATIENT-LVL III: CPT | Mod: PBBFAC,,, | Performed by: OBSTETRICS & GYNECOLOGY

## 2023-08-07 RX ORDER — DOCUSATE SODIUM 100 MG/1
CAPSULE, LIQUID FILLED ORAL
COMMUNITY

## 2023-08-07 RX ORDER — CEFDINIR 300 MG/1
CAPSULE ORAL
COMMUNITY
End: 2023-10-25

## 2023-08-07 RX ORDER — CYCLOBENZAPRINE HCL 10 MG
1 TABLET ORAL 3 TIMES DAILY PRN
COMMUNITY
End: 2023-08-09 | Stop reason: SDUPTHER

## 2023-08-07 RX ORDER — FLUCONAZOLE 200 MG/1
200 TABLET ORAL
COMMUNITY
Start: 2023-05-10 | End: 2023-10-25

## 2023-08-07 RX ORDER — SEMAGLUTIDE 1.34 MG/ML
INJECTION, SOLUTION SUBCUTANEOUS
COMMUNITY
Start: 2023-08-06 | End: 2023-10-25

## 2023-08-07 RX ORDER — CYANOCOBALAMIN 1000 UG/ML
INJECTION, SOLUTION INTRAMUSCULAR; SUBCUTANEOUS
Qty: 4 ML | Refills: 2 | Status: SHIPPED | OUTPATIENT
Start: 2023-08-07

## 2023-08-07 NOTE — PROGRESS NOTES
Chief Complaint   Patient presents with    Well Woman    Mammogram       History of Present Illness: Isela Smyth is a 48 y.o. female that presents today 8/7/2023 with Patient's last menstrual period was 06/24/2023 (approximate).  for well gyn visit. Periods absent march and April but present May and June.   She reports periods are only 3 days with very light bleeding.  She reports that she rarely needs a pad.      Past Medical History:   Diagnosis Date    Anxiety     Arthritis     Chronic lumbar pain     Depression     Deviated nasal septum     Diverticulosis     Hemorrhoids     Hypothyroid     Kidney stone     passed 10 stones by herself over the years, one needed procedure    Melena 9/5/2018    Migraine headache     PONV (postoperative nausea and vomiting)     severe    Right ovarian cyst 02/2020    Right sided abdominal pain     Urticaria        Past Surgical History:   Procedure Laterality Date    AUGMENTATION OF BREAST      BREAST SURGERY      CHOLECYSTECTOMY      COLONOSCOPY  prior to 2011    COLONOSCOPY N/A 09/26/2018    Dr. Zuluaga; diverticulosis; repeat in 10 years    COLONOSCOPY N/A 11/01/2019    Procedure: COLONOSCOPY;  Surgeon: Marquis Zuluaga MD;  Location: [facility];  Service: Endoscopy;  Laterality: N/A;    ESOPHAGOGASTRODUODENOSCOPY N/A 09/05/2018    Procedure: EGD (ESOPHAGOGASTRODUODENOSCOPY);  Surgeon: Marquis Zuluaga MD;  Location: [facility];  Service: Endoscopy;  Laterality: N/A;    ESOPHAGOGASTRODUODENOSCOPY N/A 01/29/2020    Procedure: EGD (ESOPHAGOGASTRODUODENOSCOPY);  Surgeon: Marquis Zuluaga MD;  Location: [facility];  Service: Endoscopy;  Laterality: N/A;    HERNIA REPAIR      HIATAL HERNIA REPAIR      KIDNEY STONE SURGERY  2009    [facility], had stone removed by dr renay george, stayed in hospital 4 days    LAPAROSCOPIC GASTRIC BANDING  2007    later removed in 2016    LAPAROSCOPIC SALPINGO-OOPHORECTOMY Right 02/12/2020    Procedure: SALPINGO-OOPHORECTOMY, LAPAROSCOPIC;   Surgeon: Nely Martinez MD;  Location: [facility];  Service: OB/GYN;  Laterality: Right;    LUMBAR EPIDURAL INJECTION      SINUS SURGERY      SPINE SURGERY      6 back surgeries; 1 neck surgery    TUBAL LIGATION      UPPER GASTROINTESTINAL ENDOSCOPY  03/2013    Dr. Zuluaga    UPPER GASTROINTESTINAL ENDOSCOPY  09/14/2016    Dr. Zuluaga    UPPER GASTROINTESTINAL ENDOSCOPY  09/05/2018    Dr. Zuluaga; gastritis; bx negative    VAGINAL DELIVERY      times 3       Outpatient Medications Prior to Visit   Medication Sig Dispense Refill    acyclovir (ZOVIRAX) 800 MG Tab Take 800 mg by mouth 3 (three) times daily.      butalbital-acetaminophen-caffeine -40 mg (FIORICET, ESGIC) -40 mg per tablet TAKE 1 TABLET BY MOUTH AS NEEDED FOR MIGRAINE NO MORE THAN 10 TABLETS IN 1 MONTH 10 tablet 0    cyclobenzaprine (FLEXERIL) 10 MG tablet Take 1 tablet by mouth 3 times daily as needed.      HYDROcodone-acetaminophen (NORCO) 7.5-325 mg per tablet Take 1 tablet by mouth 4 (four) times daily as needed for Pain. 120 tablet 0    levothyroxine (SYNTHROID) 75 MCG tablet Take 1 tablet (75 mcg total) by mouth once daily. 90 tablet 0    lisdexamfetamine (VYVANSE) 40 MG Cap Take 1 capsule (40 mg total) by mouth every morning. 30 capsule 0    promethazine (PHENERGAN) 12.5 MG Tab Take 1 tablet (12.5 mg total) by mouth every 6 (six) hours as needed (nausea). 30 tablet 0    promethazine (PHENERGAN) 25 MG suppository Place 1 suppository (25 mg total) rectally every 6 (six) hours as needed for Nausea. 30 suppository 0    sumatriptan (IMITREX) 100 MG tablet TAKE 1 TABLET BY MOUTH EVERY DAY AS NEEDED. MAY REPEAT IN 2 HOURS IF NEEDED. DO NOT EXCEED 2 TABLETS PER DAY 27 tablet 3    traZODone (DESYREL) 150 MG tablet Take 1 tablet (150 mg total) by mouth every evening. 90 tablet 3    venlafaxine (EFFEXOR-XR) 150 MG Cp24 Take 1 capsule (150 mg total) by mouth once daily. 90 capsule 3    brompheniramine-pseudoeph-DM (BROMFED DM) 2-30-10 mg/5  mL Syrp Take 10 mLs by mouth every 6 (six) hours as needed.      cetirizine (ZYRTEC) 10 MG tablet Take 10 mg by mouth.      fluticasone propionate (FLONASE) 50 mcg/actuation nasal spray 2 sprays by Each Nostril route.      linaCLOtide (LINZESS) 145 mcg Cap capsule Take 1 capsule (145 mcg total) by mouth before breakfast. (Patient not taking: Reported on 2023) 30 capsule 5    oxybutynin (DITROPAN) 5 MG Tab Take 5 mg by mouth 2 (two) times daily as needed.       Facility-Administered Medications Prior to Visit   Medication Dose Route Frequency Provider Last Rate Last Admin    lactated ringers infusion   Intravenous Continuous Marquis Zuluaga MD 75 mL/hr at 18 0845 New Bag at 18 0845       Review of patient's allergies indicates:   Allergen Reactions    Morphine Itching and Rash    Gabapentin Other (See Comments)     Coming out of skin       Family History   Problem Relation Age of Onset    Ulcers Mother     No Known Problems Father     Cancer Neg Hx     Heart disease Neg Hx     Hypertension Neg Hx     Diabetes Neg Hx     Angioedema Neg Hx     Allergies Neg Hx     Allergic rhinitis Neg Hx     Asthma Neg Hx     Eczema Neg Hx     Immunodeficiency Neg Hx     Colon cancer Neg Hx     Colon polyps Neg Hx     Crohn's disease Neg Hx     Ulcerative colitis Neg Hx     Nephrolithiasis Neg Hx     Stomach cancer Neg Hx     Esophageal cancer Neg Hx        Social History     Socioeconomic History    Marital status:    Tobacco Use    Smoking status: Never    Smokeless tobacco: Never   Substance and Sexual Activity    Alcohol use: Yes     Comment: ocassionally    Drug use: No     Types: Oxycodone    Sexual activity: Yes     Partners: Male     Birth control/protection: Surgical       OB History    Para Term  AB Living   3 3 3     3   SAB IAB Ectopic Multiple Live Births                  # Outcome Date GA Lbr Emiliano/2nd Weight Sex Delivery Anes PTL Lv   3 Term            2 Term            1 Term      "           Review of Symptoms:  GENERAL: Denies weight gain or weight loss. Feeling well overall.   SKIN: Denies rash or lesions.   HEAD: Denies head injury or headache.   NODES: Denies enlarged lymph nodes.   CHEST: Denies chest pain or shortness of breath.   CARDIOVASCULAR: Denies palpitations or left sided chest pain.   ABDOMEN: No abdominal pain, constipation, diarrhea, nausea, vomiting or rectal bleeding.   URINARY: No frequency, dysuria, hematuria, or burning on urination.  HEMATOLOGIC: No easy bruisability or excessive bleeding.   MUSCULOSKELETAL: Denies joint pain or swelling.     /70   Ht 5' 7" (1.702 m)   Wt 74.3 kg (163 lb 12.8 oz)   LMP 06/24/2023 (Approximate)   Physical Exam:  APPEARANCE: Well nourished, well developed, in no acute distress.  SKIN: Normal skin turgor, no lesions.  NECK: Neck symmetric without masses   RESPIRATORY: Normal respiratory effort with no retractions or use of accessory muscles  CARDIOVASCULAR: Peripheral vascular system with no swelling no varicosities and palpation of pulses normal  LYMPHATIC: No enlargements of the lymph nodes noted in the neck, axillae, or groin  ABDOMEN: Soft. No tenderness or masses. No hepatosplenomegaly. No hernias.  BREASTS: Symmetrical, no skin changes or visible lesions. No palpable masses, nipple discharge or adenopathy bilaterally.  PELVIC: Normal external female genitalia without lesions. Normal hair distribution. Adequate perineal body, normal urethral meatus. Urethra with no masses.  Bladder nontender. Vagina moist and well rugated without lesions or discharge. Cervix pink and without lesions. No significant cystocele or rectocele. Bimanual exam showed uterus normal size, shape, position, mobile and nontender. Adnexa without masses or tenderness. Urethra and bladder normal.   EXTREMITIES: No clubbing cyanosis or edema.    ASSESSMENT/PLAN:  Routine gynecological examination  -     Liquid-Based Pap Smear, Screening  -     HPV High Risk " Genotypes, PCR    Irregular periods  -     FOLLICLE STIMULATING HORMONE; Future; Expected date: 08/07/2023  -     ESTRADIOL; Future; Expected date: 08/07/2023    Cyst of left ovary    Fatigue, unspecified type    Anemia associated with nutritional deficiency    HPV (human papilloma virus) anogenital infection          Patient was counseled today on Pelvic exams and Pap Smear guidelines.   We discussed STD screening if at high risk for a STD.  We discussed recommendation for breast cancer screening with mammogram every other year after the age of 40 and annually after the age of 50.    We discussed colon cancer screening when indicated.   Osteoporosis screening discussed when indicated.   She was advised to see her primary care physician for all other health maintenance.     FOLLOW-UP with me for next routine visit.

## 2023-08-08 ENCOUNTER — PATIENT MESSAGE (OUTPATIENT)
Dept: FAMILY MEDICINE | Facility: CLINIC | Age: 48
End: 2023-08-08
Payer: MEDICARE

## 2023-08-08 DIAGNOSIS — E03.9 HYPOTHYROIDISM, UNSPECIFIED TYPE: ICD-10-CM

## 2023-08-08 DIAGNOSIS — F41.9 ANXIETY: ICD-10-CM

## 2023-08-08 DIAGNOSIS — F33.9 RECURRENT MAJOR DEPRESSIVE DISORDER, REMISSION STATUS UNSPECIFIED: ICD-10-CM

## 2023-08-08 DIAGNOSIS — M54.40 CHRONIC LOW BACK PAIN WITH SCIATICA, SCIATICA LATERALITY UNSPECIFIED, UNSPECIFIED BACK PAIN LATERALITY: Primary | ICD-10-CM

## 2023-08-08 DIAGNOSIS — G89.29 CHRONIC LOW BACK PAIN WITH SCIATICA, SCIATICA LATERALITY UNSPECIFIED, UNSPECIFIED BACK PAIN LATERALITY: Primary | ICD-10-CM

## 2023-08-09 LAB
HPV HR 12 DNA SPEC QL NAA+PROBE: NEGATIVE
HPV16 AG SPEC QL: POSITIVE
HPV18 DNA SPEC QL NAA+PROBE: NEGATIVE

## 2023-08-09 RX ORDER — CYCLOBENZAPRINE HCL 10 MG
10 TABLET ORAL 3 TIMES DAILY PRN
Qty: 90 TABLET | Refills: 3 | Status: SHIPPED | OUTPATIENT
Start: 2023-08-09 | End: 2024-01-30 | Stop reason: SDUPTHER

## 2023-08-09 RX ORDER — VENLAFAXINE HYDROCHLORIDE 150 MG/1
150 CAPSULE, EXTENDED RELEASE ORAL DAILY
Qty: 90 CAPSULE | Refills: 3 | Status: SHIPPED | OUTPATIENT
Start: 2023-08-09 | End: 2024-02-15 | Stop reason: SDUPTHER

## 2023-08-09 RX ORDER — LEVOTHYROXINE SODIUM 75 UG/1
75 TABLET ORAL DAILY
Qty: 90 TABLET | Refills: 3 | Status: SHIPPED | OUTPATIENT
Start: 2023-08-09

## 2023-08-09 NOTE — TELEPHONE ENCOUNTER
No care due was identified.  [facility] Embedded Care Due Messages. Reference number: [id number].   8/09/2023 9:31:44 AM CDT

## 2023-08-10 ENCOUNTER — PATIENT MESSAGE (OUTPATIENT)
Dept: OBSTETRICS AND GYNECOLOGY | Facility: CLINIC | Age: 48
End: 2023-08-10
Payer: MEDICARE

## 2023-08-14 ENCOUNTER — TELEPHONE (OUTPATIENT)
Dept: OBSTETRICS AND GYNECOLOGY | Facility: CLINIC | Age: 48
End: 2023-08-14
Payer: MEDICARE

## 2023-08-14 LAB
FINAL PATHOLOGIC DIAGNOSIS: ABNORMAL
Lab: ABNORMAL

## 2023-08-14 NOTE — TELEPHONE ENCOUNTER
Patient wants to know if okay to do Colpo in October? In [place] about to go on a cruise. Once she comes back from cruise, will be in [place] about 5weeks.

## 2023-08-15 ENCOUNTER — PATIENT MESSAGE (OUTPATIENT)
Dept: OBSTETRICS AND GYNECOLOGY | Facility: CLINIC | Age: 48
End: 2023-08-15
Payer: MEDICARE

## 2023-08-15 NOTE — TELEPHONE ENCOUNTER
Message sent to patient via patient portal that she can schedule Colpo in October when she retrurns home.

## 2023-08-30 DIAGNOSIS — F90.2 ATTENTION DEFICIT HYPERACTIVITY DISORDER (ADHD), COMBINED TYPE: ICD-10-CM

## 2023-08-30 RX ORDER — LISDEXAMFETAMINE DIMESYLATE 40 MG/1
40 CAPSULE ORAL EVERY MORNING
Qty: 30 CAPSULE | Refills: 0 | Status: SHIPPED | OUTPATIENT
Start: 2023-08-30 | End: 2023-09-29 | Stop reason: SDUPTHER

## 2023-08-30 NOTE — TELEPHONE ENCOUNTER
No care due was identified.  [facility] Embedded Care Due Messages. Reference number: [id number].   8/30/2023 8:57:54 AM CDT

## 2023-09-05 DIAGNOSIS — M54.12 CERVICAL RADICULOPATHY: ICD-10-CM

## 2023-09-05 DIAGNOSIS — M47.816 LUMBAR FACET ARTHROPATHY: ICD-10-CM

## 2023-09-05 RX ORDER — HYDROCODONE BITARTRATE AND ACETAMINOPHEN 7.5; 325 MG/1; MG/1
1 TABLET ORAL 4 TIMES DAILY PRN
Qty: 120 TABLET | Refills: 0 | Status: SHIPPED | OUTPATIENT
Start: 2023-09-05 | End: 2023-09-29 | Stop reason: SDUPTHER

## 2023-09-05 NOTE — TELEPHONE ENCOUNTER
No care due was identified.  [facility] Embedded Care Due Messages. Reference number: [id number].   9/05/2023 2:48:36 PM CDT

## 2023-09-06 ENCOUNTER — PATIENT MESSAGE (OUTPATIENT)
Dept: OBSTETRICS AND GYNECOLOGY | Facility: CLINIC | Age: 48
End: 2023-09-06
Payer: MEDICARE

## 2023-09-22 NOTE — TELEPHONE ENCOUNTER
Cramping like she's about start a period. Aware if she's spotting-procedure can be done. If heavy bleeding will have to reschedule. Will call back next week to let us know.

## 2023-09-26 ENCOUNTER — PATIENT MESSAGE (OUTPATIENT)
Dept: OBSTETRICS AND GYNECOLOGY | Facility: CLINIC | Age: 48
End: 2023-09-26
Payer: MEDICARE

## 2023-09-26 NOTE — TELEPHONE ENCOUNTER
Contacted pt as requested. Pt states she started her cycle Saturday, still bleeding and cramping so she needs to christos her colpo tomorrow 9/27. Pt christos to 10/9 at 1120. Patient verbalized understanding.`

## 2023-09-29 DIAGNOSIS — F90.2 ATTENTION DEFICIT HYPERACTIVITY DISORDER (ADHD), COMBINED TYPE: ICD-10-CM

## 2023-09-29 DIAGNOSIS — M47.816 LUMBAR FACET ARTHROPATHY: ICD-10-CM

## 2023-09-29 DIAGNOSIS — M54.12 CERVICAL RADICULOPATHY: ICD-10-CM

## 2023-09-29 NOTE — TELEPHONE ENCOUNTER
No care due was identified.  [facility] Embedded Care Due Messages. Reference number: [id number].   9/29/2023 4:16:48 PM CDT

## 2023-10-02 RX ORDER — PROMETHAZINE HYDROCHLORIDE 12.5 MG/1
12.5 TABLET ORAL EVERY 6 HOURS PRN
Qty: 30 TABLET | Refills: 0 | Status: SHIPPED | OUTPATIENT
Start: 2023-10-02 | End: 2024-01-30 | Stop reason: SDUPTHER

## 2023-10-02 RX ORDER — HYDROCODONE BITARTRATE AND ACETAMINOPHEN 7.5; 325 MG/1; MG/1
1 TABLET ORAL 4 TIMES DAILY PRN
Qty: 120 TABLET | Refills: 0 | Status: SHIPPED | OUTPATIENT
Start: 2023-10-02 | End: 2023-10-26 | Stop reason: SDUPTHER

## 2023-10-02 RX ORDER — LISDEXAMFETAMINE DIMESYLATE 40 MG/1
40 CAPSULE ORAL EVERY MORNING
Qty: 30 CAPSULE | Refills: 0 | Status: SHIPPED | OUTPATIENT
Start: 2023-10-02 | End: 2023-10-26 | Stop reason: SDUPTHER

## 2023-10-09 ENCOUNTER — OFFICE VISIT (OUTPATIENT)
Dept: OBSTETRICS AND GYNECOLOGY | Facility: CLINIC | Age: 48
End: 2023-10-09
Payer: MEDICARE

## 2023-10-09 VITALS — WEIGHT: 164.88 LBS | HEIGHT: 67 IN | BODY MASS INDEX: 25.88 KG/M2

## 2023-10-09 DIAGNOSIS — R87.612 LOW GRADE SQUAMOUS INTRAEPITHELIAL LESION (LGSIL) ON CERVICAL PAP SMEAR: Primary | ICD-10-CM

## 2023-10-09 LAB
B-HCG UR QL: NEGATIVE
CTP QC/QA: YES

## 2023-10-09 PROCEDURE — 81025 URINE PREGNANCY TEST: CPT | Mod: S$GLB,,, | Performed by: OBSTETRICS & GYNECOLOGY

## 2023-10-09 PROCEDURE — 81025 POCT URINE PREGNANCY: ICD-10-PCS | Mod: S$GLB,,, | Performed by: OBSTETRICS & GYNECOLOGY

## 2023-10-09 PROCEDURE — 99499 NO LOS: ICD-10-PCS | Mod: S$GLB,,, | Performed by: OBSTETRICS & GYNECOLOGY

## 2023-10-09 PROCEDURE — 57455 BIOPSY OF CERVIX W/SCOPE: CPT | Mod: S$GLB,,, | Performed by: OBSTETRICS & GYNECOLOGY

## 2023-10-09 PROCEDURE — 99499 UNLISTED E&M SERVICE: CPT | Mod: S$GLB,,, | Performed by: OBSTETRICS & GYNECOLOGY

## 2023-10-09 PROCEDURE — 57455 PR COLPOSCOPY,CERVIX W/ADJ VAGINA,W/BX: ICD-10-PCS | Mod: S$GLB,,, | Performed by: OBSTETRICS & GYNECOLOGY

## 2023-10-09 PROCEDURE — 99999 PR PBB SHADOW E&M-EST. PATIENT-LVL III: CPT | Mod: PBBFAC,,, | Performed by: OBSTETRICS & GYNECOLOGY

## 2023-10-09 PROCEDURE — 88305 TISSUE EXAM BY PATHOLOGIST: ICD-10-PCS | Mod: 26,,, | Performed by: PATHOLOGY

## 2023-10-09 PROCEDURE — 88305 TISSUE EXAM BY PATHOLOGIST: CPT | Mod: 26,,, | Performed by: PATHOLOGY

## 2023-10-09 PROCEDURE — 88305 TISSUE EXAM BY PATHOLOGIST: CPT | Performed by: PATHOLOGY

## 2023-10-09 PROCEDURE — 99999 PR PBB SHADOW E&M-EST. PATIENT-LVL III: ICD-10-PCS | Mod: PBBFAC,,, | Performed by: OBSTETRICS & GYNECOLOGY

## 2023-10-09 NOTE — PROGRESS NOTES
TIMEOUT PERFORMED  Patient identified, procedure confirmed, and allergies reviewed.     COLPOSCOPY:    UPT: negative    Female patient presents for colposcopy.    PRE-COLPOSCOPY PROCEDURE COUNSELING:  Discussed the abnormal pap test findings, HPV, need for colposcopy and possible biopsies to determine a diagnosis and plan of care, treatments available, the minimal risks of bleeding and infection with a colposcopy, alternatives to colposcopy and she agrees to proceed.    COLPOSCOPY EXAM:   TIME OUT PERFORMED. The cervix was visualized with a speculum. Acetic acid was applied.  The entire tranformation zone could be adequately visualized.      White flat epithelium was present at 6,Location.  Mosaic pattern was not present.    Punctation was not present.    Abnormal vessels were not present.    Biopsy performed at 6 Location.    ECC - Not done.    Specimens placed in formalin and sent to pathology. Monsel's solution was applied at biopsy sites to stop bleeding.    The speculum was removed.  The patient tolerated the procedure well.    IMPRESSION:   Abnormal Pap 795.09    Colposcopy Impression:  Satisfactory:  DESIREE 1    POST COLPOSCOPY COUNSELING:   Manage post colposcopy cramping with NSAIDs, Tylenol or Rx per [employer].  Avoid anything in vagina (intercourse, douching, tampons) one week after the procedure.  Expect a clumpy blackish vaginal discharge (Monsel's solution) for several days.  Report bleeding heavier than a period, worsening pain, fever > 101.0 F, or foul-smelling vaginal discharge.  HPV vaccine recommended according to FDA age guidelines.  Importance of follow-up stressed.    Counseling lasted approximately 15 minutes and all her questions were answered.    FOLLOW-UP:   Based on biopsy results.

## 2023-10-13 LAB
FINAL PATHOLOGIC DIAGNOSIS: NORMAL
GROSS: NORMAL
Lab: NORMAL

## 2023-10-16 ENCOUNTER — PATIENT MESSAGE (OUTPATIENT)
Dept: FAMILY MEDICINE | Facility: CLINIC | Age: 48
End: 2023-10-16
Payer: MEDICARE

## 2023-10-25 ENCOUNTER — OFFICE VISIT (OUTPATIENT)
Dept: FAMILY MEDICINE | Facility: CLINIC | Age: 48
End: 2023-10-25
Payer: MEDICARE

## 2023-10-25 DIAGNOSIS — G89.29 CHRONIC LOW BACK PAIN WITH SCIATICA, SCIATICA LATERALITY UNSPECIFIED, UNSPECIFIED BACK PAIN LATERALITY: ICD-10-CM

## 2023-10-25 DIAGNOSIS — F33.42 RECURRENT MAJOR DEPRESSIVE DISORDER, IN FULL REMISSION: ICD-10-CM

## 2023-10-25 DIAGNOSIS — M54.40 CHRONIC LOW BACK PAIN WITH SCIATICA, SCIATICA LATERALITY UNSPECIFIED, UNSPECIFIED BACK PAIN LATERALITY: ICD-10-CM

## 2023-10-25 DIAGNOSIS — F90.2 ATTENTION DEFICIT HYPERACTIVITY DISORDER (ADHD), COMBINED TYPE: ICD-10-CM

## 2023-10-25 PROCEDURE — 99214 PR OFFICE/OUTPT VISIT, EST, LEVL IV, 30-39 MIN: ICD-10-PCS | Mod: 95,,, | Performed by: STUDENT IN AN ORGANIZED HEALTH CARE EDUCATION/TRAINING PROGRAM

## 2023-10-25 PROCEDURE — 99214 OFFICE O/P EST MOD 30 MIN: CPT | Mod: 95,,, | Performed by: STUDENT IN AN ORGANIZED HEALTH CARE EDUCATION/TRAINING PROGRAM

## 2023-10-25 NOTE — PATIENT INSTRUCTIONS
YOU ARE SEEING THIS BECAUSE YOU ARE ON CHRONIC STIMULANT THERAPY.  Stimulant medications do have abuse potential, so there are certain precautions and regulations in place:  Chronic stimulant use is safest if I know exactly how much you are taking, so I should be the only physician providing your prescription. The only exception I allow is my primary care colleagues filling a prescription in my absence.  The state requires that I see you every three months to assess efficacy and tolerance of the medication. If I have not seen you in over three months, I cannot fill the medication for you. Please assure after every visit that you have a follow up scheduled in three months.  We will need to randomly test your urine to assure compliance with the medication.   There are certain medical conditions that may require me to taper off the stimulant medication - this includes heart disease and high blood pressure. We will discuss any changes to your stimulant medication prior to me actually making the change, but I may need to stop the medication if I feel the risks outweigh the benefits.   ___________________    YOU ARE SEEING THIS INFORMATION BECAUSE YOU ARE ON CHRONIC OPIOID THERAPY.  Chronic opioid use is safest if I know exactly how much you are taking, so I should be the only physician providing your prescription. The only exception I allow is my primary care colleagues filling a prescription in my absence.  State law requires that I see you every three months while on the medication to monitor your pain and your tolerance of your opioid medication. Please be sure that you have appointments scheduled every three months. I cannot fill your medication if I have not seen you in three months.  While on opioid medication, I will have to randomly collect urine samples to confirm compliance with the medication.    My ultimate goal is to have you on the lowest dose of opioid medication possible and this may involve weaning the  medication at some point. We will discuss any changes to your medication and formulate a plan together before I make any changes.    Opiates can be used to treat pain. Studies show that opioid medications are safest and most effective if taken for acute pain and for durations shorter than seven days. However, some patients may end up taking opioid medications for chronic pain.     Taking opioid medications for chronic pain comes with serious risks.    Prescription opioids carry serious risks of addiction and overdose. Opioids can slow a persons breathing, often a sign associated with someone experiencing an opioid overdose that can cause death. The use of prescription opioids can have several side effects as well, even when taken as directed:  -Tolerance--meaning you might need to take more of a medication for the same pain relief  -Physical dependence--meaning you have symptoms of withdrawal when a medication is stopped, or you take less  -Increased sensitivity to pain  -Constipation  -Nausea and vomiting  -Dry mouth  -Sleepiness  -Dizziness  -Confusion  -Low levels of testosterone that can result in lower sex drive, energy, and strength  -Itching and sweating    Anyone who uses opioids can experience an overdose, but certain factors may increase risk including but not limited to:  -Having a history of overdose or a substance use disorder (KUSHAL)  -Having sleep apnea or other sleep-disordered breathing  -Taking higher dosages of opioids (e.g., ?50 MME/day)  -Returning to a high dose after losing tolerance (e.g., patients undergoing tapering or recently released from nursing home)  -Taking benzodiazepines with opioids  -Having kidney or liver failure  -Being 65 years and older    Death from an opioid overdose happens when too much of the drug overwhelms the brain and interrupts the bodys natural drive to breathe. Teach your family members and friends how to respond to an overdose. Learn how and when to use naloxone, a  life-saving medication that can reverse an opioid overdose.     Studies have shown that patients who take chronic opioid therapy have overall worse pain and functionality compared to those who treat their pain using other methods. There are many options for pain management that do not include prescription opioids.   Depending on the type of pain you are experiencing, alternative options may include:  -Acetaminophen (Tylenol®) or ibuprofen (Advil®)  -Topical Ointments (for example lidocaine)  -Exercise therapy, including physical therapy  -Interventional therapies (injections)  -Exercise and weight loss  -Medications for depression or for seizures- some anti-depressants and anti-seizure medications have been shown to relieve chronic pain  -Cognitive behavioral therapy - a psychological, goal-directed approach in which patients learn how to alter physical, behavioral, and emotional responses to pain and stress  -Other therapies such as acupuncture and massage    More information can be found at the Center for Disease Control's web site on opioid medications: https://www.cdc.gov/opioids/patients/index.html

## 2023-10-25 NOTE — PROGRESS NOTES
The patient location is: [place]  The chief complaint leading to consultation is: medication management    Visit type: audiovisual    Notes:    HPI  Patient presents for regular follow up while on controlled medications. No acute concerns at this time.     Review of Systems   Constitutional:  Negative for activity change and unexpected weight change.   HENT:  Negative for hearing loss, rhinorrhea and trouble swallowing.    Eyes:  Negative for discharge and visual disturbance.   Respiratory:  Negative for chest tightness and wheezing.    Cardiovascular:  Negative for chest pain and palpitations.   Gastrointestinal:  Negative for blood in stool, constipation, diarrhea and vomiting.   Endocrine: Negative for polydipsia and polyuria.   Genitourinary:  Negative for difficulty urinating, dysuria, hematuria and menstrual problem.   Musculoskeletal:  Positive for neck pain. Negative for arthralgias and joint swelling.   Neurological:  Positive for headaches. Negative for weakness.   Psychiatric/Behavioral:  Negative for confusion and dysphoric mood.        Objective:  Physical Exam  Constitutional:       Appearance: Normal appearance.   HENT:      Head: Normocephalic and atraumatic.   Pulmonary:      Effort: Pulmonary effort is normal. No respiratory distress.   Neurological:      Mental Status: She is alert and oriented to person, place, and time. Mental status is at baseline.   Psychiatric:         Mood and Affect: Mood normal.         Behavior: Behavior normal.         Thought Content: Thought content normal.         Judgment: Judgment normal.         Plan:  1. Chronic low back pain with sciatica, sciatica laterality unspecified, unspecified back pain laterality  Assessment & Plan:  Currently controlled with hydrocodone - can take as frequently as four times a day, sometimes as little as two times a day. She briefly mentioned that she is also seeing an acupuncturist and a chiropractic to help with the pain. Continue  current medications.      2. Attention deficit hyperactivity disorder (ADHD), combined type  Assessment & Plan:  Currently controlled with Vyvanse. Continue medication.      3. Recurrent major depressive disorder, in full remission  Assessment & Plan:  Mood is stable with venlafaxine. Continue current medication.        Follow up in 3 months.    Face to Face time with patient: 10 minutes  12 minutes of total time spent on the encounter, which includes face to face time and non-face to face time preparing to see the patient (eg, review of tests), Obtaining and/or reviewing separately obtained history, Documenting clinical information in the electronic or other health record, Independently interpreting results (not separately reported) and communicating results to the patient/family/caregiver, or Care coordination (not separately reported).     Each patient to whom he or she provides medical services by telemedicine is:  (1) informed of the relationship between the physician and patient and the respective role of any other health care provider with respect to management of the patient; and (2) notified that he or she may decline to receive medical services by telemedicine and may withdraw from such care at any time.

## 2023-10-25 NOTE — ASSESSMENT & PLAN NOTE
Currently controlled with hydrocodone - can take as frequently as four times a day, sometimes as little as two times a day. She briefly mentioned that she is also seeing an acupuncturist and a chiropractic to help with the pain. Continue current medications.

## 2023-10-27 ENCOUNTER — DOCUMENTATION ONLY (OUTPATIENT)
Dept: FAMILY MEDICINE | Facility: CLINIC | Age: 48
End: 2023-10-27
Payer: MEDICARE

## 2023-11-02 ENCOUNTER — DOCUMENTATION ONLY (OUTPATIENT)
Dept: FAMILY MEDICINE | Facility: CLINIC | Age: 48
End: 2023-11-02
Payer: MEDICARE

## 2023-11-03 ENCOUNTER — DOCUMENTATION ONLY (OUTPATIENT)
Dept: FAMILY MEDICINE | Facility: CLINIC | Age: 48
End: 2023-11-03
Payer: MEDICARE

## 2023-11-30 DIAGNOSIS — M54.12 CERVICAL RADICULOPATHY: ICD-10-CM

## 2023-11-30 DIAGNOSIS — M47.816 LUMBAR FACET ARTHROPATHY: ICD-10-CM

## 2023-11-30 DIAGNOSIS — F90.2 ATTENTION DEFICIT HYPERACTIVITY DISORDER (ADHD), COMBINED TYPE: ICD-10-CM

## 2023-11-30 RX ORDER — LISDEXAMFETAMINE DIMESYLATE 40 MG/1
40 CAPSULE ORAL EVERY MORNING
Qty: 30 CAPSULE | Refills: 0 | Status: SHIPPED | OUTPATIENT
Start: 2023-11-30 | End: 2023-12-26 | Stop reason: SDUPTHER

## 2023-11-30 RX ORDER — HYDROCODONE BITARTRATE AND ACETAMINOPHEN 7.5; 325 MG/1; MG/1
1 TABLET ORAL 4 TIMES DAILY PRN
Qty: 120 TABLET | Refills: 0 | Status: SHIPPED | OUTPATIENT
Start: 2023-11-30 | End: 2023-12-26 | Stop reason: SDUPTHER

## 2023-11-30 NOTE — TELEPHONE ENCOUNTER
No care due was identified.  [facility] Embedded Care Due Messages. Reference number: [id number].   11/30/2023 11:44:20 AM CST

## 2023-12-05 ENCOUNTER — PATIENT MESSAGE (OUTPATIENT)
Dept: HEMATOLOGY/ONCOLOGY | Facility: CLINIC | Age: 48
End: 2023-12-05
Payer: MEDICARE

## 2023-12-11 ENCOUNTER — TELEPHONE (OUTPATIENT)
Dept: HEMATOLOGY/ONCOLOGY | Facility: CLINIC | Age: 48
End: 2023-12-11
Payer: MEDICARE

## 2023-12-11 ENCOUNTER — PATIENT MESSAGE (OUTPATIENT)
Dept: HEMATOLOGY/ONCOLOGY | Facility: CLINIC | Age: 48
End: 2023-12-11
Payer: MEDICARE

## 2023-12-26 DIAGNOSIS — F90.2 ATTENTION DEFICIT HYPERACTIVITY DISORDER (ADHD), COMBINED TYPE: ICD-10-CM

## 2023-12-26 DIAGNOSIS — M47.816 LUMBAR FACET ARTHROPATHY: ICD-10-CM

## 2023-12-26 DIAGNOSIS — M54.12 CERVICAL RADICULOPATHY: ICD-10-CM

## 2023-12-26 RX ORDER — HYDROCODONE BITARTRATE AND ACETAMINOPHEN 7.5; 325 MG/1; MG/1
1 TABLET ORAL 4 TIMES DAILY PRN
Qty: 120 TABLET | Refills: 0 | Status: SHIPPED | OUTPATIENT
Start: 2023-12-26 | End: 2024-01-29 | Stop reason: SDUPTHER

## 2023-12-26 RX ORDER — LISDEXAMFETAMINE DIMESYLATE 40 MG/1
40 CAPSULE ORAL EVERY MORNING
Qty: 30 CAPSULE | Refills: 0 | Status: SHIPPED | OUTPATIENT
Start: 2023-12-26 | End: 2024-01-29 | Stop reason: SDUPTHER

## 2023-12-26 NOTE — TELEPHONE ENCOUNTER
No care due was identified.  [facility] Embedded Care Due Messages. Reference number: [id number].   12/26/2023 2:44:57 PM CST

## 2024-01-03 ENCOUNTER — TELEPHONE (OUTPATIENT)
Dept: HEMATOLOGY/ONCOLOGY | Facility: CLINIC | Age: 49
End: 2024-01-03
Payer: MEDICARE

## 2024-01-03 NOTE — TELEPHONE ENCOUNTER
Spoke with the pt and got the rescheduled with labs at the [facility] on 01/[facility]. Pt verbalized and understanding.

## 2024-01-04 ENCOUNTER — LAB VISIT (OUTPATIENT)
Dept: LAB | Facility: HOSPITAL | Age: 49
End: 2024-01-04
Payer: MEDICARE

## 2024-01-04 DIAGNOSIS — E53.8 B12 DEFICIENCY: ICD-10-CM

## 2024-01-04 DIAGNOSIS — D50.8 IRON DEFICIENCY ANEMIA DUE TO DIETARY CAUSES: ICD-10-CM

## 2024-01-04 LAB
BASOPHILS # BLD AUTO: 0.06 K/UL (ref 0–0.2)
BASOPHILS NFR BLD: 1 % (ref 0–1.9)
DIFFERENTIAL METHOD BLD: NORMAL
EOSINOPHIL # BLD AUTO: 0.2 K/UL (ref 0–0.5)
EOSINOPHIL NFR BLD: 3.8 % (ref 0–8)
ERYTHROCYTE [DISTWIDTH] IN BLOOD BY AUTOMATED COUNT: 13.6 % (ref 11.5–14.5)
FERRITIN SERPL-MCNC: 20 NG/ML (ref 20–300)
HCT VFR BLD AUTO: 41.8 % (ref 37–48.5)
HGB BLD-MCNC: 13.6 G/DL (ref 12–16)
IMM GRANULOCYTES # BLD AUTO: 0.01 K/UL (ref 0–0.04)
IMM GRANULOCYTES NFR BLD AUTO: 0.2 % (ref 0–0.5)
IRON SERPL-MCNC: 60 UG/DL (ref 30–160)
LYMPHOCYTES # BLD AUTO: 1.6 K/UL (ref 1–4.8)
LYMPHOCYTES NFR BLD: 25.9 % (ref 18–48)
MCH RBC QN AUTO: 29.2 PG (ref 27–31)
MCHC RBC AUTO-ENTMCNC: 32.5 G/DL (ref 32–36)
MCV RBC AUTO: 90 FL (ref 82–98)
MONOCYTES # BLD AUTO: 0.5 K/UL (ref 0.3–1)
MONOCYTES NFR BLD: 8.1 % (ref 4–15)
NEUTROPHILS # BLD AUTO: 3.8 K/UL (ref 1.8–7.7)
NEUTROPHILS NFR BLD: 61 % (ref 38–73)
NRBC BLD-RTO: 0 /100 WBC
PLATELET # BLD AUTO: 284 K/UL (ref 150–450)
PMV BLD AUTO: 10.7 FL (ref 9.2–12.9)
RBC # BLD AUTO: 4.65 M/UL (ref 4–5.4)
SATURATED IRON: 14 % (ref 20–50)
TOTAL IRON BINDING CAPACITY: 440 UG/DL (ref 250–450)
TRANSFERRIN SERPL-MCNC: 297 MG/DL (ref 200–375)
VIT B12 SERPL-MCNC: 314 PG/ML (ref 210–950)
WBC # BLD AUTO: 6.26 K/UL (ref 3.9–12.7)

## 2024-01-04 PROCEDURE — 36415 COLL VENOUS BLD VENIPUNCTURE: CPT | Mod: PO

## 2024-01-04 PROCEDURE — 82607 VITAMIN B-12: CPT

## 2024-01-04 PROCEDURE — 82728 ASSAY OF FERRITIN: CPT

## 2024-01-04 PROCEDURE — 84238 ASSAY NONENDOCRINE RECEPTOR: CPT

## 2024-01-04 PROCEDURE — 83540 ASSAY OF IRON: CPT

## 2024-01-04 PROCEDURE — 85025 COMPLETE CBC W/AUTO DIFF WBC: CPT

## 2024-01-08 LAB — STFR SERPL-MCNC: 2.7 MG/L (ref 1.8–4.6)

## 2024-01-29 ENCOUNTER — TELEPHONE (OUTPATIENT)
Dept: FAMILY MEDICINE | Facility: CLINIC | Age: 49
End: 2024-01-29
Payer: MEDICARE

## 2024-01-29 DIAGNOSIS — M54.12 CERVICAL RADICULOPATHY: ICD-10-CM

## 2024-01-29 DIAGNOSIS — M47.816 LUMBAR FACET ARTHROPATHY: ICD-10-CM

## 2024-01-29 DIAGNOSIS — F90.2 ATTENTION DEFICIT HYPERACTIVITY DISORDER (ADHD), COMBINED TYPE: ICD-10-CM

## 2024-01-29 RX ORDER — HYDROCODONE BITARTRATE AND ACETAMINOPHEN 7.5; 325 MG/1; MG/1
1 TABLET ORAL 4 TIMES DAILY PRN
Qty: 120 TABLET | Refills: 0 | Status: SHIPPED | OUTPATIENT
Start: 2024-01-29 | End: 2024-02-28 | Stop reason: SDUPTHER

## 2024-01-29 RX ORDER — LISDEXAMFETAMINE DIMESYLATE 40 MG/1
40 CAPSULE ORAL EVERY MORNING
Qty: 30 CAPSULE | Refills: 0 | Status: SHIPPED | OUTPATIENT
Start: 2024-01-29 | End: 2024-02-28 | Stop reason: SDUPTHER

## 2024-01-29 NOTE — TELEPHONE ENCOUNTER
----- Message from Geneva Puente sent at 1/29/2024  9:11 AM CST -----  Contact: self  Type:  Needs Medical Advice    Who Called: self  Symptoms (please be specific): pt is requesting to change her appt to something today or tomorrow morning. Pt sts she would like a virtual appt instead of an in person. Pt will be going out of town tomorrow.   Would the patient rather a call back or a response via [facility]? call  Best Call Back Number: [phone] (home)     Additional Information: please advise and thank you.

## 2024-01-29 NOTE — TELEPHONE ENCOUNTER
No care due was identified.  [facility] Embedded Care Due Messages. Reference number: [id number].   1/29/2024 9:38:39 AM CST

## 2024-01-30 ENCOUNTER — DOCUMENTATION ONLY (OUTPATIENT)
Dept: FAMILY MEDICINE | Facility: CLINIC | Age: 49
End: 2024-01-30
Payer: MEDICARE

## 2024-01-30 DIAGNOSIS — M54.40 CHRONIC LOW BACK PAIN WITH SCIATICA, SCIATICA LATERALITY UNSPECIFIED, UNSPECIFIED BACK PAIN LATERALITY: ICD-10-CM

## 2024-01-30 DIAGNOSIS — G89.29 CHRONIC LOW BACK PAIN WITH SCIATICA, SCIATICA LATERALITY UNSPECIFIED, UNSPECIFIED BACK PAIN LATERALITY: ICD-10-CM

## 2024-01-31 RX ORDER — CYCLOBENZAPRINE HCL 10 MG
10 TABLET ORAL 3 TIMES DAILY PRN
Qty: 90 TABLET | Refills: 0 | Status: SHIPPED | OUTPATIENT
Start: 2024-01-31 | End: 2024-03-15

## 2024-01-31 RX ORDER — PROMETHAZINE HYDROCHLORIDE 12.5 MG/1
12.5 TABLET ORAL EVERY 6 HOURS PRN
Qty: 30 TABLET | Refills: 0 | Status: ON HOLD | OUTPATIENT
Start: 2024-01-31 | End: 2024-04-29 | Stop reason: HOSPADM

## 2024-01-31 NOTE — TELEPHONE ENCOUNTER
No care due was identified.  [facility] Embedded Care Due Messages. Reference number: [id number].   1/30/2024 11:26:39 PM CST

## 2024-02-12 ENCOUNTER — OFFICE VISIT (OUTPATIENT)
Dept: FAMILY MEDICINE | Facility: CLINIC | Age: 49
End: 2024-02-12
Payer: MEDICARE

## 2024-02-12 DIAGNOSIS — M54.12 CERVICAL RADICULOPATHY: ICD-10-CM

## 2024-02-12 DIAGNOSIS — M47.816 LUMBAR FACET ARTHROPATHY: ICD-10-CM

## 2024-02-12 DIAGNOSIS — F33.42 RECURRENT MAJOR DEPRESSIVE DISORDER, IN FULL REMISSION: ICD-10-CM

## 2024-02-12 DIAGNOSIS — F90.2 ATTENTION DEFICIT HYPERACTIVITY DISORDER (ADHD), COMBINED TYPE: Primary | ICD-10-CM

## 2024-02-12 PROCEDURE — 99214 OFFICE O/P EST MOD 30 MIN: CPT | Mod: 95,,, | Performed by: STUDENT IN AN ORGANIZED HEALTH CARE EDUCATION/TRAINING PROGRAM

## 2024-02-12 NOTE — PATIENT INSTRUCTIONS
YOU ARE SEEING THIS BECAUSE YOU ARE ON CHRONIC STIMULANT THERAPY.  Stimulant medications do have abuse potential, so there are certain precautions and regulations in place:  Chronic stimulant use is safest if I know exactly how much you are taking, so I should be the only physician providing your prescription. The only exception I allow is my primary care colleagues filling a prescription in my absence.  You are to take your medication as prescribed or less frequently than prescribed. If you take your medication more frequently than prescribed, this can put you at risk for various hazardous outcomes. Additionally, this will be grounds for me terminating your prescription.  I will not provide early refills for lost or stolen medications.  The state requires that I see you every three months to assess efficacy and tolerance of the medication. If I have not seen you in over three months, I cannot fill the medication for you. Please assure after every visit that you have a follow up scheduled in three months.  We will need to randomly test your urine to assure compliance with the medication.   There are certain medical conditions that may require me to taper off the stimulant medication - this includes heart disease and high blood pressure. We will discuss any changes to your stimulant medication prior to me actually making the change, but I may need to stop the medication if I feel the risks outweigh the benefits.    ______________________________________________    YOU ARE SEEING THIS INFORMATION BECAUSE YOU ARE ON CHRONIC OPIOID THERAPY.  Chronic opioid use is safest if I know exactly how much you are taking, so I should be the only physician providing your prescription. The only exception I allow is my primary care colleagues filling a prescription in my absence.  You are to take your medication as prescribed or less frequently than prescribed. If you take your medication more frequently than prescribed, this can  put you at risk for various hazardous outcomes. Additionally, this will be grounds for me terminating your prescription.  I will not provide early refills for lost or stolen medications.  State law requires that I see you every three months while on the medication to monitor your pain and your tolerance of your opioid medication. Please be sure that you have appointments scheduled every three months. I cannot fill your medication if I have not seen you in three months.  While on opioid medication, I will have to randomly collect urine samples to confirm compliance with the medication.    My ultimate goal is to have you on the lowest dose of opioid medication possible and this may involve weaning the medication at some point. We will discuss any changes to your medication and formulate a plan together before I make any changes.    Opiates can be used to treat pain. Studies show that opioid medications are safest and most effective if taken for acute pain and for durations shorter than seven days. However, some patients may end up taking opioid medications for chronic pain.     Taking opioid medications for chronic pain comes with serious risks.    Prescription opioids carry serious risks of addiction and overdose. Opioids can slow a persons breathing, often a sign associated with someone experiencing an opioid overdose that can cause death. The use of prescription opioids can have several side effects as well, even when taken as directed:  -Tolerance--meaning you might need to take more of a medication for the same pain relief  -Physical dependence--meaning you have symptoms of withdrawal when a medication is stopped, or you take less  -Increased sensitivity to pain  -Constipation  -Nausea and vomiting  -Dry mouth  -Sleepiness  -Dizziness  -Confusion  -Low levels of testosterone that can result in lower sex drive, energy, and strength  -Itching and sweating    Anyone who uses opioids can experience an overdose, but  certain factors may increase risk including but not limited to:  -Having a history of overdose or a substance use disorder (KUSHAL)  -Having sleep apnea or other sleep-disordered breathing  -Taking higher dosages of opioids (e.g., ?50 MME/day)  -Returning to a high dose after losing tolerance (e.g., patients undergoing tapering or recently released from alf)  -Taking benzodiazepines with opioids  -Having kidney or liver failure  -Being 65 years and older    Death from an opioid overdose happens when too much of the drug overwhelms the brain and interrupts the bodys natural drive to breathe. Teach your family members and friends how to respond to an overdose. Learn how and when to use naloxone, a life-saving medication that can reverse an opioid overdose.     Studies have shown that patients who take chronic opioid therapy have overall worse pain and functionality compared to those who treat their pain using other methods. There are many options for pain management that do not include prescription opioids.   Depending on the type of pain you are experiencing, alternative options may include:  -Acetaminophen (Tylenol®) or ibuprofen (Advil®)  -Topical Ointments (for example lidocaine)  -Exercise therapy, including physical therapy  -Interventional therapies (injections)  -Exercise and weight loss  -Medications for depression or for seizures- some anti-depressants and anti-seizure medications have been shown to relieve chronic pain  -Cognitive behavioral therapy - a psychological, goal-directed approach in which patients learn how to alter physical, behavioral, and emotional responses to pain and stress  -Other therapies such as acupuncture and massage    More information can be found at the Center for Disease Control's web site on opioid medications: https://www.cdc.gov/opioids/patients/index.html

## 2024-02-12 NOTE — PROGRESS NOTES
The patient location is: [place]  The chief complaint leading to consultation is: ADD, pain    Visit type: audiovisual    Notes:    HPI  Patient presents for regular follow up. No acute concerns at this time.    Review of Systems   Constitutional:  Negative for activity change and unexpected weight change.   HENT:  Negative for hearing loss, rhinorrhea and trouble swallowing.    Eyes:  Negative for discharge and visual disturbance.   Respiratory:  Negative for chest tightness and wheezing.    Cardiovascular:  Negative for chest pain and palpitations.   Gastrointestinal:  Negative for blood in stool, constipation, diarrhea and vomiting.   Endocrine: Negative for polydipsia and polyuria.   Genitourinary:  Negative for difficulty urinating, dysuria, hematuria and menstrual problem.   Musculoskeletal:  Positive for neck pain. Negative for arthralgias and joint swelling.   Neurological:  Positive for headaches. Negative for weakness.   Psychiatric/Behavioral:  Negative for confusion and dysphoric mood.        Objective:  Physical Exam  Constitutional:       Appearance: Normal appearance.   HENT:      Head: Normocephalic and atraumatic.   Pulmonary:      Effort: Pulmonary effort is normal. No respiratory distress.   Neurological:      Mental Status: She is alert and oriented to person, place, and time. Mental status is at baseline.   Psychiatric:         Mood and Affect: Mood normal.         Behavior: Behavior normal.         Thought Content: Thought content normal.         Judgment: Judgment normal.         Plan:  1. Attention deficit hyperactivity disorder (ADHD), combined type  Assessment & Plan:  Stable. Controlled. Continue Vyvanse.      2. Lumbar facet arthropathy  Assessment & Plan:  Stable. Controlled. Continue hydrocodone, cyclobenzaprine.      3. Cervical radiculopathy    4. Recurrent major depressive disorder, in full remission  Assessment & Plan:  Stable. Controlled. Continue venlafaxine.          Follow up  in 3 months.    Face to Face time with patient: 5 minutes  8 minutes of total time spent on the encounter, which includes face to face time and non-face to face time preparing to see the patient (eg, review of tests), Obtaining and/or reviewing separately obtained history, Documenting clinical information in the electronic or other health record, Independently interpreting results (not separately reported) and communicating results to the patient/family/caregiver, or Care coordination (not separately reported).     Each patient to whom he or she provides medical services by telemedicine is:  (1) informed of the relationship between the physician and patient and the respective role of any other health care provider with respect to management of the patient; and (2) notified that he or she may decline to receive medical services by telemedicine and may withdraw from such care at any time.

## 2024-02-14 ENCOUNTER — TELEPHONE (OUTPATIENT)
Dept: FAMILY MEDICINE | Facility: CLINIC | Age: 49
End: 2024-02-14
Payer: MEDICARE

## 2024-02-15 DIAGNOSIS — F41.9 ANXIETY: ICD-10-CM

## 2024-02-15 DIAGNOSIS — F33.9 RECURRENT MAJOR DEPRESSIVE DISORDER, REMISSION STATUS UNSPECIFIED: ICD-10-CM

## 2024-02-15 RX ORDER — VENLAFAXINE HYDROCHLORIDE 150 MG/1
150 CAPSULE, EXTENDED RELEASE ORAL DAILY
Qty: 90 CAPSULE | Refills: 3 | Status: SHIPPED | OUTPATIENT
Start: 2024-02-15

## 2024-02-15 NOTE — TELEPHONE ENCOUNTER
----- Message from Jose Oconnell MD sent at 2/12/2024  2:13 PM CST -----  This patient needs a 3 month follow up. Needs to be in person.

## 2024-02-15 NOTE — TELEPHONE ENCOUNTER
No care due was identified.  [facility] Embedded Care Due Messages. Reference number: [id number].   2/15/2024 1:12:25 PM CST

## 2024-02-28 DIAGNOSIS — M54.12 CERVICAL RADICULOPATHY: ICD-10-CM

## 2024-02-28 DIAGNOSIS — M47.816 LUMBAR FACET ARTHROPATHY: ICD-10-CM

## 2024-02-28 DIAGNOSIS — F90.2 ATTENTION DEFICIT HYPERACTIVITY DISORDER (ADHD), COMBINED TYPE: ICD-10-CM

## 2024-02-29 RX ORDER — LISDEXAMFETAMINE DIMESYLATE 40 MG/1
40 CAPSULE ORAL EVERY MORNING
Qty: 30 CAPSULE | Refills: 0 | Status: SHIPPED | OUTPATIENT
Start: 2024-02-29 | End: 2024-03-05 | Stop reason: SDUPTHER

## 2024-02-29 RX ORDER — HYDROCODONE BITARTRATE AND ACETAMINOPHEN 7.5; 325 MG/1; MG/1
1 TABLET ORAL 4 TIMES DAILY PRN
Qty: 120 TABLET | Refills: 0 | Status: SHIPPED | OUTPATIENT
Start: 2024-02-29 | End: 2024-03-28 | Stop reason: SDUPTHER

## 2024-02-29 NOTE — TELEPHONE ENCOUNTER
No care due was identified.  Health [facility] Embedded Care Due Messages. Reference number: [id number].   2/28/2024 9:16:16 PM CST

## 2024-03-05 DIAGNOSIS — F90.2 ATTENTION DEFICIT HYPERACTIVITY DISORDER (ADHD), COMBINED TYPE: ICD-10-CM

## 2024-03-06 NOTE — TELEPHONE ENCOUNTER
No care due was identified.  [facility] Embedded Care Due Messages. Reference number: [id number].   3/05/2024 8:52:38 PM CST

## 2024-03-07 RX ORDER — LISDEXAMFETAMINE DIMESYLATE 40 MG/1
40 CAPSULE ORAL EVERY MORNING
Qty: 30 CAPSULE | Refills: 0 | Status: SHIPPED | OUTPATIENT
Start: 2024-03-07 | End: 2024-03-28 | Stop reason: SDUPTHER

## 2024-03-12 ENCOUNTER — OFFICE VISIT (OUTPATIENT)
Dept: NEUROLOGY | Facility: CLINIC | Age: 49
End: 2024-03-12
Payer: MEDICARE

## 2024-03-12 VITALS
HEIGHT: 67 IN | BODY MASS INDEX: 26.12 KG/M2 | RESPIRATION RATE: 17 BRPM | HEART RATE: 112 BPM | WEIGHT: 166.44 LBS | SYSTOLIC BLOOD PRESSURE: 171 MMHG | TEMPERATURE: 98 F | DIASTOLIC BLOOD PRESSURE: 121 MMHG

## 2024-03-12 DIAGNOSIS — G43.711 INTRACTABLE CHRONIC MIGRAINE WITHOUT AURA AND WITH STATUS MIGRAINOSUS: Primary | ICD-10-CM

## 2024-03-12 PROCEDURE — 3077F SYST BP >= 140 MM HG: CPT | Mod: CPTII,S$GLB,, | Performed by: NURSE PRACTITIONER

## 2024-03-12 PROCEDURE — 1159F MED LIST DOCD IN RCRD: CPT | Mod: CPTII,S$GLB,, | Performed by: NURSE PRACTITIONER

## 2024-03-12 PROCEDURE — 1160F RVW MEDS BY RX/DR IN RCRD: CPT | Mod: CPTII,S$GLB,, | Performed by: NURSE PRACTITIONER

## 2024-03-12 PROCEDURE — 3008F BODY MASS INDEX DOCD: CPT | Mod: CPTII,S$GLB,, | Performed by: NURSE PRACTITIONER

## 2024-03-12 PROCEDURE — 99215 OFFICE O/P EST HI 40 MIN: CPT | Mod: S$GLB,,, | Performed by: NURSE PRACTITIONER

## 2024-03-12 PROCEDURE — 3080F DIAST BP >= 90 MM HG: CPT | Mod: CPTII,S$GLB,, | Performed by: NURSE PRACTITIONER

## 2024-03-12 PROCEDURE — 99999 PR PBB SHADOW E&M-EST. PATIENT-LVL IV: CPT | Mod: PBBFAC,,, | Performed by: NURSE PRACTITIONER

## 2024-03-12 RX ORDER — RIZATRIPTAN BENZOATE 10 MG/1
TABLET ORAL
Qty: 9 TABLET | Refills: 11 | Status: SHIPPED | OUTPATIENT
Start: 2024-03-12 | End: 2024-06-13 | Stop reason: ALTCHOICE

## 2024-03-12 RX ORDER — KETOROLAC TROMETHAMINE 30 MG/ML
INJECTION, SOLUTION INTRAMUSCULAR; INTRAVENOUS
Qty: 20 ML | Refills: 0 | Status: ON HOLD | OUTPATIENT
Start: 2024-03-12 | End: 2024-04-29 | Stop reason: HOSPADM

## 2024-03-12 NOTE — PROGRESS NOTES
Date of service: 3/14/2024  Referring provider: No ref. provider found    Subjective:      Chief complaint: Headache       Patient ID: Isela Smyth is a 48 y.o. lady with migraines, depression, diverticulosis, history of kidney stone presenting for follow up of headache     Her last visit with me and neurology was over 3 years ago    History of Present Illness    INTERVAL HISTORY 3/12/24    Last visit was over three years ago and at that time she was not doing well.    Today she reports she is the same. She stopped taking Aimovig about one year ago. She sold her home and travels full time. Current pain 4 with range 0-10. She has migraines but no longer weekly. She has 6-8 migraines per month. She takes sumatriptan as needed. Otherwise information below is reviewed and verified with no changes made     INTERVAL HISTORY 3/2/2021  The patient location is: home  The chief complaint leading to consultation is: follow up  Visit type: audiovisual  Face to Face time with patient: 20  30 minutes of total time spent on the encounter, which includes face to face time and non-face to face time preparing to see the patient (eg, review of tests), Obtaining and/or reviewing separately obtained history, Documenting clinical information in the electronic or other health record, Independently interpreting results (not separately reported) and communicating results to the patient/family/caregiver, or Care coordination (not separately reported).   Each patient to whom he or she provides medical services by telemedicine is:  (1) informed of the relationship between the physician and patient and the respective role of any other health care provider with respect to management of the patient; and (2) notified that he or she may decline to receive medical services by telemedicine and may withdraw from such care at any time.    Notes:     Patient was last seen for her initial visit with Dr. Donaldson in 2019. At that time, Aimovig was started  "for prevention and Imitrex for acute treatment. She missed 2-3 months of Aimovig due to pharmacy issues but recently restarted with most recent last week of February.     Today she reports she is a little worse. She is having more frequent migraines, they last longer and they are more difficult to treat. She reports 4-5 headache days per month but current migraine has been ongoing for over a week. Prior to this episode, she reports she was better with only 4 days per month with a headache.     She reports she went to the ER on 2/25/2021 and a spinal tap was done. Per the note, 1 ml was taken. Patient reports she told the ER she has had LP in the past and that "helps with the pressure". Head CT done on 2/24/2021 and no abnormalities seen.     ORIGINAL HEADACHE HISTORY - 10/07/2019  Age at onset and course over time:  The headaches began in her early 20s; they are accompanied by significant nausea. Dairy reduction has helped. She feels like they start in her sinus. Stays with a constant sinus congestion. Neck and shoulders stay tight. If she can catch it early with imitrex or fioricet they are ok. Can last 3-4 days. Associated with significant reduction to functioning  Location:  Behind her eyes and her forehead  Quality:  [x] pressure [] tight [x] throbbing [x] sharp [] stabbing   Severity:  Current 2  Duration:  Hr to days  Frequency:  Near daily facial / sinus pressure; about 10 escalations per month   Headaches awaken at night?:  Yes    Worst time of day:   Associated with: [x] photophobia [x]  phonophobia [x] osmophobia [x] blurred vision  [x] double vision [x] loss of appetite [x] nausea [x] vomiting [] dizziness [] vertigo  [] tinnitus [] irritability [x] sinus pressure [x] problems with concentration   [x] neck tightness   Alleviated by:  [x] sleep [x] darkness [] massage [x] heat [x] ice [x] medication  Exacerbated by:  [] fatigue [x] light [x] noise [x] smells [] coughing [] sneezing  [] bending over [] " ovulation [] menses [] alcohol [x] change in weather []  stress  Ipsilateral autonomic: [] nasal congestion [] lacrimation [] ptosis [] injection [] edema [] foreign body sensation [] ear fullness   ICP:   Sleep habits:   Caffeine intake: little, varies   Gyn status (if female):  Status post tubal ligation    Current acute treatment:  Imitrex 100 mg or fioricet  Flexeril most nights   Norco 7.5mg - for neck and lumbar pain (has had multiple surgeries);  Phenergan supp     Current prevention:   Venlafaxine  mg mood   Trazodone     Previously tried/failed acute treatment:  Tylenol  Ibuprofen  Ketorolac  Naproxen  BU Pap  Excedrin  Zofran  Phenergan + toradol in the ED helps the most   Fioricet    Previously tried/failed preventative treatment:  Lyrica  Topiramate - worsened headaches, cognitive side effects   Depakote  Wellbutrin  Lexapro  Gabapentin - allergy   Aimovig - started October 2019    Review of patient's allergies indicates:   Allergen Reactions    Morphine Itching and Rash    Gabapentin Other (See Comments)     Coming out of skin     Current Outpatient Medications   Medication Sig Dispense Refill    butalbital-acetaminophen-caffeine -40 mg (FIORICET, ESGIC) -40 mg per tablet TAKE 1 TABLET BY MOUTH AS NEEDED FOR MIGRAINE NO MORE THAN 10 TABLETS IN 1 MONTH 10 tablet 0    cetirizine (ZYRTEC) 10 MG tablet Take 10 mg by mouth.      cyanocobalamin 1,000 mcg/mL injection Inject one mL once a week x 4 weeks; then decrease to once a month 4 mL 2    cyclobenzaprine (FLEXERIL) 10 MG tablet Take 1 tablet (10 mg total) by mouth 3 (three) times daily as needed for Muscle spasms. 90 tablet 0    docusate sodium (DOK) 100 MG capsule TK ONE C PO BID      HYDROcodone-acetaminophen (NORCO) 7.5-325 mg per tablet Take 1 tablet by mouth 4 (four) times daily as needed for Pain. 120 tablet 0    levothyroxine (SYNTHROID) 75 MCG tablet Take 1 tablet (75 mcg total) by mouth once daily. 90 tablet 3     lisdexamfetamine (VYVANSE) 40 MG Cap Take 1 capsule (40 mg total) by mouth every morning. 30 capsule 0    promethazine (PHENERGAN) 12.5 MG Tab Take 1 tablet (12.5 mg total) by mouth every 6 (six) hours as needed (nausea). 30 tablet 0    promethazine (PHENERGAN) 25 MG suppository Place 1 suppository (25 mg total) rectally every 6 (six) hours as needed for Nausea. 30 suppository 0    traZODone (DESYREL) 150 MG tablet Take 1 tablet (150 mg total) by mouth every evening. 90 tablet 3    venlafaxine (EFFEXOR-XR) 150 MG Cp24 Take 1 capsule (150 mg total) by mouth once daily. 90 capsule 3    acyclovir (ZOVIRAX) 800 MG Tab Take 800 mg by mouth 3 (three) times daily.      fluticasone propionate (FLONASE) 50 mcg/actuation nasal spray 2 sprays by Each Nostril route.      ketorolac (TORADOL) 30 mg/mL (1 mL) injection INJECT 1 ML INTO THE MUSCLE 2 TIMES DAILY AS NEEDED FOR SEVERE MIGRAINE. NO MORE THAN 2 DAYS/WEEK 20 mL 0    linaCLOtide (LINZESS) 145 mcg Cap capsule Take 1 capsule (145 mcg total) by mouth before breakfast. (Patient not taking: Reported on 3/12/2024) 30 capsule 5    oxybutynin (DITROPAN) 5 MG Tab Take 5 mg by mouth 2 (two) times daily as needed.      rizatriptan (MAXALT) 10 MG tablet 1 tab PO PRN migraine. May repeat every 2 hours for max 3 tabs in 24 hours. Use no more than 10 days per month. 9 tablet 11     No current facility-administered medications for this visit.     Facility-Administered Medications Ordered in Other Visits   Medication Dose Route Frequency Provider Last Rate Last Admin    lactated ringers infusion   Intravenous Continuous Marquis Zuluaga MD 75 mL/hr at 09/26/18 0845 New Bag at 09/26/18 0845       Past Medical History  Past Medical History:   Diagnosis Date    Anxiety     Arthritis     Chronic lumbar pain     Depression     Deviated nasal septum     Diverticulosis     Hemorrhoids     Hypothyroid     Kidney stone     passed 10 stones by herself over the years, one needed procedure     Melena 9/5/2018    Migraine headache     PONV (postoperative nausea and vomiting)     severe    Right ovarian cyst 02/2020    Right sided abdominal pain     Urticaria        Past Surgical History  Past Surgical History:   Procedure Laterality Date    AUGMENTATION OF BREAST      BREAST SURGERY      CHOLECYSTECTOMY      COLONOSCOPY  prior to 2011    COLONOSCOPY N/A 09/26/2018    Dr. Zuluaga; diverticulosis; repeat in 10 years    COLONOSCOPY N/A 11/01/2019    Procedure: COLONOSCOPY;  Surgeon: Marquis Zuluaga MD;  Location: [facility];  Service: Endoscopy;  Laterality: N/A;    ESOPHAGOGASTRODUODENOSCOPY N/A 09/05/2018    Procedure: EGD (ESOPHAGOGASTRODUODENOSCOPY);  Surgeon: Marquis Zuluaga MD;  Location: [facility];  Service: Endoscopy;  Laterality: N/A;    ESOPHAGOGASTRODUODENOSCOPY N/A 01/29/2020    Procedure: EGD (ESOPHAGOGASTRODUODENOSCOPY);  Surgeon: Marquis Zuluaga MD;  Location: [facility];  Service: Endoscopy;  Laterality: N/A;    HERNIA REPAIR      HIATAL HERNIA REPAIR      KIDNEY STONE SURGERY  2009    [facility], had stone removed by dr renay george, stayed in hospital 4 days    LAPAROSCOPIC GASTRIC BANDING  2007    later removed in 2016    LAPAROSCOPIC SALPINGO-OOPHORECTOMY Right 02/12/2020    Procedure: SALPINGO-OOPHORECTOMY, LAPAROSCOPIC;  Surgeon: Nely Martinez MD;  Location: [facility];  Service: OB/GYN;  Laterality: Right;    LUMBAR EPIDURAL INJECTION      OOPHORECTOMY      SINUS SURGERY      SPINE SURGERY      6 back surgeries; 1 neck surgery    TUBAL LIGATION      UPPER GASTROINTESTINAL ENDOSCOPY  03/2013    Dr. Zuluaga    UPPER GASTROINTESTINAL ENDOSCOPY  09/14/2016    Dr. Zuluaga    UPPER GASTROINTESTINAL ENDOSCOPY  09/05/2018    Dr. Zuluaga; gastritis; bx negative    VAGINAL DELIVERY      times 3       Family History  Family History   Problem Relation Age of Onset    Ulcers Mother     No Known Problems Father     Cancer Neg Hx     Heart disease Neg Hx     Hypertension Neg Hx      Diabetes Neg Hx     Angioedema Neg Hx     Allergies Neg Hx     Allergic rhinitis Neg Hx     Asthma Neg Hx     Eczema Neg Hx     Immunodeficiency Neg Hx     Colon cancer Neg Hx     Colon polyps Neg Hx     Crohn's disease Neg Hx     Ulcerative colitis Neg Hx     Nephrolithiasis Neg Hx     Stomach cancer Neg Hx     Esophageal cancer Neg Hx        Social History  Social History     Socioeconomic History    Marital status:    Tobacco Use    Smoking status: Never    Smokeless tobacco: Never   Substance and Sexual Activity    Alcohol use: Yes     Comment: ocassionally    Drug use: No     Types: Oxycodone    Sexual activity: Yes     Partners: Male     Birth control/protection: Surgical     Social Determinants of Health     Financial Resource Strain: Patient Declined (2/12/2024)    Overall Financial Resource Strain (CARDIA)     Difficulty of Paying Living Expenses: Patient declined   Food Insecurity: Patient Declined (2/12/2024)    Hunger Vital Sign     Worried About Running Out of Food in the Last Year: Patient declined     Ran Out of Food in the Last Year: Patient declined   Transportation Needs: No Transportation Needs (2/12/2024)    PRAPARE - Transportation     Lack of Transportation (Medical): No     Lack of Transportation (Non-Medical): No   Physical Activity: Unknown (2/12/2024)    Exercise Vital Sign     Days of Exercise per Week: Patient declined   Stress: Patient Declined (2/12/2024)    [employer] [place] [employer] - Occupational Stress Questionnaire     Feeling of Stress : Patient declined   Social Connections: Unknown (2/12/2024)    Social Connection and Isolation Panel [NHANES]     Frequency of Communication with Friends and Family: More than three times a week     Frequency of Social Gatherings with Friends and Family: Patient declined     Active Member of Clubs or Organizations: No     Attends Club or Organization Meetings: Never     Marital Status:    Housing Stability: Patient  Declined (2/12/2024)    Housing Stability Vital Sign     Unable to Pay for Housing in the Last Year: Patient declined     Unstable Housing in the Last Year: Patient declined        Objective:        Vitals:    03/12/24 1540   BP: (!) 171/121   Pulse: (!) 112   Resp: 17   Temp: 97.8 °F (36.6 °C)     Body mass index is 26.07 kg/m².    3/12/24  Constitutional:   She appears well-developed and well-nourished. She is well groomed     Neurological Exam:  General: well-developed, well-nourished, no distress  Mental status: Awake and alert  Speech language: No dysarthria or aphasia on conversation  Cranial nerves: Face symmetric  Motor: Moves all extremities well  Coordination: No ataxia. No tremor.     Data Review:     I have personally reviewed the referring provider's notes, labs, & imaging made available to me today.      RADIOLOGY STUDIES:  I have personally reviewed the pertinent images performed.       Results for orders placed or performed during the hospital encounter of 01/25/16   CT Head Without Contrast    Narrative    CT head     INDICATION:    Headache     No comparison available     TECHNIQUE:    5 mm axial tomographic images of the brain were obtained. No contrast was   utilized. Images are reviewed on PACS in brain, blood and bone windows. Total       FINDINGS:    No hemorrhage, mass effect or CT evidence of acute cortical infarction. Brain  parenchyma and ventricles are normal. No cerebellar tonsillar ectopia.     No abnormal extra-axial fluid collections.     No hyperdense artery or vein.     No skull fracture or aggressive calvarial lesion. Visualized paranasal   sinuses and mastoid air cells are clear.     IMPRESSION:    1. Normal noncontrast CT head       Electronically signed by: Harley Vasquez (Jan 25, 2016 21:04:35)       Lab Results   Component Value Date     11/22/2023    K 4.2 11/22/2023     11/22/2023    CO2 28 11/22/2023    BUN 16 11/22/2023    CREATININE 0.75 11/22/2023     CREATININE 0.6 08/10/2012    GLU 91 11/22/2023    AST 37 (H) 11/22/2023    AST 27 01/25/2016    ALT 19 11/22/2023    ALBUMIN 4.7 11/22/2023    PROT 7.5 11/22/2023    BILITOT 0.3 11/22/2023    CHOL 185 07/29/2021    HDL 54 07/29/2021    LDLCALC 111.4 07/29/2021    TRIG 98 07/29/2021       Lab Results   Component Value Date    WBC 6.26 01/04/2024    HGB 13.6 01/04/2024    HCT 41.8 01/04/2024    MCV 90 01/04/2024     01/04/2024       Lab Results   Component Value Date    TSH 2.641 06/27/2023           Assessment & Plan:       Problem List Items Addressed This Visit          Neuro    Intractable chronic migraine without aura and with status migrainosus - Primary    Overview     Migraine headaches since her 20's. Headaches are typically unilateral, moderate to severe in intensity, worsen with activity, pounding in quality and associated with sensitivity to light, smell and sound.   The patient has chronic migraines ( G43.719) and suffers from headaches more than 3 months, more than 15 days of headache days per month lasting more than 4 hours with at least 8 attacks that meet criteria for migraine. She has tried multiple medications including but not limited to venlafaxine, trazodone, Lyrica, Topamax, Depakote, Wellbutrin, gabapentin, Aimovig  The patient has been unresponsive and refractory.The patient meets criteria for chronic headaches according to the ICHD-II, the patient has more than 15 headaches a month which last for more than 4 hours a day. The patient is an ideal candidate for Botox. After treatment, I expect 50%  improvement in the patient's symptoms. A reduction of at least 7 days per month and the number of cumulative hours suffering with headaches as well as at least 100 total hours affected with migraine per month.  DESCRIPTION OF PROCEDURE: After obtaining informed consent and under aseptic technique, a total of 155 units of botulinum toxin type A to be injected in the following muscles:      --  Procerus 5 units  --  5 units bilaterally  -- Frontalis 20 units  -- Temporalis 20 units bilaterally  -- Occipitalis 15 units bilaterally  -- Upper cervical paraspinals 10 units bilaterally  -- Trapezius 15 units bilaterally.       Unavoidable waste 45 units    For acute attacks change to rizatriptan.          Relevant Medications    rizatriptan (MAXALT) 10 MG tablet    ketorolac (TORADOL) 30 mg/mL (1 mL) injection    Other Relevant Orders    Prior authorization Order             TESTING:  -- none     REFERRALS:  -- none     PREVENTION (use daily regardless of headache):  -- start magnesium in ONE of the following preparations -               1. Magnesium oxide 800mg daily (the most common over the counter kind, may causes loose stools)              2. Magnesium citrate 400-500mg daily (harder to find, but more neutral on the bowels)              3. Magnesium glycinate 400mg daily (hardest to find, look online, but most bowel-neutral, best absorbed)    -- START Botox    AS-NEEDED TREATMENT (use total no more than 10 days per month unless otherwise stated):  -- STOP Imitrex (sumatriptan)   -- START rizatriptan (Maxalt) at onset of migraine. May repeat once in 2 hours. No more than 2 doses per pay, 10 days per month  -- rescue with 1-2 tablets of Fioricet (no more than 10 pills per month) if the above was ineffective   -- continue phenergan suppository for nausea   Take Toradol 30mg injection at home for severe escalations (if imitrex failed to relieve)     Follow up in about 3 weeks (around 4/2/2024) for first botox.    Face to Face time with patient: 30  45 minutes of total time spent on the encounter, which includes face to face time and non-face to face time on day of visit preparing to see the patient (eg, review of tests), Obtaining and/or reviewing separately obtained history, Documenting clinical information in the electronic or other health record, Independently interpreting results (not separately  reported) and communicating results to the patient/family/caregiver, or Care coordination (not separately reported).     Josiane Amaro, NP

## 2024-03-12 NOTE — PATIENT INSTRUCTIONS
TESTING:  -- none     REFERRALS:  -- none     PREVENTION (use daily regardless of headache):  -- start magnesium in ONE of the following preparations -               1. Magnesium oxide 800mg daily (the most common over the counter kind, may causes loose stools)              2. Magnesium citrate 400-500mg daily (harder to find, but more neutral on the bowels)              3. Magnesium glycinate 400mg daily (hardest to find, look online, but most bowel-neutral, best absorbed)    -- START Botox    AS-NEEDED TREATMENT (use total no more than 10 days per month unless otherwise stated):  -- STOP Imitrex (sumatriptan)   -- START rizatriptan (Maxalt) at onset of migraine. May repeat once in 2 hours. No more than 2 doses per pay, 10 days per month  -- rescue with 1-2 tablets of Fioricet (no more than 10 pills per month) if the above was ineffective   -- continue phenergan suppository for nausea   Take Toradol 30mg injection at home for severe escalations (if imitrex failed to relieve)

## 2024-03-14 DIAGNOSIS — M54.40 CHRONIC LOW BACK PAIN WITH SCIATICA, SCIATICA LATERALITY UNSPECIFIED, UNSPECIFIED BACK PAIN LATERALITY: ICD-10-CM

## 2024-03-14 DIAGNOSIS — G89.29 CHRONIC LOW BACK PAIN WITH SCIATICA, SCIATICA LATERALITY UNSPECIFIED, UNSPECIFIED BACK PAIN LATERALITY: ICD-10-CM

## 2024-03-14 NOTE — TELEPHONE ENCOUNTER
No care due was identified.  [facility] Embedded Care Due Messages. Reference number: [id number].   3/14/2024 6:29:35 PM CDT

## 2024-03-15 RX ORDER — CYCLOBENZAPRINE HCL 10 MG
10 TABLET ORAL
Qty: 90 TABLET | Refills: 0 | Status: SHIPPED | OUTPATIENT
Start: 2024-03-15 | End: 2024-04-17 | Stop reason: SDUPTHER

## 2024-03-28 DIAGNOSIS — M54.12 CERVICAL RADICULOPATHY: ICD-10-CM

## 2024-03-28 DIAGNOSIS — F90.2 ATTENTION DEFICIT HYPERACTIVITY DISORDER (ADHD), COMBINED TYPE: ICD-10-CM

## 2024-03-28 DIAGNOSIS — M47.816 LUMBAR FACET ARTHROPATHY: ICD-10-CM

## 2024-03-28 RX ORDER — LISDEXAMFETAMINE DIMESYLATE 40 MG/1
40 CAPSULE ORAL EVERY MORNING
Qty: 30 CAPSULE | Refills: 0 | Status: SHIPPED | OUTPATIENT
Start: 2024-03-28 | End: 2024-04-29 | Stop reason: SDUPTHER

## 2024-03-28 RX ORDER — HYDROCODONE BITARTRATE AND ACETAMINOPHEN 7.5; 325 MG/1; MG/1
1 TABLET ORAL 4 TIMES DAILY PRN
Qty: 120 TABLET | Refills: 0 | Status: ON HOLD | OUTPATIENT
Start: 2024-03-28 | End: 2024-04-29

## 2024-03-28 NOTE — TELEPHONE ENCOUNTER
Care Due:                  Date            Visit Type   Department     Provider  --------------------------------------------------------------------------------                                ESTABLISHED                              PATIENT -    [facility]  Last Visit: 02-      VIRTUAL      MEDICINE       Jose Oconnell                               -                              PRIMARY      [facility]  Next Visit: 05-      CARE ([facility])   MEDICINE       Jose Oconnell                                                            Last  Test          Frequency    Reason                     Performed    Due Date  --------------------------------------------------------------------------------    TSH.........  12 months..  levothyroxine............  06- 06-    [facility] Embedded Care Due Messages. Reference number: [id number].   3/28/2024 1:28:30 PM CDT

## 2024-04-02 ENCOUNTER — PROCEDURE VISIT (OUTPATIENT)
Dept: NEUROLOGY | Facility: CLINIC | Age: 49
End: 2024-04-02
Payer: MEDICARE

## 2024-04-02 VITALS
RESPIRATION RATE: 17 BRPM | DIASTOLIC BLOOD PRESSURE: 113 MMHG | HEIGHT: 67 IN | HEART RATE: 111 BPM | SYSTOLIC BLOOD PRESSURE: 167 MMHG | TEMPERATURE: 98 F | WEIGHT: 166.44 LBS | BODY MASS INDEX: 26.12 KG/M2

## 2024-04-02 DIAGNOSIS — G43.711 INTRACTABLE CHRONIC MIGRAINE WITHOUT AURA AND WITH STATUS MIGRAINOSUS: Primary | ICD-10-CM

## 2024-04-02 PROCEDURE — 64615 CHEMODENERV MUSC MIGRAINE: CPT | Mod: S$GLB,,, | Performed by: NURSE PRACTITIONER

## 2024-04-02 RX ORDER — SEMAGLUTIDE 2.68 MG/ML
2 INJECTION, SOLUTION SUBCUTANEOUS
COMMUNITY
Start: 2023-11-03 | End: 2024-04-27 | Stop reason: CLARIF

## 2024-04-02 RX ORDER — PREDNISONE 5 MG/1
5 TABLET ORAL 2 TIMES DAILY
COMMUNITY
Start: 2023-12-04 | End: 2024-04-27 | Stop reason: CLARIF

## 2024-04-02 NOTE — PROCEDURES

## 2024-04-17 DIAGNOSIS — G89.29 CHRONIC LOW BACK PAIN WITH SCIATICA, SCIATICA LATERALITY UNSPECIFIED, UNSPECIFIED BACK PAIN LATERALITY: ICD-10-CM

## 2024-04-17 DIAGNOSIS — M54.40 CHRONIC LOW BACK PAIN WITH SCIATICA, SCIATICA LATERALITY UNSPECIFIED, UNSPECIFIED BACK PAIN LATERALITY: ICD-10-CM

## 2024-04-18 RX ORDER — CYCLOBENZAPRINE HCL 10 MG
10 TABLET ORAL 3 TIMES DAILY PRN
Qty: 90 TABLET | Refills: 0 | Status: SHIPPED | OUTPATIENT
Start: 2024-04-18 | End: 2024-05-29

## 2024-04-18 NOTE — TELEPHONE ENCOUNTER
No care due was identified.  [facility] Embedded Care Due Messages. Reference number: [id number].   4/17/2024 11:05:20 PM CDT

## 2024-04-27 PROBLEM — L02.91 ABSCESS: Status: ACTIVE | Noted: 2024-04-27

## 2024-04-29 DIAGNOSIS — F90.2 ATTENTION DEFICIT HYPERACTIVITY DISORDER (ADHD), COMBINED TYPE: ICD-10-CM

## 2024-04-29 NOTE — TELEPHONE ENCOUNTER
No care due was identified.  [facility] Embedded Care Due Messages. Reference number: [id number].   4/29/2024 5:25:07 PM CDT

## 2024-04-30 RX ORDER — LISDEXAMFETAMINE DIMESYLATE 40 MG/1
40 CAPSULE ORAL EVERY MORNING
Qty: 30 CAPSULE | Refills: 0 | Status: SHIPPED | OUTPATIENT
Start: 2024-04-30 | End: 2024-06-17 | Stop reason: SDUPTHER

## 2024-05-06 ENCOUNTER — PATIENT MESSAGE (OUTPATIENT)
Dept: FAMILY MEDICINE | Facility: CLINIC | Age: 49
End: 2024-05-06
Payer: MEDICARE

## 2024-05-06 DIAGNOSIS — M51.36 DDD (DEGENERATIVE DISC DISEASE), LUMBAR: Primary | ICD-10-CM

## 2024-05-06 RX ORDER — HYDROCODONE BITARTRATE AND ACETAMINOPHEN 7.5; 325 MG/1; MG/1
1 TABLET ORAL EVERY 4 HOURS PRN
Qty: 120 TABLET | Refills: 0 | Status: SHIPPED | OUTPATIENT
Start: 2024-05-06 | End: 2024-05-08 | Stop reason: SDUPTHER

## 2024-05-06 NOTE — TELEPHONE ENCOUNTER
No care due was identified.  [facility] Embedded Care Due Messages. Reference number: [id number].   5/06/2024 12:11:20 PM CDT

## 2024-05-07 ENCOUNTER — PATIENT MESSAGE (OUTPATIENT)
Dept: FAMILY MEDICINE | Facility: CLINIC | Age: 49
End: 2024-05-07

## 2024-05-07 DIAGNOSIS — M51.36 DDD (DEGENERATIVE DISC DISEASE), LUMBAR: ICD-10-CM

## 2024-05-08 ENCOUNTER — TELEPHONE (OUTPATIENT)
Dept: HEMATOLOGY/ONCOLOGY | Facility: CLINIC | Age: 49
End: 2024-05-08
Payer: MEDICARE

## 2024-05-08 RX ORDER — HYDROCODONE BITARTRATE AND ACETAMINOPHEN 7.5; 325 MG/1; MG/1
1 TABLET ORAL EVERY 4 HOURS PRN
Qty: 120 TABLET | Refills: 0 | Status: SHIPPED | OUTPATIENT
Start: 2024-05-08 | End: 2024-06-03 | Stop reason: SDUPTHER

## 2024-05-08 RX ORDER — HYDROCODONE BITARTRATE AND ACETAMINOPHEN 7.5; 325 MG/1; MG/1
1 TABLET ORAL EVERY 4 HOURS PRN
Qty: 120 TABLET | Refills: 0 | Status: CANCELLED | OUTPATIENT
Start: 2024-05-08

## 2024-05-08 NOTE — TELEPHONE ENCOUNTER
Spoke with the pt and got the pt rescheduled with Ayo Shelby on 06/04. Pt verbalized and understanding.  ----- Message from Cora Padilla sent at 5/8/2024 10:52 AM CDT -----  Type: Appointment Request    Caller is requesting appointment.      Name of Caller:pt    When is the first available appointment?na    Symptoms:iron    Would the patient rather a call back or a response via [facility]? Call back    Best Call Back Number:[phone]      Additional Information: pt is a former pt of Zayn[facility] and she is looking to see someone about her iron     Please call Back to advise. Thanks!

## 2024-05-15 ENCOUNTER — OFFICE VISIT (OUTPATIENT)
Dept: FAMILY MEDICINE | Facility: CLINIC | Age: 49
End: 2024-05-15
Payer: MEDICARE

## 2024-05-15 ENCOUNTER — LAB VISIT (OUTPATIENT)
Dept: LAB | Facility: HOSPITAL | Age: 49
End: 2024-05-15
Attending: STUDENT IN AN ORGANIZED HEALTH CARE EDUCATION/TRAINING PROGRAM
Payer: MEDICARE

## 2024-05-15 VITALS
HEIGHT: 67 IN | BODY MASS INDEX: 25.78 KG/M2 | SYSTOLIC BLOOD PRESSURE: 160 MMHG | WEIGHT: 164.25 LBS | DIASTOLIC BLOOD PRESSURE: 108 MMHG | HEART RATE: 88 BPM | OXYGEN SATURATION: 96 %

## 2024-05-15 DIAGNOSIS — L02.31 ABSCESS AND CELLULITIS OF GLUTEAL REGION: Primary | ICD-10-CM

## 2024-05-15 DIAGNOSIS — R03.0 ELEVATED BLOOD PRESSURE READING WITHOUT DIAGNOSIS OF HYPERTENSION: ICD-10-CM

## 2024-05-15 DIAGNOSIS — K21.9 GASTROESOPHAGEAL REFLUX DISEASE, UNSPECIFIED WHETHER ESOPHAGITIS PRESENT: ICD-10-CM

## 2024-05-15 DIAGNOSIS — F90.2 ATTENTION DEFICIT HYPERACTIVITY DISORDER (ADHD), COMBINED TYPE: ICD-10-CM

## 2024-05-15 DIAGNOSIS — E03.9 ACQUIRED HYPOTHYROIDISM: ICD-10-CM

## 2024-05-15 DIAGNOSIS — M54.40 CHRONIC LOW BACK PAIN WITH SCIATICA, SCIATICA LATERALITY UNSPECIFIED, UNSPECIFIED BACK PAIN LATERALITY: ICD-10-CM

## 2024-05-15 DIAGNOSIS — G89.29 CHRONIC LOW BACK PAIN WITH SCIATICA, SCIATICA LATERALITY UNSPECIFIED, UNSPECIFIED BACK PAIN LATERALITY: ICD-10-CM

## 2024-05-15 DIAGNOSIS — L03.317 ABSCESS AND CELLULITIS OF GLUTEAL REGION: Primary | ICD-10-CM

## 2024-05-15 PROBLEM — L02.91 ABSCESS: Status: RESOLVED | Noted: 2024-04-27 | Resolved: 2024-05-15

## 2024-05-15 LAB — TSH SERPL DL<=0.005 MIU/L-ACNC: 1.54 UIU/ML (ref 0.4–4)

## 2024-05-15 PROCEDURE — 3077F SYST BP >= 140 MM HG: CPT | Mod: CPTII,S$GLB,, | Performed by: STUDENT IN AN ORGANIZED HEALTH CARE EDUCATION/TRAINING PROGRAM

## 2024-05-15 PROCEDURE — 1159F MED LIST DOCD IN RCRD: CPT | Mod: CPTII,S$GLB,, | Performed by: STUDENT IN AN ORGANIZED HEALTH CARE EDUCATION/TRAINING PROGRAM

## 2024-05-15 PROCEDURE — 84443 ASSAY THYROID STIM HORMONE: CPT | Performed by: STUDENT IN AN ORGANIZED HEALTH CARE EDUCATION/TRAINING PROGRAM

## 2024-05-15 PROCEDURE — 99999 PR PBB SHADOW E&M-EST. PATIENT-LVL III: CPT | Mod: PBBFAC,,, | Performed by: STUDENT IN AN ORGANIZED HEALTH CARE EDUCATION/TRAINING PROGRAM

## 2024-05-15 PROCEDURE — 3080F DIAST BP >= 90 MM HG: CPT | Mod: CPTII,S$GLB,, | Performed by: STUDENT IN AN ORGANIZED HEALTH CARE EDUCATION/TRAINING PROGRAM

## 2024-05-15 PROCEDURE — 36415 COLL VENOUS BLD VENIPUNCTURE: CPT | Mod: PO | Performed by: STUDENT IN AN ORGANIZED HEALTH CARE EDUCATION/TRAINING PROGRAM

## 2024-05-15 PROCEDURE — 3008F BODY MASS INDEX DOCD: CPT | Mod: CPTII,S$GLB,, | Performed by: STUDENT IN AN ORGANIZED HEALTH CARE EDUCATION/TRAINING PROGRAM

## 2024-05-15 PROCEDURE — 99214 OFFICE O/P EST MOD 30 MIN: CPT | Mod: S$GLB,,, | Performed by: STUDENT IN AN ORGANIZED HEALTH CARE EDUCATION/TRAINING PROGRAM

## 2024-05-15 RX ORDER — OMEPRAZOLE 20 MG/1
20 CAPSULE, DELAYED RELEASE ORAL DAILY
Qty: 14 CAPSULE | Refills: 0 | Status: SHIPPED | OUTPATIENT
Start: 2024-05-15 | End: 2024-06-07

## 2024-05-15 NOTE — ASSESSMENT & PLAN NOTE
Elevated today and with recent hospital visits. Previously controlled. I don't know if this is related to recent infection or antibiotics. She has clinic visits with heme/onc and neurology upcoming - if those clinic blood pressure readings are elevated, we will start a medication.

## 2024-05-15 NOTE — PROGRESS NOTES
Name: Isela Smyth  MRN: [id number]  : 1975  PCP: Jose Oconnell MD    Subjective   Patient presents for regular follow up. She did go to the hospital for superficial abscess a few weeks ago.     Review of Systems   HENT:  Positive for sore throat. Negative for congestion and rhinorrhea.    Respiratory:  Negative for cough and shortness of breath.    Cardiovascular:  Positive for chest pain (heart burn).   Gastrointestinal:  Negative for nausea.       Patient Active Problem List   Diagnosis    Lumbago    Anxiety    Chronic low back pain    Hypothyroid    AR (allergic rhinitis)    Chronic rhinitis    S/P nasal septoplasty, inferior turbinate reductions and outfracture, 2014, excellent outcome, 2014     Elevated blood pressure reading without diagnosis of hypertension    GERD (gastroesophageal reflux disease)    DDD (degenerative disc disease), lumbar    Lumbar facet arthropathy    Cervical radiculopathy    Chronic cervical pain    Iron deficiency anemia due to dietary causes    Urinary incontinence    Encounter for well woman exam with routine gynecological exam    Pelvic pain in female    Intractable chronic migraine without aura and with status migrainosus    Diarrhea of presumed infectious origin    Cervical myofascial pain syndrome    Epigastric pain    Right ovarian cyst    Recurrent major depressive disorder, in full remission    Attention deficit hyperactivity disorder (ADHD), combined type    Abscess       Health Maintenance Due   Topic Date Due    Sign Pain Contract  Never done    Complete Opioid Risk Tool  Never done    Hemoglobin A1c (Diabetic Prevention Screening)  Never done    COVID-19 Vaccine (3 - 2023-24 season) 2023    Mammogram  2024       Objective   Vitals:    05/15/24 0912   BP: (!) 144/98   Pulse: 88       Physical Exam  Constitutional:       General: She is not in acute distress.     Appearance: Normal appearance. She is well-developed.   HENT:      Head:  Normocephalic and atraumatic.      Right Ear: External ear normal.      Left Ear: External ear normal.   Eyes:      Conjunctiva/sclera: Conjunctivae normal.   Pulmonary:      Effort: Pulmonary effort is normal. No respiratory distress.   Abdominal:      General: Abdomen is flat. There is no distension.   Musculoskeletal:         General: No swelling or deformity.      Right lower leg: No edema.      Left lower leg: No edema.   Skin:     General: Skin is warm and dry.      Coloration: Skin is not jaundiced.      Comments: Scabbing overlying left lateral buttock without fluctuation, erythema, edema, drainage.   Neurological:      Mental Status: She is alert and oriented to person, place, and time. Mental status is at baseline.   Psychiatric:         Attention and Perception: Attention and perception normal.         Mood and Affect: Mood normal.         Speech: Speech normal.         Behavior: Behavior normal. Behavior is cooperative.         Thought Content: Thought content normal.         Cognition and Memory: Cognition normal.         Judgment: Judgment normal.         Assessment & Plan   1. Abscess and cellulitis of gluteal region  Assessment & Plan:  Healing well on today's exam. No systemic symptoms such as fever. Finishing up antibiotics - continue medication.      2. Chronic low back pain with sciatica, sciatica laterality unspecified, unspecified back pain laterality  Assessment & Plan:  Stable with current regimen. Continue narcotics.      3. Attention deficit hyperactivity disorder (ADHD), combined type  Assessment & Plan:  Stable on Vyvanse. Continue medication.      4. Elevated blood pressure reading without diagnosis of hypertension  Assessment & Plan:  Elevated today and with recent hospital visits. Previously controlled. I don't know if this is related to recent infection or antibiotics. She has clinic visits with heme/onc and neurology upcoming - if those clinic blood pressure readings are elevated, we  will start a medication.      5. Gastroesophageal reflux disease, unspecified whether esophagitis present  Assessment & Plan:  Patient complaining of sore throat and globus feeling with medications. Likely reflux - will send 2 week course of PPI.     Orders:  -     omeprazole (PRILOSEC) 20 MG capsule; Take 1 capsule (20 mg total) by mouth once daily. for 14 days  Dispense: 14 capsule; Refill: 0    6. Acquired hypothyroidism  -     TSH; Future; Expected date: 05/15/2024        Follow up in 3 months.     Jose Oconnell MD  05/15/2024

## 2024-05-15 NOTE — ASSESSMENT & PLAN NOTE
Healing well on today's exam. No systemic symptoms such as fever. Finishing up antibiotics - continue medication.

## 2024-05-15 NOTE — ASSESSMENT & PLAN NOTE
Patient complaining of sore throat and globus feeling with medications. Likely reflux - will send 2 week course of PPI.

## 2024-05-28 DIAGNOSIS — G89.29 CHRONIC LOW BACK PAIN WITH SCIATICA, SCIATICA LATERALITY UNSPECIFIED, UNSPECIFIED BACK PAIN LATERALITY: ICD-10-CM

## 2024-05-28 DIAGNOSIS — M54.40 CHRONIC LOW BACK PAIN WITH SCIATICA, SCIATICA LATERALITY UNSPECIFIED, UNSPECIFIED BACK PAIN LATERALITY: ICD-10-CM

## 2024-05-28 NOTE — TELEPHONE ENCOUNTER
No care due was identified.  [facility] Embedded Care Due Messages. Reference number: [id number].   5/28/2024 5:50:57 PM CDT

## 2024-05-29 RX ORDER — CYCLOBENZAPRINE HCL 10 MG
10 TABLET ORAL
Qty: 90 TABLET | Refills: 0 | Status: SHIPPED | OUTPATIENT
Start: 2024-05-29

## 2024-06-03 DIAGNOSIS — M51.36 DDD (DEGENERATIVE DISC DISEASE), LUMBAR: ICD-10-CM

## 2024-06-03 DIAGNOSIS — E53.8 B12 DEFICIENCY: Primary | ICD-10-CM

## 2024-06-03 DIAGNOSIS — E53.8 B12 DEFICIENCY: ICD-10-CM

## 2024-06-03 DIAGNOSIS — D50.8 IRON DEFICIENCY ANEMIA DUE TO DIETARY CAUSES: Primary | ICD-10-CM

## 2024-06-03 DIAGNOSIS — D53.9 NUTRITIONAL ANEMIA: ICD-10-CM

## 2024-06-03 NOTE — TELEPHONE ENCOUNTER
No care due was identified.  [facility] Embedded Care Due Messages. Reference number: [id number].   6/03/2024 1:19:16 PM CDT

## 2024-06-04 RX ORDER — HYDROCODONE BITARTRATE AND ACETAMINOPHEN 7.5; 325 MG/1; MG/1
1 TABLET ORAL EVERY 4 HOURS PRN
Qty: 120 TABLET | Refills: 0 | Status: SHIPPED | OUTPATIENT
Start: 2024-06-04

## 2024-06-05 ENCOUNTER — LAB VISIT (OUTPATIENT)
Dept: LAB | Facility: HOSPITAL | Age: 49
End: 2024-06-05
Attending: NURSE PRACTITIONER
Payer: MEDICARE

## 2024-06-05 DIAGNOSIS — E53.8 B12 DEFICIENCY: ICD-10-CM

## 2024-06-05 DIAGNOSIS — D53.9 NUTRITIONAL ANEMIA: ICD-10-CM

## 2024-06-05 DIAGNOSIS — D50.8 IRON DEFICIENCY ANEMIA DUE TO DIETARY CAUSES: ICD-10-CM

## 2024-06-05 LAB
BASOPHILS # BLD AUTO: 0.05 K/UL (ref 0–0.2)
BASOPHILS NFR BLD: 0.6 % (ref 0–1.9)
DIFFERENTIAL METHOD BLD: NORMAL
EOSINOPHIL # BLD AUTO: 0.1 K/UL (ref 0–0.5)
EOSINOPHIL NFR BLD: 1.3 % (ref 0–8)
ERYTHROCYTE [DISTWIDTH] IN BLOOD BY AUTOMATED COUNT: 12.6 % (ref 11.5–14.5)
FERRITIN SERPL-MCNC: 15 NG/ML (ref 20–300)
HCT VFR BLD AUTO: 38 % (ref 37–48.5)
HGB BLD-MCNC: 13 G/DL (ref 12–16)
IMM GRANULOCYTES # BLD AUTO: 0.03 K/UL (ref 0–0.04)
IMM GRANULOCYTES NFR BLD AUTO: 0.4 % (ref 0–0.5)
IRON SERPL-MCNC: 58 UG/DL (ref 30–160)
LYMPHOCYTES # BLD AUTO: 1.6 K/UL (ref 1–4.8)
LYMPHOCYTES NFR BLD: 20.7 % (ref 18–48)
MCH RBC QN AUTO: 30.7 PG (ref 27–31)
MCHC RBC AUTO-ENTMCNC: 34.2 G/DL (ref 32–36)
MCV RBC AUTO: 90 FL (ref 82–98)
MONOCYTES # BLD AUTO: 0.6 K/UL (ref 0.3–1)
MONOCYTES NFR BLD: 7.6 % (ref 4–15)
NEUTROPHILS # BLD AUTO: 5.5 K/UL (ref 1.8–7.7)
NEUTROPHILS NFR BLD: 69.4 % (ref 38–73)
NRBC BLD-RTO: 0 /100 WBC
PLATELET # BLD AUTO: 290 K/UL (ref 150–450)
PMV BLD AUTO: 9.9 FL (ref 9.2–12.9)
RBC # BLD AUTO: 4.23 M/UL (ref 4–5.4)
SATURATED IRON: 14 % (ref 20–50)
TOTAL IRON BINDING CAPACITY: 425 UG/DL (ref 250–450)
TRANSFERRIN SERPL-MCNC: 287 MG/DL (ref 200–375)
VIT B12 SERPL-MCNC: 160 PG/ML (ref 210–950)
WBC # BLD AUTO: 7.86 K/UL (ref 3.9–12.7)

## 2024-06-05 PROCEDURE — 83540 ASSAY OF IRON: CPT | Performed by: NURSE PRACTITIONER

## 2024-06-05 PROCEDURE — 85025 COMPLETE CBC W/AUTO DIFF WBC: CPT | Mod: PO | Performed by: NURSE PRACTITIONER

## 2024-06-05 PROCEDURE — 82728 ASSAY OF FERRITIN: CPT | Performed by: NURSE PRACTITIONER

## 2024-06-05 PROCEDURE — 36415 COLL VENOUS BLD VENIPUNCTURE: CPT | Mod: PO | Performed by: NURSE PRACTITIONER

## 2024-06-05 PROCEDURE — 82607 VITAMIN B-12: CPT | Performed by: NURSE PRACTITIONER

## 2024-06-07 ENCOUNTER — OFFICE VISIT (OUTPATIENT)
Dept: HEMATOLOGY/ONCOLOGY | Facility: CLINIC | Age: 49
End: 2024-06-07
Payer: MEDICARE

## 2024-06-07 VITALS
HEART RATE: 100 BPM | WEIGHT: 166.25 LBS | OXYGEN SATURATION: 99 % | SYSTOLIC BLOOD PRESSURE: 158 MMHG | TEMPERATURE: 98 F | DIASTOLIC BLOOD PRESSURE: 82 MMHG | RESPIRATION RATE: 18 BRPM | BODY MASS INDEX: 26.09 KG/M2 | HEIGHT: 67 IN

## 2024-06-07 DIAGNOSIS — M94.9 DISORDER OF BONE AND CARTILAGE: ICD-10-CM

## 2024-06-07 DIAGNOSIS — D50.8 IRON DEFICIENCY ANEMIA DUE TO DIETARY CAUSES: Primary | ICD-10-CM

## 2024-06-07 DIAGNOSIS — E53.8 VITAMIN B12 DEFICIENCY: ICD-10-CM

## 2024-06-07 DIAGNOSIS — M89.9 DISORDER OF BONE AND CARTILAGE: ICD-10-CM

## 2024-06-07 PROCEDURE — 3008F BODY MASS INDEX DOCD: CPT | Mod: CPTII,S$GLB,, | Performed by: NURSE PRACTITIONER

## 2024-06-07 PROCEDURE — 99999 PR PBB SHADOW E&M-EST. PATIENT-LVL IV: CPT | Mod: PBBFAC,,, | Performed by: NURSE PRACTITIONER

## 2024-06-07 PROCEDURE — 99214 OFFICE O/P EST MOD 30 MIN: CPT | Mod: S$GLB,,, | Performed by: NURSE PRACTITIONER

## 2024-06-07 PROCEDURE — 1160F RVW MEDS BY RX/DR IN RCRD: CPT | Mod: CPTII,S$GLB,, | Performed by: NURSE PRACTITIONER

## 2024-06-07 PROCEDURE — 3077F SYST BP >= 140 MM HG: CPT | Mod: CPTII,S$GLB,, | Performed by: NURSE PRACTITIONER

## 2024-06-07 PROCEDURE — 3079F DIAST BP 80-89 MM HG: CPT | Mod: CPTII,S$GLB,, | Performed by: NURSE PRACTITIONER

## 2024-06-07 PROCEDURE — 1159F MED LIST DOCD IN RCRD: CPT | Mod: CPTII,S$GLB,, | Performed by: NURSE PRACTITIONER

## 2024-06-07 RX ORDER — CYANOCOBALAMIN 1000 UG/ML
INJECTION, SOLUTION INTRAMUSCULAR; SUBCUTANEOUS
Qty: 30 ML | Refills: 4 | Status: SHIPPED | OUTPATIENT
Start: 2024-06-07

## 2024-06-07 RX ORDER — SUMATRIPTAN SUCCINATE 100 MG/1
100 TABLET ORAL
COMMUNITY
Start: 2024-06-05 | End: 2024-06-13

## 2024-06-07 NOTE — PROGRESS NOTES
PATIENT: Isela Smyth  MRN: [id number]  DATE: 6/7/2024    Diagnosis:   1. Iron deficiency anemia due to dietary causes    2. Vitamin B12 deficiency    3. Disorder of bone and cartilage      Chief Complaint: Establish Care (Labs done on 6/5; former pt naga/)      Subjective:   HPI: Ms. Smyth is a 49 y.o. female with history of migraine headaches, Depression, hypothyroidism, GERD, history of iron deficiency anemia, s/p gastric surgery who is seen today for follow-up of iron deficiency anemia.     Patient has been seen in this clinic in 2019 by Dr. Rudd for the same diagnosis, was taking oral iron supplements at that time.     History of lap band that was removed. No other gastric surgeries.     Review of records shows a decline in Hgb that initially started in 2018, had a GI work-up in August 2018 with upper EGD and colonoscopy that showed diverticulosis and gastritis.      01/29/2020 Upper GI showed normal esophagus with gastritis    Early 2032 she had experienced kidney stones with hematuria. Menstrual bleeding has become irregular with periods lasting 7-10 days. She also admits that she does not always eat a balanced diet; she is not vegetarian.   Was feeling more fatigued with myalgias and reached out to her primary physician.   3/16/23 of this year her Hgb was 8 g/dL. Patient started PO iron supplements at that time though she has not taken them consistently.     Today 06/07/2024:  Ms. Smyth presents to the clinic today for interval evaluation & review of labs with no complaints.  She has been in good health & traveling - recently [place] & [place].   She has not had a menstrual cycle since April, but they had not been heavy prior to.    She ran out of B12 injections & has been off months.  She has been taking iron gummies when in town along with Vitamin C.  Labs reviewed with stable Hemoglobin of 13.0, low iron sats of 14 & Ferritin of 15.  B12 low @ 160.  Denies HA, fatigue, CP, palpitations,  bleeding, SOB, abd pain, changes in bowel habits, hematochezia, melena, N/V, dysuria, hematuria, swelling. No fevers, chills, new lumps bumps.     Past Medical History:   Past Medical History:   Diagnosis Date    Anxiety     Arthritis     Chronic lumbar pain     Depression     Deviated nasal septum     Diverticulosis     Hemorrhoids     Hypothyroid     Kidney stone     passed 10 stones by herself over the years, one needed procedure    Melena 9/5/2018    Migraine headache     PONV (postoperative nausea and vomiting)     severe    Right ovarian cyst 02/2020    Right sided abdominal pain     Urticaria        Past Surgical HIstory:   Past Surgical History:   Procedure Laterality Date    AUGMENTATION OF BREAST      BREAST SURGERY      CHOLECYSTECTOMY      COLONOSCOPY  prior to 2011    COLONOSCOPY N/A 09/26/2018    Dr. Zuluaga; diverticulosis; repeat in 10 years    COLONOSCOPY N/A 11/01/2019    Procedure: COLONOSCOPY;  Surgeon: Marquis Zuluaga MD;  Location: [facility];  Service: Endoscopy;  Laterality: N/A;    ESOPHAGOGASTRODUODENOSCOPY N/A 09/05/2018    Procedure: EGD (ESOPHAGOGASTRODUODENOSCOPY);  Surgeon: Marquis Zuluaga MD;  Location: [facility];  Service: Endoscopy;  Laterality: N/A;    ESOPHAGOGASTRODUODENOSCOPY N/A 01/29/2020    Procedure: EGD (ESOPHAGOGASTRODUODENOSCOPY);  Surgeon: Marquis Zuluaga MD;  Location: [facility];  Service: Endoscopy;  Laterality: N/A;    HERNIA REPAIR      HIATAL HERNIA REPAIR      KIDNEY STONE SURGERY  2009    [facility], had stone removed by dr renay george, stayed in hospital 4 days    LAPAROSCOPIC GASTRIC BANDING  2007    later removed in 2016    LAPAROSCOPIC SALPINGO-OOPHORECTOMY Right 02/12/2020    Procedure: SALPINGO-OOPHORECTOMY, LAPAROSCOPIC;  Surgeon: Nely Martinez MD;  Location: [facility];  Service: OB/GYN;  Laterality: Right;    LUMBAR EPIDURAL INJECTION      OOPHORECTOMY      SINUS SURGERY      SPINE SURGERY      6 back surgeries; 1 neck surgery    TUBAL  LIGATION      UPPER GASTROINTESTINAL ENDOSCOPY  03/2013    Dr. Zuluaga    UPPER GASTROINTESTINAL ENDOSCOPY  09/14/2016    Dr. Zuluaga    UPPER GASTROINTESTINAL ENDOSCOPY  09/05/2018    Dr. Zuluaga; gastritis; bx negative    VAGINAL DELIVERY      times 3       Family History:   Family History   Problem Relation Name Age of Onset    Ulcers Mother      No Known Problems Father      Cancer Neg Hx      Heart disease Neg Hx      Hypertension Neg Hx      Diabetes Neg Hx      Angioedema Neg Hx      Allergies Neg Hx      Allergic rhinitis Neg Hx      Asthma Neg Hx      Eczema Neg Hx      Immunodeficiency Neg Hx      Colon cancer Neg Hx      Colon polyps Neg Hx      Crohn's disease Neg Hx      Ulcerative colitis Neg Hx      Nephrolithiasis Neg Hx      Stomach cancer Neg Hx      Esophageal cancer Neg Hx         Social History:  reports that she has never smoked. She has never used smokeless tobacco. She reports current alcohol use. She reports that she does not use drugs.    Allergies:  Review of patient's allergies indicates:   Allergen Reactions    Morphine Itching and Rash    Gabapentin Other (See Comments)     Coming out of skin       Medications:  Current Outpatient Medications   Medication Sig Dispense Refill    cyclobenzaprine (FLEXERIL) 10 MG tablet TAKE 1 TABLET(10 MG) BY MOUTH THREE TIMES DAILY AS NEEDED FOR MUSCLE SPASMS 90 tablet 0    HYDROcodone-acetaminophen (NORCO) 7.5-325 mg per tablet Take 1 tablet by mouth every 4 (four) hours as needed for Pain. 120 tablet 0    levothyroxine (SYNTHROID) 75 MCG tablet Take 1 tablet (75 mcg total) by mouth once daily. 90 tablet 3    lisdexamfetamine (VYVANSE) 40 MG Cap Take 1 capsule (40 mg total) by mouth every morning. 30 capsule 0    rizatriptan (MAXALT) 10 MG tablet 1 tab PO PRN migraine. May repeat every 2 hours for max 3 tabs in 24 hours. Use no more than 10 days per month. (Patient taking differently: Take 10 mg by mouth once as needed for Migraine.  May repeat every  2 hours for max 3 tabs in 24 hours. Use no more than 10 days per month.) 9 tablet 11    sumatriptan (IMITREX) 100 MG tablet Take 100 mg by mouth.      traZODone (DESYREL) 150 MG tablet Take 1 tablet (150 mg total) by mouth every evening. (Patient taking differently: Take 150 mg by mouth nightly as needed for Depression.) 90 tablet 3    venlafaxine (EFFEXOR-XR) 150 MG Cp24 Take 1 capsule (150 mg total) by mouth once daily. 90 capsule 3    omeprazole (PRILOSEC) 20 MG capsule Take 1 capsule (20 mg total) by mouth once daily. for 14 days 14 capsule 0     Current Facility-Administered Medications   Medication Dose Route Frequency Provider Last Rate Last Admin    onabotulinumtoxina injection 200 Units  200 Units Intramuscular q12 weeks Josiane Amaro NP   200 Units at 04/02/24 1533     Facility-Administered Medications Ordered in Other Visits   Medication Dose Route Frequency Provider Last Rate Last Admin    lactated ringers infusion   Intravenous Continuous Marquis Zuluaga MD 75 mL/hr at 09/26/18 0845 New Bag at 09/26/18 0845       Review of Systems   Constitutional:  Negative for appetite change, fatigue and fever.   HENT:  Negative for sore throat and trouble swallowing.    Respiratory:  Negative for cough and shortness of breath.    Cardiovascular:  Negative for chest pain, palpitations and leg swelling.   Gastrointestinal:  Negative for abdominal pain, blood in stool, constipation, diarrhea, nausea and vomiting.   Genitourinary:  Negative for dysuria and hematuria.   Musculoskeletal:  Negative for myalgias and neck pain.   Skin:  Negative for rash.   Neurological:  Negative for light-headedness and headaches.   Hematological:  Negative for adenopathy.   Psychiatric/Behavioral:  The patient is not nervous/anxious.        Objective:      Vitals:   Vitals:    06/07/24 1547   BP: (!) 158/82   BP Location: Right arm   Patient Position: Sitting   BP Method: Medium (Manual)   Pulse: 100   Resp: 18   Temp: 97.6 °F  "(36.4 °C)   TempSrc: Temporal   SpO2: 99%   Weight: 75.4 kg (166 lb 3.6 oz)   Height: 5' 7" (1.702 m)       BMI: Body mass index is 26.03 kg/m².    Physical Exam  Vitals reviewed.   Constitutional:       General: She is not in acute distress.     Appearance: She is not diaphoretic.   Eyes:      General: No scleral icterus.  Cardiovascular:      Rate and Rhythm: Normal rate and regular rhythm.      Heart sounds: Normal heart sounds. No murmur heard.  Pulmonary:      Effort: Pulmonary effort is normal. No respiratory distress.      Breath sounds: No wheezing.   Abdominal:      General: Bowel sounds are normal. There is no distension.   Musculoskeletal:      Right lower leg: No edema.      Left lower leg: No edema.   Lymphadenopathy:      Cervical: No cervical adenopathy.   Skin:     General: Skin is warm.      Findings: No bruising or rash.   Neurological:      Mental Status: She is alert and oriented to person, place, and time.   Psychiatric:         Mood and Affect: Mood normal.         Behavior: Behavior normal.       Laboratory Data:  Lab Results   Component Value Date    WBC 7.86 06/05/2024    HGB 13.0 06/05/2024    HCT 38.0 06/05/2024    MCV 90 06/05/2024     06/05/2024      Lab Results   Component Value Date    IRON 58 06/05/2024    TRANSFERRIN 287 06/05/2024    TIBC 425 06/05/2024    LABIRON 36 01/08/2019    FESATURATED 14 (L) 06/05/2024      Lab Results   Component Value Date    FERRITIN 15 (L) 06/05/2024     Component Ref Range & Units 2 d ago 5 mo ago 10 mo ago 5 yr ago   Vitamin B-12 210 - 950 pg/mL 160 Low  314 162 Low  237         Imaging:   Assessment:       1. Iron deficiency anemia due to dietary causes    2. Vitamin B12 deficiency    3. Disorder of bone and cartilage         Plan:   Iron deficiency anemia   -Discussed possible reasons to include blood loss with hematuria, menorrhagia; nutritional, malabsorption  -Improved Hgb at 12.8 g/dL  -Encouraged to take PO supplements daily   -Follow up " in 4 months with CBC, Ferritin, Iron/TIBC a week prior to appointment   06/07/2024:  Restart oral iron with Vitamin C; f/u in 4 months with CBC, Iron studies/ferritin prior.    B12 deficiency   -7/25/23 B12 level is low at 162  -Will start B12 injections   -Repeat B12 level at next visit   06/07/24:  Restart B12 weekly x 4; then monthly; recheck B12 level in 4 months    DDD  -will check Vitamin D on next visit      Patient queried and all questions were answered.   Assessment/Plan reviewed and approved by Dr. Trimble    minutes were spent in coordination of patient's care, record review and counseling.    30 minutes were spent in coordination of patient's care, record review and counseling.    Route Chart for Scheduling    Med Onc Chart Routing      Follow up with physician    Follow up with [facility] 4 months. f/u in 4 month - may be VV if needed - with labs prior:  cbc, iron studies/ferritin/b12/vitamin d   Infusion scheduling note    Injection scheduling note    Labs    Imaging    Pharmacy appointment    Other referrals

## 2024-06-13 ENCOUNTER — OFFICE VISIT (OUTPATIENT)
Dept: NEUROLOGY | Facility: CLINIC | Age: 49
End: 2024-06-13
Payer: MEDICARE

## 2024-06-13 VITALS
RESPIRATION RATE: 17 BRPM | WEIGHT: 165.44 LBS | TEMPERATURE: 97 F | HEART RATE: 108 BPM | HEIGHT: 67 IN | SYSTOLIC BLOOD PRESSURE: 131 MMHG | BODY MASS INDEX: 25.97 KG/M2 | DIASTOLIC BLOOD PRESSURE: 88 MMHG

## 2024-06-13 DIAGNOSIS — G43.711 INTRACTABLE CHRONIC MIGRAINE WITHOUT AURA AND WITH STATUS MIGRAINOSUS: Primary | ICD-10-CM

## 2024-06-13 PROCEDURE — 3079F DIAST BP 80-89 MM HG: CPT | Mod: CPTII,S$GLB,, | Performed by: NURSE PRACTITIONER

## 2024-06-13 PROCEDURE — 3008F BODY MASS INDEX DOCD: CPT | Mod: CPTII,S$GLB,, | Performed by: NURSE PRACTITIONER

## 2024-06-13 PROCEDURE — 99999 PR PBB SHADOW E&M-EST. PATIENT-LVL III: CPT | Mod: PBBFAC,,, | Performed by: NURSE PRACTITIONER

## 2024-06-13 PROCEDURE — 3075F SYST BP GE 130 - 139MM HG: CPT | Mod: CPTII,S$GLB,, | Performed by: NURSE PRACTITIONER

## 2024-06-13 PROCEDURE — 1159F MED LIST DOCD IN RCRD: CPT | Mod: CPTII,S$GLB,, | Performed by: NURSE PRACTITIONER

## 2024-06-13 PROCEDURE — 99213 OFFICE O/P EST LOW 20 MIN: CPT | Mod: S$GLB,,, | Performed by: NURSE PRACTITIONER

## 2024-06-13 PROCEDURE — 1160F RVW MEDS BY RX/DR IN RCRD: CPT | Mod: CPTII,S$GLB,, | Performed by: NURSE PRACTITIONER

## 2024-06-13 RX ORDER — SUMATRIPTAN SUCCINATE 100 MG/1
TABLET ORAL
Qty: 10 TABLET | Refills: 11 | Status: SHIPPED | OUTPATIENT
Start: 2024-06-13

## 2024-06-13 RX ORDER — UBROGEPANT 100 MG/1
TABLET ORAL
Qty: 10 TABLET | Refills: 11 | Status: SHIPPED | OUTPATIENT
Start: 2024-06-13

## 2024-06-13 NOTE — PROGRESS NOTES
Date of service: 6/13/2024  Referring provider: No ref. provider found    Subjective:      Chief complaint: Headache       Patient ID: Isela Smyth is a 49 y.o. lady with migraines, depression, diverticulosis, history of kidney stone presenting for follow up of headache     History of Present Illness    INTERVAL HISTORY 6/13/24    Last office visit was three months ago and at that time she was having 6-8 migraines per month. Plan was to start Botox.     Today she reports she is the same to a little better. Current pain 0 with range 0-10. She has 2-3 headache days per week. However she does note she had a full 7 days headache free. She takes rizatriptan, Toradol 1-2 days per week. Otherwise information below is reviewed and verified with no changes made     INTERVAL HISTORY 3/12/24    Last visit was over three years ago and at that time she was not doing well.    Today she reports she is the same. She stopped taking Aimovig about one year ago. She sold her home and travels full time. Current pain 4 with range 0-10. She has migraines but no longer weekly. She has 6-8 migraines per month. She takes sumatriptan as needed. Otherwise information below is reviewed and verified with no changes made     INTERVAL HISTORY 3/2/2021  The patient location is: home  The chief complaint leading to consultation is: follow up  Visit type: audiovisual  Face to Face time with patient: 20  30 minutes of total time spent on the encounter, which includes face to face time and non-face to face time preparing to see the patient (eg, review of tests), Obtaining and/or reviewing separately obtained history, Documenting clinical information in the electronic or other health record, Independently interpreting results (not separately reported) and communicating results to the patient/family/caregiver, or Care coordination (not separately reported).   Each patient to whom he or she provides medical services by telemedicine is:  (1) informed of  "the relationship between the physician and patient and the respective role of any other health care provider with respect to management of the patient; and (2) notified that he or she may decline to receive medical services by telemedicine and may withdraw from such care at any time.    Notes:     Patient was last seen for her initial visit with Dr. Donaldson in 2019. At that time, Aimovig was started for prevention and Imitrex for acute treatment. She missed 2-3 months of Aimovig due to pharmacy issues but recently restarted with most recent last week of February.     Today she reports she is a little worse. She is having more frequent migraines, they last longer and they are more difficult to treat. She reports 4-5 headache days per month but current migraine has been ongoing for over a week. Prior to this episode, she reports she was better with only 4 days per month with a headache.     She reports she went to the ER on 2/25/2021 and a spinal tap was done. Per the note, 1 ml was taken. Patient reports she told the ER she has had LP in the past and that "helps with the pressure". Head CT done on 2/24/2021 and no abnormalities seen.     ORIGINAL HEADACHE HISTORY - 10/07/2019  Age at onset and course over time:  The headaches began in her early 20s; they are accompanied by significant nausea. Dairy reduction has helped. She feels like they start in her sinus. Stays with a constant sinus congestion. Neck and shoulders stay tight. If she can catch it early with imitrex or fioricet they are ok. Can last 3-4 days. Associated with significant reduction to functioning  Location:  Behind her eyes and her forehead  Quality:  [x] pressure [] tight [x] throbbing [x] sharp [] stabbing   Severity:  Current 2  Duration:  Hr to days  Frequency:  Near daily facial / sinus pressure; about 10 escalations per month   Headaches awaken at night?:  Yes    Worst time of day:   Associated with: [x] photophobia [x]  phonophobia [x] osmophobia " [x] blurred vision  [x] double vision [x] loss of appetite [x] nausea [x] vomiting [] dizziness [] vertigo  [] tinnitus [] irritability [x] sinus pressure [x] problems with concentration   [x] neck tightness   Alleviated by:  [x] sleep [x] darkness [] massage [x] heat [x] ice [x] medication  Exacerbated by:  [] fatigue [x] light [x] noise [x] smells [] coughing [] sneezing  [] bending over [] ovulation [] menses [] alcohol [x] change in weather []  stress  Ipsilateral autonomic: [] nasal congestion [] lacrimation [] ptosis [] injection [] edema [] foreign body sensation [] ear fullness   ICP:   Sleep habits:   Caffeine intake: little, varies   Gyn status (if female):  Status post tubal ligation    Current acute treatment:  Rizatriptan - she feels sumatriptan worked better   Flexeril most nights   Norco 7.5mg - for neck and lumbar pain (has had multiple surgeries)  Toradol   Phenergan supp     Current prevention:   Venlafaxine  mg   Trazodone   Botox - first 4/2/24    Previously tried/failed acute treatment:  Tylenol  Ibuprofen  Ketorolac  Naproxen  BU Pap  Excedrin  Zofran  Phenergan + toradol in the ED helps the most   Fioricet  Imitrex 100 mg or fioricet    Previously tried/failed preventative treatment:  Lyrica  Topiramate - worsened headaches, cognitive side effects   Depakote  Wellbutrin  Lexapro  Gabapentin - allergy   Aimovig - started October 2019    Review of patient's allergies indicates:   Allergen Reactions    Morphine Itching and Rash    Gabapentin Other (See Comments)     Coming out of skin     Current Outpatient Medications   Medication Sig Dispense Refill    cyanocobalamin 1,000 mcg/mL injection Start with 1ml IM weekly x 4, then monthly 30 mL 4    cyclobenzaprine (FLEXERIL) 10 MG tablet TAKE 1 TABLET(10 MG) BY MOUTH THREE TIMES DAILY AS NEEDED FOR MUSCLE SPASMS 90 tablet 0    HYDROcodone-acetaminophen (NORCO) 7.5-325 mg per tablet Take 1 tablet by mouth every 4 (four) hours as needed for Pain.  120 tablet 0    levothyroxine (SYNTHROID) 75 MCG tablet Take 1 tablet (75 mcg total) by mouth once daily. 90 tablet 3    lisdexamfetamine (VYVANSE) 40 MG Cap Take 1 capsule (40 mg total) by mouth every morning. 30 capsule 0    traZODone (DESYREL) 150 MG tablet Take 1 tablet (150 mg total) by mouth every evening. (Patient taking differently: Take 150 mg by mouth nightly as needed for Depression.) 90 tablet 3    venlafaxine (EFFEXOR-XR) 150 MG Cp24 Take 1 capsule (150 mg total) by mouth once daily. 90 capsule 3    sumatriptan (IMITREX) 100 MG tablet 1 tab PO PRN migraine. May repeat once in 2 hours, no more than 2 tab in 24 hours. Use no more than 10 days per month. 10 tablet 11    ubrogepant (UBRELVY) 100 mg tablet Take 1 tablet by mouth as needed for migraine. May repeat in 2 hours if needed. Max 2 tablet per day 10 tablet 11     Current Facility-Administered Medications   Medication Dose Route Frequency Provider Last Rate Last Admin    onabotulinumtoxina injection 200 Units  200 Units Intramuscular q12 weeks Josiane Amaro NP   200 Units at 04/02/24 1533     Facility-Administered Medications Ordered in Other Visits   Medication Dose Route Frequency Provider Last Rate Last Admin    lactated ringers infusion   Intravenous Continuous Marquis Zuluaga MD 75 mL/hr at 09/26/18 0845 New Bag at 09/26/18 0845       Past Medical History  Past Medical History:   Diagnosis Date    Anxiety     Arthritis     Chronic lumbar pain     Depression     Deviated nasal septum     Diverticulosis     Hemorrhoids     Hypothyroid     Kidney stone     passed 10 stones by herself over the years, one needed procedure    Melena 9/5/2018    Migraine headache     PONV (postoperative nausea and vomiting)     severe    Right ovarian cyst 02/2020    Right sided abdominal pain     Urticaria        Past Surgical History  Past Surgical History:   Procedure Laterality Date    AUGMENTATION OF BREAST      BREAST SURGERY      CHOLECYSTECTOMY       COLONOSCOPY  prior to 2011    COLONOSCOPY N/A 09/26/2018    Dr. Zuluaga; diverticulosis; repeat in 10 years    COLONOSCOPY N/A 11/01/2019    Procedure: COLONOSCOPY;  Surgeon: Marquis Zuluaga MD;  Location: [facility];  Service: Endoscopy;  Laterality: N/A;    ESOPHAGOGASTRODUODENOSCOPY N/A 09/05/2018    Procedure: EGD (ESOPHAGOGASTRODUODENOSCOPY);  Surgeon: Marquis Zuluaga MD;  Location: [facility];  Service: Endoscopy;  Laterality: N/A;    ESOPHAGOGASTRODUODENOSCOPY N/A 01/29/2020    Procedure: EGD (ESOPHAGOGASTRODUODENOSCOPY);  Surgeon: Marquis Zuluaga MD;  Location: [facility];  Service: Endoscopy;  Laterality: N/A;    HERNIA REPAIR      HIATAL HERNIA REPAIR      KIDNEY STONE SURGERY  2009    [facility], had stone removed by dr renay george, stayed in hospital 4 days    LAPAROSCOPIC GASTRIC BANDING  2007    later removed in 2016    LAPAROSCOPIC SALPINGO-OOPHORECTOMY Right 02/12/2020    Procedure: SALPINGO-OOPHORECTOMY, LAPAROSCOPIC;  Surgeon: Nely Martinez MD;  Location: [facility];  Service: OB/GYN;  Laterality: Right;    LUMBAR EPIDURAL INJECTION      OOPHORECTOMY      SINUS SURGERY      SPINE SURGERY      6 back surgeries; 1 neck surgery    TUBAL LIGATION      UPPER GASTROINTESTINAL ENDOSCOPY  03/2013    Dr. Zuluaga    UPPER GASTROINTESTINAL ENDOSCOPY  09/14/2016    Dr. Zuluaga    UPPER GASTROINTESTINAL ENDOSCOPY  09/05/2018    Dr. Zuluaga; gastritis; bx negative    VAGINAL DELIVERY      times 3       Family History  Family History   Problem Relation Name Age of Onset    Ulcers Mother      No Known Problems Father      Cancer Neg Hx      Heart disease Neg Hx      Hypertension Neg Hx      Diabetes Neg Hx      Angioedema Neg Hx      Allergies Neg Hx      Allergic rhinitis Neg Hx      Asthma Neg Hx      Eczema Neg Hx      Immunodeficiency Neg Hx      Colon cancer Neg Hx      Colon polyps Neg Hx      Crohn's disease Neg Hx      Ulcerative colitis Neg Hx      Nephrolithiasis Neg Hx      Stomach cancer  Neg Hx      Esophageal cancer Neg Hx         Social History  Social History     Socioeconomic History    Marital status:    Tobacco Use    Smoking status: Never    Smokeless tobacco: Never   Substance and Sexual Activity    Alcohol use: Yes     Comment: ocassionally    Drug use: Never     Types: Oxycodone    Sexual activity: Yes     Partners: Male     Birth control/protection: None     Social Determinants of Health     Financial Resource Strain: Patient Declined (2/12/2024)    Overall Financial Resource Strain (CARDIA)     Difficulty of Paying Living Expenses: Patient declined   Food Insecurity: Patient Declined (2/12/2024)    Hunger Vital Sign     Worried About Running Out of Food in the Last Year: Patient declined     Ran Out of Food in the Last Year: Patient declined   Transportation Needs: No Transportation Needs (2/12/2024)    PRAPARE - Transportation     Lack of Transportation (Medical): No     Lack of Transportation (Non-Medical): No   Physical Activity: Unknown (2/12/2024)    Exercise Vital Sign     Days of Exercise per Week: Patient declined   Stress: Patient Declined (2/12/2024)    [employer] [place] [employer] - Occupational Stress Questionnaire     Feeling of Stress : Patient declined   Housing Stability: Patient Declined (2/12/2024)    Housing Stability Vital Sign     Unable to Pay for Housing in the Last Year: Patient declined     Unstable Housing in the Last Year: Patient declined        Objective:        Vitals:    06/13/24 1003   BP: 131/88   Pulse: 108   Resp: 17   Temp: 97.4 °F (36.3 °C)       Body mass index is 25.91 kg/m².    6/13/24  Constitutional:   She appears well-developed and well-nourished. She is well groomed     Neurological Exam:  General: well-developed, well-nourished, no distress  Mental status: Awake and alert  Speech language: No dysarthria or aphasia on conversation  Cranial nerves: Face symmetric  Motor: Moves all extremities well  Coordination: No ataxia. No  tremor.     Data Review:     I have personally reviewed the referring provider's notes, labs, & imaging made available to me today.      RADIOLOGY STUDIES:  I have personally reviewed the pertinent images performed.       Results for orders placed or performed during the hospital encounter of 01/25/16   CT Head Without Contrast    Narrative    CT head     INDICATION:    Headache     No comparison available     TECHNIQUE:    5 mm axial tomographic images of the brain were obtained. No contrast was   utilized. Images are reviewed on PACS in brain, blood and bone windows. Total       FINDINGS:    No hemorrhage, mass effect or CT evidence of acute cortical infarction. Brain  parenchyma and ventricles are normal. No cerebellar tonsillar ectopia.     No abnormal extra-axial fluid collections.     No hyperdense artery or vein.     No skull fracture or aggressive calvarial lesion. Visualized paranasal   sinuses and mastoid air cells are clear.     IMPRESSION:    1. Normal noncontrast CT head       Electronically signed by: Harley Vasquez (Jan 25, 2016 21:04:35)       Lab Results   Component Value Date     04/29/2024    K 3.8 04/29/2024    MG 1.8 04/29/2024     04/29/2024    CO2 26 04/29/2024    BUN 17 04/29/2024    CREATININE 0.52 04/29/2024    CREATININE 0.6 08/10/2012    GLU 90 04/29/2024    AST 36 04/29/2024    AST 27 01/25/2016    ALT 27 04/29/2024    ALBUMIN 3.8 04/29/2024    PROT 7.1 04/29/2024    BILITOT 0.1 (L) 04/29/2024    CHOL 185 07/29/2021    HDL 54 07/29/2021    LDLCALC 111.4 07/29/2021    TRIG 98 07/29/2021       Lab Results   Component Value Date    WBC 7.86 06/05/2024    HGB 13.0 06/05/2024    HCT 38.0 06/05/2024    MCV 90 06/05/2024     06/05/2024       Lab Results   Component Value Date    TSH 1.543 05/15/2024           Assessment & Plan:       Problem List Items Addressed This Visit          Neuro    Intractable chronic migraine without aura and with status migrainosus - Primary     Overview     Migraine headaches since her 20's. Headaches are typically unilateral, moderate to severe in intensity, worsen with activity, pounding in quality and associated with sensitivity to light, smell and sound.   The patient has chronic migraines ( G43.719) and suffers from headaches more than 3 months, more than 15 days of headache days per month lasting more than 4 hours with at least 8 attacks that meet criteria for migraine. She has tried multiple medications including but not limited to venlafaxine, trazodone, Lyrica, Topamax, Depakote, Wellbutrin, gabapentin, Aimovig  The patient has been unresponsive and refractory.The patient meets criteria for chronic headaches according to the ICHD-II, the patient has more than 15 headaches a month which last for more than 4 hours a day. The patient is an ideal candidate for Botox. After treatment, I expect 50%  improvement in the patient's symptoms. A reduction of at least 7 days per month and the number of cumulative hours suffering with headaches as well as at least 100 total hours affected with migraine per month.  DESCRIPTION OF PROCEDURE: After obtaining informed consent and under aseptic technique, a total of 155 units of botulinum toxin type A to be injected in the following muscles:      -- Procerus 5 units  --  5 units bilaterally  -- Frontalis 20 units  -- Temporalis 20 units bilaterally  -- Occipitalis 15 units bilaterally  -- Upper cervical paraspinals 10 units bilaterally  -- Trapezius 15 units bilaterally.       Unavoidable waste 45 units    For acute attacks change back to sumatriptan.          Current Assessment & Plan     She is six weeks s/p first Botox and having fewer migraine escalations and less severe attacks. No side effects. Stop rizatriptan as sumatriptan was more effective. Will also trial Ubrelyv.         Relevant Medications    ubrogepant (UBRELVY) 100 mg tablet    sumatriptan (IMITREX) 100 MG tablet                TESTING:  -- none     REFERRALS:  -- none     PREVENTION (use daily regardless of headache):  -- start magnesium in ONE of the following preparations -               1. Magnesium oxide 800mg daily (the most common over the counter kind, may causes loose stools)              2. Magnesium citrate 400-500mg daily (harder to find, but more neutral on the bowels)              3. Magnesium glycinate 400mg daily (hardest to find, look online, but most bowel-neutral, best absorbed)    -- continue Botox    AS-NEEDED TREATMENT (use total no more than 10 days per month unless otherwise stated):  -- restart Imitrex (sumatriptan)   -- TRIAL Ubrelvy with next migraine. You can repeat two hours later. With this medication do not drink grapefruit juice or eat grapefruit or some medications like ketoconazole, itraconazole, or antibiotics clarithromycin   -- rescue with 1-2 tablets of Fioricet (no more than 10 pills per month) if the above was ineffective   -- continue phenergan suppository for nausea   Take Toradol 30mg injection at home for severe escalations (if imitrex failed to relieve)     Follow up in 2 weeks (on 6/27/2024) for botox.      Josiane Amaro NP

## 2024-06-13 NOTE — ASSESSMENT & PLAN NOTE
She is six weeks s/p first Botox and having fewer migraine escalations and less severe attacks. No side effects. Stop rizatriptan as sumatriptan was more effective. Will also trial Ubrelyv.

## 2024-06-13 NOTE — PATIENT INSTRUCTIONS
TESTING:  -- none     REFERRALS:  -- none     PREVENTION (use daily regardless of headache):  -- start magnesium in ONE of the following preparations -               1. Magnesium oxide 800mg daily (the most common over the counter kind, may causes loose stools)              2. Magnesium citrate 400-500mg daily (harder to find, but more neutral on the bowels)              3. Magnesium glycinate 400mg daily (hardest to find, look online, but most bowel-neutral, best absorbed)    -- continue Botox    AS-NEEDED TREATMENT (use total no more than 10 days per month unless otherwise stated):  -- restart Imitrex (sumatriptan)   -- TRIAL Ubrelvy with next migraine. You can repeat two hours later. With this medication do not drink grapefruit juice or eat grapefruit or some medications like ketoconazole, itraconazole, or antibiotics clarithromycin  -- rescue with 1-2 tablets of Fioricet (no more than 10 pills per month) if the above was ineffective   -- continue phenergan suppository for nausea   Take Toradol 30mg injection at home for severe escalations (if imitrex failed to relieve)

## 2024-06-17 DIAGNOSIS — F90.2 ATTENTION DEFICIT HYPERACTIVITY DISORDER (ADHD), COMBINED TYPE: ICD-10-CM

## 2024-06-17 RX ORDER — LISDEXAMFETAMINE DIMESYLATE 40 MG/1
40 CAPSULE ORAL EVERY MORNING
Qty: 30 CAPSULE | Refills: 0 | Status: SHIPPED | OUTPATIENT
Start: 2024-06-17

## 2024-06-17 NOTE — TELEPHONE ENCOUNTER
No care due was identified.  [facility] Embedded Care Due Messages. Reference number: [id number].   6/17/2024 3:26:29 PM CDT

## 2024-06-19 ENCOUNTER — TELEPHONE (OUTPATIENT)
Dept: NEUROLOGY | Facility: CLINIC | Age: 49
End: 2024-06-19
Payer: MEDICARE

## 2024-06-19 NOTE — TELEPHONE ENCOUNTER
----- Message from Melanie Courtney sent at 6/19/2024  3:19 PM CDT -----  Regarding: Needs Medical Advice  Contact: patient at [phone]  Type: Needs Medical Advice  Who Called:  patient at [phone]    Additional Information: would like to discuss further the time frame to have her cosmetic botox with her botox for her migraines. Please call and advise. Thank you

## 2024-06-27 ENCOUNTER — PROCEDURE VISIT (OUTPATIENT)
Dept: NEUROLOGY | Facility: CLINIC | Age: 49
End: 2024-06-27
Payer: MEDICARE

## 2024-06-27 VITALS
WEIGHT: 165.38 LBS | BODY MASS INDEX: 25.96 KG/M2 | SYSTOLIC BLOOD PRESSURE: 154 MMHG | DIASTOLIC BLOOD PRESSURE: 103 MMHG | HEART RATE: 107 BPM | RESPIRATION RATE: 18 BRPM | HEIGHT: 67 IN

## 2024-06-27 DIAGNOSIS — G43.719 INTRACTABLE CHRONIC MIGRAINE WITHOUT AURA AND WITHOUT STATUS MIGRAINOSUS: Primary | ICD-10-CM

## 2024-06-27 NOTE — PROCEDURES
Procedures  A time out was conducted just before the start of the procedure to verify the correct patient and procedure, procedure location, and all relevant critical information.      Conventional methods of treatment such as multiple medications, both on and   off label have been tried including: venlafaxine, trazodone, Lyrica, Topamax, Depakote, Wellbutrin, gabapentin, Aimovig      The patient has been unresponsive and refractory.The patient meets criteria for chronic headaches according to the ICHD-II, the patient has more than 15 headaches a month which last for more than 4 hours a day.     Botox session number: 2  Last session was 12 weeks ago and resulted in improvement of: n/a     I am aiming for at least 50%  improvement in the patient's symptoms. Frequency of treatment is every 3 months unless no response to the treatments, at which time we will discontinue the injections.      DESCRIPTION OF PROCEDURE: After obtaining informed consent and under   aseptic technique, a total of 155 units of botulinum toxin type A were   injected in the following muscles:      -- Procerus 5 units  --  5 units bilaterally  -- Frontalis 20 units  -- Temporalis 20 units bilaterally  -- Occipitalis 15 units bilaterally  -- Upper cervical paraspinals 10 units bilaterally  -- Trapezius 15 units bilaterally.      The patient tolerated the procedure well. There were no complications. The patient was given a prescription for repeat treatment in 12 weeks      Unavoidable waste 45 units    DEEPTI Newberry

## 2024-06-28 DIAGNOSIS — G89.29 CHRONIC LOW BACK PAIN WITH SCIATICA, SCIATICA LATERALITY UNSPECIFIED, UNSPECIFIED BACK PAIN LATERALITY: ICD-10-CM

## 2024-06-28 DIAGNOSIS — M54.40 CHRONIC LOW BACK PAIN WITH SCIATICA, SCIATICA LATERALITY UNSPECIFIED, UNSPECIFIED BACK PAIN LATERALITY: ICD-10-CM

## 2024-06-28 RX ORDER — CYCLOBENZAPRINE HCL 10 MG
10 TABLET ORAL 3 TIMES DAILY PRN
Qty: 90 TABLET | Refills: 5 | Status: SHIPPED | OUTPATIENT
Start: 2024-06-28

## 2024-06-28 NOTE — TELEPHONE ENCOUNTER
No care due was identified.  [facility] Embedded Care Due Messages. Reference number: [id number].   6/28/2024 3:02:01 PM CDT

## 2024-07-01 ENCOUNTER — PATIENT MESSAGE (OUTPATIENT)
Dept: HEMATOLOGY/ONCOLOGY | Facility: CLINIC | Age: 49
End: 2024-07-01
Payer: MEDICARE

## 2024-07-04 ENCOUNTER — PATIENT MESSAGE (OUTPATIENT)
Dept: FAMILY MEDICINE | Facility: CLINIC | Age: 49
End: 2024-07-04
Payer: MEDICARE

## 2024-07-04 DIAGNOSIS — M51.36 DDD (DEGENERATIVE DISC DISEASE), LUMBAR: ICD-10-CM

## 2024-07-05 ENCOUNTER — PATIENT MESSAGE (OUTPATIENT)
Dept: FAMILY MEDICINE | Facility: CLINIC | Age: 49
End: 2024-07-05
Payer: MEDICARE

## 2024-07-05 RX ORDER — HYDROCODONE BITARTRATE AND ACETAMINOPHEN 7.5; 325 MG/1; MG/1
1 TABLET ORAL EVERY 4 HOURS PRN
Qty: 120 TABLET | Refills: 0 | Status: SHIPPED | OUTPATIENT
Start: 2024-07-05

## 2024-07-05 NOTE — TELEPHONE ENCOUNTER
No care due was identified.  [facility] Embedded Care Due Messages. Reference number: [id number].   7/05/2024 2:36:42 PM CDT

## 2024-07-26 DIAGNOSIS — F90.2 ATTENTION DEFICIT HYPERACTIVITY DISORDER (ADHD), COMBINED TYPE: ICD-10-CM

## 2024-07-26 NOTE — TELEPHONE ENCOUNTER
No care due was identified.  [facility] Embedded Care Due Messages. Reference number: [id number].   7/26/2024 5:35:50 PM CDT

## 2024-07-29 RX ORDER — LISDEXAMFETAMINE DIMESYLATE 40 MG/1
40 CAPSULE ORAL EVERY MORNING
Qty: 30 CAPSULE | Refills: 0 | Status: SHIPPED | OUTPATIENT
Start: 2024-07-29

## 2024-08-02 DIAGNOSIS — M51.36 DDD (DEGENERATIVE DISC DISEASE), LUMBAR: ICD-10-CM

## 2024-08-05 RX ORDER — HYDROCODONE BITARTRATE AND ACETAMINOPHEN 7.5; 325 MG/1; MG/1
1 TABLET ORAL EVERY 4 HOURS PRN
Qty: 120 TABLET | Refills: 0 | Status: SHIPPED | OUTPATIENT
Start: 2024-08-05

## 2024-08-19 DIAGNOSIS — F41.9 ANXIETY: ICD-10-CM

## 2024-08-19 DIAGNOSIS — F33.9 RECURRENT MAJOR DEPRESSIVE DISORDER, REMISSION STATUS UNSPECIFIED: ICD-10-CM

## 2024-08-19 DIAGNOSIS — E03.9 HYPOTHYROIDISM, UNSPECIFIED TYPE: ICD-10-CM

## 2024-08-19 RX ORDER — LEVOTHYROXINE SODIUM 75 UG/1
75 TABLET ORAL
Qty: 90 TABLET | Refills: 2 | Status: SHIPPED | OUTPATIENT
Start: 2024-08-19

## 2024-08-19 RX ORDER — VENLAFAXINE HYDROCHLORIDE 150 MG/1
150 CAPSULE, EXTENDED RELEASE ORAL
Qty: 90 CAPSULE | Refills: 2 | Status: SHIPPED | OUTPATIENT
Start: 2024-08-19

## 2024-08-19 NOTE — TELEPHONE ENCOUNTER
No care due was identified.  [facility] Embedded Care Due Messages. Reference number: [id number].   8/19/2024 12:12:10 AM CDT

## 2024-08-23 DIAGNOSIS — M51.36 DDD (DEGENERATIVE DISC DISEASE), LUMBAR: ICD-10-CM

## 2024-08-23 DIAGNOSIS — F90.2 ATTENTION DEFICIT HYPERACTIVITY DISORDER (ADHD), COMBINED TYPE: ICD-10-CM

## 2024-08-23 RX ORDER — LISDEXAMFETAMINE DIMESYLATE 40 MG/1
40 CAPSULE ORAL EVERY MORNING
Qty: 30 CAPSULE | Refills: 0 | Status: CANCELLED | OUTPATIENT
Start: 2024-08-23

## 2024-08-23 RX ORDER — HYDROCODONE BITARTRATE AND ACETAMINOPHEN 7.5; 325 MG/1; MG/1
1 TABLET ORAL EVERY 4 HOURS PRN
Qty: 120 TABLET | Refills: 0 | Status: CANCELLED | OUTPATIENT
Start: 2024-08-23

## 2024-08-23 NOTE — TELEPHONE ENCOUNTER
No care due was identified.  [facility] Embedded Care Due Messages. Reference number: [id number].   8/23/2024 1:17:32 PM CDT

## 2024-08-26 ENCOUNTER — PATIENT MESSAGE (OUTPATIENT)
Dept: NEUROLOGY | Facility: CLINIC | Age: 49
End: 2024-08-26
Payer: MEDICARE

## 2024-08-27 ENCOUNTER — PATIENT MESSAGE (OUTPATIENT)
Dept: FAMILY MEDICINE | Facility: CLINIC | Age: 49
End: 2024-08-27
Payer: MEDICARE

## 2024-08-27 DIAGNOSIS — F90.2 ATTENTION DEFICIT HYPERACTIVITY DISORDER (ADHD), COMBINED TYPE: ICD-10-CM

## 2024-08-27 DIAGNOSIS — M51.36 DDD (DEGENERATIVE DISC DISEASE), LUMBAR: ICD-10-CM

## 2024-08-27 RX ORDER — HYDROCODONE BITARTRATE AND ACETAMINOPHEN 7.5; 325 MG/1; MG/1
1 TABLET ORAL EVERY 4 HOURS PRN
Qty: 120 TABLET | Refills: 0 | OUTPATIENT
Start: 2024-08-27

## 2024-08-27 RX ORDER — LISDEXAMFETAMINE DIMESYLATE 40 MG/1
40 CAPSULE ORAL EVERY MORNING
Qty: 30 CAPSULE | Refills: 0 | Status: SHIPPED | OUTPATIENT
Start: 2024-08-27 | End: 2024-08-28 | Stop reason: SDUPTHER

## 2024-08-27 NOTE — TELEPHONE ENCOUNTER
No care due was identified.  [facility] Embedded Care Due Messages. Reference number: [id number].   8/27/2024 1:00:49 PM CDT

## 2024-08-28 RX ORDER — LISDEXAMFETAMINE DIMESYLATE 40 MG/1
40 CAPSULE ORAL EVERY MORNING
Qty: 30 CAPSULE | Refills: 0 | Status: SHIPPED | OUTPATIENT
Start: 2024-08-28

## 2024-08-28 RX ORDER — HYDROCODONE BITARTRATE AND ACETAMINOPHEN 7.5; 325 MG/1; MG/1
1 TABLET ORAL EVERY 4 HOURS PRN
Qty: 120 TABLET | Refills: 0 | Status: SHIPPED | OUTPATIENT
Start: 2024-08-28

## 2024-08-28 NOTE — TELEPHONE ENCOUNTER
No care due was identified.  [facility] Embedded Care Due Messages. Reference number: [id number].   8/28/2024 8:54:43 AM CDT

## 2024-08-30 ENCOUNTER — PATIENT MESSAGE (OUTPATIENT)
Dept: NEUROLOGY | Facility: CLINIC | Age: 49
End: 2024-08-30
Payer: MEDICARE

## 2024-08-30 ENCOUNTER — TELEPHONE (OUTPATIENT)
Dept: NEUROLOGY | Facility: CLINIC | Age: 49
End: 2024-08-30
Payer: MEDICARE

## 2024-08-30 DIAGNOSIS — G43.719 INTRACTABLE CHRONIC MIGRAINE WITHOUT AURA AND WITHOUT STATUS MIGRAINOSUS: Primary | ICD-10-CM

## 2024-08-30 RX ORDER — KETOROLAC TROMETHAMINE 30 MG/ML
INJECTION, SOLUTION INTRAMUSCULAR; INTRAVENOUS
Qty: 20 ML | Refills: 0 | Status: SHIPPED | OUTPATIENT
Start: 2024-08-30

## 2024-09-03 ENCOUNTER — PATIENT MESSAGE (OUTPATIENT)
Dept: OBSTETRICS AND GYNECOLOGY | Facility: CLINIC | Age: 49
End: 2024-09-03

## 2024-09-03 ENCOUNTER — OFFICE VISIT (OUTPATIENT)
Dept: OBSTETRICS AND GYNECOLOGY | Facility: CLINIC | Age: 49
End: 2024-09-03
Payer: MEDICARE

## 2024-09-03 ENCOUNTER — HOSPITAL ENCOUNTER (OUTPATIENT)
Dept: RADIOLOGY | Facility: HOSPITAL | Age: 49
Discharge: HOME | End: 2024-09-03
Attending: STUDENT IN AN ORGANIZED HEALTH CARE EDUCATION/TRAINING PROGRAM
Payer: MEDICARE

## 2024-09-03 VITALS
HEIGHT: 67 IN | BODY MASS INDEX: 24.71 KG/M2 | DIASTOLIC BLOOD PRESSURE: 80 MMHG | WEIGHT: 157.44 LBS | SYSTOLIC BLOOD PRESSURE: 110 MMHG

## 2024-09-03 DIAGNOSIS — Z12.31 VISIT FOR SCREENING MAMMOGRAM: ICD-10-CM

## 2024-09-03 DIAGNOSIS — R35.0 URINARY FREQUENCY: ICD-10-CM

## 2024-09-03 DIAGNOSIS — N87.0 DYSPLASIA OF CERVIX, LOW GRADE (CIN 1): ICD-10-CM

## 2024-09-03 DIAGNOSIS — Z01.419 ROUTINE GYNECOLOGICAL EXAMINATION: Primary | ICD-10-CM

## 2024-09-03 LAB
BILIRUBIN, UA POC OHS: ABNORMAL
BLOOD, UA POC OHS: NEGATIVE
CLARITY, UA POC OHS: CLEAR
COLOR, UA POC OHS: YELLOW
GLUCOSE, UA POC OHS: NEGATIVE
KETONES, UA POC OHS: 15
LEUKOCYTES, UA POC OHS: NEGATIVE
NITRITE, UA POC OHS: NEGATIVE
PH, UA POC OHS: 5.5
PROTEIN, UA POC OHS: 100
SPECIFIC GRAVITY, UA POC OHS: >=1.03
UROBILINOGEN, UA POC OHS: 0.2

## 2024-09-03 PROCEDURE — 99999 PR PBB SHADOW E&M-EST. PATIENT-LVL III: CPT | Mod: PBBFAC,,, | Performed by: OBSTETRICS & GYNECOLOGY

## 2024-09-03 PROCEDURE — 87086 URINE CULTURE/COLONY COUNT: CPT | Performed by: OBSTETRICS & GYNECOLOGY

## 2024-09-03 PROCEDURE — 87624 HPV HI-RISK TYP POOLED RSLT: CPT | Performed by: OBSTETRICS & GYNECOLOGY

## 2024-09-03 PROCEDURE — 87088 URINE BACTERIA CULTURE: CPT | Performed by: OBSTETRICS & GYNECOLOGY

## 2024-09-03 PROCEDURE — 81003 URINALYSIS AUTO W/O SCOPE: CPT | Mod: QW,S$GLB,, | Performed by: OBSTETRICS & GYNECOLOGY

## 2024-09-03 PROCEDURE — 77067 SCR MAMMO BI INCL CAD: CPT | Mod: 26,,, | Performed by: RADIOLOGY

## 2024-09-03 PROCEDURE — G0101 CA SCREEN;PELVIC/BREAST EXAM: HCPCS | Mod: S$GLB,,, | Performed by: OBSTETRICS & GYNECOLOGY

## 2024-09-03 PROCEDURE — 1159F MED LIST DOCD IN RCRD: CPT | Mod: CPTII,S$GLB,, | Performed by: OBSTETRICS & GYNECOLOGY

## 2024-09-03 PROCEDURE — 77063 BREAST TOMOSYNTHESIS BI: CPT | Mod: 26,,, | Performed by: RADIOLOGY

## 2024-09-03 PROCEDURE — 77067 SCR MAMMO BI INCL CAD: CPT | Mod: TC,PN

## 2024-09-03 PROCEDURE — 3074F SYST BP LT 130 MM HG: CPT | Mod: CPTII,S$GLB,, | Performed by: OBSTETRICS & GYNECOLOGY

## 2024-09-03 PROCEDURE — 3079F DIAST BP 80-89 MM HG: CPT | Mod: CPTII,S$GLB,, | Performed by: OBSTETRICS & GYNECOLOGY

## 2024-09-03 NOTE — PROGRESS NOTES
Chief Complaint   Patient presents with    Well Woman    Urinary Frequency       History of Present Illness: Isela Smyth is a 49 y.o. female that presents today 9/3/2024 with Patient's last menstrual period was 01/15/2024.  for well gyn visit.    Past Medical History:   Diagnosis Date    Anxiety     Arthritis     Chronic lumbar pain     Depression     Deviated nasal septum     Diverticulosis     Hemorrhoids     Hypothyroid     Kidney stone     passed 10 stones by herself over the years, one needed procedure    Melena 9/5/2018    Migraine headache     PONV (postoperative nausea and vomiting)     severe    Right ovarian cyst 02/2020    Right sided abdominal pain     Urticaria        Past Surgical History:   Procedure Laterality Date    AUGMENTATION OF BREAST      BREAST SURGERY      CHOLECYSTECTOMY      COLONOSCOPY  prior to 2011    COLONOSCOPY N/A 09/26/2018    Dr. Zuluaga; diverticulosis; repeat in 10 years    COLONOSCOPY N/A 11/01/2019    Procedure: COLONOSCOPY;  Surgeon: Marquis Zuluaga MD;  Location: [facility];  Service: Endoscopy;  Laterality: N/A;    ESOPHAGOGASTRODUODENOSCOPY N/A 09/05/2018    Procedure: EGD (ESOPHAGOGASTRODUODENOSCOPY);  Surgeon: Marquis Zuluaga MD;  Location: [facility];  Service: Endoscopy;  Laterality: N/A;    ESOPHAGOGASTRODUODENOSCOPY N/A 01/29/2020    Procedure: EGD (ESOPHAGOGASTRODUODENOSCOPY);  Surgeon: Marquis Zuluaga MD;  Location: [facility];  Service: Endoscopy;  Laterality: N/A;    HERNIA REPAIR      HIATAL HERNIA REPAIR      KIDNEY STONE SURGERY  2009    [facility], had stone removed by dr renay george, stayed in hospital 4 days    LAPAROSCOPIC GASTRIC BANDING  2007    later removed in 2016    LAPAROSCOPIC SALPINGO-OOPHORECTOMY Right 02/12/2020    Procedure: SALPINGO-OOPHORECTOMY, LAPAROSCOPIC;  Surgeon: Nely Martinez MD;  Location: [facility];  Service: OB/GYN;  Laterality: Right;    LUMBAR EPIDURAL INJECTION      OOPHORECTOMY      SINUS SURGERY      SPINE  SURGERY      6 back surgeries; 1 neck surgery    TUBAL LIGATION      UPPER GASTROINTESTINAL ENDOSCOPY  03/2013    Dr. Zuluaga    UPPER GASTROINTESTINAL ENDOSCOPY  09/14/2016    Dr. Zuluaga    UPPER GASTROINTESTINAL ENDOSCOPY  09/05/2018    Dr. Zuluaga; gastritis; bx negative    VAGINAL DELIVERY      times 3       Outpatient Medications Prior to Visit   Medication Sig Dispense Refill    cyanocobalamin 1,000 mcg/mL injection Start with 1ml IM weekly x 4, then monthly 30 mL 4    cyclobenzaprine (FLEXERIL) 10 MG tablet Take 1 tablet (10 mg total) by mouth 3 (three) times daily as needed for Muscle spasms. 90 tablet 5    HYDROcodone-acetaminophen (NORCO) 7.5-325 mg per tablet Take 1 tablet by mouth every 4 (four) hours as needed for Pain. 120 tablet 0    ketorolac (TORADOL) 30 mg/mL (1 mL) injection INJECT 1 ML INTO THE MUSCLE 2 TIMES DAILY AS NEEDED FOR SEVERE MIGRAINE. NO MORE THAN 2 DAYS/WEEK 20 mL 0    levothyroxine (SYNTHROID) 75 MCG tablet TAKE 1 TABLET BY MOUTH EVERY DAY 90 tablet 2    lisdexamfetamine (VYVANSE) 40 MG Cap Take 1 capsule (40 mg total) by mouth every morning. 30 capsule 0    sumatriptan (IMITREX) 100 MG tablet 1 tab PO PRN migraine. May repeat once in 2 hours, no more than 2 tab in 24 hours. Use no more than 10 days per month. 10 tablet 11    traZODone (DESYREL) 150 MG tablet Take 1 tablet (150 mg total) by mouth every evening. (Patient taking differently: Take 150 mg by mouth nightly as needed for Depression.) 90 tablet 3    ubrogepant (UBRELVY) 100 mg tablet Take 1 tablet by mouth as needed for migraine. May repeat in 2 hours if needed. Max 2 tablet per day 10 tablet 11    venlafaxine (EFFEXOR-XR) 150 MG Cp24 TAKE 1 CAPSULE BY MOUTH EVERY DAY 90 capsule 2     Facility-Administered Medications Prior to Visit   Medication Dose Route Frequency Provider Last Rate Last Admin    lactated ringers infusion   Intravenous Continuous Marquis Zuluaga MD 75 mL/hr at 09/26/18 0845 New Bag at 09/26/18  0845    onabotulinumtoxina injection 200 Units  200 Units Intramuscular q12 weeks Josiane Amaro, NP   200 Units at 06/27/24 1526       Review of patient's allergies indicates:   Allergen Reactions    Morphine Itching and Rash    Gabapentin Other (See Comments)     Coming out of skin       Family History   Problem Relation Name Age of Onset    Ulcers Mother      No Known Problems Father      Cancer Neg Hx      Heart disease Neg Hx      Hypertension Neg Hx      Diabetes Neg Hx      Angioedema Neg Hx      Allergies Neg Hx      Allergic rhinitis Neg Hx      Asthma Neg Hx      Eczema Neg Hx      Immunodeficiency Neg Hx      Colon cancer Neg Hx      Colon polyps Neg Hx      Crohn's disease Neg Hx      Ulcerative colitis Neg Hx      Nephrolithiasis Neg Hx      Stomach cancer Neg Hx      Esophageal cancer Neg Hx         Social History     Socioeconomic History    Marital status:    Tobacco Use    Smoking status: Never    Smokeless tobacco: Never   Substance and Sexual Activity    Alcohol use: Yes     Comment: ocassionally    Drug use: Never     Types: Oxycodone    Sexual activity: Yes     Partners: Male     Birth control/protection: None     Social Determinants of Health     Financial Resource Strain: Patient Declined (2/12/2024)    Overall Financial Resource Strain (CARDIA)     Difficulty of Paying Living Expenses: Patient declined   Food Insecurity: Patient Declined (2/12/2024)    Hunger Vital Sign     Worried About Running Out of Food in the Last Year: Patient declined     Ran Out of Food in the Last Year: Patient declined   Transportation Needs: No Transportation Needs (2/12/2024)    PRAPARE - Transportation     Lack of Transportation (Medical): No     Lack of Transportation (Non-Medical): No   Physical Activity: Unknown (2/12/2024)    Exercise Vital Sign     Days of Exercise per Week: Patient declined   Stress: Patient Declined (2/12/2024)    [employer] [place] [employer] - Occupational Stress  "Questionnaire     Feeling of Stress : Patient declined   Housing Stability: Patient Declined (2024)    Housing Stability Vital Sign     Unable to Pay for Housing in the Last Year: Patient declined     Unstable Housing in the Last Year: Patient declined       OB History    Para Term  AB Living   3 3 3     3   SAB IAB Ectopic Multiple Live Births                  # Outcome Date GA Lbr Emiliano/2nd Weight Sex Type Anes PTL Lv   3 Term            2 Term            1 Term                Review of Symptoms:  GENERAL: Denies weight gain or weight loss. Feeling well overall.   SKIN: Denies rash or lesions.   HEAD: Denies head injury or headache.   NODES: Denies enlarged lymph nodes.   CHEST: Denies chest pain or shortness of breath.   CARDIOVASCULAR: Denies palpitations or left sided chest pain.   ABDOMEN: No abdominal pain, constipation, diarrhea, nausea, vomiting or rectal bleeding.   URINARY: No frequency, dysuria, hematuria, or burning on urination.  HEMATOLOGIC: No easy bruisability or excessive bleeding.   MUSCULOSKELETAL: Denies joint pain or swelling.     /80   Ht 5' 7" (1.702 m)   Wt 71.4 kg (157 lb 6.5 oz)   LMP 01/15/2024   Physical Exam:  APPEARANCE: Well nourished, well developed, in no acute distress.  SKIN: Normal skin turgor, no lesions.  NECK: Neck symmetric without masses   RESPIRATORY: Normal respiratory effort with no retractions or use of accessory muscles  CARDIOVASCULAR: Peripheral vascular system with no swelling no varicosities and palpation of pulses normal  LYMPHATIC: No enlargements of the lymph nodes noted in the neck, axillae, or groin  ABDOMEN: Soft. No tenderness or masses. No hepatosplenomegaly. No hernias.  BREASTS: Symmetrical, no skin changes or visible lesions. No palpable masses, nipple discharge or adenopathy bilaterally.  PELVIC: Normal external female genitalia without lesions. Normal hair distribution. Adequate perineal body, normal urethral meatus. Urethra " with no masses.  Bladder nontender. Vagina moist and well rugated without lesions or discharge. Cervix pink and without lesions. No significant cystocele or rectocele. Bimanual exam showed uterus normal size, shape, position, mobile and nontender. Adnexa without masses or tenderness. Urethra and bladder normal.   EXTREMITIES: No clubbing cyanosis or edema.    ASSESSMENT/PLAN:  Routine gynecological examination    Dysplasia of cervix, low grade (DESIREE 1)  -     Liquid-Based Pap Smear, Screening  -     HPV High Risk Genotypes, PCR          Patient was counseled today on Pelvic exams and Pap Smear guidelines.   We discussed STD screening if at high risk for a STD.  We discussed recommendation for breast cancer screening with mammogram every other year after the age of 40 and annually after the age of 50.    We discussed colon cancer screening when indicated.   Osteoporosis screening discussed when indicated.   She was advised to see her primary care physician for all other health maintenance.     FOLLOW-UP with me for next routine visit.

## 2024-09-04 ENCOUNTER — OFFICE VISIT (OUTPATIENT)
Dept: FAMILY MEDICINE | Facility: CLINIC | Age: 49
End: 2024-09-04
Payer: MEDICARE

## 2024-09-04 DIAGNOSIS — R40.0 DAYTIME SOMNOLENCE: ICD-10-CM

## 2024-09-04 DIAGNOSIS — R06.83 SNORING: ICD-10-CM

## 2024-09-04 DIAGNOSIS — M51.36 DDD (DEGENERATIVE DISC DISEASE), LUMBAR: ICD-10-CM

## 2024-09-04 DIAGNOSIS — E03.9 ACQUIRED HYPOTHYROIDISM: ICD-10-CM

## 2024-09-04 DIAGNOSIS — G47.19 OTHER HYPERSOMNIA: ICD-10-CM

## 2024-09-04 DIAGNOSIS — F90.2 ATTENTION DEFICIT HYPERACTIVITY DISORDER (ADHD), COMBINED TYPE: Primary | ICD-10-CM

## 2024-09-04 PROBLEM — R19.7 DIARRHEA OF PRESUMED INFECTIOUS ORIGIN: Status: RESOLVED | Noted: 2019-10-07 | Resolved: 2024-09-04

## 2024-09-04 PROBLEM — L02.31 ABSCESS AND CELLULITIS OF GLUTEAL REGION: Status: RESOLVED | Noted: 2024-05-15 | Resolved: 2024-09-04

## 2024-09-04 PROBLEM — M54.2 CHRONIC CERVICAL PAIN: Status: RESOLVED | Noted: 2017-11-29 | Resolved: 2024-09-04

## 2024-09-04 PROBLEM — Z01.419 ENCOUNTER FOR WELL WOMAN EXAM WITH ROUTINE GYNECOLOGICAL EXAM: Status: RESOLVED | Noted: 2019-05-30 | Resolved: 2024-09-04

## 2024-09-04 PROBLEM — L03.317 ABSCESS AND CELLULITIS OF GLUTEAL REGION: Status: RESOLVED | Noted: 2024-05-15 | Resolved: 2024-09-04

## 2024-09-04 PROBLEM — G89.29 CHRONIC CERVICAL PAIN: Status: RESOLVED | Noted: 2017-11-29 | Resolved: 2024-09-04

## 2024-09-04 PROCEDURE — 99214 OFFICE O/P EST MOD 30 MIN: CPT | Mod: 95,,, | Performed by: STUDENT IN AN ORGANIZED HEALTH CARE EDUCATION/TRAINING PROGRAM

## 2024-09-04 RX ORDER — TRAZODONE HYDROCHLORIDE 150 MG/1
150 TABLET ORAL NIGHTLY PRN
Qty: 90 TABLET | Refills: 3 | Status: SHIPPED | OUTPATIENT
Start: 2024-09-04

## 2024-09-04 NOTE — PROGRESS NOTES
The patient location is: [place]  The chief complaint leading to consultation is: ADD    Visit type: audiovisual    Notes:    HPI  Patient presents for regular follow up of stimulant medication and narcotic medication.    Review of Systems   Constitutional:  Positive for fatigue (daytime fatigue - feels as if she needs a nap on many days). Negative for activity change and unexpected weight change.   HENT:  Negative for hearing loss, rhinorrhea and trouble swallowing.    Eyes:  Negative for discharge and visual disturbance.   Respiratory:  Negative for chest tightness and wheezing.    Cardiovascular:  Negative for chest pain and palpitations.   Gastrointestinal:  Negative for blood in stool, constipation, diarrhea and vomiting.   Endocrine: Positive for polyuria. Negative for polydipsia.   Genitourinary:  Negative for difficulty urinating, dysuria, hematuria and menstrual problem.   Musculoskeletal:  Positive for neck pain. Negative for arthralgias and joint swelling.   Neurological:  Positive for headaches. Negative for weakness.   Psychiatric/Behavioral:  Negative for confusion, dysphoric mood and sleep disturbance (told she snores).        Objective:  Physical Exam  Constitutional:       Appearance: Normal appearance.   HENT:      Head: Normocephalic and atraumatic.   Pulmonary:      Effort: Pulmonary effort is normal. No respiratory distress.   Neurological:      Mental Status: She is alert and oriented to person, place, and time. Mental status is at baseline.   Psychiatric:         Mood and Affect: Mood normal.         Behavior: Behavior normal.         Thought Content: Thought content normal.         Judgment: Judgment normal.         Plan:  1. Attention deficit hyperactivity disorder (ADHD), combined type  Assessment & Plan:  Tolerating the Vyvanse well as far as side effects ago. However, she doesn't feel as if it helps as much as it previously did. We can consider going up to 50mg. However, I would like to  investigate for sleep apnea - see listed orders. She had TSH drawn in July that was normal.      2. Acquired hypothyroidism  Assessment & Plan:  See plan for ADHD.      3. Daytime somnolence  -     Home Sleep Study; Future    4. Snoring  -     Home Sleep Study; Future    5. Other hypersomnia  -     Home Sleep Study; Future    6. DDD (degenerative disc disease), lumbar  Assessment & Plan:  No changes pain or how she is tolerating her medications. Continue hydrocodone, cyclobenzaprine.      Other orders  -     traZODone (DESYREL) 150 MG tablet; Take 1 tablet (150 mg total) by mouth nightly as needed for Insomnia.  Dispense: 90 tablet; Refill: 3        Follow up in 3 months.     Face to Face time with patient: 12 minutes  14 minutes of total time spent on the encounter, which includes face to face time and non-face to face time preparing to see the patient (eg, review of tests), Obtaining and/or reviewing separately obtained history, Documenting clinical information in the electronic or other health record, Independently interpreting results (not separately reported) and communicating results to the patient/family/caregiver, or Care coordination (not separately reported).     Each patient to whom he or she provides medical services by telemedicine is:  (1) informed of the relationship between the physician and patient and the respective role of any other health care provider with respect to management of the patient; and (2) notified that he or she may decline to receive medical services by telemedicine and may withdraw from such care at any time.

## 2024-09-04 NOTE — ASSESSMENT & PLAN NOTE
Tolerating the Vyvanse well as far as side effects ago. However, she doesn't feel as if it helps as much as it previously did. We can consider going up to 50mg. However, I would like to investigate for sleep apnea - see listed orders. She had TSH drawn in July that was normal.

## 2024-09-05 ENCOUNTER — PROCEDURE VISIT (OUTPATIENT)
Dept: NEUROLOGY | Facility: CLINIC | Age: 49
End: 2024-09-05
Payer: MEDICARE

## 2024-09-05 VITALS
SYSTOLIC BLOOD PRESSURE: 137 MMHG | DIASTOLIC BLOOD PRESSURE: 92 MMHG | HEART RATE: 110 BPM | TEMPERATURE: 98 F | BODY MASS INDEX: 24.71 KG/M2 | HEIGHT: 67 IN | WEIGHT: 157.44 LBS | RESPIRATION RATE: 17 BRPM

## 2024-09-05 DIAGNOSIS — G43.719 INTRACTABLE CHRONIC MIGRAINE WITHOUT AURA AND WITHOUT STATUS MIGRAINOSUS: Primary | ICD-10-CM

## 2024-09-05 LAB — BACTERIA UR CULT: ABNORMAL

## 2024-09-05 NOTE — PROCEDURES
Procedures  A time out was conducted just before the start of the procedure to verify the correct patient and procedure, procedure location, and all relevant critical information.      Conventional methods of treatment such as multiple medications, both on and   off label have been tried including: venlafaxine, trazodone, Lyrica, Topamax, Depakote, Wellbutrin, gabapentin, Aimovig      The patient has been unresponsive and refractory.The patient meets criteria for chronic headaches according to the ICHD-II, the patient has more than 15 headaches a month which last for more than 4 hours a day.     Botox session number: 3  Last session was 12 weeks ago and resulted in improvement of: n/a     I am aiming for at least 50%  improvement in the patient's symptoms. Frequency of treatment is every 3 months unless no response to the treatments, at which time we will discontinue the injections.      DESCRIPTION OF PROCEDURE: After obtaining informed consent and under   aseptic technique, a total of 155 units of botulinum toxin type A were   injected in the following muscles:      -- Procerus 5 units  --  5 units bilaterally  -- Frontalis 20 units  -- Temporalis 20 units bilaterally  -- Occipitalis 15 units bilaterally  -- Upper cervical paraspinals 10 units bilaterally  -- Trapezius 15 units bilaterally.      The patient tolerated the procedure well. There were no complications. The patient was given a prescription for repeat treatment in 12 weeks      Unavoidable waste 45 units    DEEPTI Newberry

## 2024-09-10 ENCOUNTER — PATIENT MESSAGE (OUTPATIENT)
Dept: OBSTETRICS AND GYNECOLOGY | Facility: CLINIC | Age: 49
End: 2024-09-10
Payer: MEDICARE

## 2024-09-18 ENCOUNTER — PATIENT MESSAGE (OUTPATIENT)
Dept: HEMATOLOGY/ONCOLOGY | Facility: CLINIC | Age: 49
End: 2024-09-18
Payer: MEDICARE

## 2024-09-23 DIAGNOSIS — M51.36 DDD (DEGENERATIVE DISC DISEASE), LUMBAR: ICD-10-CM

## 2024-09-23 NOTE — TELEPHONE ENCOUNTER
No care due was identified.  [facility] Embedded Care Due Messages. Reference number: [id number].   9/23/2024 4:38:24 PM CDT

## 2024-09-24 RX ORDER — HYDROCODONE BITARTRATE AND ACETAMINOPHEN 7.5; 325 MG/1; MG/1
1 TABLET ORAL EVERY 4 HOURS PRN
Qty: 120 TABLET | Refills: 0 | Status: SHIPPED | OUTPATIENT
Start: 2024-09-24

## 2024-10-01 DIAGNOSIS — F90.2 ATTENTION DEFICIT HYPERACTIVITY DISORDER (ADHD), COMBINED TYPE: ICD-10-CM

## 2024-10-01 RX ORDER — LISDEXAMFETAMINE DIMESYLATE 40 MG/1
40 CAPSULE ORAL EVERY MORNING
Qty: 30 CAPSULE | Refills: 0 | Status: SHIPPED | OUTPATIENT
Start: 2024-10-01

## 2024-10-01 NOTE — TELEPHONE ENCOUNTER
No care due was identified.  [facility] Embedded Care Due Messages. Reference number: [id number].   10/01/2024 3:04:26 PM CDT

## 2024-10-14 ENCOUNTER — LAB VISIT (OUTPATIENT)
Dept: LAB | Facility: HOSPITAL | Age: 49
End: 2024-10-14
Attending: STUDENT IN AN ORGANIZED HEALTH CARE EDUCATION/TRAINING PROGRAM
Payer: MEDICARE

## 2024-10-14 DIAGNOSIS — D50.8 IRON DEFICIENCY ANEMIA DUE TO DIETARY CAUSES: ICD-10-CM

## 2024-10-14 DIAGNOSIS — M89.9 DISORDER OF BONE AND CARTILAGE: ICD-10-CM

## 2024-10-14 DIAGNOSIS — M94.9 DISORDER OF BONE AND CARTILAGE: ICD-10-CM

## 2024-10-14 DIAGNOSIS — E53.8 VITAMIN B12 DEFICIENCY: ICD-10-CM

## 2024-10-14 LAB
25(OH)D3+25(OH)D2 SERPL-MCNC: 39 NG/ML (ref 30–96)
BASOPHILS # BLD AUTO: 0.05 K/UL (ref 0–0.2)
BASOPHILS NFR BLD: 0.9 % (ref 0–1.9)
DIFFERENTIAL METHOD BLD: NORMAL
EOSINOPHIL # BLD AUTO: 0.2 K/UL (ref 0–0.5)
EOSINOPHIL NFR BLD: 3.9 % (ref 0–8)
ERYTHROCYTE [DISTWIDTH] IN BLOOD BY AUTOMATED COUNT: 11.9 % (ref 11.5–14.5)
FERRITIN SERPL-MCNC: 95 NG/ML (ref 20–300)
HCT VFR BLD AUTO: 39.7 % (ref 37–48.5)
HGB BLD-MCNC: 13.7 G/DL (ref 12–16)
IMM GRANULOCYTES # BLD AUTO: 0.01 K/UL (ref 0–0.04)
IMM GRANULOCYTES NFR BLD AUTO: 0.2 % (ref 0–0.5)
IRON SERPL-MCNC: 171 UG/DL (ref 30–160)
LYMPHOCYTES # BLD AUTO: 2 K/UL (ref 1–4.8)
LYMPHOCYTES NFR BLD: 33.8 % (ref 18–48)
MCH RBC QN AUTO: 30.6 PG (ref 27–31)
MCHC RBC AUTO-ENTMCNC: 34.5 G/DL (ref 32–36)
MCV RBC AUTO: 89 FL (ref 82–98)
MONOCYTES # BLD AUTO: 0.5 K/UL (ref 0.3–1)
MONOCYTES NFR BLD: 8.9 % (ref 4–15)
NEUTROPHILS # BLD AUTO: 3.1 K/UL (ref 1.8–7.7)
NEUTROPHILS NFR BLD: 52.3 % (ref 38–73)
NRBC BLD-RTO: 0 /100 WBC
PLATELET # BLD AUTO: 246 K/UL (ref 150–450)
PMV BLD AUTO: 9.8 FL (ref 9.2–12.9)
RBC # BLD AUTO: 4.47 M/UL (ref 4–5.4)
SATURATED IRON: 58 % (ref 20–50)
TOTAL IRON BINDING CAPACITY: 293 UG/DL (ref 250–450)
TRANSFERRIN SERPL-MCNC: 198 MG/DL (ref 200–375)
VIT B12 SERPL-MCNC: 581 PG/ML (ref 210–950)
WBC # BLD AUTO: 5.83 K/UL (ref 3.9–12.7)

## 2024-10-14 PROCEDURE — 85025 COMPLETE CBC W/AUTO DIFF WBC: CPT | Mod: PO | Performed by: NURSE PRACTITIONER

## 2024-10-14 PROCEDURE — 36415 COLL VENOUS BLD VENIPUNCTURE: CPT | Mod: PO | Performed by: NURSE PRACTITIONER

## 2024-10-14 PROCEDURE — 82728 ASSAY OF FERRITIN: CPT | Performed by: NURSE PRACTITIONER

## 2024-10-14 PROCEDURE — 82607 VITAMIN B-12: CPT | Performed by: NURSE PRACTITIONER

## 2024-10-14 PROCEDURE — 83540 ASSAY OF IRON: CPT | Performed by: NURSE PRACTITIONER

## 2024-10-14 PROCEDURE — 82306 VITAMIN D 25 HYDROXY: CPT | Performed by: NURSE PRACTITIONER

## 2024-10-17 ENCOUNTER — OFFICE VISIT (OUTPATIENT)
Dept: HEMATOLOGY/ONCOLOGY | Facility: CLINIC | Age: 49
End: 2024-10-17
Payer: MEDICARE

## 2024-10-17 DIAGNOSIS — D50.8 IRON DEFICIENCY ANEMIA DUE TO DIETARY CAUSES: Primary | ICD-10-CM

## 2024-10-17 DIAGNOSIS — E55.9 VITAMIN D INSUFFICIENCY: ICD-10-CM

## 2024-10-17 DIAGNOSIS — E53.8 VITAMIN B12 DEFICIENCY: ICD-10-CM

## 2024-10-17 PROCEDURE — 99213 OFFICE O/P EST LOW 20 MIN: CPT | Mod: 95,,, | Performed by: NURSE PRACTITIONER

## 2024-10-17 NOTE — PROGRESS NOTES
PATIENT: Isela Smyth  MRN: [id number]  DATE: 10/17/2024    Diagnosis:   1. Iron deficiency anemia due to dietary causes      Chief Complaint: Review of labs      Subjective:   HPI: Ms. Smyth is a 49 y.o. female with history of migraine headaches, Depression, hypothyroidism, GERD, history of iron deficiency anemia, s/p gastric surgery who presents via VV for evaluation of labs for dx of LUIS ENRIQUE.  Since our last visit 06/07/24, she has been taking oral iron daily.  She reports that she has not had a menstrual cycle since April of this year.  She has restarted her B12 injections.  She endorses a better diet and avoiding fries & chips. She denies any fatigue, SOB, CP, palpitations, HA's, cold extremities, pica, bleeding, melena, etc.      History:  Patient has been seen in this clinic in 2019 by Dr. Rudd for the same diagnosis, was taking oral iron supplements at that time.     History of lap band that was removed. No other gastric surgeries.     Review of records shows a decline in Hgb that initially started in 2018, had a GI work-up in August 2018 with upper EGD and colonoscopy that showed diverticulosis and gastritis.      01/29/2020 Upper GI showed normal esophagus with gastritis    Early 2032 she had experienced kidney stones with hematuria. Menstrual bleeding has become irregular with periods lasting 7-10 days. She also admits that she does not always eat a balanced diet; she is not vegetarian.   Was feeling more fatigued with myalgias and reached out to her primary physician.   3/16/23 of this year her Hgb was 8 g/dL. Patient started PO iron supplements at that time though she has not taken them consistently.          Past Medical History:   Past Medical History:   Diagnosis Date    Arthritis     Chronic lumbar pain     Depression     Deviated nasal septum     Diverticulosis     Hemorrhoids     Kidney stone     passed 10 stones by herself over the years, one needed procedure    Melena 09/05/2018    Migraine  headache     PONV (postoperative nausea and vomiting)     severe    Right sided abdominal pain     Urticaria        Past Surgical HIstory:   Past Surgical History:   Procedure Laterality Date    AUGMENTATION OF BREAST      BREAST SURGERY      CHOLECYSTECTOMY      COLONOSCOPY  prior to 2011    COLONOSCOPY N/A 09/26/2018    Dr. Zuluaga; diverticulosis; repeat in 10 years    COLONOSCOPY N/A 11/01/2019    Procedure: COLONOSCOPY;  Surgeon: Marquis Zuluaga MD;  Location: [facility];  Service: Endoscopy;  Laterality: N/A;    ESOPHAGOGASTRODUODENOSCOPY N/A 09/05/2018    Procedure: EGD (ESOPHAGOGASTRODUODENOSCOPY);  Surgeon: Marquis Zuluaga MD;  Location: [facility];  Service: Endoscopy;  Laterality: N/A;    ESOPHAGOGASTRODUODENOSCOPY N/A 01/29/2020    Procedure: EGD (ESOPHAGOGASTRODUODENOSCOPY);  Surgeon: Marquis Zuluaga MD;  Location: [facility];  Service: Endoscopy;  Laterality: N/A;    HERNIA REPAIR      HIATAL HERNIA REPAIR      KIDNEY STONE SURGERY  2009    [facility], had stone removed by dr renay george, stayed in hospital 4 days    LAPAROSCOPIC GASTRIC BANDING  2007    later removed in 2016    LAPAROSCOPIC SALPINGO-OOPHORECTOMY Right 02/12/2020    Procedure: SALPINGO-OOPHORECTOMY, LAPAROSCOPIC;  Surgeon: Nely Martinez MD;  Location: [facility];  Service: OB/GYN;  Laterality: Right;    LUMBAR EPIDURAL INJECTION      OOPHORECTOMY      SEPTOPLASTY, NOSE, WITH NASAL TURBINATE REDUCTION      SPINE SURGERY      6 back surgeries; 1 neck surgery    TUBAL LIGATION      UPPER GASTROINTESTINAL ENDOSCOPY  03/2013    Dr. Zuluaga    UPPER GASTROINTESTINAL ENDOSCOPY  09/14/2016    Dr. Zuluaga    UPPER GASTROINTESTINAL ENDOSCOPY  09/05/2018    Dr. Zuluaga; gastritis; bx negative    VAGINAL DELIVERY      times 3       Family History:   Family History   Problem Relation Name Age of Onset    Ulcers Mother      No Known Problems Father      Cancer Neg Hx      Heart disease Neg Hx      Hypertension Neg Hx      Diabetes Neg  Hx      Angioedema Neg Hx      Allergies Neg Hx      Allergic rhinitis Neg Hx      Asthma Neg Hx      Eczema Neg Hx      Immunodeficiency Neg Hx      Colon cancer Neg Hx      Colon polyps Neg Hx      Crohn's disease Neg Hx      Ulcerative colitis Neg Hx      Nephrolithiasis Neg Hx      Stomach cancer Neg Hx      Esophageal cancer Neg Hx         Social History:  reports that she has never smoked. She has never used smokeless tobacco. She reports current alcohol use. She reports that she does not use drugs.    Allergies:  Review of patient's allergies indicates:   Allergen Reactions    Morphine Itching and Rash    Gabapentin Other (See Comments)     Coming out of skin       Medications:  Current Outpatient Medications   Medication Sig Dispense Refill    cyanocobalamin 1,000 mcg/mL injection Start with 1ml IM weekly x 4, then monthly 30 mL 4    cyclobenzaprine (FLEXERIL) 10 MG tablet Take 1 tablet (10 mg total) by mouth 3 (three) times daily as needed for Muscle spasms. 90 tablet 5    HYDROcodone-acetaminophen (NORCO) 7.5-325 mg per tablet Take 1 tablet by mouth every 4 (four) hours as needed for Pain. 120 tablet 0    ketorolac (TORADOL) 30 mg/mL (1 mL) injection INJECT 1 ML INTO THE MUSCLE 2 TIMES DAILY AS NEEDED FOR SEVERE MIGRAINE. NO MORE THAN 2 DAYS/WEEK 20 mL 0    levothyroxine (SYNTHROID) 75 MCG tablet TAKE 1 TABLET BY MOUTH EVERY DAY 90 tablet 2    lisdexamfetamine (VYVANSE) 40 MG Cap Take 1 capsule (40 mg total) by mouth every morning. 30 capsule 0    sumatriptan (IMITREX) 100 MG tablet 1 tab PO PRN migraine. May repeat once in 2 hours, no more than 2 tab in 24 hours. Use no more than 10 days per month. 10 tablet 11    traZODone (DESYREL) 150 MG tablet Take 1 tablet (150 mg total) by mouth nightly as needed for Insomnia. 90 tablet 3    ubrogepant (UBRELVY) 100 mg tablet Take 1 tablet by mouth as needed for migraine. May repeat in 2 hours if needed. Max 2 tablet per day 10 tablet 11    venlafaxine (EFFEXOR-XR)  150 MG Cp24 TAKE 1 CAPSULE BY MOUTH EVERY DAY 90 capsule 2     Current Facility-Administered Medications   Medication Dose Route Frequency Provider Last Rate Last Admin    onabotulinumtoxina injection 200 Units  200 Units Intramuscular q12 weeks Josiane Amaro, NP   200 Units at 09/05/24 1525     Facility-Administered Medications Ordered in Other Visits   Medication Dose Route Frequency Provider Last Rate Last Admin    lactated ringers infusion   Intravenous Continuous Marquis Zuluaga MD 75 mL/hr at 09/26/18 0845 New Bag at 09/26/18 0845       Review of Systems   Constitutional:  Negative for appetite change, fatigue, fever and unexpected weight change.   HENT:  Negative for mouth sores, sore throat and trouble swallowing.    Eyes:  Negative for visual disturbance.   Respiratory:  Negative for cough and shortness of breath.    Cardiovascular:  Negative for chest pain, palpitations and leg swelling.   Gastrointestinal:  Negative for abdominal pain, blood in stool, constipation, diarrhea, nausea and vomiting.   Genitourinary:  Negative for dysuria, frequency and hematuria.   Musculoskeletal:  Positive for back pain. Negative for myalgias and neck pain.   Skin:  Negative for rash.   Neurological:  Negative for light-headedness and headaches.   Hematological:  Negative for adenopathy.   Psychiatric/Behavioral:  The patient is not nervous/anxious.        Objective:      Vitals: Virtual visit  There were no vitals filed for this visit.      Physical Exam  Constitutional:       General: She is not in acute distress.  Pulmonary:      Effort: Pulmonary effort is normal.   Neurological:      Mental Status: She is alert and oriented to person, place, and time.   Psychiatric:         Mood and Affect: Mood normal.         Behavior: Behavior normal.         Laboratory Data:  Lab Results   Component Value Date    WBC 5.83 10/14/2024    HGB 13.7 10/14/2024    HCT 39.7 10/14/2024    MCV 89 10/14/2024     10/14/2024       Lab Results   Component Value Date    IRON 171 (H) 10/14/2024    TRANSFERRIN 198 (L) 10/14/2024    TIBC 293 10/14/2024    LABIRON 36 01/08/2019    FESATURATED 58 (H) 10/14/2024      Lab Results   Component Value Date    FERRITIN 95 10/14/2024     Vitamin D:  39             Component Ref Range & Units 3 d ago 4 mo ago 9 mo ago 1 yr ago 5 yr ago   Vitamin B-12 210 - 950 pg/mL 581 160 Low  314 162 Low  237            Imaging:   Assessment:       1. Iron deficiency anemia due to dietary causes    2. Vitamin B12 deficiency         Plan:   Iron deficiency anemia   -Discussed possible reasons to include blood loss with hematuria, menorrhagia; nutritional, malabsorption  -Improved Hgb at 12.8 g/dL  -Encouraged to take PO supplements daily   -Follow up in 4 months with CBC, Ferritin, Iron/TIBC a week prior to appointment   06/07/2024:  Restart oral iron with Vitamin C; f/u in 4 months with CBC, Iron studies/ferritin prior.  10/17/24:  Decrease oral iron with Vitamin C to twice a week; f/u in 4 months with CBC, iron studies/ferritin/b12/vitamin D.    B12 deficiency   -7/25/23 B12 level is low at 162  -Will start B12 injections   -Repeat B12 level at next visit   06/07/24:  Restart B12 weekly x 4; then monthly; recheck B12 level in 4 months  10/17/24:  Stable; continue monthly injections.    DDD  -will check Vitamin D on next visit  10/17/24:  level @ low normal; increase Vitamin D3 to 2,000 iu/day      Patient queried and all questions were answered.   Assessment/Plan reviewed and approved by Dr. Trimble       20 minutes were spent in coordination of patient's care, record review and counseling.  FERNANDO Carrington, FNP-C  [facility]    Route Chart for Scheduling    Med Onc Chart Routing      Follow up with physician    Follow up with LUPE 4 months. f/u in 4 months with CBC, iron studies/ferritin/b12/vitamin D   Infusion scheduling note    Injection scheduling note    Labs     Imaging    Pharmacy appointment    Other referrals

## 2024-10-21 DIAGNOSIS — M51.369 DDD (DEGENERATIVE DISC DISEASE), LUMBAR: ICD-10-CM

## 2024-10-21 RX ORDER — HYDROCODONE BITARTRATE AND ACETAMINOPHEN 7.5; 325 MG/1; MG/1
1 TABLET ORAL EVERY 4 HOURS PRN
Qty: 120 TABLET | Refills: 0 | Status: SHIPPED | OUTPATIENT
Start: 2024-10-21

## 2024-10-21 RX ORDER — TRAZODONE HYDROCHLORIDE 150 MG/1
150 TABLET ORAL NIGHTLY PRN
Qty: 90 TABLET | Refills: 3 | Status: SHIPPED | OUTPATIENT
Start: 2024-10-21

## 2024-10-21 NOTE — TELEPHONE ENCOUNTER
No care due was identified.  [facility] Embedded Care Due Messages. Reference number: [id number].   10/21/2024 7:00:06 AM CDT

## 2024-10-29 DIAGNOSIS — F90.2 ATTENTION DEFICIT HYPERACTIVITY DISORDER (ADHD), COMBINED TYPE: ICD-10-CM

## 2024-10-31 RX ORDER — LISDEXAMFETAMINE DIMESYLATE 40 MG/1
40 CAPSULE ORAL EVERY MORNING
Qty: 30 CAPSULE | Refills: 0 | Status: SHIPPED | OUTPATIENT
Start: 2024-10-31

## 2024-11-21 DIAGNOSIS — M51.369 DDD (DEGENERATIVE DISC DISEASE), LUMBAR: ICD-10-CM

## 2024-11-21 RX ORDER — HYDROCODONE BITARTRATE AND ACETAMINOPHEN 7.5; 325 MG/1; MG/1
1 TABLET ORAL EVERY 4 HOURS PRN
Qty: 120 TABLET | Refills: 0 | Status: SHIPPED | OUTPATIENT
Start: 2024-11-21

## 2024-11-21 NOTE — TELEPHONE ENCOUNTER
No care due was identified.  [facility] Embedded Care Due Messages. Reference number: [id number].   11/21/2024 12:12:23 AM CST

## 2024-11-26 DIAGNOSIS — F90.2 ATTENTION DEFICIT HYPERACTIVITY DISORDER (ADHD), COMBINED TYPE: ICD-10-CM

## 2024-11-26 NOTE — TELEPHONE ENCOUNTER
No care due was identified.  [facility] Embedded Care Due Messages. Reference number: [id number].   11/26/2024 4:58:48 PM CST

## 2024-11-27 RX ORDER — LISDEXAMFETAMINE DIMESYLATE 40 MG/1
40 CAPSULE ORAL EVERY MORNING
Qty: 30 CAPSULE | Refills: 0 | Status: SHIPPED | OUTPATIENT
Start: 2024-11-27

## 2024-12-03 DIAGNOSIS — F90.2 ATTENTION DEFICIT HYPERACTIVITY DISORDER (ADHD), COMBINED TYPE: ICD-10-CM

## 2024-12-03 RX ORDER — LISDEXAMFETAMINE DIMESYLATE 40 MG/1
40 CAPSULE ORAL EVERY MORNING
Qty: 30 CAPSULE | Refills: 0 | Status: CANCELLED | OUTPATIENT
Start: 2024-12-03

## 2024-12-03 NOTE — TELEPHONE ENCOUNTER
Pt requesting Rx to be [place] in another state  Replied to portal message advising we are unable to send controls across state lines

## 2024-12-03 NOTE — TELEPHONE ENCOUNTER
No care due was identified.  [facility] Embedded Care Due Messages. Reference number: [id number].   12/03/2024 12:31:47 PM CST

## 2024-12-18 ENCOUNTER — PROCEDURE VISIT (OUTPATIENT)
Dept: NEUROLOGY | Facility: CLINIC | Age: 49
End: 2024-12-18
Payer: MEDICARE

## 2024-12-18 ENCOUNTER — OFFICE VISIT (OUTPATIENT)
Dept: FAMILY MEDICINE | Facility: CLINIC | Age: 49
End: 2024-12-18
Payer: MEDICARE

## 2024-12-18 ENCOUNTER — LAB VISIT (OUTPATIENT)
Dept: LAB | Facility: HOSPITAL | Age: 49
End: 2024-12-18
Attending: STUDENT IN AN ORGANIZED HEALTH CARE EDUCATION/TRAINING PROGRAM
Payer: MEDICARE

## 2024-12-18 VITALS
BODY MASS INDEX: 23.38 KG/M2 | WEIGHT: 148.94 LBS | OXYGEN SATURATION: 98 % | DIASTOLIC BLOOD PRESSURE: 84 MMHG | HEART RATE: 75 BPM | SYSTOLIC BLOOD PRESSURE: 134 MMHG | HEIGHT: 67 IN

## 2024-12-18 VITALS
DIASTOLIC BLOOD PRESSURE: 89 MMHG | SYSTOLIC BLOOD PRESSURE: 141 MMHG | HEIGHT: 67 IN | WEIGHT: 148.81 LBS | RESPIRATION RATE: 17 BRPM | BODY MASS INDEX: 23.36 KG/M2 | HEART RATE: 86 BPM | TEMPERATURE: 97 F

## 2024-12-18 DIAGNOSIS — Z00.00 HEALTHCARE MAINTENANCE: Primary | ICD-10-CM

## 2024-12-18 DIAGNOSIS — G89.29 CHRONIC LOW BACK PAIN WITH SCIATICA, SCIATICA LATERALITY UNSPECIFIED, UNSPECIFIED BACK PAIN LATERALITY: ICD-10-CM

## 2024-12-18 DIAGNOSIS — M54.40 CHRONIC LOW BACK PAIN WITH SCIATICA, SCIATICA LATERALITY UNSPECIFIED, UNSPECIFIED BACK PAIN LATERALITY: ICD-10-CM

## 2024-12-18 DIAGNOSIS — F90.2 ATTENTION DEFICIT HYPERACTIVITY DISORDER (ADHD), COMBINED TYPE: ICD-10-CM

## 2024-12-18 DIAGNOSIS — Z00.00 HEALTHCARE MAINTENANCE: ICD-10-CM

## 2024-12-18 DIAGNOSIS — G43.719 INTRACTABLE CHRONIC MIGRAINE WITHOUT AURA AND WITHOUT STATUS MIGRAINOSUS: Primary | ICD-10-CM

## 2024-12-18 LAB
ALBUMIN SERPL BCP-MCNC: 4.2 G/DL (ref 3.5–5.2)
ALP SERPL-CCNC: 68 U/L (ref 40–150)
ALT SERPL W/O P-5'-P-CCNC: 19 U/L (ref 10–44)
ANION GAP SERPL CALC-SCNC: 9 MMOL/L (ref 8–16)
AST SERPL-CCNC: 23 U/L (ref 10–40)
BILIRUB SERPL-MCNC: 0.2 MG/DL (ref 0.1–1)
BUN SERPL-MCNC: 14 MG/DL (ref 6–20)
CALCIUM SERPL-MCNC: 9 MG/DL (ref 8.7–10.5)
CHLORIDE SERPL-SCNC: 108 MMOL/L (ref 95–110)
CO2 SERPL-SCNC: 25 MMOL/L (ref 23–29)
CREAT SERPL-MCNC: 0.8 MG/DL (ref 0.5–1.4)
EST. GFR  (NO RACE VARIABLE): >60 ML/MIN/1.73 M^2
GLUCOSE SERPL-MCNC: 85 MG/DL (ref 70–110)
POTASSIUM SERPL-SCNC: 4.4 MMOL/L (ref 3.5–5.1)
PROT SERPL-MCNC: 7.3 G/DL (ref 6–8.4)
SODIUM SERPL-SCNC: 142 MMOL/L (ref 136–145)

## 2024-12-18 PROCEDURE — 1159F MED LIST DOCD IN RCRD: CPT | Mod: CPTII,S$GLB,, | Performed by: STUDENT IN AN ORGANIZED HEALTH CARE EDUCATION/TRAINING PROGRAM

## 2024-12-18 PROCEDURE — 3079F DIAST BP 80-89 MM HG: CPT | Mod: CPTII,S$GLB,, | Performed by: STUDENT IN AN ORGANIZED HEALTH CARE EDUCATION/TRAINING PROGRAM

## 2024-12-18 PROCEDURE — 80053 COMPREHEN METABOLIC PANEL: CPT | Mod: PO | Performed by: STUDENT IN AN ORGANIZED HEALTH CARE EDUCATION/TRAINING PROGRAM

## 2024-12-18 PROCEDURE — 36415 COLL VENOUS BLD VENIPUNCTURE: CPT | Mod: PO | Performed by: STUDENT IN AN ORGANIZED HEALTH CARE EDUCATION/TRAINING PROGRAM

## 2024-12-18 PROCEDURE — 99214 OFFICE O/P EST MOD 30 MIN: CPT | Mod: S$GLB,,, | Performed by: STUDENT IN AN ORGANIZED HEALTH CARE EDUCATION/TRAINING PROGRAM

## 2024-12-18 PROCEDURE — 99999 PR PBB SHADOW E&M-EST. PATIENT-LVL III: CPT | Mod: PBBFAC,,, | Performed by: STUDENT IN AN ORGANIZED HEALTH CARE EDUCATION/TRAINING PROGRAM

## 2024-12-18 PROCEDURE — 3075F SYST BP GE 130 - 139MM HG: CPT | Mod: CPTII,S$GLB,, | Performed by: STUDENT IN AN ORGANIZED HEALTH CARE EDUCATION/TRAINING PROGRAM

## 2024-12-18 PROCEDURE — 3008F BODY MASS INDEX DOCD: CPT | Mod: CPTII,S$GLB,, | Performed by: STUDENT IN AN ORGANIZED HEALTH CARE EDUCATION/TRAINING PROGRAM

## 2024-12-18 NOTE — ASSESSMENT & PLAN NOTE
Tolerating Vyvanse well. Takes it irregularly, just as needed. She also avoids Vyvanse if she is having a migraine. Continue medication.

## 2024-12-18 NOTE — PROCEDURES
Procedures  A time out was conducted just before the start of the procedure to verify the correct patient and procedure, procedure location, and all relevant critical information.      Conventional methods of treatment such as multiple medications, both on and   off label have been tried including: venlafaxine, trazodone, Lyrica, Topamax, Depakote, Wellbutrin, gabapentin, Aimovig      The patient has been unresponsive and refractory.The patient meets criteria for chronic headaches according to the ICHD-II, the patient has more than 15 headaches a month which last for more than 4 hours a day.     Botox session number: 4  Last session was 12 weeks ago and resulted in improvement of: 60%     I am aiming for at least 50%  improvement in the patient's symptoms. Frequency of treatment is every 3 months unless no response to the treatments, at which time we will discontinue the injections.      DESCRIPTION OF PROCEDURE: After obtaining informed consent and under   aseptic technique, a total of 155 units of botulinum toxin type A were   injected in the following muscles:      -- Procerus 5 units  --  5 units bilaterally  -- Frontalis 20 units  -- Temporalis 20 units bilaterally  -- Occipitalis 15 units bilaterally  -- Upper cervical paraspinals 10 units bilaterally  -- Trapezius 15 units bilaterally.      The patient tolerated the procedure well. There were no complications. The patient was given a prescription for repeat treatment in 12 weeks      Unavoidable waste 45 units    DEEPTI Newberry

## 2024-12-18 NOTE — PROGRESS NOTES
Name: Isela Smyth  MRN: [id number]  : 1975    Subjective   HPI  Patient presents for regular follow up.     Review of Systems   Respiratory:  Negative for shortness of breath.    Cardiovascular:  Negative for chest pain.   Gastrointestinal:  Negative for constipation, diarrhea, nausea and vomiting.   Neurological:  Negative for dizziness and light-headedness.        Patient Active Problem List   Diagnosis    Lumbago    Anxiety    Chronic low back pain    Hypothyroid    AR (allergic rhinitis)    Chronic rhinitis    S/P nasal septoplasty, inferior turbinate reductions and outfracture, 2014, excellent outcome, 2014     GERD (gastroesophageal reflux disease)    DDD (degenerative disc disease), lumbar    Lumbar facet arthropathy    Cervical radiculopathy    Iron deficiency anemia due to dietary causes    Urinary incontinence    Pelvic pain in female    Intractable chronic migraine without aura and without status migrainosus    Cervical myofascial pain syndrome    Epigastric pain    Right ovarian cyst    Recurrent major depressive disorder, in full remission    Attention deficit hyperactivity disorder (ADHD), combined type    Vitamin B12 deficiency       Current Outpatient Medications on File Prior to Visit   Medication Sig Dispense Refill    cyanocobalamin 1,000 mcg/mL injection Start with 1ml IM weekly x 4, then monthly 30 mL 4    cyclobenzaprine (FLEXERIL) 10 MG tablet Take 1 tablet (10 mg total) by mouth 3 (three) times daily as needed for Muscle spasms. 90 tablet 5    HYDROcodone-acetaminophen (NORCO) 7.5-325 mg per tablet Take 1 tablet by mouth every 4 (four) hours as needed for Pain. 120 tablet 0    ketorolac (TORADOL) 30 mg/mL (1 mL) injection INJECT 1 ML INTO THE MUSCLE 2 TIMES DAILY AS NEEDED FOR SEVERE MIGRAINE. NO MORE THAN 2 DAYS/WEEK 20 mL 0    levothyroxine (SYNTHROID) 75 MCG tablet TAKE 1 TABLET BY MOUTH EVERY DAY 90 tablet 2    lisdexamfetamine (VYVANSE) 40 MG Cap Take 1 capsule (40 mg  total) by mouth every morning. 30 capsule 0    sumatriptan (IMITREX) 100 MG tablet 1 tab PO PRN migraine. May repeat once in 2 hours, no more than 2 tab in 24 hours. Use no more than 10 days per month. 10 tablet 11    traZODone (DESYREL) 150 MG tablet Take 1 tablet (150 mg total) by mouth nightly as needed for Insomnia. 90 tablet 3    ubrogepant (UBRELVY) 100 mg tablet Take 1 tablet by mouth as needed for migraine. May repeat in 2 hours if needed. Max 2 tablet per day 10 tablet 11    venlafaxine (EFFEXOR-XR) 150 MG Cp24 TAKE 1 CAPSULE BY MOUTH EVERY DAY 90 capsule 2     Current Facility-Administered Medications on File Prior to Visit   Medication Dose Route Frequency Provider Last Rate Last Admin    lactated ringers infusion   Intravenous Continuous Marquis Zuluaga MD 75 mL/hr at 09/26/18 0845 New Bag at 09/26/18 0845    onabotulinumtoxina injection 200 Units  200 Units Intramuscular q12 weeks Josiane Amaro NP   200 Units at 09/05/24 1525       Past Medical History:   Diagnosis Date    Arthritis     Chronic lumbar pain     Depression     Deviated nasal septum     Diverticulosis     Hemorrhoids     Kidney stone     passed 10 stones by herself over the years, one needed procedure    Melena 09/05/2018    Migraine headache     PONV (postoperative nausea and vomiting)     severe    Right sided abdominal pain     Urticaria        Past Surgical History:   Procedure Laterality Date    AUGMENTATION OF BREAST      BREAST SURGERY      CHOLECYSTECTOMY      COLONOSCOPY  prior to 2011    COLONOSCOPY N/A 09/26/2018    Dr. Zuluaga; diverticulosis; repeat in 10 years    COLONOSCOPY N/A 11/01/2019    Procedure: COLONOSCOPY;  Surgeon: Marquis Zuluaga MD;  Location: [facility];  Service: Endoscopy;  Laterality: N/A;    ESOPHAGOGASTRODUODENOSCOPY N/A 09/05/2018    Procedure: EGD (ESOPHAGOGASTRODUODENOSCOPY);  Surgeon: Marquis Zuluaga MD;  Location: [facility];  Service: Endoscopy;  Laterality: N/A;     ESOPHAGOGASTRODUODENOSCOPY N/A 01/29/2020    Procedure: EGD (ESOPHAGOGASTRODUODENOSCOPY);  Surgeon: Marquis Zuluaga MD;  Location: [facility];  Service: Endoscopy;  Laterality: N/A;    HIATAL HERNIA REPAIR      KIDNEY STONE SURGERY  2009    [facility], had stone removed by dr renay george, stayed in hospital 4 days    LAPAROSCOPIC GASTRIC BANDING  2007    later removed in 2016    LAPAROSCOPIC SALPINGO-OOPHORECTOMY Right 02/12/2020    Procedure: SALPINGO-OOPHORECTOMY, LAPAROSCOPIC;  Surgeon: Nely Martinez MD;  Location: [facility];  Service: OB/GYN;  Laterality: Right;    LUMBAR EPIDURAL INJECTION      SEPTOPLASTY, NOSE, WITH NASAL TURBINATE REDUCTION      SPINE SURGERY      6 back surgeries; 1 neck surgery    TUBAL LIGATION      UPPER GASTROINTESTINAL ENDOSCOPY  03/2013    Dr. Zuluaga    UPPER GASTROINTESTINAL ENDOSCOPY  09/14/2016    Dr. Zuluaga    UPPER GASTROINTESTINAL ENDOSCOPY  09/05/2018    Dr. Zuluaga; gastritis; bx negative    VAGINAL DELIVERY      times 3        Family History   Problem Relation Name Age of Onset    Ulcers Mother      No Known Problems Father      Cancer Neg Hx      Heart disease Neg Hx      Hypertension Neg Hx      Diabetes Neg Hx      Angioedema Neg Hx      Allergies Neg Hx      Allergic rhinitis Neg Hx      Asthma Neg Hx      Eczema Neg Hx      Immunodeficiency Neg Hx      Colon cancer Neg Hx      Colon polyps Neg Hx      Crohn's disease Neg Hx      Ulcerative colitis Neg Hx      Nephrolithiasis Neg Hx      Stomach cancer Neg Hx      Esophageal cancer Neg Hx         Social History     Socioeconomic History    Marital status:    Tobacco Use    Smoking status: Never    Smokeless tobacco: Never   Substance and Sexual Activity    Alcohol use: Yes     Comment: ocassionally    Drug use: Never     Types: Oxycodone    Sexual activity: Yes     Partners: Male     Birth control/protection: None     Social Drivers of Health     Financial Resource Strain: Patient Declined (2/12/2024)     Overall Financial Resource Strain (CARDIA)     Difficulty of Paying Living Expenses: Patient declined   Food Insecurity: Patient Declined (2/12/2024)    Hunger Vital Sign     Worried About Running Out of Food in the Last Year: Patient declined     Ran Out of Food in the Last Year: Patient declined   Transportation Needs: No Transportation Needs (2/12/2024)    PRAPARE - Transportation     Lack of Transportation (Medical): No     Lack of Transportation (Non-Medical): No   Physical Activity: Unknown (2/12/2024)    Exercise Vital Sign     Days of Exercise per Week: Patient declined   Stress: Patient Declined (2/12/2024)    [employer] [place] [employer] - Occupational Stress Questionnaire     Feeling of Stress : Patient declined   Housing Stability: Patient Declined (2/12/2024)    Housing Stability Vital Sign     Unable to Pay for Housing in the Last Year: Patient declined     Unstable Housing in the Last Year: Patient declined       Review of patient's allergies indicates:   Allergen Reactions    Morphine Itching and Rash    Gabapentin Other (See Comments)     Coming out of skin        Health Maintenance Due   Topic Date Due    Sign Pain Contract  Never done    Complete Opioid Risk Tool  Never done    Influenza Vaccine (1) Never done    COVID-19 Vaccine (3 - 2024-25 season) 09/01/2024       Objective   Vitals:    12/18/24 0918   BP: 134/84   Pulse: 75        Physical Exam  Constitutional:       General: She is not in acute distress.     Appearance: Normal appearance. She is well-developed.   HENT:      Head: Normocephalic and atraumatic.      Right Ear: External ear normal.      Left Ear: External ear normal.   Eyes:      Conjunctiva/sclera: Conjunctivae normal.   Cardiovascular:      Rate and Rhythm: Normal rate and regular rhythm.      Heart sounds: No murmur heard.     No friction rub. No gallop.   Pulmonary:      Effort: Pulmonary effort is normal. No respiratory distress.      Breath sounds: No wheezing,  rhonchi or rales.   Abdominal:      General: Abdomen is flat. There is no distension.   Musculoskeletal:         General: No swelling or deformity.      Right lower leg: No edema.      Left lower leg: No edema.   Skin:     General: Skin is warm and dry.      Coloration: Skin is not jaundiced.   Neurological:      Mental Status: She is alert and oriented to person, place, and time. Mental status is at baseline.   Psychiatric:         Attention and Perception: Attention and perception normal.         Mood and Affect: Mood normal.         Speech: Speech normal.         Behavior: Behavior normal. Behavior is cooperative.         Thought Content: Thought content normal.         Cognition and Memory: Cognition normal.         Judgment: Judgment normal.          Assessment & Plan   1. Healthcare maintenance  -     Comprehensive Metabolic Panel; Future; Expected date: 12/18/2024    2. Attention deficit hyperactivity disorder (ADHD), combined type  Assessment & Plan:  Tolerating Vyvanse well. Takes it irregularly, just as needed. She also avoids Vyvanse if she is having a migraine. Continue medication.      3. Chronic low back pain with sciatica, sciatica laterality unspecified, unspecified back pain laterality  Assessment & Plan:  Patient currently takes hydrocodone maximum of four times per day, but many days she will take less. Continue current regimen.          Follow up in 3 months.     Jose Oconnell MD  12/18/2024

## 2024-12-18 NOTE — ASSESSMENT & PLAN NOTE
Patient currently takes hydrocodone maximum of four times per day, but many days she will take less. Continue current regimen.

## 2024-12-19 DIAGNOSIS — M51.369 DDD (DEGENERATIVE DISC DISEASE), LUMBAR: ICD-10-CM

## 2024-12-19 RX ORDER — HYDROCODONE BITARTRATE AND ACETAMINOPHEN 7.5; 325 MG/1; MG/1
1 TABLET ORAL EVERY 4 HOURS PRN
Qty: 120 TABLET | Refills: 0 | Status: SHIPPED | OUTPATIENT
Start: 2024-12-19

## 2024-12-19 NOTE — TELEPHONE ENCOUNTER
No care due was identified.  [facility] Embedded Care Due Messages. Reference number: [id number].   12/19/2024 1:54:55 PM CST

## 2024-12-27 DIAGNOSIS — G43.719 INTRACTABLE CHRONIC MIGRAINE WITHOUT AURA AND WITHOUT STATUS MIGRAINOSUS: ICD-10-CM

## 2024-12-27 RX ORDER — KETOROLAC TROMETHAMINE 30 MG/ML
INJECTION, SOLUTION INTRAMUSCULAR; INTRAVENOUS
Qty: 20 ML | Refills: 0 | Status: SHIPPED | OUTPATIENT
Start: 2024-12-27

## 2025-01-07 DIAGNOSIS — F90.2 ATTENTION DEFICIT HYPERACTIVITY DISORDER (ADHD), COMBINED TYPE: ICD-10-CM

## 2025-01-08 RX ORDER — LISDEXAMFETAMINE DIMESYLATE 40 MG/1
40 CAPSULE ORAL EVERY MORNING
Qty: 30 CAPSULE | Refills: 0 | Status: SHIPPED | OUTPATIENT
Start: 2025-01-08

## 2025-01-08 NOTE — TELEPHONE ENCOUNTER
No care due was identified.  [facility] Embedded Care Due Messages. Reference number: [id number].   1/07/2025 9:35:29 PM CST

## 2025-01-09 DIAGNOSIS — M54.40 CHRONIC LOW BACK PAIN WITH SCIATICA, SCIATICA LATERALITY UNSPECIFIED, UNSPECIFIED BACK PAIN LATERALITY: ICD-10-CM

## 2025-01-09 DIAGNOSIS — G89.29 CHRONIC LOW BACK PAIN WITH SCIATICA, SCIATICA LATERALITY UNSPECIFIED, UNSPECIFIED BACK PAIN LATERALITY: ICD-10-CM

## 2025-01-09 RX ORDER — CYCLOBENZAPRINE HCL 10 MG
10 TABLET ORAL
Qty: 90 TABLET | Refills: 5 | Status: SHIPPED | OUTPATIENT
Start: 2025-01-09

## 2025-01-09 NOTE — TELEPHONE ENCOUNTER
No care due was identified.  Health [facility] Embedded Care Due Messages. Reference number: [id number].   1/09/2025 11:15:16 AM CST

## 2025-01-12 ENCOUNTER — DOCUMENTATION ONLY (OUTPATIENT)
Dept: FAMILY MEDICINE | Facility: CLINIC | Age: 50
End: 2025-01-12
Payer: MEDICARE

## 2025-01-12 NOTE — PROGRESS NOTES
Isela Smyth (Key: [id number])  PA Case ID #: [id number]  Need Help? Call us at [phone]  Outcome  [facility]  PA Case: [id number], Status: Approved, Coverage Starts on: 1/1/2025 12:00:00 AM, Coverage Ends on: 12/31/2025 12:00:00 AM. Questions? Contact [phone].  Authorization Expiration Date: 12/30/2025  Drug  Vyvanse 40MG capsules    Form  iContact Electronic PA Form

## 2025-01-14 ENCOUNTER — TELEPHONE (OUTPATIENT)
Dept: FAMILY MEDICINE | Facility: CLINIC | Age: 50
End: 2025-01-14
Payer: MEDICARE

## 2025-01-14 NOTE — TELEPHONE ENCOUNTER
----- Message from Christine sent at 1/14/2025  2:38 PM CST -----  Regarding: appt request  sx clearance  Contact: pt  Type: Appointment Request    Caller is requesting an appointment.      Name of Caller:pt    Symptoms: sx clearance    Would the patient rather a call back or a response via [employer]? Call back    Best Call Back Number:[phone]    Additional Information:  dr Owusu plastic sx asking for sx clearance to include EKG, lab work and other items.  They have faxed over what they need to have checked.

## 2025-01-15 ENCOUNTER — OFFICE VISIT (OUTPATIENT)
Dept: FAMILY MEDICINE | Facility: CLINIC | Age: 50
End: 2025-01-15
Payer: MEDICARE

## 2025-01-15 ENCOUNTER — HOSPITAL ENCOUNTER (OUTPATIENT)
Dept: RADIOLOGY | Facility: HOSPITAL | Age: 50
Discharge: HOME | End: 2025-01-15
Attending: PHYSICIAN ASSISTANT
Payer: MEDICARE

## 2025-01-15 VITALS
OXYGEN SATURATION: 96 % | WEIGHT: 147.25 LBS | HEIGHT: 67 IN | BODY MASS INDEX: 23.11 KG/M2 | DIASTOLIC BLOOD PRESSURE: 86 MMHG | HEART RATE: 107 BPM | SYSTOLIC BLOOD PRESSURE: 134 MMHG

## 2025-01-15 DIAGNOSIS — Z01.818 PREOPERATIVE CLEARANCE: ICD-10-CM

## 2025-01-15 DIAGNOSIS — Z01.818 PREOPERATIVE CLEARANCE: Primary | ICD-10-CM

## 2025-01-15 DIAGNOSIS — D68.9 BLOOD CLOTTING TENDENCY: ICD-10-CM

## 2025-01-15 LAB
OHS QRS DURATION: 78 MS
OHS QTC CALCULATION: 440 MS

## 2025-01-15 PROCEDURE — 3075F SYST BP GE 130 - 139MM HG: CPT | Mod: CPTII,S$GLB,, | Performed by: PHYSICIAN ASSISTANT

## 2025-01-15 PROCEDURE — 99214 OFFICE O/P EST MOD 30 MIN: CPT | Mod: S$GLB,,, | Performed by: PHYSICIAN ASSISTANT

## 2025-01-15 PROCEDURE — 3079F DIAST BP 80-89 MM HG: CPT | Mod: CPTII,S$GLB,, | Performed by: PHYSICIAN ASSISTANT

## 2025-01-15 PROCEDURE — 71046 X-RAY EXAM CHEST 2 VIEWS: CPT | Mod: TC,FY,PO

## 2025-01-15 PROCEDURE — 1159F MED LIST DOCD IN RCRD: CPT | Mod: CPTII,S$GLB,, | Performed by: PHYSICIAN ASSISTANT

## 2025-01-15 PROCEDURE — 99999 PR PBB SHADOW E&M-EST. PATIENT-LVL III: CPT | Mod: PBBFAC,,, | Performed by: PHYSICIAN ASSISTANT

## 2025-01-15 PROCEDURE — 1160F RVW MEDS BY RX/DR IN RCRD: CPT | Mod: CPTII,S$GLB,, | Performed by: PHYSICIAN ASSISTANT

## 2025-01-15 PROCEDURE — 3008F BODY MASS INDEX DOCD: CPT | Mod: CPTII,S$GLB,, | Performed by: PHYSICIAN ASSISTANT

## 2025-01-15 PROCEDURE — 93005 ELECTROCARDIOGRAM TRACING: CPT | Mod: S$GLB,,, | Performed by: PHYSICIAN ASSISTANT

## 2025-01-15 PROCEDURE — 71046 X-RAY EXAM CHEST 2 VIEWS: CPT | Mod: 26,,, | Performed by: RADIOLOGY

## 2025-01-15 PROCEDURE — 93010 ELECTROCARDIOGRAM REPORT: CPT | Mod: S$GLB,,, | Performed by: INTERNAL MEDICINE

## 2025-01-17 DIAGNOSIS — M51.369 DDD (DEGENERATIVE DISC DISEASE), LUMBAR: ICD-10-CM

## 2025-01-17 RX ORDER — HYDROCODONE BITARTRATE AND ACETAMINOPHEN 7.5; 325 MG/1; MG/1
1 TABLET ORAL EVERY 4 HOURS PRN
Qty: 120 TABLET | Refills: 0 | Status: SHIPPED | OUTPATIENT
Start: 2025-01-17

## 2025-01-17 NOTE — TELEPHONE ENCOUNTER
----- Message from Veena sent at 1/17/2025  2:18 PM CST -----  Regarding: Call  Type:  Needs Medical Advice    Who Called: Cora [facility]     Would the patient rather a call back or a response via [facility]? Call    Best Call Back Number: [phone] fax [phone]    Additional Information: Need sx clearance for pt. Pt is on 2/11/2025, pre op 1/22/205 need sx clearance by 1/20/25. Thanks

## 2025-01-17 NOTE — TELEPHONE ENCOUNTER
No care due was identified.  [facility] Embedded Care Due Messages. Reference number: [id number].   1/17/2025 2:12:21 PM CST

## 2025-02-07 DIAGNOSIS — F90.2 ATTENTION DEFICIT HYPERACTIVITY DISORDER (ADHD), COMBINED TYPE: ICD-10-CM

## 2025-02-07 RX ORDER — LISDEXAMFETAMINE DIMESYLATE 40 MG/1
40 CAPSULE ORAL EVERY MORNING
Qty: 30 CAPSULE | Refills: 0 | Status: SHIPPED | OUTPATIENT
Start: 2025-02-07

## 2025-02-07 NOTE — TELEPHONE ENCOUNTER
No care due was identified.  [facility] Embedded Care Due Messages. Reference number: [id number].   2/07/2025 10:53:28 AM CST  
Please see the attached refill request.  
Detail Level: Detailed

## 2025-02-14 DIAGNOSIS — M51.369 DDD (DEGENERATIVE DISC DISEASE), LUMBAR: ICD-10-CM

## 2025-02-14 RX ORDER — HYDROCODONE BITARTRATE AND ACETAMINOPHEN 7.5; 325 MG/1; MG/1
1 TABLET ORAL EVERY 4 HOURS PRN
Qty: 120 TABLET | Refills: 0 | Status: SHIPPED | OUTPATIENT
Start: 2025-02-14

## 2025-02-14 NOTE — TELEPHONE ENCOUNTER
No care due was identified.  [facility] Embedded Care Due Messages. Reference number: [id number].   2/14/2025 12:34:17 PM CST

## 2025-02-17 ENCOUNTER — PATIENT MESSAGE (OUTPATIENT)
Dept: NEUROLOGY | Facility: CLINIC | Age: 50
End: 2025-02-17
Payer: MEDICARE

## 2025-02-17 DIAGNOSIS — G43.711 INTRACTABLE CHRONIC MIGRAINE WITHOUT AURA AND WITH STATUS MIGRAINOSUS: Primary | ICD-10-CM

## 2025-02-21 RX ORDER — RIZATRIPTAN BENZOATE 10 MG/1
TABLET ORAL
Qty: 9 TABLET | Refills: 11 | Status: SHIPPED | OUTPATIENT
Start: 2025-02-21

## 2025-02-26 ENCOUNTER — PATIENT MESSAGE (OUTPATIENT)
Dept: NEUROLOGY | Facility: CLINIC | Age: 50
End: 2025-02-26
Payer: MEDICARE

## 2025-02-27 NOTE — TELEPHONE ENCOUNTER
[place].   
No care due was identified.  [facility] Embedded Care Due Messages. Reference number: [id number].   5/08/2024 10:43:11 AM CDT  
None

## 2025-03-06 DIAGNOSIS — M51.369 DDD (DEGENERATIVE DISC DISEASE), LUMBAR: ICD-10-CM

## 2025-03-06 DIAGNOSIS — F90.2 ATTENTION DEFICIT HYPERACTIVITY DISORDER (ADHD), COMBINED TYPE: ICD-10-CM

## 2025-03-07 RX ORDER — HYDROCODONE BITARTRATE AND ACETAMINOPHEN 7.5; 325 MG/1; MG/1
1 TABLET ORAL EVERY 4 HOURS PRN
Qty: 120 TABLET | Refills: 0 | Status: SHIPPED | OUTPATIENT
Start: 2025-03-07

## 2025-03-07 RX ORDER — LISDEXAMFETAMINE DIMESYLATE 40 MG/1
40 CAPSULE ORAL EVERY MORNING
Qty: 30 CAPSULE | Refills: 0 | Status: SHIPPED | OUTPATIENT
Start: 2025-03-07

## 2025-03-07 NOTE — TELEPHONE ENCOUNTER
No care due was identified.  [facility] Embedded Care Due Messages. Reference number: [id number].   3/06/2025 6:52:16 PM CST

## 2025-03-10 DIAGNOSIS — M51.369 DDD (DEGENERATIVE DISC DISEASE), LUMBAR: ICD-10-CM

## 2025-03-10 DIAGNOSIS — F90.2 ATTENTION DEFICIT HYPERACTIVITY DISORDER (ADHD), COMBINED TYPE: ICD-10-CM

## 2025-03-10 NOTE — TELEPHONE ENCOUNTER
No care due was identified.  [facility] Embedded Care Due Messages. Reference number: [id number].   3/10/2025 6:42:46 PM CDT

## 2025-03-11 RX ORDER — LISDEXAMFETAMINE DIMESYLATE 40 MG/1
40 CAPSULE ORAL EVERY MORNING
Qty: 30 CAPSULE | Refills: 0 | Status: SHIPPED | OUTPATIENT
Start: 2025-03-11

## 2025-03-11 RX ORDER — HYDROCODONE BITARTRATE AND ACETAMINOPHEN 7.5; 325 MG/1; MG/1
1 TABLET ORAL EVERY 4 HOURS PRN
Qty: 120 TABLET | Refills: 0 | Status: SHIPPED | OUTPATIENT
Start: 2025-03-11

## 2025-03-14 ENCOUNTER — OFFICE VISIT (OUTPATIENT)
Dept: NEUROLOGY | Facility: CLINIC | Age: 50
End: 2025-03-14
Payer: MEDICARE

## 2025-03-14 DIAGNOSIS — G43.719 INTRACTABLE CHRONIC MIGRAINE WITHOUT AURA AND WITHOUT STATUS MIGRAINOSUS: ICD-10-CM

## 2025-03-14 DIAGNOSIS — G43.711 INTRACTABLE CHRONIC MIGRAINE WITHOUT AURA AND WITH STATUS MIGRAINOSUS: Primary | ICD-10-CM

## 2025-03-14 RX ORDER — ERENUMAB-AOOE 140 MG/ML
140 INJECTION, SOLUTION SUBCUTANEOUS
Qty: 1 ML | Refills: 11 | Status: SHIPPED | OUTPATIENT
Start: 2025-03-14

## 2025-03-14 NOTE — PROGRESS NOTES
Date of service: 3/14/2025  Referring provider: No ref. provider found    Subjective:      Chief complaint: Headache       Patient ID: Isela Smyth is a 49 y.o. lady with migraines, depression, diverticulosis, history of kidney stone presenting for follow up of headache     History of Present Illness    INTERVAL HISTORY 3/14/25  The patient location is: home  The chief complaint leading to consultation is: follow up  Visit type: audiovisual  20 minutes of total time spent on the encounter, which includes face to face time and non-face to face time preparing to see the patient (eg, review of tests), Obtaining and/or reviewing separately obtained history, Documenting clinical information in the electronic or other health record, Independently interpreting results (not separately reported) and communicating results to the patient/family/caregiver, or Care coordination (not separately reported).   Each patient to whom he or she provides medical services by telemedicine is:  (1) informed of the relationship between the physician and patient and the respective role of any other health care provider with respect to management of the patient; and (2) notified that he or she may decline to receive medical services by telemedicine and may withdraw from such care at any time.    Notes:     Last office visit was nine months ago and most recent Botox was about three months ago.    Today she reports she is worse. She is having more migraines that are lasting longer. She wants to try Aimovig again. She reports 2-3 headache days per week. Current pain 7 with range 0-10. She takes sumatriptan and Toradol IM. Otherwise information below is reviewed and verified with no changes made    INTERVAL HISTORY 6/13/24    Last office visit was three months ago and at that time she was having 6-8 migraines per month. Plan was to start Botox.     Today she reports she is the same to a little better. Current pain 0 with range 0-10. She has 2-3  headache days per week. However she does note she had a full 7 days headache free. She takes rizatriptan, Toradol 1-2 days per week. Otherwise information below is reviewed and verified with no changes made     INTERVAL HISTORY 3/12/24    Last visit was over three years ago and at that time she was not doing well.    Today she reports she is the same. She stopped taking Aimovig about one year ago. She sold her home and travels full time. Current pain 4 with range 0-10. She has migraines but no longer weekly. She has 6-8 migraines per month. She takes sumatriptan as needed. Otherwise information below is reviewed and verified with no changes made     INTERVAL HISTORY 3/2/2021  The patient location is: home  The chief complaint leading to consultation is: follow up  Visit type: audiovisual  Face to Face time with patient: 20  30 minutes of total time spent on the encounter, which includes face to face time and non-face to face time preparing to see the patient (eg, review of tests), Obtaining and/or reviewing separately obtained history, Documenting clinical information in the electronic or other health record, Independently interpreting results (not separately reported) and communicating results to the patient/family/caregiver, or Care coordination (not separately reported).   Each patient to whom he or she provides medical services by telemedicine is:  (1) informed of the relationship between the physician and patient and the respective role of any other health care provider with respect to management of the patient; and (2) notified that he or she may decline to receive medical services by telemedicine and may withdraw from such care at any time.    Notes:     Patient was last seen for her initial visit with Dr. Donaldson in 2019. At that time, Aimovig was started for prevention and Imitrex for acute treatment. She missed 2-3 months of Aimovig due to pharmacy issues but recently restarted with most recent last week of  "February.     Today she reports she is a little worse. She is having more frequent migraines, they last longer and they are more difficult to treat. She reports 4-5 headache days per month but current migraine has been ongoing for over a week. Prior to this episode, she reports she was better with only 4 days per month with a headache.     She reports she went to the ER on 2/25/2021 and a spinal tap was done. Per the note, 1 ml was taken. Patient reports she told the ER she has had LP in the past and that "helps with the pressure". Head CT done on 2/24/2021 and no abnormalities seen.     ORIGINAL HEADACHE HISTORY - 10/07/2019  Age at onset and course over time:  The headaches began in her early 20s; they are accompanied by significant nausea. Dairy reduction has helped. She feels like they start in her sinus. Stays with a constant sinus congestion. Neck and shoulders stay tight. If she can catch it early with imitrex or fioricet they are ok. Can last 3-4 days. Associated with significant reduction to functioning  Location:  Behind her eyes and her forehead  Quality:  [x] pressure [] tight [x] throbbing [x] sharp [] stabbing   Severity:  Current 2  Duration:  Hr to days  Frequency:  Near daily facial / sinus pressure; about 10 escalations per month   Headaches awaken at night?:  Yes    Worst time of day:   Associated with: [x] photophobia [x]  phonophobia [x] osmophobia [x] blurred vision  [x] double vision [x] loss of appetite [x] nausea [x] vomiting [] dizziness [] vertigo  [] tinnitus [] irritability [x] sinus pressure [x] problems with concentration   [x] neck tightness   Alleviated by:  [x] sleep [x] darkness [] massage [x] heat [x] ice [x] medication  Exacerbated by:  [] fatigue [x] light [x] noise [x] smells [] coughing [] sneezing  [] bending over [] ovulation [] menses [] alcohol [x] change in weather []  stress  Ipsilateral autonomic: [] nasal congestion [] lacrimation [] ptosis [] injection [] edema [] " foreign body sensation [] ear fullness   ICP:   Sleep habits:   Caffeine intake: little, varies   Gyn status (if female):  Status post tubal ligation    Current acute treatment:  Sumatriptan   Flexeril most nights   Norco 7.5mg - for neck and lumbar pain (has had multiple surgeries)  Toradol   Phenergan supp     Current prevention:   Venlafaxine  mg   Trazodone   Botox - first 4/2/24    Previously tried/failed acute treatment:  Tylenol  Ibuprofen  Ketorolac  Naproxen  BU Pap  Excedrin  Zofran  Phenergan + toradol in the ED helps the most   Fioricet  Imitrex 100 mg or fioricet  Rizatriptan    Previously tried/failed preventative treatment:  Lyrica  Topiramate - worsened headaches, cognitive side effects   Depakote  Wellbutrin  Lexapro  Gabapentin - allergy   Aimovig - started October 2019    Review of patient's allergies indicates:   Allergen Reactions    Morphine Itching and Rash    Gabapentin Other (See Comments)     Coming out of skin     Current Outpatient Medications   Medication Sig Dispense Refill    cyanocobalamin 1,000 mcg/mL injection Start with 1ml IM weekly x 4, then monthly 30 mL 4    cyclobenzaprine (FLEXERIL) 10 MG tablet TAKE 1 TABLET(10 MG) BY MOUTH THREE TIMES DAILY AS NEEDED FOR MUSCLE SPASMS 90 tablet 5    erenumab-aooe (AIMOVIG AUTOINJECTOR) 140 mg/mL autoinjector Inject 1 mL (140 mg total) into the skin every 28 days. 1 mL 11    HYDROcodone-acetaminophen (NORCO) 7.5-325 mg per tablet Take 1 tablet by mouth every 4 (four) hours as needed for Pain. 120 tablet 0    ketorolac (TORADOL) 30 mg/mL (1 mL) injection INJECT 1 ML INTO THE MUSCLE 2 TIMES DAILY AS NEEDED FOR SEVERE MIGRAINE. NO MORE THAN 2 DAYS/WEEK 20 mL 0    levothyroxine (SYNTHROID) 75 MCG tablet TAKE 1 TABLET BY MOUTH EVERY DAY 90 tablet 2    lisdexamfetamine (VYVANSE) 40 MG Cap Take 1 capsule (40 mg total) by mouth every morning. 30 capsule 0    rizatriptan (MAXALT) 10 MG tablet 1 tab PO PRN migraine. May repeat every 2 hours for  max 3 tabs in 24 hours. Use no more than 10 days per month. 9 tablet 11    traZODone (DESYREL) 150 MG tablet Take 1 tablet (150 mg total) by mouth nightly as needed for Insomnia. 90 tablet 3    ubrogepant (UBRELVY) 100 mg tablet Take 1 tablet by mouth as needed for migraine. May repeat in 2 hours if needed. Max 2 tablet per day 10 tablet 11    venlafaxine (EFFEXOR-XR) 150 MG Cp24 TAKE 1 CAPSULE BY MOUTH EVERY DAY 90 capsule 2     Current Facility-Administered Medications   Medication Dose Route Frequency Provider Last Rate Last Admin    onabotulinumtoxina injection 200 Units  200 Units Intramuscular q12 weeks Josiane Amaro, NP   200 Units at 12/18/24 1531       Past Medical History  Past Medical History:   Diagnosis Date    Arthritis     Chronic lumbar pain     Depression     Deviated nasal septum     Diverticulosis     Hemorrhoids     Kidney stone     passed 10 stones by herself over the years, one needed procedure    Melena 09/05/2018    Migraine headache     PONV (postoperative nausea and vomiting)     severe    Right sided abdominal pain     Urticaria        Past Surgical History  Past Surgical History:   Procedure Laterality Date    AUGMENTATION OF BREAST      BREAST SURGERY      CHOLECYSTECTOMY      COLONOSCOPY  prior to 2011    COLONOSCOPY N/A 09/26/2018    Dr. Zuluaga; diverticulosis; repeat in 10 years    COLONOSCOPY N/A 11/01/2019    Procedure: COLONOSCOPY;  Surgeon: Marquis Zuluaga MD;  Location: [facility];  Service: Endoscopy;  Laterality: N/A;    ESOPHAGOGASTRODUODENOSCOPY N/A 09/05/2018    Procedure: EGD (ESOPHAGOGASTRODUODENOSCOPY);  Surgeon: Marquis Zuluaga MD;  Location: [facility];  Service: Endoscopy;  Laterality: N/A;    ESOPHAGOGASTRODUODENOSCOPY N/A 01/29/2020    Procedure: EGD (ESOPHAGOGASTRODUODENOSCOPY);  Surgeon: Marquis Zuluaga MD;  Location: [facility];  Service: Endoscopy;  Laterality: N/A;    HIATAL HERNIA REPAIR      KIDNEY STONE SURGERY  2009    st, had stone  removed by dr renay george, stayed in hospital 4 days    LAPAROSCOPIC GASTRIC BANDING  2007    later removed in 2016    LAPAROSCOPIC SALPINGO-OOPHORECTOMY Right 02/12/2020    Procedure: SALPINGO-OOPHORECTOMY, LAPAROSCOPIC;  Surgeon: Nely Martinez MD;  Location: [facility];  Service: OB/GYN;  Laterality: Right;    LUMBAR EPIDURAL INJECTION      SEPTOPLASTY, NOSE, WITH NASAL TURBINATE REDUCTION      SPINE SURGERY      6 back surgeries; 1 neck surgery    TUBAL LIGATION      UPPER GASTROINTESTINAL ENDOSCOPY  03/2013    Dr. Zuluaga    UPPER GASTROINTESTINAL ENDOSCOPY  09/14/2016    Dr. Zuulaga    UPPER GASTROINTESTINAL ENDOSCOPY  09/05/2018    Dr. Zuluaga; gastritis; bx negative    VAGINAL DELIVERY      times 3       Family History  Family History   Problem Relation Name Age of Onset    Ulcers Mother      No Known Problems Father      Cancer Neg Hx      Heart disease Neg Hx      Hypertension Neg Hx      Diabetes Neg Hx      Angioedema Neg Hx      Allergies Neg Hx      Allergic rhinitis Neg Hx      Asthma Neg Hx      Eczema Neg Hx      Immunodeficiency Neg Hx      Colon cancer Neg Hx      Colon polyps Neg Hx      Crohn's disease Neg Hx      Ulcerative colitis Neg Hx      Nephrolithiasis Neg Hx      Stomach cancer Neg Hx      Esophageal cancer Neg Hx         Social History  Social History     Socioeconomic History    Marital status:    Tobacco Use    Smoking status: Never    Smokeless tobacco: Never   Substance and Sexual Activity    Alcohol use: Yes     Comment: ocassionally    Drug use: Never     Types: Oxycodone    Sexual activity: Yes     Partners: Male     Birth control/protection: None     Social Drivers of Health     Financial Resource Strain: Low Risk  (3/14/2025)    Overall Financial Resource Strain (CARDIA)     Difficulty of Paying Living Expenses: Not hard at all   Food Insecurity: Patient Declined (3/14/2025)    Hunger Vital Sign     Worried About Running Out of Food in the Last Year: Patient  declined     Ran Out of Food in the Last Year: Patient declined   Transportation Needs: Patient Declined (3/14/2025)    PRAPARE - Transportation     Lack of Transportation (Medical): Patient declined     Lack of Transportation (Non-Medical): Patient declined   Physical Activity: Inactive (3/14/2025)    Exercise Vital Sign     Days of Exercise per Week: 0 days     Minutes of Exercise per Session: 0 min   Stress: No Stress Concern Present (3/14/2025)    [employer] - Occupational Stress Questionnaire     Feeling of Stress : Only a little   Housing Stability: Unknown (3/14/2025)    Housing Stability Vital Sign     Unable to Pay for Housing in the Last Year: Patient declined     Homeless in the Last Year: No        Objective:        There were no vitals filed for this visit.      There is no height or weight on file to calculate BMI.    3/14/25  Constitutional:   She appears well-developed and well-nourished. She is well groomed     Neurological Exam:  General: well-developed, well-nourished, no distress  Mental status: Awake and alert  Speech language: No dysarthria or aphasia on conversation  Cranial nerves: Face symmetric      Data Review:     I have personally reviewed the referring provider's notes, labs, & imaging made available to me today.      RADIOLOGY STUDIES:  I have personally reviewed the pertinent images performed.       Results for orders placed or performed during the hospital encounter of 01/25/16   CT Head Without Contrast    Narrative    CT head     INDICATION:    Headache     No comparison available     TECHNIQUE:    5 mm axial tomographic images of the brain were obtained. No contrast was   utilized. Images are reviewed on PACS in brain, blood and bone windows. Total       FINDINGS:    No hemorrhage, mass effect or CT evidence of acute cortical infarction. Brain  parenchyma and ventricles are normal. No cerebellar tonsillar ectopia.     No abnormal extra-axial  fluid collections.     No hyperdense artery or vein.     No skull fracture or aggressive calvarial lesion. Visualized paranasal   sinuses and mastoid air cells are clear.     IMPRESSION:    1. Normal noncontrast CT head       Electronically signed by: Harley Vasquez (Jan 25, 2016 21:04:35)       Lab Results   Component Value Date     02/18/2025     01/15/2025    K 4.7 02/18/2025    K 3.9 01/15/2025    MG 2.0 07/01/2024     01/15/2025    CO2 29 02/18/2025    CO2 25 01/15/2025    BUN 19.0 (H) 02/18/2025    BUN 11 01/15/2025    CREATININE 0.64 02/18/2025    CREATININE 0.8 01/15/2025    CREATININE 0.6 08/10/2012    GLU 86 01/15/2025    HGBA1C 5.1 02/18/2025    AST 25 02/18/2025    AST 28 01/15/2025    AST 27 01/25/2016    ALT 32 02/18/2025    ALT 27 01/15/2025    ALBUMIN 3.4 02/18/2025    ALBUMIN 4.7 01/15/2025    PROT 7.7 01/15/2025    BILITOT 0.5 02/18/2025    BILITOT 0.3 01/15/2025    CHOL 199 02/18/2025    CHOL 185 07/29/2021    HDL 43 02/18/2025    HDL 54 07/29/2021    LDLCALC 131 (H) 02/18/2025    LDLCALC 111.4 07/29/2021    TRIG 125 02/18/2025    TRIG 98 07/29/2021       Lab Results   Component Value Date    WBC 4.55 01/15/2025    HGB 13.6 01/15/2025    HCT 39.1 01/15/2025    MCV 93 01/15/2025     01/15/2025       Lab Results   Component Value Date    TSH 2.750 07/01/2024           Assessment & Plan:       Problem List Items Addressed This Visit          Neuro    Intractable chronic migraine without aura and without status migrainosus    Overview   Migraine headaches since her 20's. Headaches are typically unilateral, moderate to severe in intensity, worsen with activity, pounding in quality and associated with sensitivity to light, smell and sound.   The patient has chronic migraines ( G43.719) and suffers from headaches more than 3 months, more than 15 days of headache days per month lasting more than 4 hours with at least 8 attacks that meet criteria for migraine. She has tried multiple  medications including but not limited to venlafaxine, trazodone, Lyrica, Topamax, Depakote, Wellbutrin, gabapentin, Aimovig  The patient has been unresponsive and refractory.The patient meets criteria for chronic headaches according to the ICHD-II, the patient has more than 15 headaches a month which last for more than 4 hours a day. The patient is an ideal candidate for Botox. After treatment, I expect 50%  improvement in the patient's symptoms. A reduction of at least 7 days per month and the number of cumulative hours suffering with headaches as well as at least 100 total hours affected with migraine per month.  DESCRIPTION OF PROCEDURE: After obtaining informed consent and under aseptic technique, a total of 155 units of botulinum toxin type A to be injected in the following muscles:      -- Procerus 5 units  --  5 units bilaterally  -- Frontalis 20 units  -- Temporalis 20 units bilaterally  -- Occipitalis 15 units bilaterally  -- Upper cervical paraspinals 10 units bilaterally  -- Trapezius 15 units bilaterally.       Unavoidable waste 45 units    For acute attacks change back to sumatriptan.          Current Assessment & Plan   She wishes to stop Botox and restart Aimovig.          Relevant Medications    erenumab-aooe (AIMOVIG AUTOINJECTOR) 140 mg/mL autoinjector     Other Visit Diagnoses         Intractable chronic migraine without aura and with status migrainosus    -  Primary    Relevant Medications    erenumab-aooe (AIMOVIG AUTOINJECTOR) 140 mg/mL autoinjector                      TESTING:  -- none     REFERRALS:  -- none     PREVENTION (use daily regardless of headache):  -- start magnesium in ONE of the following preparations -               1. Magnesium oxide 800mg daily (the most common over the counter kind, may causes loose stools)              2. Magnesium citrate 400-500mg daily (harder to find, but more neutral on the bowels)              3. Magnesium glycinate 400mg daily (hardest to  find, look online, but most bowel-neutral, best absorbed)    -- stop Botox  -- RESTART Aimovig once monthly    AS-NEEDED TREATMENT (use total no more than 10 days per month unless otherwise stated):  -- restart Imitrex (sumatriptan)   -- TRIAL Ubrelvy with next migraine. You can repeat two hours later. With this medication do not drink grapefruit juice or eat grapefruit or some medications like ketoconazole, itraconazole, or antibiotics clarithromycin   -- rescue with 1-2 tablets of Fioricet (no more than 10 pills per month) if the above was ineffective   -- continue phenergan suppository for nausea   Take Toradol 30mg injection at home for severe escalations (if imitrex failed to relieve)     Follow up in about 3 months (around 6/14/2025).      Josiane Amaro, NP

## 2025-03-14 NOTE — PATIENT INSTRUCTIONS
TESTING:  -- none     REFERRALS:  -- none     PREVENTION (use daily regardless of headache):  -- start magnesium in ONE of the following preparations -               1. Magnesium oxide 800mg daily (the most common over the counter kind, may causes loose stools)              2. Magnesium citrate 400-500mg daily (harder to find, but more neutral on the bowels)              3. Magnesium glycinate 400mg daily (hardest to find, look online, but most bowel-neutral, best absorbed)    -- stop Botox  -- RESTART Aimovig once monthly    AS-NEEDED TREATMENT (use total no more than 10 days per month unless otherwise stated):  -- restart Imitrex (sumatriptan)   -- TRIAL Ubrelvy with next migraine. You can repeat two hours later. With this medication do not drink grapefruit juice or eat grapefruit or some medications like ketoconazole, itraconazole, or antibiotics clarithromycin   -- rescue with 1-2 tablets of Fioricet (no more than 10 pills per month) if the above was ineffective   -- continue phenergan suppository for nausea   Take Toradol 30mg injection at home for severe escalations (if imitrex failed to relieve)

## 2025-03-26 DIAGNOSIS — M51.369 DDD (DEGENERATIVE DISC DISEASE), LUMBAR: ICD-10-CM

## 2025-03-26 NOTE — TELEPHONE ENCOUNTER
No care due was identified.  [facility] Embedded Care Due Messages. Reference number: [id number].   3/26/2025 5:14:56 PM CDT

## 2025-03-27 DIAGNOSIS — M51.369 DDD (DEGENERATIVE DISC DISEASE), LUMBAR: ICD-10-CM

## 2025-03-27 RX ORDER — HYDROCODONE BITARTRATE AND ACETAMINOPHEN 7.5; 325 MG/1; MG/1
1 TABLET ORAL EVERY 4 HOURS PRN
Qty: 120 TABLET | Refills: 0 | Status: SHIPPED | OUTPATIENT
Start: 2025-03-27

## 2025-03-27 RX ORDER — HYDROCODONE BITARTRATE AND ACETAMINOPHEN 7.5; 325 MG/1; MG/1
1 TABLET ORAL EVERY 4 HOURS PRN
Qty: 120 TABLET | Refills: 0 | Status: SHIPPED | OUTPATIENT
Start: 2025-03-27 | End: 2025-03-27 | Stop reason: SDUPTHER

## 2025-03-27 NOTE — TELEPHONE ENCOUNTER
No care due was identified.  [facility] Embedded Care Due Messages. Reference number: [id number].   3/27/2025 2:52:02 PM CDT

## 2025-03-28 ENCOUNTER — OFFICE VISIT (OUTPATIENT)
Dept: FAMILY MEDICINE | Facility: CLINIC | Age: 50
End: 2025-03-28
Payer: MEDICARE

## 2025-03-28 DIAGNOSIS — F90.2 ATTENTION DEFICIT HYPERACTIVITY DISORDER (ADHD), COMBINED TYPE: ICD-10-CM

## 2025-03-28 DIAGNOSIS — F41.9 ANXIETY: ICD-10-CM

## 2025-03-28 DIAGNOSIS — M51.362 DEGENERATION OF INTERVERTEBRAL DISC OF LUMBAR REGION WITH DISCOGENIC BACK PAIN AND LOWER EXTREMITY PAIN: Primary | ICD-10-CM

## 2025-03-28 DIAGNOSIS — F33.42 RECURRENT MAJOR DEPRESSIVE DISORDER, IN FULL REMISSION: ICD-10-CM

## 2025-03-28 NOTE — PROGRESS NOTES
The patient location is: [place]  The chief complaint leading to consultation is: chronic pain    Visit type: audiovisual    Notes:    HPI  Patient presents for regular check up.     Review of Systems   Constitutional:  Negative for activity change and unexpected weight change.   HENT:  Negative for hearing loss, rhinorrhea and trouble swallowing.    Eyes:  Negative for discharge and visual disturbance.   Respiratory:  Negative for chest tightness and wheezing.    Cardiovascular:  Negative for chest pain and palpitations.   Gastrointestinal:  Negative for blood in stool, constipation, diarrhea and vomiting.   Endocrine: Negative for polydipsia and polyuria.   Genitourinary:  Negative for difficulty urinating, dysuria, hematuria and menstrual problem.   Musculoskeletal:  Positive for neck pain. Negative for arthralgias and joint swelling.   Neurological:  Positive for headaches. Negative for weakness.   Psychiatric/Behavioral:  Negative for confusion and dysphoric mood.        Objective:  Physical Exam    Assessment & Plan  Degeneration of intervertebral disc of lumbar region with discogenic back pain and lower extremity pain  Controlled. Continue cyclobenzaprine and hydrocodone.        Recurrent major depressive disorder, in full remission  Anxiety  Controlled. Continue venlafaxine.        Attention deficit hyperactivity disorder (ADHD), combined type  Controlled. Continue Vyvanse.          Follow up in 3 months.     Face to Face time with patient: 4 minutes  7 minutes of total time spent on the encounter, which includes face to face time and non-face to face time preparing to see the patient (eg, review of tests), Obtaining and/or reviewing separately obtained history, Documenting clinical information in the electronic or other health record, Independently interpreting results (not separately reported) and communicating results to the patient/family/caregiver, or Care coordination (not separately reported).      Each patient to whom he or she provides medical services by telemedicine is:  (1) informed of the relationship between the physician and patient and the respective role of any other health care provider with respect to management of the patient; and (2) notified that he or she may decline to receive medical services by telemedicine and may withdraw from such care at any time.

## 2025-04-08 DIAGNOSIS — D50.8 IRON DEFICIENCY ANEMIA DUE TO DIETARY CAUSES: Primary | ICD-10-CM

## 2025-04-25 ENCOUNTER — PATIENT MESSAGE (OUTPATIENT)
Dept: NEUROLOGY | Facility: CLINIC | Age: 50
End: 2025-04-25
Payer: MEDICARE

## 2025-04-25 DIAGNOSIS — M51.369 DDD (DEGENERATIVE DISC DISEASE), LUMBAR: ICD-10-CM

## 2025-04-25 RX ORDER — HYDROCODONE BITARTRATE AND ACETAMINOPHEN 7.5; 325 MG/1; MG/1
1 TABLET ORAL EVERY 4 HOURS PRN
Qty: 120 TABLET | Refills: 0 | Status: SHIPPED | OUTPATIENT
Start: 2025-04-25

## 2025-04-25 NOTE — TELEPHONE ENCOUNTER
Care Due:                  Date            Visit Type   Department     Provider  --------------------------------------------------------------------------------                                ESTABLISHED                              PATIENT -    [facility]  Last Visit: 03-      VIRTUAL      MEDICINE       Jose Oconnell                               -                              PRIMARY      [facility]  Next Visit: 07-      CARE ([facility])   MEDICINE       Jose Oconnell                                                            Last  Test          Frequency    Reason                     Performed    Due Date  --------------------------------------------------------------------------------    TSH.........  12 months..  levothyroxine............  07- 06-    [facility] Embedded Care Due Messages. Reference number: [id number].   4/25/2025 2:20:34 PM CDT

## 2025-05-12 DIAGNOSIS — F90.2 ATTENTION DEFICIT HYPERACTIVITY DISORDER (ADHD), COMBINED TYPE: ICD-10-CM

## 2025-05-12 NOTE — TELEPHONE ENCOUNTER
No care due was identified.  [facility] Embedded Care Due Messages. Reference number: [id number].   5/12/2025 2:51:05 PM CDT

## 2025-05-13 RX ORDER — LISDEXAMFETAMINE DIMESYLATE 40 MG/1
40 CAPSULE ORAL EVERY MORNING
Qty: 30 CAPSULE | Refills: 0 | Status: SHIPPED | OUTPATIENT
Start: 2025-05-13

## 2025-05-14 DIAGNOSIS — F41.9 ANXIETY: ICD-10-CM

## 2025-05-14 DIAGNOSIS — F33.9 RECURRENT MAJOR DEPRESSIVE DISORDER, REMISSION STATUS UNSPECIFIED: ICD-10-CM

## 2025-05-14 DIAGNOSIS — E03.9 HYPOTHYROIDISM, UNSPECIFIED TYPE: ICD-10-CM

## 2025-05-14 RX ORDER — VENLAFAXINE HYDROCHLORIDE 150 MG/1
150 CAPSULE, EXTENDED RELEASE ORAL
Qty: 90 CAPSULE | Refills: 2 | Status: SHIPPED | OUTPATIENT
Start: 2025-05-14

## 2025-05-14 RX ORDER — LEVOTHYROXINE SODIUM 75 UG/1
75 TABLET ORAL
Qty: 90 TABLET | Refills: 0 | Status: SHIPPED | OUTPATIENT
Start: 2025-05-14

## 2025-05-14 NOTE — TELEPHONE ENCOUNTER
No care due was identified.  [facility] Embedded Care Due Messages. Reference number: [id number].   5/14/2025 12:12:02 AM CDT

## 2025-05-14 NOTE — TELEPHONE ENCOUNTER
Refill Decision Note   Isela Smyth  is requesting a refill authorization.  Brief Assessment and Rationale for Refill:  Approve     Medication Therapy Plan:         Comments:     Note composed:12:38 AM 05/14/2025

## 2025-05-20 DIAGNOSIS — M51.369 DDD (DEGENERATIVE DISC DISEASE), LUMBAR: ICD-10-CM

## 2025-05-20 NOTE — TELEPHONE ENCOUNTER
No care due was identified.  [facility] Embedded Care Due Messages. Reference number: [id number].   5/20/2025 6:25:16 PM CDT

## 2025-05-21 RX ORDER — HYDROCODONE BITARTRATE AND ACETAMINOPHEN 7.5; 325 MG/1; MG/1
1 TABLET ORAL EVERY 4 HOURS PRN
Qty: 120 TABLET | Refills: 0 | Status: SHIPPED | OUTPATIENT
Start: 2025-05-21

## 2025-05-23 ENCOUNTER — PATIENT MESSAGE (OUTPATIENT)
Dept: FAMILY MEDICINE | Facility: CLINIC | Age: 50
End: 2025-05-23
Payer: MEDICARE

## 2025-05-23 DIAGNOSIS — M51.369 DDD (DEGENERATIVE DISC DISEASE), LUMBAR: ICD-10-CM

## 2025-05-23 RX ORDER — HYDROCODONE BITARTRATE AND ACETAMINOPHEN 7.5; 325 MG/1; MG/1
1 TABLET ORAL EVERY 4 HOURS PRN
Qty: 120 TABLET | Refills: 0 | Status: CANCELLED | OUTPATIENT
Start: 2025-05-23

## 2025-05-23 NOTE — TELEPHONE ENCOUNTER
No care due was identified.  [facility] Embedded Care Due Messages. Reference number: [id number].   5/23/2025 9:18:07 AM CDT

## 2025-05-30 DIAGNOSIS — R53.83 OTHER FATIGUE: ICD-10-CM

## 2025-05-30 DIAGNOSIS — M85.9 DISORDER OF BONE DENSITY AND STRUCTURE, UNSPECIFIED: ICD-10-CM

## 2025-05-30 DIAGNOSIS — E53.8 VITAMIN B12 DEFICIENCY: Primary | ICD-10-CM

## 2025-06-04 ENCOUNTER — PATIENT MESSAGE (OUTPATIENT)
Dept: HEMATOLOGY/ONCOLOGY | Facility: CLINIC | Age: 50
End: 2025-06-04
Payer: MEDICARE

## 2025-06-09 ENCOUNTER — LAB VISIT (OUTPATIENT)
Dept: LAB | Facility: HOSPITAL | Age: 50
End: 2025-06-09
Attending: NURSE PRACTITIONER
Payer: MEDICARE

## 2025-06-09 DIAGNOSIS — E53.8 VITAMIN B12 DEFICIENCY: ICD-10-CM

## 2025-06-09 DIAGNOSIS — D50.8 IRON DEFICIENCY ANEMIA DUE TO DIETARY CAUSES: ICD-10-CM

## 2025-06-09 DIAGNOSIS — R53.83 OTHER FATIGUE: ICD-10-CM

## 2025-06-09 DIAGNOSIS — M85.9 DISORDER OF BONE DENSITY AND STRUCTURE, UNSPECIFIED: ICD-10-CM

## 2025-06-09 DIAGNOSIS — F90.2 ATTENTION DEFICIT HYPERACTIVITY DISORDER (ADHD), COMBINED TYPE: ICD-10-CM

## 2025-06-09 LAB
25(OH)D3+25(OH)D2 SERPL-MCNC: 46 NG/ML (ref 30–96)
ABSOLUTE EOSINOPHIL (OHS): 0.16 K/UL
ABSOLUTE MONOCYTE (OHS): 0.49 K/UL (ref 0.3–1)
ABSOLUTE NEUTROPHIL COUNT (OHS): 4.12 K/UL (ref 1.8–7.7)
BASOPHILS # BLD AUTO: 0.05 K/UL
BASOPHILS NFR BLD AUTO: 0.7 %
ERYTHROCYTE [DISTWIDTH] IN BLOOD BY AUTOMATED COUNT: 11.5 % (ref 11.5–14.5)
FERRITIN SERPL-MCNC: 71 NG/ML (ref 20–300)
HCT VFR BLD AUTO: 43.1 % (ref 37–48.5)
HGB BLD-MCNC: 15 GM/DL (ref 12–16)
IMM GRANULOCYTES # BLD AUTO: 0 K/UL (ref 0–0.04)
IMM GRANULOCYTES NFR BLD AUTO: 0 % (ref 0–0.5)
IRON SATN MFR SERPL: 32 % (ref 20–50)
IRON SERPL-MCNC: 135 UG/DL (ref 30–160)
LYMPHOCYTES # BLD AUTO: 1.91 K/UL (ref 1–4.8)
MCH RBC QN AUTO: 31.6 PG (ref 27–31)
MCHC RBC AUTO-ENTMCNC: 34.8 G/DL (ref 32–36)
MCV RBC AUTO: 91 FL (ref 82–98)
NUCLEATED RBC (/100WBC) (OHS): 0 /100 WBC
PLATELET # BLD AUTO: 305 K/UL (ref 150–450)
PMV BLD AUTO: 9.6 FL (ref 9.2–12.9)
RBC # BLD AUTO: 4.74 M/UL (ref 4–5.4)
RELATIVE EOSINOPHIL (OHS): 2.4 %
RELATIVE LYMPHOCYTE (OHS): 28.4 % (ref 18–48)
RELATIVE MONOCYTE (OHS): 7.3 % (ref 4–15)
RELATIVE NEUTROPHIL (OHS): 61.2 % (ref 38–73)
TIBC SERPL-MCNC: 416 UG/DL (ref 250–450)
TRANSFERRIN SERPL-MCNC: 281 MG/DL (ref 200–375)
VIT B12 SERPL-MCNC: 576 PG/ML (ref 210–950)
WBC # BLD AUTO: 6.73 K/UL (ref 3.9–12.7)

## 2025-06-09 PROCEDURE — 83540 ASSAY OF IRON: CPT

## 2025-06-09 PROCEDURE — 82607 VITAMIN B-12: CPT

## 2025-06-09 PROCEDURE — 82728 ASSAY OF FERRITIN: CPT

## 2025-06-09 PROCEDURE — 82306 VITAMIN D 25 HYDROXY: CPT

## 2025-06-09 PROCEDURE — 85025 COMPLETE CBC W/AUTO DIFF WBC: CPT | Mod: PN

## 2025-06-09 PROCEDURE — 36415 COLL VENOUS BLD VENIPUNCTURE: CPT | Mod: PN

## 2025-06-09 NOTE — TELEPHONE ENCOUNTER
No care due was identified.  [facility] Embedded Care Due Messages. Reference number: [id number].   6/09/2025 4:08:08 PM CDT

## 2025-06-10 RX ORDER — LISDEXAMFETAMINE DIMESYLATE 40 MG/1
40 CAPSULE ORAL EVERY MORNING
Qty: 30 CAPSULE | Refills: 0 | Status: SHIPPED | OUTPATIENT
Start: 2025-06-10

## 2025-06-11 ENCOUNTER — OFFICE VISIT (OUTPATIENT)
Dept: HEMATOLOGY/ONCOLOGY | Facility: CLINIC | Age: 50
End: 2025-06-11
Payer: MEDICARE

## 2025-06-11 VITALS
SYSTOLIC BLOOD PRESSURE: 168 MMHG | RESPIRATION RATE: 18 BRPM | DIASTOLIC BLOOD PRESSURE: 110 MMHG | BODY MASS INDEX: 21.97 KG/M2 | HEART RATE: 100 BPM | WEIGHT: 140 LBS | TEMPERATURE: 97 F | OXYGEN SATURATION: 100 % | HEIGHT: 67 IN

## 2025-06-11 DIAGNOSIS — E55.9 VITAMIN D DEFICIENCY: ICD-10-CM

## 2025-06-11 DIAGNOSIS — R03.0 ELEVATED BLOOD PRESSURE READING WITHOUT DIAGNOSIS OF HYPERTENSION: ICD-10-CM

## 2025-06-11 DIAGNOSIS — E53.8 VITAMIN B12 DEFICIENCY: ICD-10-CM

## 2025-06-11 DIAGNOSIS — D50.8 IRON DEFICIENCY ANEMIA DUE TO DIETARY CAUSES: Primary | ICD-10-CM

## 2025-06-11 PROCEDURE — 1159F MED LIST DOCD IN RCRD: CPT | Mod: CPTII,S$GLB,, | Performed by: NURSE PRACTITIONER

## 2025-06-11 PROCEDURE — 99999 PR PBB SHADOW E&M-EST. PATIENT-LVL IV: CPT | Mod: PBBFAC,,, | Performed by: NURSE PRACTITIONER

## 2025-06-11 PROCEDURE — 3077F SYST BP >= 140 MM HG: CPT | Mod: CPTII,S$GLB,, | Performed by: NURSE PRACTITIONER

## 2025-06-11 PROCEDURE — 3008F BODY MASS INDEX DOCD: CPT | Mod: CPTII,S$GLB,, | Performed by: NURSE PRACTITIONER

## 2025-06-11 PROCEDURE — 3044F HG A1C LEVEL LT 7.0%: CPT | Mod: CPTII,S$GLB,, | Performed by: NURSE PRACTITIONER

## 2025-06-11 PROCEDURE — 99214 OFFICE O/P EST MOD 30 MIN: CPT | Mod: S$GLB,,, | Performed by: NURSE PRACTITIONER

## 2025-06-11 PROCEDURE — 3080F DIAST BP >= 90 MM HG: CPT | Mod: CPTII,S$GLB,, | Performed by: NURSE PRACTITIONER

## 2025-06-11 RX ORDER — PROMETHAZINE HYDROCHLORIDE 25 MG/1
25 TABLET ORAL EVERY 6 HOURS PRN
COMMUNITY
Start: 2025-02-09

## 2025-06-11 RX ORDER — SUMATRIPTAN SUCCINATE 100 MG/1
TABLET ORAL
COMMUNITY
Start: 2025-05-11

## 2025-06-11 RX ORDER — ACYCLOVIR 800 MG/1
TABLET ORAL
COMMUNITY
Start: 2025-05-30

## 2025-06-11 RX ORDER — CYANOCOBALAMIN 1000 UG/ML
INJECTION, SOLUTION INTRAMUSCULAR; SUBCUTANEOUS
Qty: 30 ML | Refills: 4 | Status: SHIPPED | OUTPATIENT
Start: 2025-06-11

## 2025-06-11 NOTE — PROGRESS NOTES
PATIENT: Isela Smyth  MRN: [id number]  DATE: 6/11/2025    Diagnosis:   1. Iron deficiency anemia due to dietary causes    2. Vitamin B12 deficiency      Chief Complaint: Review of labs      Subjective:   HPI: Ms. Smyth is a 50 y.o. female with history of migraine headaches, Depression, hypothyroidism, GERD, history of iron deficiency anemia, s/p gastric surgery who presents for evaluation of labs for dx of LUIS ENRIQUE.  Since our last visit 10/17/24, she reports that she has been taking oral iron twice a week with Vitamin C without difficulties.  She endorses incorporating foods high in iron in her diet. She has been compliant with monthly B12 injections & daily Vitamin D.  Was admitted overnight for elevated BP after surgery in February & placed on Metoprolol.  BP today 162/121 & 168/110.  Discussed maintaining a log & f/u with PCP, Dr. Oconnell. She denies any fatigue, SOB, CP, palpitations, HA's, cold extremities, pica, bleeding, melena, etc.  Labs reviewed.    History:  Patient has been seen in this clinic in 2019 by Dr. Rudd for the same diagnosis, was taking oral iron supplements at that time.     History of lap band that was removed. No other gastric surgeries.     Review of records shows a decline in Hgb that initially started in 2018, had a GI work-up in August 2018 with upper EGD and colonoscopy that showed diverticulosis and gastritis.      01/29/2020 Upper GI showed normal esophagus with gastritis    Early 2032 she had experienced kidney stones with hematuria. Menstrual bleeding has become irregular with periods lasting 7-10 days. She also admits that she does not always eat a balanced diet; she is not vegetarian.   Was feeling more fatigued with myalgias and reached out to her primary physician.   3/16/23 of this year her Hgb was 8 g/dL. Patient started PO iron supplements at that time though she has not taken them consistently.     Past Medical History:   Past Medical History:   Diagnosis Date    Arthritis      Chronic lumbar pain     Depression     Deviated nasal septum     Diverticulosis     Hemorrhoids     Kidney stone     passed 10 stones by herself over the years, one needed procedure    Melena 09/05/2018    Migraine headache     PONV (postoperative nausea and vomiting)     severe    Right sided abdominal pain     Urticaria        Past Surgical HIstory:   Past Surgical History:   Procedure Laterality Date    AUGMENTATION OF BREAST      BREAST SURGERY      CHOLECYSTECTOMY      COLONOSCOPY  prior to 2011    COLONOSCOPY N/A 09/26/2018    Dr. Zuluaga; diverticulosis; repeat in 10 years    COLONOSCOPY N/A 11/01/2019    Procedure: COLONOSCOPY;  Surgeon: Marquis Zuluaga MD;  Location: [facility];  Service: Endoscopy;  Laterality: N/A;    ESOPHAGOGASTRODUODENOSCOPY N/A 09/05/2018    Procedure: EGD (ESOPHAGOGASTRODUODENOSCOPY);  Surgeon: Marquis Zuluaga MD;  Location: [facility];  Service: Endoscopy;  Laterality: N/A;    ESOPHAGOGASTRODUODENOSCOPY N/A 01/29/2020    Procedure: EGD (ESOPHAGOGASTRODUODENOSCOPY);  Surgeon: Marquis Zuluaga MD;  Location: [facility];  Service: Endoscopy;  Laterality: N/A;    HIATAL HERNIA REPAIR      KIDNEY STONE SURGERY  2009    [facility], had stone removed by dr renay george, stayed in hospital 4 days    LAPAROSCOPIC GASTRIC BANDING  2007    later removed in 2016    LAPAROSCOPIC SALPINGO-OOPHORECTOMY Right 02/12/2020    Procedure: SALPINGO-OOPHORECTOMY, LAPAROSCOPIC;  Surgeon: Nely Martinez MD;  Location: [facility];  Service: OB/GYN;  Laterality: Right;    LUMBAR EPIDURAL INJECTION      SEPTOPLASTY, NOSE, WITH NASAL TURBINATE REDUCTION      SPINE SURGERY      6 back surgeries; 1 neck surgery    TUBAL LIGATION      UPPER GASTROINTESTINAL ENDOSCOPY  03/2013    Dr. Zuluaga    UPPER GASTROINTESTINAL ENDOSCOPY  09/14/2016    Dr. Zuluaga    UPPER GASTROINTESTINAL ENDOSCOPY  09/05/2018    Dr. Zuluaga; gastritis; bx negative    VAGINAL DELIVERY      times 3       Family History:    Family History   Problem Relation Name Age of Onset    Ulcers Mother      No Known Problems Father      Cancer Neg Hx      Heart disease Neg Hx      Hypertension Neg Hx      Diabetes Neg Hx      Angioedema Neg Hx      Allergies Neg Hx      Allergic rhinitis Neg Hx      Asthma Neg Hx      Eczema Neg Hx      Immunodeficiency Neg Hx      Colon cancer Neg Hx      Colon polyps Neg Hx      Crohn's disease Neg Hx      Ulcerative colitis Neg Hx      Nephrolithiasis Neg Hx      Stomach cancer Neg Hx      Esophageal cancer Neg Hx         Social History:  reports that she has never smoked. She has never used smokeless tobacco. She reports current alcohol use. She reports that she does not use drugs.    Allergies:  Review of patient's allergies indicates:   Allergen Reactions    Morphine Itching and Rash    Gabapentin Other (See Comments)     Coming out of skin       Medications:  Current Outpatient Medications   Medication Sig Dispense Refill    cyanocobalamin 1,000 mcg/mL injection Start with 1ml IM weekly x 4, then monthly 30 mL 4    cyclobenzaprine (FLEXERIL) 10 MG tablet TAKE 1 TABLET(10 MG) BY MOUTH THREE TIMES DAILY AS NEEDED FOR MUSCLE SPASMS 90 tablet 5    erenumab-aooe (AIMOVIG AUTOINJECTOR) 140 mg/mL autoinjector Inject 1 mL (140 mg total) into the skin every 28 days. 1 mL 11    HYDROcodone-acetaminophen (NORCO) 7.5-325 mg per tablet Take 1 tablet by mouth every 4 (four) hours as needed for Pain. 120 tablet 0    ketorolac (TORADOL) 30 mg/mL (1 mL) injection INJECT 1 ML INTO THE MUSCLE 2 TIMES DAILY AS NEEDED FOR SEVERE MIGRAINE. NO MORE THAN 2 DAYS/WEEK 20 mL 0    levothyroxine (SYNTHROID) 75 MCG tablet TAKE 1 TABLET BY MOUTH EVERY DAY 90 tablet 0    lisdexamfetamine (VYVANSE) 40 MG Cap Take 1 capsule (40 mg total) by mouth every morning. 30 capsule 0    rizatriptan (MAXALT) 10 MG tablet 1 tab PO PRN migraine. May repeat every 2 hours for max 3 tabs in 24 hours. Use no more than 10 days per month. 9 tablet 11     "traZODone (DESYREL) 150 MG tablet Take 1 tablet (150 mg total) by mouth nightly as needed for Insomnia. 90 tablet 3    ubrogepant (UBRELVY) 100 mg tablet Take 1 tablet by mouth as needed for migraine. May repeat in 2 hours if needed. Max 2 tablet per day 10 tablet 11    venlafaxine (EFFEXOR-XR) 150 MG Cp24 TAKE 1 CAPSULE BY MOUTH EVERY DAY 90 capsule 2     Current Facility-Administered Medications   Medication Dose Route Frequency Provider Last Rate Last Admin    onabotulinumtoxina injection 200 Units  200 Units Intramuscular q12 weeks Josiane Amaro, NP   200 Units at 12/18/24 1531       Review of Systems   Constitutional:  Negative for appetite change, fatigue, fever and unexpected weight change.   HENT:  Negative for mouth sores, sore throat and trouble swallowing.    Eyes:  Negative for visual disturbance.   Respiratory:  Negative for cough and shortness of breath.    Cardiovascular:  Negative for chest pain, palpitations and leg swelling.   Gastrointestinal:  Negative for abdominal pain, blood in stool, constipation, diarrhea, nausea and vomiting.   Genitourinary:  Negative for dysuria, frequency and hematuria.   Musculoskeletal:  Positive for back pain. Negative for myalgias and neck pain.   Skin:  Negative for rash.   Neurological:  Negative for light-headedness and headaches.   Hematological:  Negative for adenopathy.   Psychiatric/Behavioral:  The patient is not nervous/anxious.        Objective:        Vitals:    06/11/25 1535   BP: (!) 168/110   BP Location: Left arm   Patient Position: Sitting   Pulse: 100   Resp: 18   Temp: 97.3 °F (36.3 °C)   TempSrc: Temporal   SpO2: 100%   Weight: 63.5 kg (139 lb 15.9 oz)   Height: 5' 7" (1.702 m)         Physical Exam  Vitals reviewed.   Constitutional:       General: She is not in acute distress.  HENT:      Head: Normocephalic and atraumatic.   Eyes:      Conjunctiva/sclera: Conjunctivae normal.   Cardiovascular:      Rate and Rhythm: Normal rate and regular " rhythm.      Pulses: Normal pulses.      Heart sounds: Normal heart sounds.   Pulmonary:      Effort: Pulmonary effort is normal. No respiratory distress.      Breath sounds: Normal breath sounds. No wheezing.   Abdominal:      General: Bowel sounds are normal. There is no distension.      Palpations: Abdomen is soft.      Tenderness: There is no abdominal tenderness.   Skin:     General: Skin is warm and dry.   Neurological:      Mental Status: She is alert and oriented to person, place, and time.   Psychiatric:         Mood and Affect: Mood normal.         Behavior: Behavior normal.         Thought Content: Thought content normal.         Laboratory Data:  Lab Results   Component Value Date    WBC 6.73 06/09/2025    HGB 15.0 06/09/2025    HCT 43.1 06/09/2025    MCV 91 06/09/2025     06/09/2025      Lab Results   Component Value Date    IRON 135 06/09/2025    TRANSFERRIN 281 06/09/2025    TIBC 416 06/09/2025    LABIRON 32 06/09/2025    FESATURATED 25 01/15/2025      Lab Results   Component Value Date    FERRITIN 71.0 06/09/2025           Component  Ref Range & Units (hover) 2 d ago 4 mo ago 8 mo ago   Vitamin D 46 35 CM 39 CM                  Component  Ref Range & Units (hover) 2 d ago  (6/9/25) 4 mo ago  (1/15/25) 8 mo ago  (10/14/24) 1 yr ago  (6/5/24) 1 yr ago  (1/4/24) 1 yr ago  (7/25/23) 6 yr ago  (11/26/18)   Vitamin B12 576 548 581 160 Low  314 162 Low  237            Imaging:   Assessment:       1. Iron deficiency anemia due to dietary causes    2. Vitamin B12 deficiency    3. Elevated blood pressure reading without diagnosis of hypertension    4. Vitamin D deficiency         Plan:   Iron deficiency anemia   -Discussed possible reasons to include blood loss with hematuria, menorrhagia; nutritional, malabsorption  -Improved Hgb at 12.8 g/dL  -Encouraged to take PO supplements daily   -Follow up in 4 months with CBC, Ferritin, Iron/TIBC a week prior to appointment   06/07/2024:  Restart oral iron with  Vitamin C; f/u in 4 months with CBC, Iron studies/ferritin prior.  10/17/24:  Decrease oral iron with Vitamin C to twice a week; f/u in 4 months with CBC, iron studies/ferritin/b12/vitamin D.  06/11/25:  Stable; asymptomatic; stop oral iron/C & start MVI daily; re-evaluate in 4 months with labs 1-2 days prior:  CBC, Iron studies/ferritin.      B12 deficiency   -7/25/23 B12 level is low at 162  -Will start B12 injections   -Repeat B12 level at next visit   06/07/24:  Restart B12 weekly x 4; then monthly; recheck B12 level in 4 months  10/17/24:  Stable; continue monthly injections.  06/11/25:  Stable; continue monthly injections    Vitamin D deficiency  10/17/24:  level @ low normal; increase Vitamin D3 to 2,000 iu/day  06/11/25:  Stable @ 46; continue Vitamin D3    Elevated BP without a diagnosis of HTN  06/11/25:  Start log & f/u with PCP asap.      Patient queried and all questions were answered.   Assessment/Plan reviewed and approved by Dr. Trimble       30 minutes were spent in coordination of patient's care, record review and counseling.  FERNANDO Carrington, FNP-C  [facility]    Route Chart for Scheduling    Med Onc Chart Routing      Follow up with physician    Follow up with LUPE 4 months. f/u in 4 months with labs 1-2 days prior:  CBC, iron studies/ferritin   Infusion scheduling note    Injection scheduling note    Labs    Imaging    Pharmacy appointment    Other referrals

## 2025-06-19 DIAGNOSIS — M51.369 DDD (DEGENERATIVE DISC DISEASE), LUMBAR: ICD-10-CM

## 2025-06-19 NOTE — TELEPHONE ENCOUNTER
No care due was identified.  [facility] Embedded Care Due Messages. Reference number: [id number].   6/19/2025 6:18:59 PM CDT

## 2025-06-20 RX ORDER — HYDROCODONE BITARTRATE AND ACETAMINOPHEN 7.5; 325 MG/1; MG/1
1 TABLET ORAL EVERY 4 HOURS PRN
Qty: 120 TABLET | Refills: 0 | Status: SHIPPED | OUTPATIENT
Start: 2025-06-20

## 2025-06-24 ENCOUNTER — PATIENT MESSAGE (OUTPATIENT)
Dept: FAMILY MEDICINE | Facility: CLINIC | Age: 50
End: 2025-06-24
Payer: MEDICARE

## 2025-07-01 ENCOUNTER — PATIENT MESSAGE (OUTPATIENT)
Dept: FAMILY MEDICINE | Facility: CLINIC | Age: 50
End: 2025-07-01
Payer: MEDICARE

## 2025-07-07 ENCOUNTER — OFFICE VISIT (OUTPATIENT)
Dept: FAMILY MEDICINE | Facility: CLINIC | Age: 50
End: 2025-07-07
Payer: MEDICARE

## 2025-07-07 DIAGNOSIS — F41.9 ANXIETY: ICD-10-CM

## 2025-07-07 DIAGNOSIS — G43.711 INTRACTABLE CHRONIC MIGRAINE WITHOUT AURA AND WITH STATUS MIGRAINOSUS: Primary | ICD-10-CM

## 2025-07-07 DIAGNOSIS — R03.0 ELEVATED BLOOD PRESSURE READING WITHOUT DIAGNOSIS OF HYPERTENSION: Primary | ICD-10-CM

## 2025-07-07 DIAGNOSIS — G89.29 CHRONIC LOW BACK PAIN WITH SCIATICA, SCIATICA LATERALITY UNSPECIFIED, UNSPECIFIED BACK PAIN LATERALITY: ICD-10-CM

## 2025-07-07 DIAGNOSIS — M54.40 CHRONIC LOW BACK PAIN WITH SCIATICA, SCIATICA LATERALITY UNSPECIFIED, UNSPECIFIED BACK PAIN LATERALITY: ICD-10-CM

## 2025-07-07 DIAGNOSIS — F90.2 ATTENTION DEFICIT HYPERACTIVITY DISORDER (ADHD), COMBINED TYPE: ICD-10-CM

## 2025-07-07 DIAGNOSIS — E03.9 ACQUIRED HYPOTHYROIDISM: ICD-10-CM

## 2025-07-07 PROCEDURE — 98006 SYNCH AUDIO-VIDEO EST MOD 30: CPT | Mod: 95,,, | Performed by: STUDENT IN AN ORGANIZED HEALTH CARE EDUCATION/TRAINING PROGRAM

## 2025-07-07 PROCEDURE — 3044F HG A1C LEVEL LT 7.0%: CPT | Mod: CPTII,95,, | Performed by: STUDENT IN AN ORGANIZED HEALTH CARE EDUCATION/TRAINING PROGRAM

## 2025-07-07 NOTE — PROGRESS NOTES
The patient location is: [place]  The chief complaint leading to consultation is: Pain, anxiety    Visit type: audiovisual    Notes:    HPI  Patient presents for regular follow up.     Review of Systems   Constitutional:  Negative for activity change and unexpected weight change.   HENT:  Negative for hearing loss, rhinorrhea and trouble swallowing.    Eyes:  Negative for discharge and visual disturbance.   Respiratory:  Negative for chest tightness and wheezing.    Cardiovascular:  Negative for chest pain and palpitations.   Gastrointestinal:  Negative for blood in stool, constipation, diarrhea and vomiting.   Endocrine: Negative for polydipsia and polyuria.   Genitourinary:  Negative for difficulty urinating, dysuria, hematuria and menstrual problem.   Musculoskeletal:  Positive for neck pain. Negative for arthralgias and joint swelling.   Neurological:  Positive for headaches. Negative for weakness.   Psychiatric/Behavioral:  Negative for confusion and dysphoric mood.        Objective:  Physical Exam  Constitutional:       Appearance: Normal appearance.   HENT:      Head: Normocephalic and atraumatic.   Pulmonary:      Effort: Pulmonary effort is normal. No respiratory distress.   Neurological:      Mental Status: She is alert and oriented to person, place, and time. Mental status is at baseline.   Psychiatric:         Mood and Affect: Mood normal.         Behavior: Behavior normal.         Thought Content: Thought content normal.         Judgment: Judgment normal.         1. Elevated blood pressure reading without diagnosis of hypertension  Assessment & Plan:  Patient has had several elevated blood pressures since I last saw her. Her next visit will need to be in-person so I can confirm the presence of hypertension and treat it appropriately.      2. Acquired hypothyroidism  Assessment & Plan:  TSH from February was within normal limits. Continue current dose of levothyroxine.      3. Chronic low back pain  with sciatica, sciatica laterality unspecified, unspecified back pain laterality  Assessment & Plan:  Currently controlled with Norco and Flexaril. Continue current regimen.      4. Attention deficit hyperactivity disorder (ADHD), combined type  Assessment & Plan:  Controlled. Continue Vyvanse. See plan for elevated blood pressure.      5. Anxiety  Assessment & Plan:  Controlled. Continue current regimen.           Follow up in 3 months.     Face to Face time with patient: 4 minutes  7 minutes of total time spent on the encounter, which includes face to face time and non-face to face time preparing to see the patient (eg, review of tests), Obtaining and/or reviewing separately obtained history, Documenting clinical information in the electronic or other health record, Independently interpreting results (not separately reported) and communicating results to the patient/family/caregiver, or Care coordination (not separately reported).     Each patient to whom he or she provides medical services by telemedicine is:  (1) informed of the relationship between the physician and patient and the respective role of any other health care provider with respect to management of the patient; and (2) notified that he or she may decline to receive medical services by telemedicine and may withdraw from such care at any time.

## 2025-07-07 NOTE — ASSESSMENT & PLAN NOTE
Patient has had several elevated blood pressures since I last saw her. Her next visit will need to be in-person so I can confirm the presence of hypertension and treat it appropriately.

## 2025-07-08 RX ORDER — SUMATRIPTAN SUCCINATE 100 MG/1
TABLET ORAL
Qty: 10 TABLET | Refills: 11 | Status: SHIPPED | OUTPATIENT
Start: 2025-07-08

## 2025-07-09 ENCOUNTER — TELEPHONE (OUTPATIENT)
Dept: NEUROLOGY | Facility: CLINIC | Age: 50
End: 2025-07-09
Payer: MEDICARE

## 2025-07-10 ENCOUNTER — PATIENT MESSAGE (OUTPATIENT)
Dept: NEUROLOGY | Facility: CLINIC | Age: 50
End: 2025-07-10
Payer: MEDICARE

## 2025-07-10 DIAGNOSIS — G43.711 INTRACTABLE CHRONIC MIGRAINE WITHOUT AURA AND WITH STATUS MIGRAINOSUS: Primary | ICD-10-CM

## 2025-07-11 RX ORDER — BUTALBITAL, ACETAMINOPHEN AND CAFFEINE 50; 325; 40 MG/1; MG/1; MG/1
TABLET ORAL
Qty: 10 TABLET | Refills: 3 | Status: SHIPPED | OUTPATIENT
Start: 2025-07-11

## 2025-07-14 ENCOUNTER — TELEPHONE (OUTPATIENT)
Dept: NEUROLOGY | Facility: CLINIC | Age: 50
End: 2025-07-14
Payer: MEDICARE

## 2025-07-21 DIAGNOSIS — M51.369 DDD (DEGENERATIVE DISC DISEASE), LUMBAR: ICD-10-CM

## 2025-07-21 RX ORDER — HYDROCODONE BITARTRATE AND ACETAMINOPHEN 7.5; 325 MG/1; MG/1
1 TABLET ORAL EVERY 4 HOURS PRN
Qty: 120 TABLET | Refills: 0 | Status: SHIPPED | OUTPATIENT
Start: 2025-07-21

## 2025-07-21 NOTE — TELEPHONE ENCOUNTER
Care Due:                  Date            Visit Type   Department     Provider  --------------------------------------------------------------------------------                                ESTABLISHED                              PATIENT -    [facility]  Last Visit: 07-      VIRTUAL      MEDICINE       Jose Oconnell                               -                              PRIMARY      [facility]  Next Visit: 10-      CARE ([facility])   MEDICINE       Jose Oconnell                                                            Last  Test          Frequency    Reason                     Performed    Due Date  --------------------------------------------------------------------------------    TSH.........  12 months..  levothyroxine............  07- 06-    Health [facility] Embedded Care Due Messages. Reference number: [id number].   7/21/2025 6:41:26 AM CDT

## 2025-07-23 DIAGNOSIS — F90.2 ATTENTION DEFICIT HYPERACTIVITY DISORDER (ADHD), COMBINED TYPE: ICD-10-CM

## 2025-07-23 RX ORDER — LISDEXAMFETAMINE DIMESYLATE 40 MG/1
40 CAPSULE ORAL EVERY MORNING
Qty: 30 CAPSULE | Refills: 0 | Status: SHIPPED | OUTPATIENT
Start: 2025-07-23

## 2025-07-23 NOTE — TELEPHONE ENCOUNTER
No care due was identified.  [facility] Embedded Care Due Messages. Reference number: [id number].   7/23/2025 10:31:58 AM CDT

## 2025-07-28 ENCOUNTER — PATIENT MESSAGE (OUTPATIENT)
Dept: OBSTETRICS AND GYNECOLOGY | Facility: CLINIC | Age: 50
End: 2025-07-28
Payer: MEDICARE

## 2025-08-12 DIAGNOSIS — E03.9 HYPOTHYROIDISM, UNSPECIFIED TYPE: ICD-10-CM

## 2025-08-12 RX ORDER — LEVOTHYROXINE SODIUM 75 UG/1
75 TABLET ORAL
Qty: 90 TABLET | Refills: 3 | Status: SHIPPED | OUTPATIENT
Start: 2025-08-12

## 2025-08-18 DIAGNOSIS — M51.369 DDD (DEGENERATIVE DISC DISEASE), LUMBAR: ICD-10-CM

## 2025-08-19 RX ORDER — HYDROCODONE BITARTRATE AND ACETAMINOPHEN 7.5; 325 MG/1; MG/1
1 TABLET ORAL EVERY 4 HOURS PRN
Qty: 120 TABLET | Refills: 0 | Status: SHIPPED | OUTPATIENT
Start: 2025-08-19

## 2025-08-21 DIAGNOSIS — F90.2 ATTENTION DEFICIT HYPERACTIVITY DISORDER (ADHD), COMBINED TYPE: ICD-10-CM

## 2025-08-22 RX ORDER — LISDEXAMFETAMINE DIMESYLATE 40 MG/1
40 CAPSULE ORAL EVERY MORNING
Qty: 30 CAPSULE | Refills: 0 | Status: SHIPPED | OUTPATIENT
Start: 2025-08-22

## 2025-09-03 DIAGNOSIS — Z12.31 VISIT FOR SCREENING MAMMOGRAM: Primary | ICD-10-CM

## (undated) DEVICE — TRAY NERVE BLOCK

## (undated) DEVICE — NDL TUOHY EPIDURAL 20G X 3.5

## (undated) DEVICE — MARKER SKIN STND TIP BLUE BARR

## (undated) DEVICE — GLOVE SURGICAL LATEX SZ 7

## (undated) DEVICE — SYR GLASS 5CC LUER LOK

## (undated) DEVICE — APPLICATOR CHLORAPREP CLR 10.5